# Patient Record
Sex: FEMALE | Race: WHITE | NOT HISPANIC OR LATINO | Employment: OTHER | URBAN - METROPOLITAN AREA
[De-identification: names, ages, dates, MRNs, and addresses within clinical notes are randomized per-mention and may not be internally consistent; named-entity substitution may affect disease eponyms.]

---

## 2017-07-06 DIAGNOSIS — I65.23 BILATERAL CAROTID ARTERY OCCLUSION: Primary | ICD-10-CM

## 2017-07-20 ENCOUNTER — OFFICE VISIT (OUTPATIENT)
Dept: VASCULAR SURGERY | Facility: CLINIC | Age: 72
End: 2017-07-20
Payer: MEDICARE

## 2017-07-20 ENCOUNTER — HOSPITAL ENCOUNTER (OUTPATIENT)
Dept: VASCULAR SURGERY | Facility: CLINIC | Age: 72
Discharge: HOME | End: 2017-07-20
Attending: SURGERY
Payer: MEDICARE

## 2017-07-20 VITALS
WEIGHT: 222 LBS | TEMPERATURE: 98 F | HEIGHT: 61 IN | DIASTOLIC BLOOD PRESSURE: 73 MMHG | BODY MASS INDEX: 41.91 KG/M2 | SYSTOLIC BLOOD PRESSURE: 184 MMHG | HEART RATE: 83 BPM

## 2017-07-20 DIAGNOSIS — I65.23 BILATERAL CAROTID ARTERY OCCLUSION: ICD-10-CM

## 2017-07-20 DIAGNOSIS — I65.22 ASYMPTOMATIC STENOSIS OF LEFT CAROTID ARTERY: Primary | ICD-10-CM

## 2017-07-20 PROCEDURE — 93880 EXTRACRANIAL BILAT STUDY: CPT | Mod: S$GLB,,, | Performed by: SURGERY

## 2017-07-20 PROCEDURE — 99999 PR PBB SHADOW E&M-EST. PATIENT-LVL III: CPT | Mod: PBBFAC,,, | Performed by: SURGERY

## 2017-07-20 PROCEDURE — 1125F AMNT PAIN NOTED PAIN PRSNT: CPT | Mod: S$GLB,,, | Performed by: SURGERY

## 2017-07-20 PROCEDURE — 1159F MED LIST DOCD IN RCRD: CPT | Mod: S$GLB,,, | Performed by: SURGERY

## 2017-07-20 PROCEDURE — 1157F ADVNC CARE PLAN IN RCRD: CPT | Mod: S$GLB,,, | Performed by: SURGERY

## 2017-07-20 PROCEDURE — 99214 OFFICE O/P EST MOD 30 MIN: CPT | Mod: S$GLB,,, | Performed by: SURGERY

## 2017-07-20 RX ORDER — PEN NEEDLE, DIABETIC 30 GX 1/3"
NEEDLE, DISPOSABLE MISCELLANEOUS
COMMUNITY
Start: 2017-07-19 | End: 2017-08-24 | Stop reason: SDUPTHER

## 2017-07-20 RX ORDER — TIZANIDINE HYDROCHLORIDE 4 MG/1
4 CAPSULE, GELATIN COATED ORAL DAILY
COMMUNITY
Start: 2017-06-02 | End: 2018-03-01 | Stop reason: SDUPTHER

## 2017-07-20 RX ORDER — LEVOTHYROXINE SODIUM 75 UG/1
TABLET ORAL
COMMUNITY
Start: 2017-07-16 | End: 2017-10-12 | Stop reason: SDUPTHER

## 2017-07-20 RX ORDER — DULAGLUTIDE 1.5 MG/.5ML
INJECTION, SOLUTION SUBCUTANEOUS
COMMUNITY
Start: 2017-07-16 | End: 2017-10-12 | Stop reason: SDUPTHER

## 2017-07-20 RX ORDER — CITALOPRAM 20 MG/1
20 TABLET, FILM COATED ORAL DAILY
COMMUNITY
Start: 2017-06-02 | End: 2018-04-10 | Stop reason: SDUPTHER

## 2017-07-20 NOTE — PROGRESS NOTES
Patient ID: Melinda Knight is a 71 y.o. female.    I. HISTORY     Chief Complaint: 1 year f/u for left CEA on 3/24/16    HPI  Melinda Knight is a 71 y.o. female with DM, HTN here for 1 year f/u s/p Left CEA on 3/24/16.  She reports continued numbness to area around the left ear which persists s/p Left CEA, but no pain. No TIA, no stroke, no sudden loss of vision.  She uses a wheelchair when traveling long distances but otherwise she is ambulatory. She is taking  mg and statin.    PMH: Insulin dependent diabetes, hyperlipidemia, HTN.  Denies MI  PSH: cholecystectomy, tubal ligation  SH: Denies tobacco use    Review of Systems   Constitution: Negative.   HENT: Negative.    Eyes: Negative.    Cardiovascular: Negative.    Respiratory: Negative.    Endocrine: Negative.    Hematologic/Lymphatic: Negative.    Skin: Negative.    Musculoskeletal: Negative for muscle weakness.   Gastrointestinal: Negative.    Genitourinary: Negative.    Neurological: Positive for dizziness and light-headedness (when she stands from sitting or laying). Negative for brief paralysis, disturbances in coordination, focal weakness, loss of balance, numbness, paresthesias, seizures, sensory change and vertigo.   Psychiatric/Behavioral: Negative for altered mental status.       II. PHYSICAL EXAM     Physical Exam   Constitutional: She is oriented to person, place, and time. She appears well-developed and well-nourished. No distress.   HENT:   Head: Normocephalic and atraumatic.   Eyes: Conjunctivae and EOM are normal. No scleral icterus.   Neck: Neck supple. No JVD present. Carotid bruit is not present.   Cardiovascular: Normal rate, regular rhythm, normal heart sounds and intact distal pulses.    No murmur heard.  Pulmonary/Chest: Effort normal and breath sounds normal. No respiratory distress. She has no wheezes.   Abdominal: Soft. She exhibits no distension.   Musculoskeletal: Normal range of motion. She exhibits no edema.   Neurological:  She is alert and oriented to person, place, and time.   Skin: Skin is warm and dry.   Psychiatric: She has a normal mood and affect.          III. ASSESSMENT & PLAN (MEDICAL DECISION MAKING)     1. Asymptomatic stenosis of left carotid artery        Imaging Results:  Carotid Duplex:   R L   1-39% Endarterectomized     Assessment/Diagnosis and Plan:  Melinda Knight is a 71 y.o. female here for 1 year f/u for left CEA. Denies any history of arm or leg numbness or weakness, facial droop, vision changes or slurred speech.Remote history of left hemispheric stroke in 2003 but no intervention was offered.    1. F/u in 1 year with  Carotid duplex or sooner if issues  2. Continue ASA and statin    JOSE Mendiola, APRN, FNP-BC  Nurse Practitioner  Vascular and Endovascular Surgery        Vascular Surgery Staff  I have seen and examined the patient and reviewed the NPs note. I agree with her assessment and plan.    Melinda Knight is a 71 y.o. female here for follow up of asymptomatic carotid stenosis.   Denies any recent history of arm or leg numbness or weakness, facial droop, vision changes or slurred speech.   No significant complaints today.    Continue yearly follow up      Sofia Way MD FACS [facility]  Vascular & Endovascular Surgery

## 2017-08-25 RX ORDER — PEN NEEDLE, DIABETIC 30 GX 1/3"
NEEDLE, DISPOSABLE MISCELLANEOUS
Qty: 200 EACH | Refills: 3 | Status: SHIPPED | OUTPATIENT
Start: 2017-08-25 | End: 2018-08-15 | Stop reason: SDUPTHER

## 2017-10-12 RX ORDER — INSULIN DETEMIR 100 [IU]/ML
INJECTION, SOLUTION SUBCUTANEOUS
Qty: 15 ML | Refills: 11 | Status: SHIPPED | OUTPATIENT
Start: 2017-10-12 | End: 2018-11-03 | Stop reason: SDUPTHER

## 2017-10-12 RX ORDER — LEVOTHYROXINE SODIUM 75 UG/1
TABLET ORAL
Qty: 90 TABLET | Refills: 1 | Status: SHIPPED | OUTPATIENT
Start: 2017-10-12 | End: 2018-04-10 | Stop reason: SDUPTHER

## 2017-10-12 RX ORDER — DULAGLUTIDE 1.5 MG/.5ML
INJECTION, SOLUTION SUBCUTANEOUS
Qty: 12 SYRINGE | Refills: 1 | Status: SHIPPED | OUTPATIENT
Start: 2017-10-12 | End: 2018-03-23 | Stop reason: SDUPTHER

## 2017-10-12 RX ORDER — LOSARTAN POTASSIUM AND HYDROCHLOROTHIAZIDE 25; 100 MG/1; MG/1
TABLET ORAL
Qty: 90 TABLET | Refills: 1 | Status: SHIPPED | OUTPATIENT
Start: 2017-10-12 | End: 2017-11-15 | Stop reason: SDUPTHER

## 2017-10-12 RX ORDER — DIPHENOXYLATE HYDROCHLORIDE AND ATROPINE SULFATE 2.5; .025 MG/1; MG/1
TABLET ORAL
Qty: 30 TABLET | Refills: 0 | Status: SHIPPED | OUTPATIENT
Start: 2017-10-12 | End: 2018-07-11 | Stop reason: SDUPTHER

## 2017-10-16 ENCOUNTER — TELEPHONE (OUTPATIENT)
Dept: PRIMARY CARE CLINIC | Facility: CLINIC | Age: 72
End: 2017-10-16

## 2017-10-16 NOTE — TELEPHONE ENCOUNTER
----- Message from Monse Sims sent at 10/16/2017 12:22 PM CDT -----  Contact: daughter- Selina  Patient needs refill on 3 meds, however Selina didn't know the name of them. Please call back at [phone] (home)     [facility]  [place] - [address]  [address] [place] [address]  Phone: [phone] Fax: [phone]

## 2017-10-18 ENCOUNTER — TELEPHONE (OUTPATIENT)
Dept: PRIMARY CARE CLINIC | Facility: CLINIC | Age: 72
End: 2017-10-18

## 2017-10-18 ENCOUNTER — PATIENT MESSAGE (OUTPATIENT)
Dept: PRIMARY CARE CLINIC | Facility: CLINIC | Age: 72
End: 2017-10-18

## 2017-10-18 NOTE — TELEPHONE ENCOUNTER
Patient has been called multiple in regards to medication refills and rx being at pharmacy with no success replied to patient via portal

## 2017-10-18 NOTE — TELEPHONE ENCOUNTER
Called [facility] twice and replied through myportal, called [facility] pharmacy states trulicty is ready for  and hyzar was just filled and both rx have refills

## 2017-10-31 ENCOUNTER — PATIENT MESSAGE (OUTPATIENT)
Dept: PRIMARY CARE CLINIC | Facility: CLINIC | Age: 72
End: 2017-10-31

## 2017-11-15 ENCOUNTER — OFFICE VISIT (OUTPATIENT)
Dept: PRIMARY CARE CLINIC | Facility: CLINIC | Age: 72
End: 2017-11-15
Payer: MEDICARE

## 2017-11-15 VITALS
OXYGEN SATURATION: 98 % | SYSTOLIC BLOOD PRESSURE: 157 MMHG | HEART RATE: 78 BPM | RESPIRATION RATE: 18 BRPM | TEMPERATURE: 98 F | HEIGHT: 61 IN | WEIGHT: 225 LBS | BODY MASS INDEX: 42.48 KG/M2 | DIASTOLIC BLOOD PRESSURE: 70 MMHG

## 2017-11-15 DIAGNOSIS — Z23 NEED FOR VACCINATION: ICD-10-CM

## 2017-11-15 DIAGNOSIS — N18.30 TYPE 2 DIABETES MELLITUS WITH STAGE 3 CHRONIC KIDNEY DISEASE, WITH LONG-TERM CURRENT USE OF INSULIN: Chronic | ICD-10-CM

## 2017-11-15 DIAGNOSIS — Z79.4 TYPE 2 DIABETES MELLITUS WITH STAGE 3 CHRONIC KIDNEY DISEASE, WITH LONG-TERM CURRENT USE OF INSULIN: Chronic | ICD-10-CM

## 2017-11-15 DIAGNOSIS — E78.5 HYPERLIPIDEMIA, UNSPECIFIED HYPERLIPIDEMIA TYPE: ICD-10-CM

## 2017-11-15 DIAGNOSIS — E11.22 TYPE 2 DIABETES MELLITUS WITH STAGE 3 CHRONIC KIDNEY DISEASE, WITH LONG-TERM CURRENT USE OF INSULIN: Chronic | ICD-10-CM

## 2017-11-15 DIAGNOSIS — M15.9 POLYARTICULAR OSTEOARTHRITIS: Primary | Chronic | ICD-10-CM

## 2017-11-15 DIAGNOSIS — N18.30 CKD (CHRONIC KIDNEY DISEASE) STAGE 3, GFR 30-59 ML/MIN: ICD-10-CM

## 2017-11-15 DIAGNOSIS — I10 ESSENTIAL HYPERTENSION: ICD-10-CM

## 2017-11-15 PROCEDURE — 99499 UNLISTED E&M SERVICE: CPT | Mod: S$GLB,,, | Performed by: FAMILY MEDICINE

## 2017-11-15 PROCEDURE — 99999 PR PBB SHADOW E&M-EST. PATIENT-LVL III: CPT | Mod: PBBFAC,,, | Performed by: FAMILY MEDICINE

## 2017-11-15 PROCEDURE — G0008 ADMIN INFLUENZA VIRUS VAC: HCPCS | Mod: S$GLB,,, | Performed by: FAMILY MEDICINE

## 2017-11-15 PROCEDURE — 99214 OFFICE O/P EST MOD 30 MIN: CPT | Mod: 25,S$GLB,, | Performed by: FAMILY MEDICINE

## 2017-11-15 PROCEDURE — 90662 IIV NO PRSV INCREASED AG IM: CPT | Mod: S$GLB,,, | Performed by: FAMILY MEDICINE

## 2017-11-15 RX ORDER — HYDROCODONE BITARTRATE AND ACETAMINOPHEN 7.5; 325 MG/1; MG/1
1 TABLET ORAL EVERY 8 HOURS PRN
Qty: 60 TABLET | Refills: 0 | Status: SHIPPED | OUTPATIENT
Start: 2017-11-15 | End: 2017-12-20 | Stop reason: SDUPTHER

## 2017-11-15 RX ORDER — HYDROCODONE BITARTRATE AND ACETAMINOPHEN 5; 325 MG/1; MG/1
1 TABLET ORAL DAILY PRN
Qty: 30 TABLET | Refills: 0 | Status: CANCELLED | OUTPATIENT
Start: 2017-11-15

## 2017-11-15 RX ORDER — DICYCLOMINE HYDROCHLORIDE 20 MG/1
20 TABLET ORAL DAILY PRN
Qty: 90 TABLET | Refills: 1 | Status: SHIPPED | OUTPATIENT
Start: 2017-11-15 | End: 2018-02-14 | Stop reason: SDUPTHER

## 2017-11-15 RX ORDER — LOSARTAN POTASSIUM AND HYDROCHLOROTHIAZIDE 25; 100 MG/1; MG/1
1 TABLET ORAL DAILY
Qty: 90 TABLET | Refills: 1 | Status: SHIPPED | OUTPATIENT
Start: 2017-11-15 | End: 2017-12-12 | Stop reason: SDUPTHER

## 2017-11-15 RX ORDER — DICYCLOMINE HYDROCHLORIDE 20 MG/1
20 TABLET ORAL EVERY 6 HOURS
COMMUNITY
End: 2017-11-15 | Stop reason: SDUPTHER

## 2017-11-15 NOTE — PROGRESS NOTES
"Subjective:       Patient ID: Melinda Knight is a 72 y.o. female.    Chief Complaint: Medication Refill and Flu Vaccine    Chronic polyarticular arthritis, primarily localizing to the knees.  No recent falls or injury.  Takes about 1 pain pill a day, feels like sometimes this does not last her all day.  Requesting higher dose if possible.  Diabetes-continues to maintain good glycemic control.  Fasting blood sugars are in the low 100s.  No significant hypoglycemia or other adverse events.  Hypertension-blood pressure stable on current regimen.  No adverse side effects.  Compliant with meds.      Review of Systems   Constitutional: Negative for fever and weight loss.   HENT: Negative for trouble swallowing.    Eyes: Negative for visual disturbance.   Cardiovascular: Negative for chest pain.   Gastrointestinal: Negative for abdominal pain and bowel incontinence.   Genitourinary: Negative for bladder incontinence, dysuria, hematuria and pelvic pain.   Musculoskeletal: Positive for arthralgias and back pain.   Skin: Negative for rash.   Neurological: Positive for weakness. Negative for tingling, numbness, headaches and paresthesias.   Psychiatric/Behavioral: Negative for agitation.       Objective:      Vitals:    11/15/17 0933   BP: (!) 157/70   BP Location: Left arm   Patient Position: Sitting   BP Method: Large (Automatic)   Pulse: 78   Resp: 18   Temp: 97.8 °F (36.6 °C)   TempSrc: Oral   SpO2: 98%   Weight: 102.1 kg (225 lb)   Height: 5' 1" (1.549 m)     Physical Exam   Constitutional: She is oriented to person, place, and time. She appears well-developed and well-nourished.   HENT:   Head: Normocephalic and atraumatic.   Neck: Neck supple. No JVD present.   Cardiovascular: Normal rate, regular rhythm and normal heart sounds.    Pulmonary/Chest: Effort normal and breath sounds normal.   Abdominal: She exhibits no distension.   Musculoskeletal: She exhibits no edema.        Right knee: She exhibits decreased range of " motion. She exhibits no deformity.        Left knee: She exhibits decreased range of motion. She exhibits no effusion.   Neurological: She is alert and oriented to person, place, and time.   Skin: Skin is warm and dry.   Psychiatric: She has a normal mood and affect.   Vitals reviewed.      Assessment:       1. Polyarticular osteoarthritis Sub-optimally controlled   2. Type 2 diabetes mellitus with stage 3 chronic kidney disease, with long-term current use of insulin Stable   3. CKD (chronic kidney disease) stage 3, GFR 30-59 ml/min    4. Hyperlipidemia, unspecified hyperlipidemia type    5. Essential hypertension Well controlled   6. Need for vaccination        Plan:       Polyarticular osteoarthritis  -     hydrocodone-acetaminophen 7.5-325mg (NORCO) 7.5-325 mg per tablet; Take 1 tablet by mouth every 8 (eight) hours as needed for Pain.  Dispense: 60 tablet; Refill: 0    Type 2 diabetes mellitus with stage 3 chronic kidney disease, with long-term current use of insulin  Comments:  Continue current meds, repeat labs in 3 months  Orders:  -     Hemoglobin A1c; Future; Expected date: 02/15/2018  -     Microalbumin/creatinine urine ratio; Future; Expected date: 02/15/2018    CKD (chronic kidney disease) stage 3, GFR 30-59 ml/min  -     CBC auto differential; Future; Expected date: 02/15/2018    Hyperlipidemia, unspecified hyperlipidemia type  -     Comprehensive metabolic panel; Future; Expected date: 02/15/2018  -     Lipid panel; Future; Expected date: 02/15/2018    Essential hypertension  Comments:  Continue current meds  Orders:  -     losartan-hydrochlorothiazide 100-25 mg (HYZAAR) 100-25 mg per tablet; Take 1 tablet by mouth once daily.  Dispense: 90 tablet; Refill: 1    Need for vaccination  -     Influenza - High Dose (65+) (PF) (IM)    Other orders  -     Cancel: hydrocodone-acetaminophen 5-325mg (NORCO) 5-325 mg per tablet; Take 1 tablet by mouth daily as needed for Pain.  Dispense: 30 tablet; Refill: 0  -    "  dicyclomine (BENTYL) 20 mg tablet; Take 1 tablet (20 mg total) by mouth daily as needed (GI cramps).  Dispense: 90 tablet; Refill: 1      Medication List with Changes/Refills   New Medications    HYDROCODONE-ACETAMINOPHEN 7.5-325MG (NORCO) 7.5-325 MG PER TABLET    Take 1 tablet by mouth every 8 (eight) hours as needed for Pain.   Current Medications    AMLODIPINE (NORVASC) 10 MG TABLET    10 mg once daily.    ASPIRIN 325 MG TABLET    Take 325 mg by mouth every evening.     ATORVASTATIN (LIPITOR) 40 MG TABLET    40 mg once daily.    CITALOPRAM (CELEXA) 20 MG TABLET    Take 20 mg by mouth once daily.     DIPHENOXYLATE-ATROPINE 2.5-0.025 MG (LOMOTIL) 2.5-0.025 MG PER TABLET    TAKE ONE TABLET BY MOUTH EVERY 6 HOURS AS NEEDED FOR DIARRHEA    INSULIN ASPART (NOVOLOG) 100 UNIT/ML INJECTION    Inject 8 Units into the skin 3 (three) times daily before meals.    LEVEMIR FLEXTOUCH 100 UNIT/ML (3 ML) INPN PEN    INJECT 30 UNITS SUBCUTANEOUSLY ONCE DAILY AT BEDTIME    LEVOTHYROXINE (SYNTHROID) 75 MCG TABLET    TAKE ONE TABLET BY MOUTH ONCE DAILY    MULTIVITAMIN ORAL    Take by mouth. [place] Diabetes Pack    NOVOTWIST 32 GAUGE X 1/5" NDLE    USE TWICE DAILY AS DIRECTED    OMEPRAZOLE (PRILOSEC) 20 MG CAPSULE    Take 20 mg by mouth once daily.     TIZANIDINE 4 MG CAP    Take 4 mg by mouth once daily.     TRULICITY 1.5 MG/0.5 ML PNIJ    INJECT ONE SYRINGE SUBCUTANEOUSLY ONCE A WEEK   Changed and/or Refilled Medications    Modified Medication Previous Medication    DICYCLOMINE (BENTYL) 20 MG TABLET dicyclomine (BENTYL) 20 mg tablet       Take 1 tablet (20 mg total) by mouth daily as needed (GI cramps).    Take 20 mg by mouth every 6 (six) hours.    LOSARTAN-HYDROCHLOROTHIAZIDE 100-25 MG (HYZAAR) 100-25 MG PER TABLET losartan-hydrochlorothiazide 100-25 mg (HYZAAR) 100-25 mg per tablet       Take 1 tablet by mouth once daily.    TAKE ONE TABLET BY MOUTH ONCE DAILY   Discontinued Medications    CITALOPRAM (CELEXA) 40 MG TABLET "    Take 40 mg by mouth once daily.    HYDROCODONE-ACETAMINOPHEN 5-325MG (NORCO) 5-325 MG PER TABLET    Take 1 tablet by mouth daily as needed for Pain.     LEVOTHYROXINE (SYNTHROID) 50 MCG TABLET    Take 0.05 mcg by mouth once daily.    NOVOLOG FLEXPEN 100 UNIT/ML INPN PEN        VICTOZA 3-MARIUM 0.6 MG/0.1 ML (18 MG/3 ML) PNIJ

## 2017-12-12 DIAGNOSIS — I10 ESSENTIAL HYPERTENSION: ICD-10-CM

## 2017-12-12 RX ORDER — LOSARTAN POTASSIUM AND HYDROCHLOROTHIAZIDE 25; 100 MG/1; MG/1
1 TABLET ORAL DAILY
Qty: 90 TABLET | Refills: 1 | Status: SHIPPED | OUTPATIENT
Start: 2017-12-12 | End: 2017-12-13 | Stop reason: SDUPTHER

## 2017-12-13 RX ORDER — LOSARTAN POTASSIUM AND HYDROCHLOROTHIAZIDE 25; 100 MG/1; MG/1
1 TABLET ORAL DAILY
Qty: 90 TABLET | Refills: 1 | Status: SHIPPED | OUTPATIENT
Start: 2017-12-13 | End: 2019-01-21 | Stop reason: SDUPTHER

## 2017-12-20 DIAGNOSIS — M15.9 POLYARTICULAR OSTEOARTHRITIS: Chronic | ICD-10-CM

## 2017-12-20 RX ORDER — HYDROCODONE BITARTRATE AND ACETAMINOPHEN 7.5; 325 MG/1; MG/1
1 TABLET ORAL EVERY 8 HOURS PRN
Qty: 60 TABLET | Refills: 0 | Status: SHIPPED | OUTPATIENT
Start: 2017-12-20 | End: 2018-02-08 | Stop reason: SDUPTHER

## 2018-02-08 DIAGNOSIS — M15.9 POLYARTICULAR OSTEOARTHRITIS: Chronic | ICD-10-CM

## 2018-02-08 RX ORDER — HYDROCODONE BITARTRATE AND ACETAMINOPHEN 7.5; 325 MG/1; MG/1
1 TABLET ORAL EVERY 8 HOURS PRN
Qty: 60 TABLET | Refills: 0 | Status: SHIPPED | OUTPATIENT
Start: 2018-02-08 | End: 2018-02-12 | Stop reason: SDUPTHER

## 2018-02-12 DIAGNOSIS — M15.9 POLYARTICULAR OSTEOARTHRITIS: Chronic | ICD-10-CM

## 2018-02-12 RX ORDER — HYDROCODONE BITARTRATE AND ACETAMINOPHEN 7.5; 325 MG/1; MG/1
1 TABLET ORAL EVERY 8 HOURS PRN
Qty: 60 TABLET | Refills: 0 | Status: SHIPPED | OUTPATIENT
Start: 2018-02-12 | End: 2018-03-29 | Stop reason: SDUPTHER

## 2018-02-14 RX ORDER — DICYCLOMINE HYDROCHLORIDE 20 MG/1
20 TABLET ORAL DAILY PRN
Qty: 90 TABLET | Refills: 1 | Status: SHIPPED | OUTPATIENT
Start: 2018-02-14 | End: 2018-11-05 | Stop reason: SDUPTHER

## 2018-03-01 RX ORDER — TIZANIDINE HYDROCHLORIDE 4 MG/1
CAPSULE, GELATIN COATED ORAL
Qty: 90 CAPSULE | Refills: 1 | Status: SHIPPED | OUTPATIENT
Start: 2018-03-01 | End: 2018-10-03

## 2018-03-12 ENCOUNTER — TELEPHONE (OUTPATIENT)
Dept: PRIMARY CARE CLINIC | Facility: CLINIC | Age: 73
End: 2018-03-12

## 2018-03-12 NOTE — TELEPHONE ENCOUNTER
----- Message from Shannon Richardson sent at 3/12/2018 10:55 AM CDT -----    Calling  To  Book    Blood  work    Orders // please call  For  Details /[phone]    Daughter ,fiorella

## 2018-03-16 ENCOUNTER — CLINICAL SUPPORT (OUTPATIENT)
Dept: PRIMARY CARE CLINIC | Facility: CLINIC | Age: 73
End: 2018-03-16
Payer: MEDICARE

## 2018-03-16 DIAGNOSIS — E78.5 HYPERLIPIDEMIA, UNSPECIFIED HYPERLIPIDEMIA TYPE: ICD-10-CM

## 2018-03-16 DIAGNOSIS — E11.22 TYPE 2 DIABETES MELLITUS WITH STAGE 3 CHRONIC KIDNEY DISEASE, WITH LONG-TERM CURRENT USE OF INSULIN: Chronic | ICD-10-CM

## 2018-03-16 DIAGNOSIS — Z79.4 TYPE 2 DIABETES MELLITUS WITH STAGE 3 CHRONIC KIDNEY DISEASE, WITH LONG-TERM CURRENT USE OF INSULIN: Chronic | ICD-10-CM

## 2018-03-16 DIAGNOSIS — N18.30 TYPE 2 DIABETES MELLITUS WITH STAGE 3 CHRONIC KIDNEY DISEASE, WITH LONG-TERM CURRENT USE OF INSULIN: Chronic | ICD-10-CM

## 2018-03-16 DIAGNOSIS — N18.30 CKD (CHRONIC KIDNEY DISEASE) STAGE 3, GFR 30-59 ML/MIN: ICD-10-CM

## 2018-03-16 LAB
ALBUMIN SERPL BCP-MCNC: 3.7 G/DL
ALP SERPL-CCNC: 111 U/L
ALT SERPL W/O P-5'-P-CCNC: 11 U/L
ANION GAP SERPL CALC-SCNC: 8 MMOL/L
AST SERPL-CCNC: 18 U/L
BASOPHILS # BLD AUTO: 0.05 K/UL
BASOPHILS NFR BLD: 0.7 %
BILIRUB SERPL-MCNC: 0.5 MG/DL
BUN SERPL-MCNC: 31 MG/DL
CALCIUM SERPL-MCNC: 10.8 MG/DL
CHLORIDE SERPL-SCNC: 103 MMOL/L
CHOLEST SERPL-MCNC: 168 MG/DL
CHOLEST/HDLC SERPL: 3.1 {RATIO}
CO2 SERPL-SCNC: 30 MMOL/L
CREAT SERPL-MCNC: 1.3 MG/DL
CREAT UR-MCNC: 89 MG/DL
DIFFERENTIAL METHOD: ABNORMAL
EOSINOPHIL # BLD AUTO: 0.3 K/UL
EOSINOPHIL NFR BLD: 3.9 %
ERYTHROCYTE [DISTWIDTH] IN BLOOD BY AUTOMATED COUNT: 12.8 %
EST. GFR  (AFRICAN AMERICAN): 48 ML/MIN/1.73 M^2
EST. GFR  (NON AFRICAN AMERICAN): 41.7 ML/MIN/1.73 M^2
ESTIMATED AVG GLUCOSE: 123 MG/DL
GLUCOSE SERPL-MCNC: 120 MG/DL
HBA1C MFR BLD HPLC: 5.9 %
HCT VFR BLD AUTO: 45 %
HDLC SERPL-MCNC: 54 MG/DL
HDLC SERPL: 32.1 %
HGB BLD-MCNC: 13.8 G/DL
IMM GRANULOCYTES # BLD AUTO: 0.02 K/UL
IMM GRANULOCYTES NFR BLD AUTO: 0.3 %
LDLC SERPL CALC-MCNC: 98.8 MG/DL
LYMPHOCYTES # BLD AUTO: 2.4 K/UL
LYMPHOCYTES NFR BLD: 33.3 %
MCH RBC QN AUTO: 30.8 PG
MCHC RBC AUTO-ENTMCNC: 30.7 G/DL
MCV RBC AUTO: 100 FL
MICROALBUMIN UR DL<=1MG/L-MCNC: 45 UG/ML
MICROALBUMIN/CREATININE RATIO: 50.6 UG/MG
MONOCYTES # BLD AUTO: 0.6 K/UL
MONOCYTES NFR BLD: 7.9 %
NEUTROPHILS # BLD AUTO: 3.8 K/UL
NEUTROPHILS NFR BLD: 53.9 %
NONHDLC SERPL-MCNC: 114 MG/DL
NRBC BLD-RTO: 0 /100 WBC
PLATELET # BLD AUTO: 265 K/UL
PMV BLD AUTO: 11.6 FL
POTASSIUM SERPL-SCNC: 4.7 MMOL/L
PROT SERPL-MCNC: 7.3 G/DL
RBC # BLD AUTO: 4.48 M/UL
SODIUM SERPL-SCNC: 141 MMOL/L
TRIGL SERPL-MCNC: 76 MG/DL
WBC # BLD AUTO: 7.11 K/UL

## 2018-03-16 PROCEDURE — 80053 COMPREHEN METABOLIC PANEL: CPT

## 2018-03-16 PROCEDURE — 83036 HEMOGLOBIN GLYCOSYLATED A1C: CPT

## 2018-03-16 PROCEDURE — 80061 LIPID PANEL: CPT

## 2018-03-16 PROCEDURE — 85025 COMPLETE CBC W/AUTO DIFF WBC: CPT

## 2018-03-16 PROCEDURE — 99999 PR PBB SHADOW E&M-EST. PATIENT-LVL II: CPT | Mod: PBBFAC,,,

## 2018-03-16 PROCEDURE — 82043 UR ALBUMIN QUANTITATIVE: CPT

## 2018-03-23 ENCOUNTER — OFFICE VISIT (OUTPATIENT)
Dept: PRIMARY CARE CLINIC | Facility: CLINIC | Age: 73
End: 2018-03-23
Payer: MEDICARE

## 2018-03-23 VITALS
WEIGHT: 221 LBS | SYSTOLIC BLOOD PRESSURE: 123 MMHG | DIASTOLIC BLOOD PRESSURE: 63 MMHG | HEIGHT: 61 IN | RESPIRATION RATE: 18 BRPM | HEART RATE: 92 BPM | OXYGEN SATURATION: 94 % | BODY MASS INDEX: 41.72 KG/M2 | TEMPERATURE: 98 F

## 2018-03-23 DIAGNOSIS — Z13.820 OSTEOPOROSIS SCREENING: ICD-10-CM

## 2018-03-23 DIAGNOSIS — Z12.31 ENCOUNTER FOR SCREENING MAMMOGRAM FOR BREAST CANCER: ICD-10-CM

## 2018-03-23 DIAGNOSIS — Z11.59 NEED FOR HEPATITIS C SCREENING TEST: ICD-10-CM

## 2018-03-23 DIAGNOSIS — E03.9 HYPOTHYROIDISM (ACQUIRED): Chronic | ICD-10-CM

## 2018-03-23 DIAGNOSIS — E11.42 TYPE 2 DIABETES MELLITUS WITH DIABETIC POLYNEUROPATHY, WITH LONG-TERM CURRENT USE OF INSULIN: Primary | Chronic | ICD-10-CM

## 2018-03-23 DIAGNOSIS — E66.01 MORBID OBESITY WITH BMI OF 40.0-44.9, ADULT: Chronic | ICD-10-CM

## 2018-03-23 DIAGNOSIS — N18.30 CKD (CHRONIC KIDNEY DISEASE) STAGE 3, GFR 30-59 ML/MIN: ICD-10-CM

## 2018-03-23 DIAGNOSIS — E78.5 HYPERLIPIDEMIA, UNSPECIFIED HYPERLIPIDEMIA TYPE: ICD-10-CM

## 2018-03-23 DIAGNOSIS — Z12.11 COLON CANCER SCREENING: ICD-10-CM

## 2018-03-23 DIAGNOSIS — Z79.4 TYPE 2 DIABETES MELLITUS WITH DIABETIC POLYNEUROPATHY, WITH LONG-TERM CURRENT USE OF INSULIN: Primary | Chronic | ICD-10-CM

## 2018-03-23 PROCEDURE — 3044F HG A1C LEVEL LT 7.0%: CPT | Mod: CPTII,S$GLB,, | Performed by: FAMILY MEDICINE

## 2018-03-23 PROCEDURE — 3078F DIAST BP <80 MM HG: CPT | Mod: CPTII,S$GLB,, | Performed by: FAMILY MEDICINE

## 2018-03-23 PROCEDURE — 99214 OFFICE O/P EST MOD 30 MIN: CPT | Mod: S$GLB,,, | Performed by: FAMILY MEDICINE

## 2018-03-23 PROCEDURE — 99499 UNLISTED E&M SERVICE: CPT | Mod: S$GLB,,, | Performed by: FAMILY MEDICINE

## 2018-03-23 PROCEDURE — 3074F SYST BP LT 130 MM HG: CPT | Mod: CPTII,S$GLB,, | Performed by: FAMILY MEDICINE

## 2018-03-23 PROCEDURE — 99999 PR PBB SHADOW E&M-EST. PATIENT-LVL IV: CPT | Mod: PBBFAC,,, | Performed by: FAMILY MEDICINE

## 2018-03-23 NOTE — PROGRESS NOTES
"Subjective:       Patient ID: Melinda Knight is a 72 y.o. female.    Chief Complaint: Results (Patient is here to dicuss her lab results )    Thinks her right leg gave out while walking outside last week, fell on her back. Has had increased lower back pain since then, no focal paresthesia. Neuropathy has been acting up more lately, gets relief from pain meds.  Recent labs look great, excellent glycemic control  Lipid profile excellent.  Renal function stable  Patient also complains of dizziness upon standing for the past few years.  Symptoms resolve when she sits or lies down.  Doesn't think she has fallen as a result of this, does make her uncomfortable at times.      Review of Systems   Constitutional: Negative for chills, fatigue and fever.   HENT: Negative for congestion.    Eyes: Negative for visual disturbance.   Respiratory: Negative for cough and shortness of breath.    Cardiovascular: Negative for chest pain.   Gastrointestinal: Negative for abdominal pain, nausea and vomiting.   Genitourinary: Negative for difficulty urinating.   Musculoskeletal: Positive for arthralgias, back pain and gait problem.   Skin: Negative for rash.   Neurological: Positive for dizziness. Negative for seizures and syncope.   Psychiatric/Behavioral: Negative for sleep disturbance.       Objective:      Vitals:    03/23/18 0843 03/23/18 0926 03/23/18 0927 03/23/18 0928   BP: 139/77 (!) 125/53 133/72 123/63   BP Location: Left arm Left arm Left arm Left arm   Patient Position: Sitting Lying Sitting Standing   BP Method: Large (Automatic) Large (Automatic) Large (Automatic) Large (Automatic)   Pulse: 83 86 96 92   Resp: 18      Temp: 98.4 °F (36.9 °C)      TempSrc: Oral      SpO2: (!) 94%      Weight: 100.2 kg (221 lb)      Height: 5' 1" (1.549 m)        Lab Results   Component Value Date    WBC 7.11 03/16/2018    HGB 13.8 03/16/2018    HCT 45.0 03/16/2018     03/16/2018    CHOL 168 03/16/2018    TRIG 76 03/16/2018    HDL 54 " 03/16/2018    ALT 11 03/16/2018    AST 18 03/16/2018     03/16/2018    K 4.7 03/16/2018     03/16/2018    CREATININE 1.3 03/16/2018    BUN 31 (H) 03/16/2018    CO2 30 (H) 03/16/2018    INR 1.0 03/18/2016    HGBA1C 5.9 (H) 03/16/2018     Physical Exam   Constitutional: She is oriented to person, place, and time. She appears well-developed and well-nourished.   HENT:   Head: Normocephalic and atraumatic.   Eyes: EOM are normal.   Neck: Neck supple. No JVD present. Carotid bruit is not present.   Cardiovascular: Normal rate, regular rhythm and normal heart sounds.    Pulmonary/Chest: Effort normal and breath sounds normal.   Abdominal: Soft. There is no tenderness.   Musculoskeletal: She exhibits no edema.   Neurological: She is alert and oriented to person, place, and time.   Skin: Skin is warm and dry.   Psychiatric: She has a normal mood and affect. Thought content normal.   Nursing note and vitals reviewed.      Assessment:       1. Type 2 diabetes mellitus with diabetic polyneuropathy, with long-term current use of insulin    2. CKD (chronic kidney disease) stage 3, GFR 30-59 ml/min    3. Hypothyroidism (acquired)    4. Morbid obesity with BMI of 40.0-44.9, adult    5. Need for hepatitis C screening test    6. Encounter for screening mammogram for breast cancer    7. Colon cancer screening    8. Osteoporosis screening    9. Hyperlipidemia, unspecified hyperlipidemia type        Plan:       Type 2 diabetes mellitus with diabetic polyneuropathy, with long-term current use of insulin  Comments:  Excellent glycemic control  Orders:  -     dulaglutide (TRULICITY) 1.5 mg/0.5 mL PnIj; Inject 1.5 mg into the skin every 7 days.  Dispense: 12 Syringe; Refill: 1  -     blood sugar diagnostic (BLOOD GLUCOSE TEST) Strp; 1 strip by Misc.(Non-Drug; Combo Route) route 2 (two) times daily. Prodigy  Dispense: 200 strip; Refill: 5  -     Hemoglobin A1c; Future; Expected date: 09/23/2018    CKD (chronic kidney disease)  stage 3, GFR 30-59 ml/min  Comments:  Stable renal function  Orders:  -     CBC auto differential; Future; Expected date: 09/23/2018  -     Comprehensive metabolic panel; Future; Expected date: 09/23/2018    Hypothyroidism (acquired)  -     TSH; Future; Expected date: 09/23/2018  -     T4, free; Future; Expected date: 09/23/2018  -     T3; Future; Expected date: 09/23/2018    Morbid obesity with BMI of 40.0-44.9, adult  Comments:  Encouraged patient to decrease carbohydrate intake    Need for hepatitis C screening test  -     Hepatitis C antibody; Future; Expected date: 03/23/2018    Encounter for screening mammogram for breast cancer  -     Mammo Digital Screening Bilat without CA; Future; Expected date: 04/06/2018    Colon cancer screening  Comments:  normal colonoscopy ~3 years ago    Osteoporosis screening  -     DXA Bone Density Spine And Hip; Future; Expected date: 03/23/2018    Hyperlipidemia, unspecified hyperlipidemia type  -     Lipid panel; Future; Expected date: 09/23/2018      Medication List with Changes/Refills   New Medications    BLOOD SUGAR DIAGNOSTIC (BLOOD GLUCOSE TEST) STRP    1 strip by Misc.(Non-Drug; Combo Route) route 2 (two) times daily. Prodigy   Current Medications    AMLODIPINE (NORVASC) 10 MG TABLET    10 mg once daily.    ASPIRIN 325 MG TABLET    Take 325 mg by mouth every evening.     ATORVASTATIN (LIPITOR) 40 MG TABLET    40 mg once daily.    BLOOD SUGAR DIAGNOSTIC (PRODIGY AUTOCODE TEST STRIPS MISC)    1 strip by Misc.(Non-Drug; Combo Route) route 2 (two) times daily.    CITALOPRAM (CELEXA) 20 MG TABLET    Take 20 mg by mouth once daily.     DICYCLOMINE (BENTYL) 20 MG TABLET    Take 1 tablet (20 mg total) by mouth daily as needed (GI cramps).    DIPHENOXYLATE-ATROPINE 2.5-0.025 MG (LOMOTIL) 2.5-0.025 MG PER TABLET    TAKE ONE TABLET BY MOUTH EVERY 6 HOURS AS NEEDED FOR DIARRHEA    HYDROCODONE-ACETAMINOPHEN 7.5-325MG (NORCO) 7.5-325 MG PER TABLET    Take 1 tablet by mouth every 8  "(eight) hours as needed for Pain.    INSULIN ASPART (NOVOLOG) 100 UNIT/ML INJECTION    Inject 8 Units into the skin 3 (three) times daily before meals.    LEVEMIR FLEXTOUCH 100 UNIT/ML (3 ML) INPN PEN    INJECT 30 UNITS SUBCUTANEOUSLY ONCE DAILY AT BEDTIME    LEVOTHYROXINE (SYNTHROID) 75 MCG TABLET    TAKE ONE TABLET BY MOUTH ONCE DAILY    LOSARTAN-HYDROCHLOROTHIAZIDE 100-25 MG (HYZAAR) 100-25 MG PER TABLET    Take 1 tablet by mouth once daily.    MULTIVITAMIN ORAL    Take by mouth. [place] Diabetes Pack    NOVOTWIST 32 GAUGE X 1/5" NDLE    USE TWICE DAILY AS DIRECTED    OMEPRAZOLE (PRILOSEC) 20 MG CAPSULE    Take 20 mg by mouth once daily.     TIZANIDINE 4 MG CAP    TAKE ONE CAPSULE BY MOUTH ONCE DAILY AS NEEDED FOR MUSCLE SPASM   Changed and/or Refilled Medications    Modified Medication Previous Medication    DULAGLUTIDE (TRULICITY) 1.5 MG/0.5 ML PNIJ TRULICITY 1.5 mg/0.5 mL PnIj       Inject 1.5 mg into the skin every 7 days.    INJECT ONE SYRINGE SUBCUTANEOUSLY ONCE A WEEK         "

## 2018-03-29 DIAGNOSIS — M15.9 POLYARTICULAR OSTEOARTHRITIS: Chronic | ICD-10-CM

## 2018-03-29 RX ORDER — HYDROCODONE BITARTRATE AND ACETAMINOPHEN 7.5; 325 MG/1; MG/1
1 TABLET ORAL EVERY 8 HOURS PRN
Qty: 60 TABLET | Refills: 0 | Status: SHIPPED | OUTPATIENT
Start: 2018-03-29 | End: 2018-04-27 | Stop reason: SDUPTHER

## 2018-04-05 DIAGNOSIS — M81.0 AGE-RELATED OSTEOPOROSIS WITHOUT CURRENT PATHOLOGICAL FRACTURE: ICD-10-CM

## 2018-04-05 RX ORDER — ALENDRONATE SODIUM 70 MG/1
70 TABLET ORAL
Qty: 12 TABLET | Refills: 3 | Status: SHIPPED | OUTPATIENT
Start: 2018-04-05 | End: 2018-08-27

## 2018-04-05 RX ORDER — TIZANIDINE 4 MG/1
4 TABLET ORAL EVERY 8 HOURS PRN
Qty: 60 TABLET | Refills: 3 | Status: ON HOLD | OUTPATIENT
Start: 2018-04-05 | End: 2018-12-26 | Stop reason: HOSPADM

## 2018-04-05 RX ORDER — TIZANIDINE HYDROCHLORIDE 4 MG/1
CAPSULE, GELATIN COATED ORAL
Qty: 90 CAPSULE | Refills: 1 | Status: CANCELLED | OUTPATIENT
Start: 2018-04-05

## 2018-04-05 NOTE — TELEPHONE ENCOUNTER
I spoke to the pharmacist and the capsules are not covered. Can you try sending over the tablets instead?

## 2018-04-10 RX ORDER — LEVOTHYROXINE SODIUM 75 UG/1
TABLET ORAL
Qty: 90 TABLET | Refills: 3 | Status: SHIPPED | OUTPATIENT
Start: 2018-04-10 | End: 2019-05-07 | Stop reason: SDUPTHER

## 2018-04-10 RX ORDER — CITALOPRAM 20 MG/1
TABLET, FILM COATED ORAL
Qty: 90 TABLET | Refills: 3 | Status: SHIPPED | OUTPATIENT
Start: 2018-04-10 | End: 2018-05-16 | Stop reason: SDUPTHER

## 2018-04-27 DIAGNOSIS — M15.9 POLYARTICULAR OSTEOARTHRITIS: Chronic | ICD-10-CM

## 2018-04-27 RX ORDER — HYDROCODONE BITARTRATE AND ACETAMINOPHEN 7.5; 325 MG/1; MG/1
1 TABLET ORAL EVERY 8 HOURS PRN
Qty: 60 TABLET | Refills: 0 | Status: SHIPPED | OUTPATIENT
Start: 2018-04-27 | End: 2018-05-28 | Stop reason: SDUPTHER

## 2018-05-16 RX ORDER — CITALOPRAM 20 MG/1
20 TABLET, FILM COATED ORAL DAILY
Qty: 90 TABLET | Refills: 3 | Status: SHIPPED | OUTPATIENT
Start: 2018-05-16 | End: 2019-06-12 | Stop reason: SDUPTHER

## 2018-05-28 DIAGNOSIS — M15.9 POLYARTICULAR OSTEOARTHRITIS: Chronic | ICD-10-CM

## 2018-05-29 RX ORDER — HYDROCODONE BITARTRATE AND ACETAMINOPHEN 7.5; 325 MG/1; MG/1
1 TABLET ORAL EVERY 8 HOURS PRN
Qty: 60 TABLET | Refills: 0 | Status: SHIPPED | OUTPATIENT
Start: 2018-05-29 | End: 2018-07-11 | Stop reason: SDUPTHER

## 2018-06-11 RX ORDER — AMLODIPINE BESYLATE 10 MG/1
TABLET ORAL
Qty: 90 TABLET | Refills: 3 | Status: SHIPPED | OUTPATIENT
Start: 2018-06-11 | End: 2018-06-15 | Stop reason: SDUPTHER

## 2018-06-15 RX ORDER — AMLODIPINE BESYLATE 10 MG/1
10 TABLET ORAL DAILY
Qty: 90 TABLET | Refills: 3 | Status: SHIPPED | OUTPATIENT
Start: 2018-06-15 | End: 2019-06-12 | Stop reason: SDUPTHER

## 2018-07-11 DIAGNOSIS — M15.9 POLYARTICULAR OSTEOARTHRITIS: Chronic | ICD-10-CM

## 2018-07-11 RX ORDER — DIPHENOXYLATE HYDROCHLORIDE AND ATROPINE SULFATE 2.5; .025 MG/1; MG/1
1 TABLET ORAL EVERY 6 HOURS PRN
Qty: 30 TABLET | Refills: 0 | Status: SHIPPED | OUTPATIENT
Start: 2018-07-11 | End: 2020-01-23 | Stop reason: SDUPTHER

## 2018-07-11 RX ORDER — ATORVASTATIN CALCIUM 40 MG/1
TABLET, FILM COATED ORAL
Qty: 90 TABLET | Refills: 3 | Status: SHIPPED | OUTPATIENT
Start: 2018-07-11 | End: 2019-07-31 | Stop reason: SDUPTHER

## 2018-07-11 RX ORDER — HYDROCODONE BITARTRATE AND ACETAMINOPHEN 7.5; 325 MG/1; MG/1
1 TABLET ORAL EVERY 8 HOURS PRN
Qty: 60 TABLET | Refills: 0 | Status: SHIPPED | OUTPATIENT
Start: 2018-07-11 | End: 2018-08-06 | Stop reason: SDUPTHER

## 2018-07-11 RX ORDER — ONDANSETRON 4 MG/1
TABLET, ORALLY DISINTEGRATING ORAL
Qty: 20 TABLET | Refills: 2 | Status: SHIPPED | OUTPATIENT
Start: 2018-07-11 | End: 2019-07-29 | Stop reason: SDUPTHER

## 2018-08-06 DIAGNOSIS — M15.9 POLYARTICULAR OSTEOARTHRITIS: Chronic | ICD-10-CM

## 2018-08-06 RX ORDER — HYDROCODONE BITARTRATE AND ACETAMINOPHEN 7.5; 325 MG/1; MG/1
1 TABLET ORAL EVERY 8 HOURS PRN
Qty: 60 TABLET | Refills: 0 | Status: SHIPPED | OUTPATIENT
Start: 2018-08-06 | End: 2018-09-05 | Stop reason: SDUPTHER

## 2018-08-27 ENCOUNTER — PATIENT MESSAGE (OUTPATIENT)
Dept: PRIMARY CARE CLINIC | Facility: CLINIC | Age: 73
End: 2018-08-27

## 2018-08-27 DIAGNOSIS — E11.42 TYPE 2 DIABETES MELLITUS WITH DIABETIC POLYNEUROPATHY, WITH LONG-TERM CURRENT USE OF INSULIN: Chronic | ICD-10-CM

## 2018-08-27 DIAGNOSIS — M15.9 POLYARTICULAR OSTEOARTHRITIS: Primary | Chronic | ICD-10-CM

## 2018-08-27 DIAGNOSIS — Z79.4 TYPE 2 DIABETES MELLITUS WITH DIABETIC POLYNEUROPATHY, WITH LONG-TERM CURRENT USE OF INSULIN: Chronic | ICD-10-CM

## 2018-08-27 RX ORDER — IBANDRONATE SODIUM 150 MG/1
150 TABLET, FILM COATED ORAL
Qty: 3 TABLET | Refills: 3 | Status: ON HOLD | OUTPATIENT
Start: 2018-08-27 | End: 2018-12-23

## 2018-08-28 ENCOUNTER — PATIENT MESSAGE (OUTPATIENT)
Dept: PRIMARY CARE CLINIC | Facility: CLINIC | Age: 73
End: 2018-08-28

## 2018-09-05 DIAGNOSIS — M15.9 POLYARTICULAR OSTEOARTHRITIS: Chronic | ICD-10-CM

## 2018-09-05 RX ORDER — HYDROCODONE BITARTRATE AND ACETAMINOPHEN 7.5; 325 MG/1; MG/1
1 TABLET ORAL EVERY 8 HOURS PRN
Qty: 60 TABLET | Refills: 0 | Status: SHIPPED | OUTPATIENT
Start: 2018-09-05 | End: 2018-10-03 | Stop reason: SDUPTHER

## 2018-09-09 ENCOUNTER — HOSPITAL ENCOUNTER (INPATIENT)
Facility: HOSPITAL | Age: 73
LOS: 3 days | Discharge: HOME HEALTH | DRG: 206 | End: 2018-09-12
Attending: EMERGENCY MEDICINE | Admitting: EMERGENCY MEDICINE
Payer: MEDICARE

## 2018-09-09 DIAGNOSIS — R06.00 DYSPNEA: ICD-10-CM

## 2018-09-09 DIAGNOSIS — R09.02 HYPOXIA: Primary | ICD-10-CM

## 2018-09-09 DIAGNOSIS — R10.9 ABDOMINAL PAIN: ICD-10-CM

## 2018-09-09 DIAGNOSIS — R07.9 CHEST PAIN: ICD-10-CM

## 2018-09-09 LAB
ALBUMIN SERPL BCP-MCNC: 3.5 G/DL
ALP SERPL-CCNC: 108 U/L
ALT SERPL W/O P-5'-P-CCNC: 14 U/L
ANION GAP SERPL CALC-SCNC: 12 MMOL/L
AST SERPL-CCNC: 19 U/L
BASOPHILS # BLD AUTO: 0.04 K/UL
BASOPHILS NFR BLD: 0.4 %
BILIRUB SERPL-MCNC: 0.3 MG/DL
BNP SERPL-MCNC: 28 PG/ML
BUN SERPL-MCNC: 37 MG/DL
CALCIUM SERPL-MCNC: 9.2 MG/DL
CHLORIDE SERPL-SCNC: 108 MMOL/L
CO2 SERPL-SCNC: 21 MMOL/L
CREAT SERPL-MCNC: 1.4 MG/DL
D DIMER PPP IA.FEU-MCNC: 0.86 MG/L FEU
DIFFERENTIAL METHOD: ABNORMAL
EOSINOPHIL # BLD AUTO: 0.1 K/UL
EOSINOPHIL NFR BLD: 1.2 %
ERYTHROCYTE [DISTWIDTH] IN BLOOD BY AUTOMATED COUNT: 13 %
EST. GFR  (AFRICAN AMERICAN): 44 ML/MIN/1.73 M^2
EST. GFR  (NON AFRICAN AMERICAN): 38 ML/MIN/1.73 M^2
GLUCOSE SERPL-MCNC: 190 MG/DL
HCT VFR BLD AUTO: 42.8 %
HGB BLD-MCNC: 13.4 G/DL
LACTATE SERPL-SCNC: 1 MMOL/L
LIPASE SERPL-CCNC: 66 U/L
LYMPHOCYTES # BLD AUTO: 1.1 K/UL
LYMPHOCYTES NFR BLD: 11.4 %
MCH RBC QN AUTO: 31.7 PG
MCHC RBC AUTO-ENTMCNC: 31.3 G/DL
MCV RBC AUTO: 101 FL
MONOCYTES # BLD AUTO: 0.7 K/UL
MONOCYTES NFR BLD: 6.9 %
NEUTROPHILS # BLD AUTO: 7.7 K/UL
NEUTROPHILS NFR BLD: 80.1 %
OB PNL STL: NEGATIVE
PLATELET # BLD AUTO: 262 K/UL
PMV BLD AUTO: 11 FL
POCT GLUCOSE: 194 MG/DL (ref 70–110)
POTASSIUM SERPL-SCNC: 3.9 MMOL/L
PROT SERPL-MCNC: 7.4 G/DL
RBC # BLD AUTO: 4.23 M/UL
SODIUM SERPL-SCNC: 141 MMOL/L
TROPONIN I SERPL DL<=0.01 NG/ML-MCNC: <0.006 NG/ML
TROPONIN I SERPL DL<=0.01 NG/ML-MCNC: <0.006 NG/ML
WBC # BLD AUTO: 9.61 K/UL

## 2018-09-09 PROCEDURE — 85025 COMPLETE CBC W/AUTO DIFF WBC: CPT

## 2018-09-09 PROCEDURE — 25000003 PHARM REV CODE 250: Performed by: EMERGENCY MEDICINE

## 2018-09-09 PROCEDURE — 82272 OCCULT BLD FECES 1-3 TESTS: CPT

## 2018-09-09 PROCEDURE — 84484 ASSAY OF TROPONIN QUANT: CPT | Mod: 91

## 2018-09-09 PROCEDURE — 83880 ASSAY OF NATRIURETIC PEPTIDE: CPT

## 2018-09-09 PROCEDURE — 12000002 HC ACUTE/MED SURGE SEMI-PRIVATE ROOM

## 2018-09-09 PROCEDURE — 93010 ELECTROCARDIOGRAM REPORT: CPT | Mod: ,,, | Performed by: INTERNAL MEDICINE

## 2018-09-09 PROCEDURE — 83690 ASSAY OF LIPASE: CPT

## 2018-09-09 PROCEDURE — 82962 GLUCOSE BLOOD TEST: CPT

## 2018-09-09 PROCEDURE — 99285 EMERGENCY DEPT VISIT HI MDM: CPT | Mod: 25

## 2018-09-09 PROCEDURE — 87449 NOS EACH ORGANISM AG IA: CPT

## 2018-09-09 PROCEDURE — 81000 URINALYSIS NONAUTO W/SCOPE: CPT

## 2018-09-09 PROCEDURE — 85379 FIBRIN DEGRADATION QUANT: CPT

## 2018-09-09 PROCEDURE — 80053 COMPREHEN METABOLIC PANEL: CPT

## 2018-09-09 PROCEDURE — 87427 SHIGA-LIKE TOXIN AG IA: CPT

## 2018-09-09 PROCEDURE — 83605 ASSAY OF LACTIC ACID: CPT

## 2018-09-09 PROCEDURE — 93005 ELECTROCARDIOGRAM TRACING: CPT

## 2018-09-09 PROCEDURE — 87045 FECES CULTURE AEROBIC BACT: CPT

## 2018-09-09 PROCEDURE — 87046 STOOL CULTR AEROBIC BACT EA: CPT | Mod: 59

## 2018-09-09 RX ORDER — LOPERAMIDE HYDROCHLORIDE 2 MG/1
4 CAPSULE ORAL
Status: COMPLETED | OUTPATIENT
Start: 2018-09-09 | End: 2018-09-09

## 2018-09-09 RX ADMIN — LOPERAMIDE HYDROCHLORIDE 4 MG: 2 CAPSULE ORAL at 08:09

## 2018-09-09 RX ADMIN — LIDOCAINE HYDROCHLORIDE: 20 SOLUTION ORAL; TOPICAL at 06:09

## 2018-09-09 NOTE — ED PROVIDER NOTES
"Encounter Date: 9/9/2018    SCRIBE #1 NOTE: I, Ting Dumont, am scribing for, and in the presence of,  Johny Chan MD. I have scribed the following portions of the note - Other sections scribed: HPI, ROS.       History     Chief Complaint   Patient presents with    Abdominal Pain     prior to arrival sudden RUQ pain today with nausea, none at the moment; reports loose stool today; hx of DM     CC: Abdominal Pain    72 y/o female with a PMHx of Carotid Artery Occlusion, CAD, HTN, Skin cancer, Thyroid disease, and DM presents to ED via EMS for emergent evaluation of sudden onset, intermittent RU abdominal pain pain with associated nausea, generalized "weakness," numbness to R arm and mouth, and SOB that began 2 hours ago (since resolved). Pt reports she was sitting an electronic shopping cart while shopping and her RU abdomen began to hurt.  She states the painful episode lasted 10-20 minutes, "felt like gas," and was relieved by breathing.   Pt denies hx of similar symptoms.  Pt notes a 10 year hx of lower abdominal pain which often results in her defecating on heself. Pt notes R leg swelling that began "a while back."  Pt also notes vascular surgery for a carotid blockage on 7/20/17, after which her SpO2 "was down."  Pt denies dysuria, constipation, palpitations, congestion, sore throat, and frequency. No other symptoms reported.        The history is provided by the patient and a relative. No  was used.     Review of patient's allergies indicates:   Allergen Reactions    Sulfa (sulfonamide antibiotics) Nausea And Vomiting    Menthol contain prod      Past Medical History:   Diagnosis Date    Carotid artery occlusion     Coronary artery disease     Hypertension     Skin cancer     cyst on the face , was removed 20years +    Thyroid disease     Type 2 diabetes mellitus      Past Surgical History:   Procedure Laterality Date    CAROTID ENDARTERECTOMY Left 03/24/2016    " CHOLECYSTECTOMY      ENDARTERECTOMY-CAROTID Left 3/24/2016    Performed by Sofia Way MD at [facility] OR [address] [place] [address]    GALLBLADDER SURGERY      1996    SKIN BIOPSY      TONSILLECTOMY      TUBAL LIGATION Bilateral 3/18/2016     History reviewed. No pertinent family history.  Social History     Tobacco Use    Smoking status: Never Smoker    Smokeless tobacco: Never Used   Substance Use Topics    Alcohol use: Yes     Alcohol/week: 0.0 oz     Comment: occasions    Drug use: No     Review of Systems   Constitutional: Negative for chills and fever.   HENT: Negative for ear pain and sore throat.    Eyes: Negative for pain.   Respiratory: Positive for shortness of breath (resolved). Negative for cough.    Cardiovascular: Negative for palpitations.   Gastrointestinal: Positive for abdominal pain (resolved) and nausea (resolved). Negative for vomiting.   Genitourinary: Negative for dysuria and urgency.   Musculoskeletal: Negative for neck pain.   Skin: Negative for rash and wound.   Neurological: Positive for numbness (R arm and mouth (resolved)). Negative for syncope and headaches.   All other systems reviewed and are negative.      Physical Exam     Initial Vitals [09/09/18 1701]   BP Pulse Resp Temp SpO2   (!) 152/70 88 18 98.2 °F (36.8 °C) 97 %      MAP       --         Physical Exam    Nursing note and vitals reviewed.  Constitutional: She appears well-developed and well-nourished. No distress.   HENT:   Mouth/Throat: Oropharynx is clear and moist.   Eyes: EOM are normal. Pupils are equal, round, and reactive to light.   Neck: Normal range of motion. Neck supple. No thyromegaly present. No JVD present.   Cardiovascular: Normal rate, regular rhythm, normal heart sounds and intact distal pulses. Exam reveals no gallop and no friction rub.    No murmur heard.  Pulmonary/Chest: Breath sounds normal. No respiratory distress.   Abdominal: Soft. Bowel sounds are normal. She exhibits no distension and no mass. There is no  tenderness. There is no rebound and no guarding.   Musculoskeletal: Normal range of motion. She exhibits no edema or tenderness.   Right calf mildly larger left calf.  No edema. No cord.  No Homans sign. No calf pain.  Normal pulses.   Neurological: She is alert and oriented to person, place, and time. She has normal strength.   Skin: Skin is warm and dry.         ED Course   Procedures  Labs Reviewed   CBC W/ AUTO DIFFERENTIAL - Abnormal; Notable for the following components:       Result Value     (*)     MCH 31.7 (*)     MCHC 31.3 (*)     Gran% 80.1 (*)     Lymph% 11.4 (*)     All other components within normal limits   COMPREHENSIVE METABOLIC PANEL - Abnormal; Notable for the following components:    CO2 21 (*)     Glucose 190 (*)     BUN, Bld 37 (*)     eGFR if  44 (*)     eGFR if non  38 (*)     All other components within normal limits   LIPASE - Abnormal; Notable for the following components:    Lipase 66 (*)     All other components within normal limits   D DIMER, QUANTITATIVE - Abnormal; Notable for the following components:    D-Dimer 0.86 (*)     All other components within normal limits   URINALYSIS MICROSCOPIC - Abnormal; Notable for the following components:    Bacteria, UA Many (*)     Non-Squam Epith 1 (*)     All other components within normal limits    Narrative:     Preferred Collection Type->Urine, Clean Catch   POCT GLUCOSE - Abnormal; Notable for the following components:    POCT Glucose 194 (*)     All other components within normal limits   CULTURE, STOOL   CLOSTRIDIUM DIFFICILE   ENTEROHEMORRHAGIC E.COLI   URINALYSIS, REFLEX TO URINE CULTURE    Narrative:     Preferred Collection Type->Urine, Clean Catch   TROPONIN I   B-TYPE NATRIURETIC PEPTIDE   OCCULT BLOOD X 1, STOOL   TROPONIN I   LACTIC ACID, PLASMA          Imaging Results          CT Abdomen Pelvis  Without Contrast (Final result)     Abnormal  Result time 09/09/18 20:27:21    Final result by  Minh Hernandez MD (09/09/18 20:27:21)                 Impression:      Significantly degraded examination secondary to motion limitations.    Abnormally thickened and distended long segment of the ileum with fecalization of the thickened segment.  Given the degree of significant atherosclerotic disease in the abdomen, some form of ischemic enteritis would be difficult to exclude.  Suggest correlation with lactate levels and also possible CTA, if clinically feasible.    Inflammatory changes involving the proximal aspect of the duodenum.  No evidence of bowel perforation.  Endoscopic correlation is recommended after the patient's acute symptoms resolve.    Status post cholecystectomy with marked enlargement of the CBD.  Nonemergent MRCP may be obtained for further evaluation.    Erosive endplate changes in the lumbar spine.  Nonemergent MRI of the lumbar spine may be attempted for further evaluation.    This report was flagged in Epic as abnormal.      Electronically signed by: Minh Hernandez MD  Date:    09/09/2018  Time:    20:27             Narrative:    EXAMINATION:  CT ABDOMEN PELVIS WITHOUT CONTRAST    CLINICAL HISTORY:  abdominal pain;    TECHNIQUE:  Axial CT scan of the abdomen and pelvis was obtained from the level of the hemidiaphragms to the pubic symphysis without intravenous contrast. Coronal and sagittal reformats were obtained. The study was performed using a renal stone protocol.    COMPARISON:  Abdomen x-ray dated 03/27/2016.    FINDINGS:  The examination is degraded secondary to motion and the patient's arm overlying the field of view.    There are no pleural effusions.  There is no evidence of a pneumothorax.  No airspace opacity is present.  There is subsegmental atelectasis in the bilateral lower lobes.    There are coronary artery calcifications present.  No pericardial effusions are present.  There are extensive calcifications involving the abdominal aorta and its branch vessels.  Overall  noncontrast assessment is significantly difficult.  There is no evidence of lymphadenopathy in the abdomen or pelvis.    The esophagus is unremarkable.  The stomach is distended.  There are minimal inflammatory changes involving the proximal aspect of the duodenum.  There is fluid distention of the large bowel.  There is an abnormally thickened loop of large bowel within the central abdomen.  The segment of thickened measures approximately 20 cm.  There is significant fecalization of the thickened segment of lobe.  There is an apparent change in caliber at the level of the distal ileum.  The appendix is not visualized.  There are no secondary findings of acute appendicitis.  The large bowel is unremarkable.    The liver is unremarkable.  The patient is status post cholecystectomy.  There is marked enlargement of the CBD measuring up to 1.2 cm.  No stones are identified along the course of the CBD.  There are calcifications involving the spleen.  The pancreas is unremarkable.    The adrenal glands are within normal limits.  There is a 1.4 cm simple cyst in the interpolar regions of the right kidney.  The left kidney is unremarkable.  No stones are identified along the course of the collecting system.  The urinary bladder is unremarkable.  The uterus is within normal limits.    There is no evidence of free fluid in the abdomen or pelvis.  There is no evidence of free air.  There is no evidence of pneumatosis.    The psoas margins are unremarkable.  The abdominal wall is within normal limits.  There are mild degenerative changes in the osseous structures.  There are erosive changes at the L3-L4 level.                               X-Ray Chest 1 View (Final result)  Result time 09/09/18 18:28:47    Final result by Chauncey Camargo MD (09/09/18 18:28:47)                 Impression:      1. No acute cardiopulmonary process.      Electronically signed by: Chauncey Camargo MD  Date:    09/09/2018  Time:    18:28              Narrative:    EXAMINATION:  XR CHEST 1 VIEW    CLINICAL HISTORY:  Chest pain, unspecified    TECHNIQUE:  Single frontal view of the chest was performed.    COMPARISON:  03/29/2016    FINDINGS:  The cardiomediastinal silhouette is not enlarged, noting calcification of the aortic arch..  There is no pleural effusion.  The trachea is midline.  The lungs are symmetrically expanded bilaterally with mildly coarse interstitial attenuation..  No large focal consolidation seen.  There is no pneumothorax.  The osseous structures are remarkable for degenerative changes..                                patient presents with acute onset of right upper quadrant/right lower chest pain.  Described as being sharp.  Associated with nausea and shortness of breath.  Lasted 20 min.  Now resolved.  Patient has already had her gallbladder removed.  Denies shortness of breath this time however patient's oxygen saturations as low as 86% on room air.  Patient does not have home oxygen.  States approximately 2 years ago she was on home oxygen after a carotid end arterectomy but went through a cardiac rehab program and was off home oxygen.  This is possibly a chronic state however needs to be addressed.  Patient states she has frequent dizziness and frequent abdominal pain and frequent episodes of shortness of breath. This may all be related to the chronic hypoxic state.  Patient denies any chest pain at this time.  Denies any pain with breathing.  .  Workup patient states she has had chronic diarrhea and chronic intermittent lower abdominal pain.  Lower abdominal pain occurs when usually she has have a bowel movement.  However states it hurts too much to sit on the toilet so she usually has a bowel movement on herself.  States the bowel was usually diarrhea.  Will send off stool studies for chronic diarrhea.  Due to complaints of both right upper quadrant and lower abdominal pain CT was performed.  CT suggests some inflammation to the  duodenum.  Possible peptic ulcer.  Radiologist states possible ischemic bowel.  Patient has no abdominal pain at this time.  As benign abdominal exam.  Has no elevated potassium or lactic acid.  I do not feel the patient is having ischemic bowel.  No peritoneal signs on repeat abdominal exam.  Patient has had troponin negative x2.  No acute EKG changes.  Do not feel this right upper quadrant pain/right lower chest pain is cardiac.  However unable to discharge the patient home due to oxygen requirement.  Will admit and consult social work for oxygen.  Will obtain a 2D echo and an ABG on room air after breathing treatment.  Discussed with Dr. Núñez.              Scribe Attestation:   Scribe #1: I performed the above scribed service and the documentation accurately describes the services I performed. I attest to the accuracy of the note.    Attending Attestation:           Physician Attestation for Scribe:  Physician Attestation Statement for Scribe #1: I, Johny Chan MD, reviewed documentation, as scribed by Ting Dumont in my presence, and it is both accurate and complete.                    Clinical Impression:   The primary encounter diagnosis was Hypoxia. Diagnoses of Abdominal pain, Chest pain, Dyspnea, and Dyspnea were also pertinent to this visit.                             Johny Chan MD  09/10/18 0149

## 2018-09-09 NOTE — ED TRIAGE NOTES
Pt arrives to er via ems on stretcher  with family. Pt states epigastric pain today 1645 in drivable cart in [facility]. Reports sob when move around toomuch, but none now. Denies nausea.

## 2018-09-10 PROBLEM — I10 HYPERTENSION: Status: ACTIVE | Noted: 2018-09-10

## 2018-09-10 PROBLEM — R53.1 WEAKNESS: Status: ACTIVE | Noted: 2018-09-10

## 2018-09-10 PROBLEM — R19.7 DIARRHEA: Status: ACTIVE | Noted: 2018-09-10

## 2018-09-10 PROBLEM — R07.9 CHEST PAIN: Status: ACTIVE | Noted: 2018-09-10

## 2018-09-10 PROBLEM — I10 HYPERTENSION: Status: RESOLVED | Noted: 2018-09-10 | Resolved: 2018-09-10

## 2018-09-10 PROBLEM — R09.02 HYPOXIA: Status: RESOLVED | Noted: 2018-09-09 | Resolved: 2018-09-10

## 2018-09-10 LAB
ALBUMIN SERPL BCP-MCNC: 3.2 G/DL
ALP SERPL-CCNC: 101 U/L
ALT SERPL W/O P-5'-P-CCNC: 15 U/L
ANION GAP SERPL CALC-SCNC: 8 MMOL/L
AORTIC VALVE STENOSIS: ABNORMAL
AST SERPL-CCNC: 17 U/L
BACTERIA #/AREA URNS HPF: ABNORMAL /HPF
BASOPHILS # BLD AUTO: 0.03 K/UL
BASOPHILS NFR BLD: 0.3 %
BILIRUB SERPL-MCNC: 0.4 MG/DL
BILIRUB UR QL STRIP: NEGATIVE
BUN SERPL-MCNC: 31 MG/DL
C DIFF GDH STL QL: NEGATIVE
C DIFF TOX A+B STL QL IA: NEGATIVE
CALCIUM SERPL-MCNC: 8.9 MG/DL
CHLORIDE SERPL-SCNC: 107 MMOL/L
CLARITY UR: CLEAR
CO2 SERPL-SCNC: 25 MMOL/L
COLOR UR: YELLOW
CREAT SERPL-MCNC: 1.2 MG/DL
DIFFERENTIAL METHOD: ABNORMAL
E COLI SXT1 STL QL IA: NEGATIVE
E COLI SXT2 STL QL IA: NEGATIVE
EOSINOPHIL # BLD AUTO: 0.2 K/UL
EOSINOPHIL NFR BLD: 1.7 %
ERYTHROCYTE [DISTWIDTH] IN BLOOD BY AUTOMATED COUNT: 12.8 %
EST. GFR  (AFRICAN AMERICAN): 53 ML/MIN/1.73 M^2
EST. GFR  (NON AFRICAN AMERICAN): 46 ML/MIN/1.73 M^2
ESTIMATED AVG GLUCOSE: 123 MG/DL
ESTIMATED PA SYSTOLIC PRESSURE: 23.81
GLOBAL PERICARDIAL EFFUSION: ABNORMAL
GLUCOSE SERPL-MCNC: 190 MG/DL
GLUCOSE UR QL STRIP: NEGATIVE
HBA1C MFR BLD HPLC: 5.9 %
HCT VFR BLD AUTO: 40.1 %
HGB BLD-MCNC: 12.9 G/DL
HGB UR QL STRIP: NEGATIVE
KETONES UR QL STRIP: NEGATIVE
LEUKOCYTE ESTERASE UR QL STRIP: NEGATIVE
LYMPHOCYTES # BLD AUTO: 1.5 K/UL
LYMPHOCYTES NFR BLD: 16.6 %
MCH RBC QN AUTO: 31.7 PG
MCHC RBC AUTO-ENTMCNC: 32.2 G/DL
MCV RBC AUTO: 99 FL
MICROSCOPIC COMMENT: ABNORMAL
MITRAL VALVE MOBILITY: NORMAL
MONOCYTES # BLD AUTO: 1 K/UL
MONOCYTES NFR BLD: 10.7 %
NEUTROPHILS # BLD AUTO: 6.5 K/UL
NEUTROPHILS NFR BLD: 70.6 %
NITRITE UR QL STRIP: NEGATIVE
NON-SQ EPI CELLS #/AREA URNS HPF: 1 /HPF
PH UR STRIP: 5 [PH] (ref 5–8)
PLATELET # BLD AUTO: 233 K/UL
PMV BLD AUTO: 10.9 FL
POCT GLUCOSE: 245 MG/DL (ref 70–110)
POTASSIUM SERPL-SCNC: 4.1 MMOL/L
PROT SERPL-MCNC: 6.8 G/DL
PROT UR QL STRIP: NEGATIVE
RBC # BLD AUTO: 4.07 M/UL
RBC #/AREA URNS HPF: 1 /HPF (ref 0–4)
RETIRED EF AND QEF - SEE NOTES: 55 (ref 55–65)
SODIUM SERPL-SCNC: 140 MMOL/L
SP GR UR STRIP: 1.02 (ref 1–1.03)
SQUAMOUS #/AREA URNS HPF: 2 /HPF
TROPONIN I SERPL DL<=0.01 NG/ML-MCNC: <0.006 NG/ML
URN SPEC COLLECT METH UR: NORMAL
UROBILINOGEN UR STRIP-ACNC: NEGATIVE EU/DL
WBC # BLD AUTO: 9.18 K/UL
WBC #/AREA URNS HPF: 2 /HPF (ref 0–5)

## 2018-09-10 PROCEDURE — 93306 TTE W/DOPPLER COMPLETE: CPT | Mod: 26,,, | Performed by: INTERNAL MEDICINE

## 2018-09-10 PROCEDURE — 25000003 PHARM REV CODE 250: Performed by: EMERGENCY MEDICINE

## 2018-09-10 PROCEDURE — 27000221 HC OXYGEN, UP TO 24 HOURS

## 2018-09-10 PROCEDURE — 85025 COMPLETE CBC W/AUTO DIFF WBC: CPT

## 2018-09-10 PROCEDURE — 25000003 PHARM REV CODE 250: Performed by: INTERNAL MEDICINE

## 2018-09-10 PROCEDURE — G8988 SELF CARE GOAL STATUS: HCPCS | Mod: CI

## 2018-09-10 PROCEDURE — G8989 SELF CARE D/C STATUS: HCPCS | Mod: CI

## 2018-09-10 PROCEDURE — 21400001 HC TELEMETRY ROOM

## 2018-09-10 PROCEDURE — 97161 PT EVAL LOW COMPLEX 20 MIN: CPT

## 2018-09-10 PROCEDURE — 63600175 PHARM REV CODE 636 W HCPCS: Performed by: EMERGENCY MEDICINE

## 2018-09-10 PROCEDURE — G8978 MOBILITY CURRENT STATUS: HCPCS | Mod: CK

## 2018-09-10 PROCEDURE — C9113 INJ PANTOPRAZOLE SODIUM, VIA: HCPCS | Performed by: EMERGENCY MEDICINE

## 2018-09-10 PROCEDURE — G8987 SELF CARE CURRENT STATUS: HCPCS | Mod: CI

## 2018-09-10 PROCEDURE — 36415 COLL VENOUS BLD VENIPUNCTURE: CPT

## 2018-09-10 PROCEDURE — 84484 ASSAY OF TROPONIN QUANT: CPT

## 2018-09-10 PROCEDURE — 97165 OT EVAL LOW COMPLEX 30 MIN: CPT

## 2018-09-10 PROCEDURE — G8980 MOBILITY D/C STATUS: HCPCS | Mod: CK

## 2018-09-10 PROCEDURE — 94761 N-INVAS EAR/PLS OXIMETRY MLT: CPT

## 2018-09-10 PROCEDURE — A4216 STERILE WATER/SALINE, 10 ML: HCPCS | Performed by: EMERGENCY MEDICINE

## 2018-09-10 PROCEDURE — 83036 HEMOGLOBIN GLYCOSYLATED A1C: CPT

## 2018-09-10 PROCEDURE — G8979 MOBILITY GOAL STATUS: HCPCS | Mod: CH

## 2018-09-10 PROCEDURE — 80053 COMPREHEN METABOLIC PANEL: CPT

## 2018-09-10 PROCEDURE — 93306 TTE W/DOPPLER COMPLETE: CPT

## 2018-09-10 PROCEDURE — 25500020 PHARM REV CODE 255: Performed by: INTERNAL MEDICINE

## 2018-09-10 PROCEDURE — 63600175 PHARM REV CODE 636 W HCPCS: Performed by: INTERNAL MEDICINE

## 2018-09-10 RX ORDER — LORAZEPAM 2 MG/ML
1 INJECTION INTRAMUSCULAR ONCE
Status: COMPLETED | OUTPATIENT
Start: 2018-09-10 | End: 2018-09-10

## 2018-09-10 RX ORDER — SODIUM CHLORIDE 0.9 % (FLUSH) 0.9 %
3 SYRINGE (ML) INJECTION EVERY 8 HOURS
Status: DISCONTINUED | OUTPATIENT
Start: 2018-09-10 | End: 2018-09-12 | Stop reason: HOSPADM

## 2018-09-10 RX ORDER — INSULIN ASPART 100 [IU]/ML
8 INJECTION, SOLUTION INTRAVENOUS; SUBCUTANEOUS
Status: DISCONTINUED | OUTPATIENT
Start: 2018-09-10 | End: 2018-09-12 | Stop reason: HOSPADM

## 2018-09-10 RX ORDER — PANTOPRAZOLE SODIUM 40 MG/10ML
40 INJECTION, POWDER, LYOPHILIZED, FOR SOLUTION INTRAVENOUS DAILY
Status: DISCONTINUED | OUTPATIENT
Start: 2018-09-10 | End: 2018-09-10

## 2018-09-10 RX ORDER — CITALOPRAM 20 MG/1
20 TABLET, FILM COATED ORAL DAILY
Status: DISCONTINUED | OUTPATIENT
Start: 2018-09-10 | End: 2018-09-12 | Stop reason: HOSPADM

## 2018-09-10 RX ORDER — ENOXAPARIN SODIUM 100 MG/ML
40 INJECTION SUBCUTANEOUS EVERY 24 HOURS
Status: DISCONTINUED | OUTPATIENT
Start: 2018-09-10 | End: 2018-09-12 | Stop reason: HOSPADM

## 2018-09-10 RX ORDER — ONDANSETRON 2 MG/ML
4 INJECTION INTRAMUSCULAR; INTRAVENOUS EVERY 8 HOURS PRN
Status: DISCONTINUED | OUTPATIENT
Start: 2018-09-10 | End: 2018-09-12 | Stop reason: HOSPADM

## 2018-09-10 RX ORDER — ATORVASTATIN CALCIUM 40 MG/1
40 TABLET, FILM COATED ORAL DAILY
Status: DISCONTINUED | OUTPATIENT
Start: 2018-09-10 | End: 2018-09-12 | Stop reason: HOSPADM

## 2018-09-10 RX ORDER — SODIUM CHLORIDE 9 MG/ML
INJECTION, SOLUTION INTRAVENOUS CONTINUOUS
Status: DISCONTINUED | OUTPATIENT
Start: 2018-09-10 | End: 2018-09-12 | Stop reason: HOSPADM

## 2018-09-10 RX ORDER — TIZANIDINE 4 MG/1
4 TABLET ORAL EVERY 8 HOURS
Status: DISCONTINUED | OUTPATIENT
Start: 2018-09-10 | End: 2018-09-12 | Stop reason: HOSPADM

## 2018-09-10 RX ORDER — ENOXAPARIN SODIUM 100 MG/ML
40 INJECTION SUBCUTANEOUS EVERY 24 HOURS
Status: DISCONTINUED | OUTPATIENT
Start: 2018-09-10 | End: 2018-09-10

## 2018-09-10 RX ORDER — IPRATROPIUM BROMIDE AND ALBUTEROL SULFATE 2.5; .5 MG/3ML; MG/3ML
3 SOLUTION RESPIRATORY (INHALATION)
Status: ACTIVE | OUTPATIENT
Start: 2018-09-10 | End: 2018-09-10

## 2018-09-10 RX ORDER — AMLODIPINE BESYLATE 5 MG/1
10 TABLET ORAL DAILY
Status: DISCONTINUED | OUTPATIENT
Start: 2018-09-10 | End: 2018-09-12 | Stop reason: HOSPADM

## 2018-09-10 RX ORDER — ASPIRIN 325 MG
325 TABLET ORAL NIGHTLY
Status: DISCONTINUED | OUTPATIENT
Start: 2018-09-10 | End: 2018-09-12 | Stop reason: HOSPADM

## 2018-09-10 RX ORDER — PANTOPRAZOLE SODIUM 40 MG/1
40 TABLET, DELAYED RELEASE ORAL DAILY
Status: DISCONTINUED | OUTPATIENT
Start: 2018-09-10 | End: 2018-09-12 | Stop reason: HOSPADM

## 2018-09-10 RX ADMIN — ATORVASTATIN CALCIUM 40 MG: 40 TABLET, FILM COATED ORAL at 09:09

## 2018-09-10 RX ADMIN — Medication 3 ML: at 06:09

## 2018-09-10 RX ADMIN — TIZANIDINE 4 MG: 4 TABLET ORAL at 10:09

## 2018-09-10 RX ADMIN — IOHEXOL 75 ML: 350 INJECTION, SOLUTION INTRAVENOUS at 05:09

## 2018-09-10 RX ADMIN — Medication 3 ML: at 11:09

## 2018-09-10 RX ADMIN — Medication 3 ML: at 02:09

## 2018-09-10 RX ADMIN — INSULIN ASPART 8 UNITS: 100 INJECTION, SOLUTION INTRAVENOUS; SUBCUTANEOUS at 04:09

## 2018-09-10 RX ADMIN — PANTOPRAZOLE SODIUM 40 MG: 40 INJECTION, POWDER, LYOPHILIZED, FOR SOLUTION INTRAVENOUS at 10:09

## 2018-09-10 RX ADMIN — INSULIN ASPART 8 UNITS: 100 INJECTION, SOLUTION INTRAVENOUS; SUBCUTANEOUS at 12:09

## 2018-09-10 RX ADMIN — TIZANIDINE 4 MG: 4 TABLET ORAL at 04:09

## 2018-09-10 RX ADMIN — ENOXAPARIN SODIUM 40 MG: 40 INJECTION SUBCUTANEOUS at 04:09

## 2018-09-10 RX ADMIN — LORAZEPAM 1 MG: 2 INJECTION INTRAMUSCULAR; INTRAVENOUS at 03:09

## 2018-09-10 RX ADMIN — ASPIRIN 325 MG ORAL TABLET 325 MG: 325 PILL ORAL at 10:09

## 2018-09-10 RX ADMIN — CITALOPRAM HYDROBROMIDE 20 MG: 20 TABLET ORAL at 12:09

## 2018-09-10 RX ADMIN — AMLODIPINE BESYLATE 10 MG: 5 TABLET ORAL at 09:09

## 2018-09-10 RX ADMIN — SODIUM CHLORIDE: 0.9 INJECTION, SOLUTION INTRAVENOUS at 06:09

## 2018-09-10 RX ADMIN — INSULIN DETEMIR 30 UNITS: 100 INJECTION, SOLUTION SUBCUTANEOUS at 10:09

## 2018-09-10 NOTE — NURSING
Ms. Knight arrived via stretcher from ED to telemetry at 0030. Patient settled in room, no distress, call bell in reach. Will continue to monitor.

## 2018-09-10 NOTE — HPI
Mrs. Knight is a 74 yo F who is a poor historian. She presents with multiple vague complaints- all of which she states are chronic problems- weakness, dizziness, chest pain/abdominal pain, diarrhea, not feeling good.  She could not elaborate on any. Evidently she was at [facility] when she had R upper abdomina/chest pain. Her daughter apparently called 911.  The patient presents to the ED and is found to have some mild hypoxemia. The patient states she does not wear 02 at home. D-dimer was elevated.  The patient is resting comfortably and has no current complaints. She is non- ill appearing.  The patient lives at home with family and ambulates with a walker.

## 2018-09-10 NOTE — SUBJECTIVE & OBJECTIVE
Past Medical History:   Diagnosis Date    Carotid artery occlusion     Coronary artery disease     Hypertension     Skin cancer     cyst on the face , was removed 20years +    Thyroid disease     Type 2 diabetes mellitus        Past Surgical History:   Procedure Laterality Date    CAROTID ENDARTERECTOMY Left 03/24/2016    CHOLECYSTECTOMY      ENDARTERECTOMY-CAROTID Left 3/24/2016    Performed by Sofia Way MD at [facility] OR [address] [place] [address]    GALLBLADDER SURGERY      1996    SKIN BIOPSY      TONSILLECTOMY      TUBAL LIGATION Bilateral 3/18/2016       Review of patient's allergies indicates:   Allergen Reactions    Sulfa (sulfonamide antibiotics) Nausea And Vomiting    Menthol contain prod        No current facility-administered medications on file prior to encounter.      Current Outpatient Medications on File Prior to Encounter   Medication Sig    amLODIPine (NORVASC) 10 MG tablet Take 1 tablet (10 mg total) by mouth once daily.    aspirin 325 MG tablet Take 325 mg by mouth every evening.     atorvastatin (LIPITOR) 40 MG tablet TAKE ONE TABLET BY MOUTH ONCE DAILY    citalopram (CELEXA) 20 MG tablet Take 1 tablet (20 mg total) by mouth once daily.    dicyclomine (BENTYL) 20 mg tablet Take 1 tablet (20 mg total) by mouth daily as needed (GI cramps).    dulaglutide (TRULICITY) 1.5 mg/0.5 mL PnIj Inject 1.5 mg into the skin every 7 days.    HYDROcodone-acetaminophen (NORCO) 7.5-325 mg per tablet Take 1 tablet by mouth every 8 (eight) hours as needed for Pain.    ibandronate (BONIVA) 150 mg tablet Take 1 tablet (150 mg total) by mouth every 30 days.    insulin aspart (NOVOLOG) 100 unit/mL injection Inject 8 Units into the skin 3 (three) times daily before meals. (Patient taking differently: Inject 8 Units into the skin 2 (two) times daily before meals. )    LEVEMIR FLEXTOUCH 100 unit/mL (3 mL) InPn pen INJECT 30 UNITS SUBCUTANEOUSLY ONCE DAILY AT BEDTIME    levothyroxine (SYNTHROID) 75 MCG tablet  "TAKE ONE TABLET BY MOUTH ONCE DAILY    losartan-hydrochlorothiazide 100-25 mg (HYZAAR) 100-25 mg per tablet Take 1 tablet by mouth once daily.    MULTIVITAMIN ORAL Take by mouth. [place] Diabetes Pack    omeprazole (PRILOSEC) 20 MG capsule Take 20 mg by mouth once daily.     ondansetron (ZOFRAN-ODT) 4 MG TbDL DISSOLVE ONE TABLET UNDER THE TONGUE EVERY 6 HOURS AS NEEDED FOR NAUSEA    tiZANidine (ZANAFLEX) 4 MG tablet Take 1 tablet (4 mg total) by mouth every 8 (eight) hours as needed.    tiZANidine 4 mg Cap TAKE ONE CAPSULE BY MOUTH ONCE DAILY AS NEEDED FOR MUSCLE SPASM    blood sugar diagnostic (BLOOD GLUCOSE TEST) Strp 1 strip by Misc.(Non-Drug; Combo Route) route 2 (two) times daily. Prodigy    BLOOD SUGAR DIAGNOSTIC (PRODIGY AUTOCODE TEST STRIPS MISC) 1 strip by Misc.(Non-Drug; Combo Route) route 2 (two) times daily.    diphenoxylate-atropine 2.5-0.025 mg (LOMOTIL) 2.5-0.025 mg per tablet Take 1 tablet by mouth every 6 (six) hours as needed.    pen needle, diabetic (NOVOTWIST) 32 gauge x 1/5" Ndle Use with insulin pen as directed     Family History     None        Tobacco Use    Smoking status: Never Smoker    Smokeless tobacco: Never Used   Substance and Sexual Activity    Alcohol use: Yes     Alcohol/week: 0.0 oz     Comment: occasions    Drug use: No    Sexual activity: Not on file     Review of Systems   Constitutional: Positive for activity change and fatigue. Negative for appetite change, chills, diaphoresis, fever and unexpected weight change.   HENT: Negative for congestion.    Eyes: Negative for discharge.   Respiratory: Negative for apnea, cough, choking, chest tightness, shortness of breath and stridor.    Cardiovascular: Positive for chest pain. Negative for leg swelling.   Gastrointestinal: Positive for abdominal pain and diarrhea. Negative for abdominal distention, anal bleeding, blood in stool, constipation, nausea and vomiting.   Genitourinary: Negative for difficulty " urinating.   Musculoskeletal: Negative for arthralgias.   Skin: Negative for color change.   Neurological: Positive for dizziness.   Psychiatric/Behavioral: Negative for agitation and behavioral problems.     Objective:     Vital Signs (Most Recent):  Temp: 98.2 °F (36.8 °C) (09/10/18 0725)  Pulse: 98 (09/10/18 0725)  Resp: 18 (09/10/18 0725)  BP: (!) 145/65 (09/10/18 0725)  SpO2: 95 % (09/10/18 0814) Vital Signs (24h Range):  Temp:  [98.2 °F (36.8 °C)-98.8 °F (37.1 °C)] 98.2 °F (36.8 °C)  Pulse:  [] 98  Resp:  [18-20] 18  SpO2:  [87 %-98 %] 95 %  BP: (125-177)/(60-72) 145/65     Weight: 97 kg (213 lb 13.5 oz)  Body mass index is 40.41 kg/m².    Physical Exam   Constitutional: She is oriented to person, place, and time. She appears well-developed and well-nourished.   HENT:   Head: Normocephalic and atraumatic.   Cardiovascular: Normal rate and regular rhythm. Exam reveals no gallop and no friction rub.   Pulmonary/Chest: Effort normal. No stridor. No respiratory distress. She has no wheezes. She has no rales.   Abdominal: Soft. Bowel sounds are normal. She exhibits no distension and no mass. There is no tenderness. There is no rebound and no guarding.   Neurological: She is alert and oriented to person, place, and time.   Skin: Skin is warm and dry.   Psychiatric: She has a normal mood and affect. Her behavior is normal.   Vitals reviewed.          Significant Labs:   BMP:   Recent Labs   Lab  09/10/18   0445   GLU  190*   NA  140   K  4.1   CL  107   CO2  25   BUN  31*   CREATININE  1.2   CALCIUM  8.9     CBC:   Recent Labs   Lab  09/09/18   1815  09/10/18   0445   WBC  9.61  9.18   HGB  13.4  12.9   HCT  42.8  40.1   PLT  262  233       Significant Imaging:  CT abdomen reviewed.   D-dimer .86  Trop negative.   CXR negative

## 2018-09-10 NOTE — H&P
[facility] Medicine  History & Physical    Patient Name: Melinda Knight  MRN: [id number]  Admission Date: 9/9/2018  Attending Physician: Ayan Hassan MD   Primary Care Provider: Duke Cole MD         Patient information was obtained from patient and ER records.     Subjective:     Principal Problem:Hypoxemia    Chief Complaint:   Chief Complaint   Patient presents with    Abdominal Pain     prior to arrival sudden RUQ pain today with nausea, none at the moment; reports loose stool today; hx of DM        HPI: Mrs. Knight is a 74 yo F who is a poor historian. She presents with multiple vague complaints- all of which she states are chronic problems- weakness, dizziness, chest pain/abdominal pain, diarrhea, not feeling good.  She could not elaborate on any. Evidently she was at [facility] when she had R upper abdomina/chest pain. Her daughter apparently called 911.  The patient presents to the ED and is found to have some mild hypoxemia. The patient states she does not wear 02 at home. D-dimer was elevated.  The patient is resting comfortably and has no current complaints. She is non- ill appearing.  The patient lives at home with family and ambulates with a walker.      Past Medical History:   Diagnosis Date    Carotid artery occlusion     Coronary artery disease     Hypertension     Skin cancer     cyst on the face , was removed 20years +    Thyroid disease     Type 2 diabetes mellitus        Past Surgical History:   Procedure Laterality Date    CAROTID ENDARTERECTOMY Left 03/24/2016    CHOLECYSTECTOMY      ENDARTERECTOMY-CAROTID Left 3/24/2016    Performed by Sofia Way MD at [facility] OR [address] [place] [address]    GALLBLADDER SURGERY      1996    SKIN BIOPSY      TONSILLECTOMY      TUBAL LIGATION Bilateral 3/18/2016       Review of patient's allergies indicates:   Allergen Reactions    Sulfa (sulfonamide antibiotics) Nausea And Vomiting    Menthol contain prod        No  current facility-administered medications on file prior to encounter.      Current Outpatient Medications on File Prior to Encounter   Medication Sig    amLODIPine (NORVASC) 10 MG tablet Take 1 tablet (10 mg total) by mouth once daily.    aspirin 325 MG tablet Take 325 mg by mouth every evening.     atorvastatin (LIPITOR) 40 MG tablet TAKE ONE TABLET BY MOUTH ONCE DAILY    citalopram (CELEXA) 20 MG tablet Take 1 tablet (20 mg total) by mouth once daily.    dicyclomine (BENTYL) 20 mg tablet Take 1 tablet (20 mg total) by mouth daily as needed (GI cramps).    dulaglutide (TRULICITY) 1.5 mg/0.5 mL PnIj Inject 1.5 mg into the skin every 7 days.    HYDROcodone-acetaminophen (NORCO) 7.5-325 mg per tablet Take 1 tablet by mouth every 8 (eight) hours as needed for Pain.    ibandronate (BONIVA) 150 mg tablet Take 1 tablet (150 mg total) by mouth every 30 days.    insulin aspart (NOVOLOG) 100 unit/mL injection Inject 8 Units into the skin 3 (three) times daily before meals. (Patient taking differently: Inject 8 Units into the skin 2 (two) times daily before meals. )    LEVEMIR FLEXTOUCH 100 unit/mL (3 mL) InPn pen INJECT 30 UNITS SUBCUTANEOUSLY ONCE DAILY AT BEDTIME    levothyroxine (SYNTHROID) 75 MCG tablet TAKE ONE TABLET BY MOUTH ONCE DAILY    losartan-hydrochlorothiazide 100-25 mg (HYZAAR) 100-25 mg per tablet Take 1 tablet by mouth once daily.    MULTIVITAMIN ORAL Take by mouth. [place] Diabetes Pack    omeprazole (PRILOSEC) 20 MG capsule Take 20 mg by mouth once daily.     ondansetron (ZOFRAN-ODT) 4 MG TbDL DISSOLVE ONE TABLET UNDER THE TONGUE EVERY 6 HOURS AS NEEDED FOR NAUSEA    tiZANidine (ZANAFLEX) 4 MG tablet Take 1 tablet (4 mg total) by mouth every 8 (eight) hours as needed.    tiZANidine 4 mg Cap TAKE ONE CAPSULE BY MOUTH ONCE DAILY AS NEEDED FOR MUSCLE SPASM    blood sugar diagnostic (BLOOD GLUCOSE TEST) Strp 1 strip by Misc.(Non-Drug; Combo Route) route 2 (two) times daily. Prodigy     "BLOOD SUGAR DIAGNOSTIC (PRODIGY AUTOCODE TEST STRIPS MISC) 1 strip by Misc.(Non-Drug; Combo Route) route 2 (two) times daily.    diphenoxylate-atropine 2.5-0.025 mg (LOMOTIL) 2.5-0.025 mg per tablet Take 1 tablet by mouth every 6 (six) hours as needed.    pen needle, diabetic (NOVOTWIST) 32 gauge x 1/5" Ndle Use with insulin pen as directed     Family History     None        Tobacco Use    Smoking status: Never Smoker    Smokeless tobacco: Never Used   Substance and Sexual Activity    Alcohol use: Yes     Alcohol/week: 0.0 oz     Comment: occasions    Drug use: No    Sexual activity: Not on file     Review of Systems   Constitutional: Positive for activity change and fatigue. Negative for appetite change, chills, diaphoresis, fever and unexpected weight change.   HENT: Negative for congestion.    Eyes: Negative for discharge.   Respiratory: Negative for apnea, cough, choking, chest tightness, shortness of breath and stridor.    Cardiovascular: Positive for chest pain. Negative for leg swelling.   Gastrointestinal: Positive for abdominal pain and diarrhea. Negative for abdominal distention, anal bleeding, blood in stool, constipation, nausea and vomiting.   Genitourinary: Negative for difficulty urinating.   Musculoskeletal: Negative for arthralgias.   Skin: Negative for color change.   Neurological: Positive for dizziness.   Psychiatric/Behavioral: Negative for agitation and behavioral problems.     Objective:     Vital Signs (Most Recent):  Temp: 98.2 °F (36.8 °C) (09/10/18 0725)  Pulse: 98 (09/10/18 0725)  Resp: 18 (09/10/18 0725)  BP: (!) 145/65 (09/10/18 0725)  SpO2: 95 % (09/10/18 0814) Vital Signs (24h Range):  Temp:  [98.2 °F (36.8 °C)-98.8 °F (37.1 °C)] 98.2 °F (36.8 °C)  Pulse:  [] 98  Resp:  [18-20] 18  SpO2:  [87 %-98 %] 95 %  BP: (125-177)/(60-72) 145/65     Weight: 97 kg (213 lb 13.5 oz)  Body mass index is 40.41 kg/m².    Physical Exam   Constitutional: She is oriented to person, place, " and time. She appears well-developed and well-nourished.   HENT:   Head: Normocephalic and atraumatic.   Cardiovascular: Normal rate and regular rhythm. Exam reveals no gallop and no friction rub.   Pulmonary/Chest: Effort normal. No stridor. No respiratory distress. She has no wheezes. She has no rales.   Abdominal: Soft. Bowel sounds are normal. She exhibits no distension and no mass. There is no tenderness. There is no rebound and no guarding.   Neurological: She is alert and oriented to person, place, and time.   Skin: Skin is warm and dry.   Psychiatric: She has a normal mood and affect. Her behavior is normal.   Vitals reviewed.          Significant Labs:   BMP:   Recent Labs   Lab  09/10/18   0445   GLU  190*   NA  140   K  4.1   CL  107   CO2  25   BUN  31*   CREATININE  1.2   CALCIUM  8.9     CBC:   Recent Labs   Lab  09/09/18   1815  09/10/18   0445   WBC  9.61  9.18   HGB  13.4  12.9   HCT  42.8  40.1   PLT  262  233       Significant Imaging:  CT abdomen reviewed.   D-dimer .86  Trop negative.   CXR negative     Assessment/Plan:     * Hypoxemia    This is the issue for admission per the ED.  Patient's D -dimer elevated with vague CP. Will check a CTA of the chest.            Weakness    Will order PT/OT eval. Likely benefit from H/H           Chest pain    Trop negative. More upper R abdominal pain. CTA chest pending.  Resolved.           Diarrhea    Chronic issue. Can follow up with GI.  C-diff negative. No concerning labs.           Asymptomatic stenosis of left carotid artery    No new issues           Morbid obesity    Body mass index is 40.41 kg/m².  Needs to loose weight.          Hypothyroidism (acquired)    Resume home meds.           Type 2 diabetes mellitus with diabetic chronic kidney disease    No other acute issues. A1c is 5.9          Diastolic dysfunction    Chronic. No acute issues.           GERD (gastroesophageal reflux disease)    Resume PPI          CKD (chronic kidney disease) stage  3, GFR 30-59 ml/min    At baseline.           Hyperlipemia    Resume statin           Essential hypertension    No acute issues. Resume home meds.             VTE Risk Mitigation (From admission, onward)        Ordered     enoxaparin injection 40 mg  Daily      09/10/18 0032     IP VTE HIGH RISK PATIENT  Once      09/10/18 0032            Ayan Rubin MD  Department of Hospital Medicine   [facility]

## 2018-09-10 NOTE — PT/OT/SLP EVAL
"Occupational Therapy   Evaluation and Discharge Note    Name: Melinda Knight  MRN: [id number]  Admitting Diagnosis:  Hypoxemia      Recommendations:     Discharge Recommendations: home  Discharge Equipment Recommendations:  none  Barriers to discharge:  None    History:     Occupational Profile:  Living Environment: lives with adult daughter  Previous level of function: modified independent with ADL's  Equipment Owned:  bedside commode, bath bench, glucometer, walker, rolling, rollator, shower chair  Assistance upon Discharge: from her daughter    Past Medical History:   Diagnosis Date    Carotid artery occlusion     Coronary artery disease     Hypertension     Skin cancer     cyst on the face , was removed 20years +    Thyroid disease     Type 2 diabetes mellitus          Past Surgical History:   Procedure Laterality Date    CAROTID ENDARTERECTOMY Left 03/24/2016    CHOLECYSTECTOMY      ENDARTERECTOMY-CAROTID Left 3/24/2016    Performed by Sofia Way [facility] at [facility] OR [address] [place] [address]    GALLBLADDER SURGERY      1996    SKIN BIOPSY      TONSILLECTOMY      TUBAL LIGATION Bilateral 3/18/2016       Subjective     Chief Complaint: "My hips hurt when I sit in one place for too long."  Patient/Family stated goals: to get better  Communicated with: nurse Gisele prior to session.  Pain/Comfort:  Pain Rating Post-Intervention 1: 6/10  Location - Side 2: Bilateral  Location 2: hip  Pain Addressed 2: Reposition, Cessation of Activity    Patients cultural, spiritual, Lutheran conflicts given the current situation: none    Objective:     Patient found with: peripheral IV, telemetry    General Precautions: Standard, fall   Orthopedic Precautions:N/A   Braces: N/A     Occupational Performance:    Bed Mobility:    · Patient completed Rolling/Turning to Right with modified independence  · Patient completed Scooting/Bridging with modified independence  · Patient completed Supine to Sit with modified independence    Functional " "Mobility/Transfers:  · Patient completed Sit <> Stand Transfer with stand by assistance  with rolling walker   · Patient completed Bed <> Chair Transfer using Step Transfer technique with contact guard assistance with rolling walker  · Functional Mobility: took 3-5 steps to the bedside chair but has already ambulated to the bathroom with the nursing staff    Activities of Daily Living:  · Feeding:  independence    · Upper Body Dressing: independence    · Lower Body Dressing: independence  to taye socks  · Toileting: independence      Cognitive/Visual Perceptual:  Cognitive/Psychosocial Skills:     -       Oriented to: Person, Place, Time and Situation   -       Follows Commands/attention:Follows two-step commands  -       Communication: clear/fluent  -       Memory: No Deficits noted  -       Safety awareness/insight to disability: intact   -       Mood/Affect/Coping skills/emotional control: Appropriate to situation    Physical Exam:  Balance:    -       sit balance good; standing balance good  Postural examination/scapula alignment:    -       Rounded shoulders  Skin integrity: Visible skin intact  Upper Extremity Range of Motion:     -       Right Upper Extremity: WFL    -       Left Upper Extremity: WFL      Upper Extremity Strength:  -       Right Upper Extremity: WFL    -       Left Upper Extremity: WFL      Patient left up in chair with all lines intact, call button in reach and daughter present    [facility] 6 Click:  [facility] Total Score: 23    Treatment & Education:  Evaluation    Education:    Assessment:     Melinda Knight is a 73 y.o. female with a medical diagnosis of Hypoxemia. At this time, patient is functioning at their prior level of function and does not require further acute OT services.     Clinical Decision Makin.  OT Low:  "Pt evaluation falls under low complexity for evaluation coding due to performance deficits noted in 1-3 areas as stated above and 0 co-morbities affecting current " "functional status. Data obtained from problem focused assessments. No modifications or assistance was required for completion of evaluation. Only brief occupational profile and history review completed."     Plan:     During this hospitalization, patient does not require further acute OT services.  Please re-consult if situation changes.    · Plan of Care Reviewed with: patient, daughter    This Plan of care has been discussed with the patient who was involved in its development and understands and is in agreement with the identified goals and treatment plan    GOALS:   Multidisciplinary Problems     Occupational Therapy Goals     Not on file          Multidisciplinary Problems (Resolved)        Problem: Occupational Therapy Goal    Goal Priority Disciplines Outcome Interventions   Occupational Therapy Goal   (Resolved)     OT, PT/OT Outcome(s) achieved                    Time Tracking:     OT Date of Treatment: 09/10/18  OT Start Time: 1424  OT Stop Time: 1434  OT Total Time (min): 10 min    Billable Minutes:Evaluation 10 minutes with PT    HUY Love, MS  9/10/2018    "

## 2018-09-10 NOTE — PLAN OF CARE
Problem: Diarrhea (Adult)  Intervention: Manage Oral/IV Intake   09/10/18 0516   Safety Interventions   Medication Review/Management medications reviewed   Nutrition Interventions   Fluid/Electrolyte Management fluids provided     Intervention: Promote Perineal Hygiene/Elimination Safety   09/10/18 0516   Hygiene Care   Perineal Care absorbent pad changed;diaper changed;perineum cleansed   Positioning   Body Position neutral body alignment;neutral head position;supine   Skin Interventions   Skin Protection Incontinence pads       Goal: Identify Related Risk Factors and Signs and Symptoms  Related risk factors and signs and symptoms are identified upon initiation of Human Response Clinical Practice Guideline (CPG)  Outcome: Ongoing (interventions implemented as appropriate)   09/10/18 0516   Diarrhea   Related Risk Factors (Diarrhea) anxiety/stress;contamination;dietary causes;infectious process;intestinal malabsorption;medication effects   Signs and Symptoms (Diarrhea) abdominal distension;abdominal pain/cramps;fecal incontinence;increased stool frequency, amount, and fluidity;loose, watery stool;malaise;urgency     Goal: Improved/Reduced Symptoms  Patient will demonstrate the desired outcomes by discharge/transition of care.  Outcome: Ongoing (interventions implemented as appropriate)   09/10/18 0516   Diarrhea (Adult)   Improved/Reduced Symptoms making progress toward outcome

## 2018-09-10 NOTE — HOSPITAL COURSE
Mrs. Knight is a 72 yo F who is a poor historian. She presents with multiple vague complaints- all of which she states are chronic problems- weakness, dizziness, chest pain/abdominal pain, diarrhea, not feeling good.  She could not elaborate on any. Evidently she was at [facility] when she had R upper abdomina/chest pain. Her daughter apparently called 911.  The patient presents to the ED and is found to have some mild hypoxemia. The patient states she does not wear 02 at home. D-dimer was elevated. The patient had a CT of abdomen and pelvis that was not very remarkable. Because of the elevated D-dimer and hypoxia- the patient was sent for a CTA of the chest on 9/10.  This showed no PE. GI was consulted as daughter noted that patient's RUQ abdominal pain worse than normal. She was taken to EGD on 9/11. This showed mild esophageal ring as well as gastritis. The patient had no further symptoms and was discharged to home with H/H on 9/12 with close GI follow up.  Activity as tolerated. Follow up with Dr. Mckee in one week. Will start PPI BiD. Diet- low NA, ADA 1800 luiz diet.

## 2018-09-10 NOTE — ASSESSMENT & PLAN NOTE
This is the issue for admission per the ED.  Patient's D -dimer elevated with vague CP. Will check a CTA of the chest.

## 2018-09-10 NOTE — PLAN OF CARE
Problem: Occupational Therapy Goal  Goal: Occupational Therapy Goal  Outcome: Outcome(s) achieved Date Met: 09/10/18  Patient tolerated evaluation well, patient with no need for skilled OT services at this time. HUY Love, MS

## 2018-09-10 NOTE — CONSULTS
Gastroenterology Consult    9/10/2018 3:21 PM    Consulting Physician:  Jaimee Vivar MD  Primary Care Provider: Duke Cole MD    Reason for consultation: abnormal CT    HPI:  Melinda Knight is a 73 y.o. female with a history of HTN, DM2, hypothyroidism, CAD and carotid artery occlusion s/p CEA who presents with multiple complaints including weakness/dizziness, chest and abdominal pain and diarrhea, which are chronic.  Her daughter was prompted to call 911 due to more severe RUQ pain than usual.  She reports the pain was sudden onset and located in the RUQ and chest without radiation.  It felt like gas pain.  She also reported worse diarrhea than usual throughout the day yesterday without any blood.  She does have a history of chronic diarrhea.  She also had some nausea, but no vomiting.  She says her pain is gone now (resolved on its own).  The patient's daughter says that her mother had had chronic GI issues for the last 20 years.  She did undergo EGD and colonoscopy at [facility] about 2-3 years ago.  She thinks that she had an unremarkable EGD and polyps on her colonoscopy.  She denies any melena, hematochezia or hematemesis.  She has lost about 5-10 lbs in the last few months.  No recent fevers or chills.  No jaundice or icterus.    Past Medical History:   Diagnosis Date    Carotid artery occlusion     Coronary artery disease     Hypertension     Skin cancer     cyst on the face , was removed 20years +    Thyroid disease     Type 2 diabetes mellitus        Past Surgical History:   Procedure Laterality Date    CAROTID ENDARTERECTOMY Left 03/24/2016    CHOLECYSTECTOMY      ENDARTERECTOMY-CAROTID Left 3/24/2016    Performed by Sofia Way MD at [facility] OR [address] [place] [address]    GALLBLADDER SURGERY      1996    SKIN BIOPSY      TONSILLECTOMY      TUBAL LIGATION Bilateral 3/18/2016       Social History     Socioeconomic History    Marital status:      Spouse name: None    Number of children: None     Years of education: None    Highest education level: None   Social Needs    Financial resource strain: None    Food insecurity - worry: None    Food insecurity - inability: None    Transportation needs - medical: None    Transportation needs - non-medical: None   Occupational History    None   Tobacco Use    Smoking status: Never Smoker    Smokeless tobacco: Never Used   Substance and Sexual Activity    Alcohol use: Yes     Alcohol/week: 0.0 oz     Comment: occasions    Drug use: No    Sexual activity: None   Other Topics Concern    None   Social History Narrative    None       History reviewed. No pertinent family history.      Review of patient's allergies indicates:   Allergen Reactions    Sulfa (sulfonamide antibiotics) Nausea And Vomiting    Menthol contain prod          Current Facility-Administered Medications:     amLODIPine tablet 10 mg, 10 mg, Oral, Daily, Johny Chan MD, 10 mg at 09/10/18 0933    aspirin tablet 325 mg, 325 mg, Oral, QHS, Johny Chan MD    atorvastatin tablet 40 mg, 40 mg, Oral, Daily, Johny Chan MD, 40 mg at 09/10/18 0933    citalopram tablet 20 mg, 20 mg, Oral, Daily, Ayan Hassan MD, 20 mg at 09/10/18 1213    enoxaparin injection 40 mg, 40 mg, Subcutaneous, Daily, Ayan Hassan MD    insulin aspart U-100 pen 8 Units, 8 Units, Subcutaneous, TID AC, Ayan Hassan MD, 8 Units at 09/10/18 1213    insulin detemir U-100 pen 30 Units, 30 Units, Subcutaneous, QHS, Ayan Hassan MD    ondansetron injection 4 mg, 4 mg, Intravenous, Q8H PRN, Johny Chan MD    pantoprazole EC tablet 40 mg, 40 mg, Oral, Daily, Ayan Hassan MD    sodium chloride 0.9% flush 3 mL, 3 mL, Intravenous, Q8H, Johny Chan MD, 3 mL at 09/10/18 0600    tiZANidine tablet 4 mg, 4 mg, Oral, Q8H, Ayan Hassan MD      Review of Systems   Constitutional: Positive for malaise/fatigue and weight loss. Negative for  "chills and fever.   HENT: Negative.    Eyes: Negative.    Respiratory: Negative.    Cardiovascular: Positive for chest pain.   Gastrointestinal: Positive for abdominal pain, diarrhea and nausea. Negative for blood in stool, constipation, heartburn, melena and vomiting.   Genitourinary: Negative.    Musculoskeletal: Negative.    Skin: Negative.    Neurological: Positive for weakness.   Endo/Heme/Allergies: Negative.          BP (!) 143/60   Pulse 92   Temp 98.3 °F (36.8 °C)   Resp 18   Ht 5' 1" (1.549 m)   Wt 97 kg (213 lb 13.5 oz)   SpO2 (!) 91%   Breastfeeding? No   BMI 40.41 kg/m²   Physical Exam   Constitutional: She is oriented to person, place, and time. She appears well-developed and well-nourished. No distress.   obese   HENT:   Head: Normocephalic and atraumatic.   Mouth/Throat: Oropharynx is clear and moist.   Eyes: EOM are normal. Pupils are equal, round, and reactive to light. No scleral icterus.   Cardiovascular: Normal rate and regular rhythm.   No murmur heard.  Pulmonary/Chest: Effort normal and breath sounds normal. She has no wheezes.   Abdominal: Soft. Bowel sounds are normal. She exhibits no distension. There is no tenderness. There is no guarding.   Musculoskeletal: Normal range of motion. She exhibits no edema.   Neurological: She is alert and oriented to person, place, and time. No sensory deficit.   Skin: Skin is warm and dry. No rash noted.   Vitals reviewed.      Labs:  Lab Results   Component Value Date/Time    WBC 9.18 09/10/2018 04:45 AM    HGB 12.9 09/10/2018 04:45 AM    HCT 40.1 09/10/2018 04:45 AM    HCT 31 (L) 03/24/2016 01:11 PM     09/10/2018 04:45 AM    MCV 99 (H) 09/10/2018 04:45 AM     09/10/2018 04:45 AM    K 4.1 09/10/2018 04:45 AM     09/10/2018 04:45 AM    CO2 25 09/10/2018 04:45 AM    BUN 31 (H) 09/10/2018 04:45 AM    CREATININE 1.2 09/10/2018 04:45 AM     (H) 09/10/2018 04:45 AM    CALCIUM 8.9 09/10/2018 04:45 AM    INR 1.0 03/18/2016 04:54 " PM   ]  Lab Results   Component Value Date/Time    PROT 6.8 09/10/2018 04:45 AM    ALBUMIN 3.2 (L) 09/10/2018 04:45 AM    BILITOT 0.4 09/10/2018 04:45 AM    AST 17 09/10/2018 04:45 AM    ALT 15 09/10/2018 04:45 AM    ALKPHOS 101 09/10/2018 04:45 AM   ]    Imaging and Procedures:  I personally reviewed the imaging/procedures below.  CT A/P 9/9/18:  Significantly degraded examination secondary to motion limitations.    Abnormally thickened and distended long segment of the ileum with fecalization of the thickened segment.  Given the degree of significant atherosclerotic disease in the abdomen, some form of ischemic enteritis would be difficult to exclude.  Suggest correlation with lactate levels and also possible CTA, if clinically feasible.    Inflammatory changes involving the proximal aspect of the duodenum.  No evidence of bowel perforation.  Endoscopic correlation is recommended after the patient's acute symptoms resolve.    Status post cholecystectomy with marked enlargement of the CBD.  Nonemergent MRCP may be obtained for further evaluation.    Erosive endplate changes in the lumbar spine.  Nonemergent MRI of the lumbar spine may be attempted for further evaluation.    Assessment:  Melinda Knight is a 73 y.o. female with a history of  HTN, DM2, hypothyroidism, CAD and carotid artery occlusion s/p CEA who presents with multiple complaints including weakness/dizziness, chest and abdominal pain and diarrhea, which are chronic. GI consulted for abnormal CT showing inflammatory changes in proximal duodenum, enlarged bile duct, and thickened and distended segment of ileum with fecalization.     Plan:  - can consider EGD to evaluate non-specific duodenal thickening and RUQ pain - will keep NPO after midnight - if pain recurs or she develops nausea and/or vomiting with dinner, will do EGD tomorrow  - bile duct dilation is likely related to post-cholecystectomy status; her LFTs are normal, so I do not suspect any  biliary obstruction  - unclear what the etiology of the small bowel thickening/fecalization is, but there is significant atherosclerosis/caclifications in her abdominal vasculature, so this may be chronic and related to ischemia.  She does report intermittent episodic periods of nausea/vomiting and decreased appetite, so she could develop some mild intermittent bowel obstructions from this, and it may also explain her diarrhea (vs. IBS-D); lactate is normal so this is probably chronic.  Could consider getting a CT or MR enterography in the outpatient setting to evaluate this further.    Jaimee Vivar MD

## 2018-09-10 NOTE — PT/OT/SLP EVAL
Physical Therapy Evaluation    Patient Name:  Melinda Knight   MRN:  [id number]    Recommendations:     Discharge Recommendations:  home health PT   Discharge Equipment Recommendations: none   Barriers to discharge: None    Assessment:     Melinda Knight is a 73 y.o. female admitted with a medical diagnosis of Hypoxemia.  She presents with the following impairments/functional limitations:  weakness, impaired functional mobilty, gait instability, impaired endurance, impaired balance, impaired self care skills, decreased lower extremity function, decreased safety awareness, pain .    Rehab Prognosis:  Good; patient would benefit from acute skilled PT services to address these deficits and reach maximum level of function.      Recent Surgery: * No surgery found *      Plan:     During this hospitalization, patient to be seen 3 x/week to address the above listed problems via gait training, therapeutic activities, therapeutic exercises  · Plan of Care Expires:  09/17/18   Plan of Care Reviewed with: patient    Subjective     Communicated with nsg prior to session.  Patient found HOB elevated upon PT entry to room, agreeable to evaluation.      Chief Complaint: pain  Patient comments/goals: to go home, pt declining HHPT  Pain/Comfort:  · Pain Rating 1: 6/10  · Location 1: (B Hips)  · Pain Addressed 1: Reposition, Distraction, Cessation of Activity, Nurse notified  · Pain Rating Post-Intervention 1: 6/10    Patients cultural, spiritual, Church conflicts given the current situation: none    Living Environment:  Pt lives with her Daughter in a [facility] with 3 NELDA front and back.  Front steps hs B HR's, back just one. HR  Prior to admission, patients level of function was Mod Indep.  Patient has the following equipment: bath bench, glucometer, walker, rolling, rollator.  DME owned (not currently used): none.  Upon discharge, patient will have assistance from Daughter.    Objective:     Patient found with: telemetry, peripheral  IV     General Precautions: Standard, fall   Orthopedic Precautions:N/A   Braces: N/A     Exams:  · Cognitive Exam:  Patient is oriented to Person, Place, Time and Situation  · Gross Motor Coordination:  mildly impaired 2/2 gen weakness  · Postural Exam:  Patient presented with the following abnormalities:    · -       Rounded shoulders  · -       Forward head  · Sensation:    · -       Intact  light/touch B LE's  · Skin Integrity/Edema:      · -       Skin integrity: Visible skin intact  · RLE ROM: WFL  · RLE Strength: WFL  · LLE ROM: WFL  · LLE Strength: WFL    Functional Mobility:  · Bed Mobility:     · Scooting: stand by assistance  · Supine to Sit: stand by assistance  · Transfers:     · Sit to Stand:  contact guard assistance with rolling walker  · Gait: 5 steps to BSC with CGA and RW  · Balance: FAir+ sit/stand    AM-PAC 6 CLICK MOBILITY  Total Score:19       Therapeutic Activities and Exercises:   Instructed pt to perform B AP's, FAQ's, marching, and Hip ABd/Add x 10 reps.   Pt verbalized/demonstrated understanding     Patient left up in chair with all lines intact, call button in reach, nsg notified and daughter present.    GOALS:   Multidisciplinary Problems     Physical Therapy Goals        Problem: Physical Therapy Goal    Goal Priority Disciplines Outcome Goal Variances Interventions   Physical Therapy Goal     PT, PT/OT Ongoing (interventions implemented as appropriate)     Description:  Goals to be met by: 2018     Patient will increase functional independence with mobility by performin. Supine to sit with Modified [place]  2. Sit to stand transfer with Modified [place]  3. Gait  x 50 feet with Modified [place] using Rolling Walker.   4. Lower extremity exercise program x10-15 reps per handout, with independence                      History:     Past Medical History:   Diagnosis Date    Carotid artery occlusion     Coronary artery disease     Hypertension     Skin cancer      cyst on the face , was removed 20years +    Thyroid disease     Type 2 diabetes mellitus        Past Surgical History:   Procedure Laterality Date    CAROTID ENDARTERECTOMY Left 03/24/2016    CHOLECYSTECTOMY      ENDARTERECTOMY-CAROTID Left 3/24/2016    Performed by [facility] at [facility] OR [address] [place] [address]    GALLBLADDER SURGERY      1996    SKIN BIOPSY      TONSILLECTOMY      TUBAL LIGATION Bilateral 3/18/2016       Clinical Decision Making:     History  Co-morbidities and personal factors that may impact the plan of care Examination  Body Structures and Functions, activity limitations and participation restrictions that may impact the plan of care Clinical Presentation   Decision Making/ Complexity Score   Co-morbidities:   [] Time since onset of injury / illness / exacerbation  [] Status of current condition  []Patient's cognitive status and safety concerns    [] Multiple Medical Problems (see med hx)  Personal Factors:   [] Patient's age  [] Prior Level of function   [] Patient's home situation (environment and family support)  [] Patient's level of motivation  [] Expected progression of patient      HISTORY:(criteria)    [] 05779 - no personal factors/history    [] 00631 - has 1-2 personal factor/comorbidity     [] 42406 - has >3 personal factor/comorbidity     Body Regions:  [] Objective examination findings  [] Head     []  Neck  [] Trunk   [] Upper Extremity  [] Lower Extremity    Body Systems:  [] For communication ability, affect, cognition, language, and learning style: the assessment of the ability to make needs known, consciousness, orientation (person, place, and time), expected emotional /behavioral responses, and learning preferences (eg, learning barriers, education  needs)  [] For the neuromuscular system: a general assessment of gross coordinated movement (eg, balance, gait, locomotion, transfers, and transitions) and motor function  (motor control and motor learning)  [] For the  musculoskeletal system: the assessment of gross symmetry, gross range of motion, gross strength, height, and weight  [] For the integumentary system: the assessment of pliability(texture), presence of scar formation, skin color, and skin integrity  [] For cardiovascular/pulmonary system: the assessment of heart rate, respiratory rate, blood pressure, and edema     Activity limitations:    [] Patient's cognitive status and saf ety concerns          [] Status of current condition      [] Weight bearing restriction  [] Cardiopulmunary Restriction    Participation Restrictions:   [] Goals and goal agreement with the patient     [] Rehab potential (prognosis) and probable outcome      Examination of Body System: (criteria)    [] 00915 - addressing 1-2 elements    [] 54216 - addressing a total of 3 or more elements     [] 20690 -  Addressing a total of 4 or more elements         Clinical Presentation: (criteria)  Choose one     On examination of body system using standardized tests and measures patient presents with (CHOOSE ONE) elements from any of the following: body structures and functions, activity limitations, and/or participation restrictions.  Leading to a clinical presentation that is considered (CHOOSE ONE)                              Clinical Decision Making  (Eval Complexity):  Choose One     Time Tracking:     PT Received On: 09/10/18  PT Start Time: 1425     PT Stop Time: 1435  PT Total Time (min): 10 min     Billable Minutes: Evaluation 10 co-treat with PÉREZ Munguia, PT  09/10/2018

## 2018-09-10 NOTE — PLAN OF CARE
09/10/18 1022   Discharge Assessment   Assessment Type Discharge Planning Assessment   Confirmed/corrected address and phone number on facesheet? Yes   Assessment information obtained from? Patient   Prior to hospitilization cognitive status: Alert/Oriented   Prior to hospitalization functional status: Independent;Assistive Equipment;Needs Assistance   Current cognitive status: Alert/Oriented   Current Functional Status: Independent;Assistive Equipment;Needs Assistance   Facility Arrived From: home   Lives With child(aster), adult   Able to Return to Prior Arrangements yes   Is patient able to care for self after discharge? (not without assistance)   Who are your caregiver(s) and their phone number(s)? [facility]- [phone]   Patient's perception of discharge disposition home or selfcare;home health   Readmission Within The Last 30 Days no previous admission in last 30 days   Patient currently being followed by outpatient case management? No   Patient currently receives any other outside agency services? No   Equipment Currently Used at Home bedside commode;bath bench;glucometer;walker, rolling;wheelchair;shower chair   Do you have any problems affording any of your prescribed medications? No   Is the patient taking medications as prescribed? yes   Does the patient have transportation home? Yes   Transportation Available car;family or friend will provide   Does the patient receive services at the [facility]? No   Discharge Plan A Home with family;Home Health   Discharge Plan B Home with family;Home Health  (with follow up appointments)   Patient/Family In Agreement With Plan yes   Readmission Questionnaire   Have you felt down, depressed, or hopeless? 1       [facility] - [place] - [address]  [address] [place] [address]  Phone: [phone] Fax: [phone]    [facility]/pharm[facility] #[id number] - [place] - [address]  [address]  [place] [address]  Phone:  [phone] Fax: [phone]

## 2018-09-10 NOTE — PLAN OF CARE
Problem: Diabetes, Type 2 (Adult)  Goal: Signs and Symptoms of Listed Potential Problems Will be Absent, Minimized or Managed (Diabetes, Type 2)  Signs and symptoms of listed potential problems will be absent, minimized or managed by discharge/transition of care (reference Diabetes, Type 2 (Adult) CPG).  Outcome: Ongoing (interventions implemented as appropriate)   09/10/18 1456   Diabetes, Type 2   Problems Assessed (Type 2 Diabetes) all   Problems Present (Type 2 Diabetes) none       Problem: Fall Risk (Adult)  Goal: Absence of Falls  Patient will demonstrate the desired outcomes by discharge/transition of care.  Outcome: Ongoing (interventions implemented as appropriate)   09/10/18 1456   Fall Risk (Adult)   Absence of Falls making progress toward outcome       Problem: Patient Care Overview  Goal: Plan of Care Review  Outcome: Ongoing (interventions implemented as appropriate)   09/10/18 1456   Coping/Psychosocial   Plan Of Care Reviewed With patient       Problem: Infection, Risk/Actual (Adult)  Goal: Identify Related Risk Factors and Signs and Symptoms  Related risk factors and signs and symptoms are identified upon initiation of Human Response Clinical Practice Guideline (CPG)  Outcome: Ongoing (interventions implemented as appropriate)   09/10/18 1456   Infection, Risk/Actual   Related Risk Factors (Infection, Risk/Actual) age extremes   Signs and Symptoms (Infection, Risk/Actual) weakness

## 2018-09-10 NOTE — PLAN OF CARE
Problem: Physical Therapy Goal  Goal: Physical Therapy Goal  Outcome: Ongoing (interventions implemented as appropriate)  Initial PT evaluation performed.  Pt could benefit from skilled PT services 3x/wk in order to maximize function prior to D/C.  HHPT recommended, but patient declined.

## 2018-09-11 ENCOUNTER — ANESTHESIA (OUTPATIENT)
Dept: ENDOSCOPY | Facility: HOSPITAL | Age: 73
DRG: 206 | End: 2018-09-11
Payer: MEDICARE

## 2018-09-11 ENCOUNTER — ANESTHESIA EVENT (OUTPATIENT)
Dept: ENDOSCOPY | Facility: HOSPITAL | Age: 73
DRG: 206 | End: 2018-09-11
Payer: MEDICARE

## 2018-09-11 PROBLEM — R10.9 ABDOMINAL PAIN: Status: ACTIVE | Noted: 2018-09-11

## 2018-09-11 LAB
ALBUMIN SERPL BCP-MCNC: 3.2 G/DL
ALP SERPL-CCNC: 112 U/L
ALT SERPL W/O P-5'-P-CCNC: 16 U/L
ANION GAP SERPL CALC-SCNC: 8 MMOL/L
AST SERPL-CCNC: 14 U/L
BASOPHILS # BLD AUTO: 0.04 K/UL
BASOPHILS NFR BLD: 0.6 %
BILIRUB SERPL-MCNC: 0.3 MG/DL
BUN SERPL-MCNC: 31 MG/DL
CALCIUM SERPL-MCNC: 9.1 MG/DL
CHLORIDE SERPL-SCNC: 108 MMOL/L
CO2 SERPL-SCNC: 25 MMOL/L
CREAT SERPL-MCNC: 1.3 MG/DL
DIFFERENTIAL METHOD: ABNORMAL
EOSINOPHIL # BLD AUTO: 0.3 K/UL
EOSINOPHIL NFR BLD: 4.2 %
ERYTHROCYTE [DISTWIDTH] IN BLOOD BY AUTOMATED COUNT: 12.7 %
EST. GFR  (AFRICAN AMERICAN): 48 ML/MIN/1.73 M^2
EST. GFR  (NON AFRICAN AMERICAN): 41 ML/MIN/1.73 M^2
GLUCOSE SERPL-MCNC: 238 MG/DL
HCT VFR BLD AUTO: 43.2 %
HGB BLD-MCNC: 13.1 G/DL
LYMPHOCYTES # BLD AUTO: 1.8 K/UL
LYMPHOCYTES NFR BLD: 27.2 %
MCH RBC QN AUTO: 30.5 PG
MCHC RBC AUTO-ENTMCNC: 30.3 G/DL
MCV RBC AUTO: 101 FL
MONOCYTES # BLD AUTO: 0.5 K/UL
MONOCYTES NFR BLD: 7.4 %
NEUTROPHILS # BLD AUTO: 3.9 K/UL
NEUTROPHILS NFR BLD: 60.3 %
PLATELET # BLD AUTO: 244 K/UL
PMV BLD AUTO: 11.1 FL
POCT GLUCOSE: 203 MG/DL (ref 70–110)
POCT GLUCOSE: 207 MG/DL (ref 70–110)
POCT GLUCOSE: 216 MG/DL (ref 70–110)
POCT GLUCOSE: 316 MG/DL (ref 70–110)
POTASSIUM SERPL-SCNC: 4.5 MMOL/L
PROT SERPL-MCNC: 7.3 G/DL
RBC # BLD AUTO: 4.29 M/UL
SODIUM SERPL-SCNC: 141 MMOL/L
WBC # BLD AUTO: 6.47 K/UL

## 2018-09-11 PROCEDURE — 85025 COMPLETE CBC W/AUTO DIFF WBC: CPT

## 2018-09-11 PROCEDURE — 88305 TISSUE EXAM BY PATHOLOGIST: CPT | Mod: 26,,, | Performed by: PATHOLOGY

## 2018-09-11 PROCEDURE — 27000221 HC OXYGEN, UP TO 24 HOURS

## 2018-09-11 PROCEDURE — 43239 EGD BIOPSY SINGLE/MULTIPLE: CPT | Performed by: INTERNAL MEDICINE

## 2018-09-11 PROCEDURE — 25000003 PHARM REV CODE 250: Performed by: EMERGENCY MEDICINE

## 2018-09-11 PROCEDURE — A4216 STERILE WATER/SALINE, 10 ML: HCPCS | Performed by: EMERGENCY MEDICINE

## 2018-09-11 PROCEDURE — D9220A PRA ANESTHESIA: Mod: CRNA,,, | Performed by: NURSE ANESTHETIST, CERTIFIED REGISTERED

## 2018-09-11 PROCEDURE — 63600175 PHARM REV CODE 636 W HCPCS: Performed by: NURSE ANESTHETIST, CERTIFIED REGISTERED

## 2018-09-11 PROCEDURE — 80053 COMPREHEN METABOLIC PANEL: CPT

## 2018-09-11 PROCEDURE — 63600175 PHARM REV CODE 636 W HCPCS: Performed by: INTERNAL MEDICINE

## 2018-09-11 PROCEDURE — 0DB78ZX EXCISION OF STOMACH, PYLORUS, VIA NATURAL OR ARTIFICIAL OPENING ENDOSCOPIC, DIAGNOSTIC: ICD-10-PCS | Performed by: INTERNAL MEDICINE

## 2018-09-11 PROCEDURE — 27201012 HC FORCEPS, HOT/COLD, DISP: Performed by: INTERNAL MEDICINE

## 2018-09-11 PROCEDURE — 37000008 HC ANESTHESIA 1ST 15 MINUTES: Performed by: INTERNAL MEDICINE

## 2018-09-11 PROCEDURE — 0DB98ZX EXCISION OF DUODENUM, VIA NATURAL OR ARTIFICIAL OPENING ENDOSCOPIC, DIAGNOSTIC: ICD-10-PCS | Performed by: INTERNAL MEDICINE

## 2018-09-11 PROCEDURE — 25000003 PHARM REV CODE 250: Performed by: INTERNAL MEDICINE

## 2018-09-11 PROCEDURE — 37000009 HC ANESTHESIA EA ADD 15 MINS: Performed by: INTERNAL MEDICINE

## 2018-09-11 PROCEDURE — D9220A PRA ANESTHESIA: Mod: ANES,,, | Performed by: ANESTHESIOLOGY

## 2018-09-11 PROCEDURE — 36415 COLL VENOUS BLD VENIPUNCTURE: CPT

## 2018-09-11 PROCEDURE — 21400001 HC TELEMETRY ROOM

## 2018-09-11 PROCEDURE — 88305 TISSUE EXAM BY PATHOLOGIST: CPT | Performed by: PATHOLOGY

## 2018-09-11 PROCEDURE — 94760 N-INVAS EAR/PLS OXIMETRY 1: CPT

## 2018-09-11 RX ORDER — PROPOFOL 10 MG/ML
VIAL (ML) INTRAVENOUS
Status: DISCONTINUED | OUTPATIENT
Start: 2018-09-11 | End: 2018-09-11

## 2018-09-11 RX ORDER — LIDOCAINE HCL/PF 100 MG/5ML
SYRINGE (ML) INTRAVENOUS
Status: DISPENSED
Start: 2018-09-11 | End: 2018-09-12

## 2018-09-11 RX ORDER — LABETALOL HYDROCHLORIDE 5 MG/ML
INJECTION, SOLUTION INTRAVENOUS
Status: DISPENSED
Start: 2018-09-11 | End: 2018-09-12

## 2018-09-11 RX ORDER — LABETALOL HYDROCHLORIDE 5 MG/ML
10 INJECTION, SOLUTION INTRAVENOUS ONCE
Status: COMPLETED | OUTPATIENT
Start: 2018-09-11 | End: 2018-09-11

## 2018-09-11 RX ORDER — PROPOFOL 10 MG/ML
VIAL (ML) INTRAVENOUS
Status: DISPENSED
Start: 2018-09-11 | End: 2018-09-12

## 2018-09-11 RX ORDER — SODIUM CHLORIDE 0.9 % (FLUSH) 0.9 %
3 SYRINGE (ML) INJECTION
Status: DISCONTINUED | OUTPATIENT
Start: 2018-09-11 | End: 2018-09-12 | Stop reason: HOSPADM

## 2018-09-11 RX ORDER — LIDOCAINE HCL/PF 100 MG/5ML
SYRINGE (ML) INTRAVENOUS
Status: DISCONTINUED | OUTPATIENT
Start: 2018-09-11 | End: 2018-09-11

## 2018-09-11 RX ADMIN — INSULIN DETEMIR 30 UNITS: 100 INJECTION, SOLUTION SUBCUTANEOUS at 09:09

## 2018-09-11 RX ADMIN — SODIUM CHLORIDE: 0.9 INJECTION, SOLUTION INTRAVENOUS at 03:09

## 2018-09-11 RX ADMIN — ENOXAPARIN SODIUM 40 MG: 40 INJECTION SUBCUTANEOUS at 04:09

## 2018-09-11 RX ADMIN — Medication 3 ML: at 05:09

## 2018-09-11 RX ADMIN — Medication 3 ML: at 09:09

## 2018-09-11 RX ADMIN — SODIUM CHLORIDE: 0.9 INJECTION, SOLUTION INTRAVENOUS at 11:09

## 2018-09-11 RX ADMIN — Medication 3 ML: at 02:09

## 2018-09-11 RX ADMIN — PROPOFOL 80 MG: 10 INJECTION, EMULSION INTRAVENOUS at 01:09

## 2018-09-11 RX ADMIN — TIZANIDINE 4 MG: 4 TABLET ORAL at 09:09

## 2018-09-11 RX ADMIN — INSULIN ASPART 8 UNITS: 100 INJECTION, SOLUTION INTRAVENOUS; SUBCUTANEOUS at 04:09

## 2018-09-11 RX ADMIN — TIZANIDINE 4 MG: 4 TABLET ORAL at 04:09

## 2018-09-11 RX ADMIN — LABETALOL HYDROCHLORIDE 10 MG: 5 INJECTION, SOLUTION INTRAVENOUS at 01:09

## 2018-09-11 RX ADMIN — ASPIRIN 325 MG ORAL TABLET 325 MG: 325 PILL ORAL at 09:09

## 2018-09-11 RX ADMIN — LIDOCAINE HYDROCHLORIDE 100 MG: 20 INJECTION, SOLUTION INTRAVENOUS at 01:09

## 2018-09-11 NOTE — PROGRESS NOTES
[facility] Hospital Medicine  Progress Note    Patient Name: Melinda Knight  MRN: [id number]  Patient Class: IP- Inpatient   Admission Date: 9/9/2018  Length of Stay: 2 days  Attending Physician: Ayan Hassan MD  Primary Care Provider: Duke Cole MD        Subjective:     Principal Problem:Hypoxemia    HPI:  Mrs. Knight is a 72 yo F who is a poor historian. She presents with multiple vague complaints- all of which she states are chronic problems- weakness, dizziness, chest pain/abdominal pain, diarrhea, not feeling good.  She could not elaborate on any. Evidently she was at [facility] when she had R upper abdomina/chest pain. Her daughter apparently called 911.  The patient presents to the ED and is found to have some mild hypoxemia. The patient states she does not wear 02 at home. D-dimer was elevated.  The patient is resting comfortably and has no current complaints. She is non- ill appearing.  The patient lives at home with family and ambulates with a walker.      Hospital Course:  Mrs. Knight is a 72 yo F who is a poor historian. She presents with multiple vague complaints- all of which she states are chronic problems- weakness, dizziness, chest pain/abdominal pain, diarrhea, not feeling good.  She could not elaborate on any. Evidently she was at [facility] when she had R upper abdomina/chest pain. Her daughter apparently called 911.  The patient presents to the ED and is found to have some mild hypoxemia. The patient states she does not wear 02 at home. D-dimer was elevated. The patient had a CT of abdomen and pelvis that was not very remarkable. Because of the elevated D-dimer and hypoxia- the patient was sent for a CTA of the chest on 9/10.  This showed no PE. GI was consulted as daughter noted that patient's RUQ abdominal pain worse than normal. She was taken to EGD on 9/11.     Interval History: No new issues.     Review of Systems   Constitutional: Negative for activity  change, appetite change, chills, diaphoresis, fatigue, fever and unexpected weight change.   HENT: Negative for congestion.    Eyes: Negative for discharge.   Respiratory: Negative for apnea, cough, choking, chest tightness, shortness of breath and stridor.    Cardiovascular: Negative for chest pain and leg swelling.   Gastrointestinal: Negative for abdominal distention, abdominal pain, anal bleeding, blood in stool, constipation, diarrhea, nausea and vomiting.   Genitourinary: Negative for difficulty urinating.   Musculoskeletal: Negative for arthralgias.   Skin: Negative for color change.   Neurological: Negative for dizziness.   Psychiatric/Behavioral: Negative for agitation and behavioral problems.     Objective:     Vital Signs (Most Recent):  Temp: 98.8 °F (37.1 °C) (09/11/18 0759)  Pulse: 94 (09/11/18 0759)  Resp: 18 (09/11/18 0759)  BP: 131/61 (09/11/18 0759)  SpO2: 96 % (09/11/18 0830) Vital Signs (24h Range):  Temp:  [98.2 °F (36.8 °C)-98.8 °F (37.1 °C)] 98.8 °F (37.1 °C)  Pulse:  [83-97] 94  Resp:  [18-20] 18  SpO2:  [84 %-97 %] 96 %  BP: (109-148)/(51-66) 131/61     Weight: 103.9 kg (229 lb 0.9 oz)  Body mass index is 43.28 kg/m².    Intake/Output Summary (Last 24 hours) at 9/11/2018 1143  Last data filed at 9/11/2018 0558  Gross per 24 hour   Intake 1837.91 ml   Output --   Net 1837.91 ml      Physical Exam   Constitutional: She is oriented to person, place, and time. She appears well-developed and well-nourished.   Abdominal: Soft. Bowel sounds are normal. She exhibits no distension. There is no tenderness.   Neurological: She is alert and oriented to person, place, and time.   Psychiatric: She has a normal mood and affect. Her behavior is normal.   Vitals reviewed.      Significant Labs:   BMP:   Recent Labs   Lab  09/11/18   0604   GLU  238*   NA  141   K  4.5   CL  108   CO2  25   BUN  31*   CREATININE  1.3   CALCIUM  9.1     CBC:   Recent Labs   Lab  09/09/18   1815  09/10/18   0445  09/11/18    0604   WBC  9.61  9.18  6.47   HGB  13.4  12.9  13.1   HCT  42.8  40.1  43.2   PLT  262  233  244       Significant Imaging:    Assessment/Plan:      * Hypoxemia    This is the issue for admission per the ED.  Patient's D -dimer elevated with vague CP. Will check a CTA of the chest. - negative. No acute issues.  May have Obesity/hypoventilation.           Abdominal pain    With abnormal CT of abdomen. GI consulted. EGD 9/11.           Weakness    Will order PT/OT eval. H/H. No equipment.           Chest pain    Trop negative. More upper R abdominal pain. CTA chest pending.  Resolved.           Diarrhea    Chronic issue. Can follow up with GI.  C-diff negative. No concerning labs.           Asymptomatic stenosis of left carotid artery    No new issues           Morbid obesity    Body mass index is 40.41 kg/m².  Needs to loose weight.          Hypothyroidism (acquired)    Resume home meds.           Type 2 diabetes mellitus with diabetic chronic kidney disease    No other acute issues. A1c is 5.9          Diastolic dysfunction    Chronic. No acute issues.           GERD (gastroesophageal reflux disease)    Resume PPI          CKD (chronic kidney disease) stage 3, GFR 30-59 ml/min    At baseline.           Hyperlipemia    Resume statin           Essential hypertension    No acute issues. Resume home meds.             VTE Risk Mitigation (From admission, onward)        Ordered     enoxaparin injection 40 mg  Daily      09/10/18 1052     IP VTE HIGH RISK PATIENT  Once      09/10/18 0032        EGD today. Home likely 9/12 with H/H.        Ayan Rubin MD  Department of Hospital Medicine   [facility]

## 2018-09-11 NOTE — SUBJECTIVE & OBJECTIVE
Interval History: No new issues.     Review of Systems   Constitutional: Negative for activity change, appetite change, chills, diaphoresis, fatigue, fever and unexpected weight change.   HENT: Negative for congestion.    Eyes: Negative for discharge.   Respiratory: Negative for apnea, cough, choking, chest tightness, shortness of breath and stridor.    Cardiovascular: Negative for chest pain and leg swelling.   Gastrointestinal: Negative for abdominal distention, abdominal pain, anal bleeding, blood in stool, constipation, diarrhea, nausea and vomiting.   Genitourinary: Negative for difficulty urinating.   Musculoskeletal: Negative for arthralgias.   Skin: Negative for color change.   Neurological: Negative for dizziness.   Psychiatric/Behavioral: Negative for agitation and behavioral problems.     Objective:     Vital Signs (Most Recent):  Temp: 98.8 °F (37.1 °C) (09/11/18 0759)  Pulse: 94 (09/11/18 0759)  Resp: 18 (09/11/18 0759)  BP: 131/61 (09/11/18 0759)  SpO2: 96 % (09/11/18 0830) Vital Signs (24h Range):  Temp:  [98.2 °F (36.8 °C)-98.8 °F (37.1 °C)] 98.8 °F (37.1 °C)  Pulse:  [83-97] 94  Resp:  [18-20] 18  SpO2:  [84 %-97 %] 96 %  BP: (109-148)/(51-66) 131/61     Weight: 103.9 kg (229 lb 0.9 oz)  Body mass index is 43.28 kg/m².    Intake/Output Summary (Last 24 hours) at 9/11/2018 1143  Last data filed at 9/11/2018 0558  Gross per 24 hour   Intake 1837.91 ml   Output --   Net 1837.91 ml      Physical Exam   Constitutional: She is oriented to person, place, and time. She appears well-developed and well-nourished.   Abdominal: Soft. Bowel sounds are normal. She exhibits no distension. There is no tenderness.   Neurological: She is alert and oriented to person, place, and time.   Psychiatric: She has a normal mood and affect. Her behavior is normal.   Vitals reviewed.      Significant Labs:   BMP:   Recent Labs   Lab  09/11/18   0604   GLU  238*   NA  141   K  4.5   CL  108   CO2  25   BUN  31*   CREATININE   1.3   CALCIUM  9.1     CBC:   Recent Labs   Lab  09/09/18   1815  09/10/18   0445  09/11/18   0604   WBC  9.61  9.18  6.47   HGB  13.4  12.9  13.1   HCT  42.8  40.1  43.2   PLT  262  233  244       Significant Imaging:

## 2018-09-11 NOTE — ANESTHESIA PREPROCEDURE EVALUATION
09/11/2018  Melinda Knight is a 73 y.o., female.    Anesthesia Evaluation    I have reviewed the Patient Summary Reports.     I have reviewed the Medications.     Review of Systems  Anesthesia Hx:  No problems with previous Anesthesia    Social:  Alcohol Use, Non-Smoker    Cardiovascular:   Hypertension CAD      Renal/:   Chronic Renal Disease, CRI    Hepatic/GI:   GERD    Musculoskeletal:   Arthritis     Endocrine:   Diabetes, type 2 Hypothyroidism        Physical Exam  General:  Well nourished    Airway/Jaw/Neck:  AIRWAY FINDINGS: Normal       Dental:  DENTAL FINDINGS: Normal   Chest/Lungs:  Chest/Lungs Clear    Heart/Vascular:  Heart Findings: Normal       Mental Status:  Mental Status Findings:  Cooperative, Alert and Oriented         Anesthesia Plan  Type of Anesthesia, risks & benefits discussed:  Anesthesia Type:  general  Patient's Preference:   Intra-op Monitoring Plan: standard ASA monitors  Intra-op Monitoring Plan Comments:   Post Op Pain Control Plan: multimodal analgesia, IV/PO Opioids PRN and per primary service following discharge from PACU  Post Op Pain Control Plan Comments:   Induction:   IV  Beta Blocker:  Patient is not currently on a Beta-Blocker (No further documentation required).       Informed Consent: Patient understands risks and agrees with Anesthesia plan.  Questions answered. Anesthesia consent signed with patient.  ASA Score: 3     Day of Surgery Review of History & Physical:    H&P update referred to the surgeon.         Ready For Surgery From Anesthesia Perspective.

## 2018-09-11 NOTE — PLAN OF CARE
Problem: Patient Care Overview  Goal: Plan of Care Review  Outcome: Ongoing (interventions implemented as appropriate)  Pt AAO; no diarrhea noted; afebrile; no falls or injuries; Blood glucose - 316; denies pain; IVFs infusing; no acute distress noted; Will continue to monitor.

## 2018-09-11 NOTE — TRANSFER OF CARE
"Anesthesia Transfer of Care Note    Patient: Melinda Knight    Procedure(s) Performed: Procedure(s) (LRB):  EGD (ESOPHAGOGASTRODUODENOSCOPY) (Left)    Patient location: GI    Anesthesia Type: general    Transport from OR: Transported from OR on 6-10 L/min O2 by face mask with adequate spontaneous ventilation    Post pain: adequate analgesia    Post assessment: no apparent anesthetic complications    Post vital signs: stable    Level of consciousness: awake, alert and oriented    Nausea/Vomiting: no nausea/vomiting    Complications: none    Transfer of care protocol was followed      Last vitals:   Visit Vitals  BP (!) 110/50 (BP Location: Right arm, Patient Position: Lying)   Pulse 62   Temp 36.7 °C (98.1 °F) (Oral)   Resp 12   Ht 5' 1" (1.549 m)   Wt 103.9 kg (229 lb 0.9 oz)   SpO2 99%   Breastfeeding? No   BMI 43.28 kg/m²     "

## 2018-09-11 NOTE — NURSING
Patient transported to [facility], No complaints, no acute distress noted. Will monitor upon return to unit.

## 2018-09-11 NOTE — PLAN OF CARE
Problem: Diabetes, Type 2 (Adult)  Intervention: Support/Optimize Psychosocial Response to Condition   09/11/18 1846   Coping/Psychosocial Interventions   Supportive Measures active listening utilized;decision-making supported;self-care encouraged;verbalization of feelings encouraged   Environmental Support calm environment promoted;environmental consistency promoted;personal routine supported       Goal: Signs and Symptoms of Listed Potential Problems Will be Absent, Minimized or Managed (Diabetes, Type 2)  Signs and symptoms of listed potential problems will be absent, minimized or managed by discharge/transition of care (reference Diabetes, Type 2 (Adult) CPG).  Outcome: Ongoing (interventions implemented as appropriate)   09/11/18 1846   Diabetes, Type 2   Problems Assessed (Type 2 Diabetes) all;hyperglycemia   Problems Present (Type 2 Diabetes) hyperglycemia

## 2018-09-11 NOTE — NURSING
Patient arrived to unit from Endo. No complaints, no acute distress noted. Will continue to monitor.

## 2018-09-11 NOTE — DISCHARGE SUMMARY
[facility]  Discharge Summary      Admit Date: 9/9/2018    Discharge Date and Time:  09/11/2018 2:04 PM    Attending Physician: Ayan Hassan MD     Reason for Admission: Abdominal Pain    Procedures Performed: Procedure(s) (LRB):  EGD (ESOPHAGOGASTRODUODENOSCOPY) (Left)    Hospital Course (synopsis of major diagnoses, care, treatment, and services provided during the course of the hospital stay): Ongoing     Consults: GI    Significant Diagnostic Studies: EGD    Final Diagnoses:    Principal Problem: Hypoxemia   Secondary Diagnoses: Abdominal Pain    Discharged Condition: good    Disposition: Admitted as an Inpatient    Follow Up/Patient Instructions: Follow-up with referring physician             Resume previous diet and activity.    Medications:  Transfer Medications (for Discharge Readmit only):   Current Facility-Administered Medications   Medication Dose Route Frequency Provider Last Rate Last Dose    0.9%  NaCl infusion   Intravenous Continuous Ayan Hassan  mL/hr at 09/11/18 1127      amLODIPine tablet 10 mg  10 mg Oral Daily Johny Chan MD   Stopped at 09/11/18 0900    aspirin tablet 325 mg  325 mg Oral QHS Johny Chan MD   325 mg at 09/10/18 2258    atorvastatin tablet 40 mg  40 mg Oral Daily Johny Chan MD   Stopped at 09/11/18 0900    citalopram tablet 20 mg  20 mg Oral Daily Ayan Hassan MD   20 mg at 09/10/18 1213    enoxaparin injection 40 mg  40 mg Subcutaneous Daily Ayan Hassan MD   40 mg at 09/10/18 1634    insulin aspart U-100 pen 8 Units  8 Units Subcutaneous TID AC Ayan Hassan MD   Stopped at 09/11/18 0645    insulin detemir U-100 pen 30 Units  30 Units Subcutaneous QHS Ayan Hassan MD   30 Units at 09/10/18 2259    labetalol (NORMODYNE,TRANDATE) 5 mg/mL injection             ondansetron injection 4 mg  4 mg Intravenous Q8H PRN Johny Chan MD        pantoprazole EC tablet 40  mg  40 mg Oral Daily Ayan Hassan MD   Stopped at 09/11/18 0900    sodium chloride 0.9% flush 3 mL  3 mL Intravenous Q8H Johny Chan MD   3 mL at 09/11/18 0558    sodium chloride 0.9% flush 3 mL  3 mL Intravenous PRN Stevenson Mckee MD        tiZANidine tablet 4 mg  4 mg Oral Q8H Ayan Hassan MD   4 mg at 09/10/18 2258     Facility-Administered Medications Ordered in Other Encounters   Medication Dose Route Frequency Provider Last Rate Last Dose    lidocaine (cardiac) injection    PRN Valeria Cervantes CRNA   100 mg at 09/11/18 1357    propofol (DIPRIVAN) 10 mg/mL infusion    PRN Valeria Cervantes CRNA   80 mg at 09/11/18 1357     No discharge procedures on file.

## 2018-09-11 NOTE — [employer] PATIENT INSTRUCTIONS
Discharge Summary/Instructions after an Endoscopic Procedure  Patient Name: Melinda Knight  Patient MRN: [id number]  Patient YOB: 1945 Tuesday, September 11, 2018  Stevenson Mckee MD  RESTRICTIONS:  During your procedure today, you received medications for sedation.  These   medications may affect your judgment, balance and coordination.  Therefore,   for 24 hours, you have the following restrictions:   - DO NOT drive a car, operate machinery, make legal/financial decisions,   sign important papers or drink alcohol.    ACTIVITY:  Today: no heavy lifting, straining or running due to procedural   sedation/anesthesia.  The following day: return to full activity including work.  DIET:  Eat and drink normally unless instructed otherwise.     TREATMENT FOR COMMON SIDE EFFECTS:  - Mild abdominal pain, nausea, belching, bloating or excessive gas:  rest,   eat lightly and use a heating pad.  - Sore Throat: treat with throat lozenges and/or gargle with warm salt   water.  - Because air was used during the procedure, expelling large amounts of air   from your rectum or belching is normal.  - If a bowel prep was taken, you may not have a bowel movement for 1-3 days.    This is normal.  SYMPTOMS TO WATCH FOR AND REPORT TO YOUR PHYSICIAN:  1. Abdominal pain or bloating, other than gas cramps.  2. Chest pain.  3. Back pain.  4. Signs of infection such as: chills or fever occurring within 24 hours   after the procedure.  5. Rectal bleeding, which would show as bright red, maroon, or black stools.   (A tablespoon of blood from the rectum is not serious, especially if   hemorrhoids are present.)  6. Vomiting.  7. Weakness or dizziness.  GO DIRECTLY TO THE NEAREST EMERGENCY ROOM IF YOU HAVE ANY OF THE FOLLOWING:      Difficulty breathing              Chills and/or fever over 101 F   Persistent vomiting and/or vomiting blood   Severe abdominal pain   Severe chest pain   Black, tarry stools   Bleeding- more than one  tablespoon   Any other symptom or condition that you feel may need urgent attention  Your doctor recommends these additional instructions:  If any biopsies were taken, your doctors clinic will contact you in 1 to 2   weeks with any results.  - Await pathology results.   - No ibuprofen, naproxen, or other non-steroidal anti-inflammatory drugs.   - Use Protonix (pantoprazole) 40 mg PO daily.   - The patient may have an enteritis.  A CT enterography can be considered if   she worsens.  Her symptoms are returning to baseline.  She also had a lot   of bilious fluid in the stomach so if she does not improve, a gastric   emptying study can also be considered.  If she does well and tolerates   food, she will likely be discharged tomorrow.  - Return patient to [facility] for ongoing care.  For questions, problems or results please call your physician - Stevenson Mckee MD at Work:  [phone].  [facility] can be reached at [phone]     IF A COMPLICATION OR EMERGENCY SITUATION ARISES AND YOU ARE UNABLE TO REACH   YOUR PHYSICIAN - GO DIRECTLY TO THE EMERGENCY ROOM.  Stevenson Mckee MD  9/11/2018 2:27:19 PM  This report has been verified and signed electronically.  [employer]

## 2018-09-11 NOTE — PT/OT/SLP PROGRESS
Physical Therapy      Patient Name:  Melinda Knight   MRN:  [id number]    Patient not seen today secondary to (Per pt's nurse, Raisa, pt off floor in endo.). Will follow-up as able.    Nelia Chung, PTA

## 2018-09-11 NOTE — ASSESSMENT & PLAN NOTE
This is the issue for admission per the ED.  Patient's D -dimer elevated with vague CP. Will check a CTA of the chest. - negative. No acute issues.  May have Obesity/hypoventilation.

## 2018-09-12 VITALS
BODY MASS INDEX: 41.91 KG/M2 | OXYGEN SATURATION: 90 % | SYSTOLIC BLOOD PRESSURE: 138 MMHG | TEMPERATURE: 98 F | HEART RATE: 95 BPM | DIASTOLIC BLOOD PRESSURE: 63 MMHG | HEIGHT: 61 IN | RESPIRATION RATE: 16 BRPM | WEIGHT: 222 LBS

## 2018-09-12 PROBLEM — R19.7 DIARRHEA: Status: RESOLVED | Noted: 2018-09-10 | Resolved: 2018-09-12

## 2018-09-12 PROBLEM — R07.9 CHEST PAIN: Status: RESOLVED | Noted: 2018-09-10 | Resolved: 2018-09-12

## 2018-09-12 PROBLEM — R10.9 ABDOMINAL PAIN: Status: RESOLVED | Noted: 2018-09-11 | Resolved: 2018-09-12

## 2018-09-12 LAB
ALBUMIN SERPL BCP-MCNC: 2.8 G/DL
ALP SERPL-CCNC: 87 U/L
ALT SERPL W/O P-5'-P-CCNC: 11 U/L
ANION GAP SERPL CALC-SCNC: 7 MMOL/L
AST SERPL-CCNC: 12 U/L
BACTERIA STL CULT: NORMAL
BASOPHILS # BLD AUTO: 0.03 K/UL
BASOPHILS NFR BLD: 0.4 %
BILIRUB SERPL-MCNC: 0.3 MG/DL
BUN SERPL-MCNC: 26 MG/DL
CALCIUM SERPL-MCNC: 9.1 MG/DL
CHLORIDE SERPL-SCNC: 112 MMOL/L
CO2 SERPL-SCNC: 26 MMOL/L
CREAT SERPL-MCNC: 1.2 MG/DL
DIFFERENTIAL METHOD: ABNORMAL
EOSINOPHIL # BLD AUTO: 0.3 K/UL
EOSINOPHIL NFR BLD: 4.2 %
ERYTHROCYTE [DISTWIDTH] IN BLOOD BY AUTOMATED COUNT: 13 %
EST. GFR  (AFRICAN AMERICAN): 53 ML/MIN/1.73 M^2
EST. GFR  (NON AFRICAN AMERICAN): 46 ML/MIN/1.73 M^2
GLUCOSE SERPL-MCNC: 155 MG/DL
HCT VFR BLD AUTO: 39.1 %
HGB BLD-MCNC: 12.3 G/DL
LYMPHOCYTES # BLD AUTO: 1.4 K/UL
LYMPHOCYTES NFR BLD: 19.5 %
MCH RBC QN AUTO: 31.4 PG
MCHC RBC AUTO-ENTMCNC: 31.5 G/DL
MCV RBC AUTO: 100 FL
MONOCYTES # BLD AUTO: 0.4 K/UL
MONOCYTES NFR BLD: 5.9 %
NEUTROPHILS # BLD AUTO: 5 K/UL
NEUTROPHILS NFR BLD: 70 %
PLATELET # BLD AUTO: 257 K/UL
PMV BLD AUTO: 11.5 FL
POTASSIUM SERPL-SCNC: 4.4 MMOL/L
PROT SERPL-MCNC: 6.1 G/DL
RBC # BLD AUTO: 3.92 M/UL
SODIUM SERPL-SCNC: 145 MMOL/L
WBC # BLD AUTO: 7.14 K/UL

## 2018-09-12 PROCEDURE — A4216 STERILE WATER/SALINE, 10 ML: HCPCS | Performed by: EMERGENCY MEDICINE

## 2018-09-12 PROCEDURE — 36415 COLL VENOUS BLD VENIPUNCTURE: CPT

## 2018-09-12 PROCEDURE — 85025 COMPLETE CBC W/AUTO DIFF WBC: CPT

## 2018-09-12 PROCEDURE — 25000003 PHARM REV CODE 250: Performed by: INTERNAL MEDICINE

## 2018-09-12 PROCEDURE — 25000003 PHARM REV CODE 250: Performed by: EMERGENCY MEDICINE

## 2018-09-12 PROCEDURE — 94761 N-INVAS EAR/PLS OXIMETRY MLT: CPT

## 2018-09-12 PROCEDURE — 80053 COMPREHEN METABOLIC PANEL: CPT

## 2018-09-12 PROCEDURE — 27000221 HC OXYGEN, UP TO 24 HOURS

## 2018-09-12 RX ORDER — PANTOPRAZOLE SODIUM 40 MG/1
40 TABLET, DELAYED RELEASE ORAL 2 TIMES DAILY
Qty: 60 TABLET | Refills: 5 | Status: SHIPPED | OUTPATIENT
Start: 2018-09-12 | End: 2018-10-03 | Stop reason: SDUPTHER

## 2018-09-12 RX ADMIN — Medication 3 ML: at 06:09

## 2018-09-12 RX ADMIN — SODIUM CHLORIDE: 0.9 INJECTION, SOLUTION INTRAVENOUS at 10:09

## 2018-09-12 RX ADMIN — PANTOPRAZOLE SODIUM 40 MG: 40 TABLET, DELAYED RELEASE ORAL at 10:09

## 2018-09-12 RX ADMIN — SODIUM CHLORIDE: 0.9 INJECTION, SOLUTION INTRAVENOUS at 01:09

## 2018-09-12 RX ADMIN — ATORVASTATIN CALCIUM 40 MG: 40 TABLET, FILM COATED ORAL at 10:09

## 2018-09-12 RX ADMIN — AMLODIPINE BESYLATE 10 MG: 5 TABLET ORAL at 10:09

## 2018-09-12 RX ADMIN — INSULIN ASPART 8 UNITS: 100 INJECTION, SOLUTION INTRAVENOUS; SUBCUTANEOUS at 10:09

## 2018-09-12 RX ADMIN — CITALOPRAM HYDROBROMIDE 20 MG: 20 TABLET ORAL at 10:09

## 2018-09-12 RX ADMIN — TIZANIDINE 4 MG: 4 TABLET ORAL at 06:09

## 2018-09-12 NOTE — SUBJECTIVE & OBJECTIVE
Interval History: No complaints. Would like to go home.       Review of Systems   Constitutional: Negative for activity change.   Respiratory: Negative for chest tightness.    Cardiovascular: Negative for chest pain.   Gastrointestinal: Negative for abdominal pain, nausea and vomiting.   Genitourinary: Negative for difficulty urinating.     Objective:     Vital Signs (Most Recent):  Temp: 98.3 °F (36.8 °C) (09/12/18 0358)  Pulse: 98 (09/12/18 0358)  Resp: 19 (09/12/18 0358)  BP: (!) 149/68 (09/12/18 0358)  SpO2: 98 % (09/12/18 0358) Vital Signs (24h Range):  Temp:  [97.8 °F (36.6 °C)-98.8 °F (37.1 °C)] 98.3 °F (36.8 °C)  Pulse:  [62-98] 98  Resp:  [12-20] 19  SpO2:  [93 %-99 %] 98 %  BP: ()/() 149/68     Weight: 100.7 kg (222 lb 0.1 oz)  Body mass index is 41.95 kg/m².  No intake or output data in the 24 hours ending 09/12/18 0721   Physical Exam   Constitutional: She is oriented to person, place, and time. She appears well-developed and well-nourished.   HENT:   Head: Normocephalic and atraumatic.   Abdominal: Bowel sounds are normal. There is no tenderness.   Neurological: She is alert and oriented to person, place, and time.   Psychiatric: She has a normal mood and affect.   Vitals reviewed.      Significant Labs:   BMP:   Recent Labs   Lab  09/12/18 0456   GLU  155*   NA  145   K  4.4   CL  112*   CO2  26   BUN  26*   CREATININE  1.2   CALCIUM  9.1     CBC:   Recent Labs   Lab  09/11/18   0604  09/12/18 0456   WBC  6.47  7.14   HGB  13.1  12.3   HCT  43.2  39.1   PLT  244  257       Significant Imaging

## 2018-09-12 NOTE — ASSESSMENT & PLAN NOTE
With abnormal CT of abdomen. GI consulted. EGD 9/11- some gastritis. PPI bid  Follow up GI in one week.

## 2018-09-12 NOTE — PROGRESS NOTES
[facility] Hospital Medicine  Progress Note    Patient Name: Melinda Knight  MRN: [id number]  Patient Class: IP- Inpatient   Admission Date: 9/9/2018  Length of Stay: 3 days  Attending Physician: Ayan Hassan MD  Primary Care Provider: Duke Cole MD        Subjective:     Principal Problem:Hypoxemia    HPI:  Mrs. Knight is a 74 yo F who is a poor historian. She presents with multiple vague complaints- all of which she states are chronic problems- weakness, dizziness, chest pain/abdominal pain, diarrhea, not feeling good.  She could not elaborate on any. Evidently she was at [facility] when she had R upper abdomina/chest pain. Her daughter apparently called 911.  The patient presents to the ED and is found to have some mild hypoxemia. The patient states she does not wear 02 at home. D-dimer was elevated.  The patient is resting comfortably and has no current complaints. She is non- ill appearing.  The patient lives at home with family and ambulates with a walker.      Hospital Course:  Mrs. Knight is a 74 yo F who is a poor historian. She presents with multiple vague complaints- all of which she states are chronic problems- weakness, dizziness, chest pain/abdominal pain, diarrhea, not feeling good.  She could not elaborate on any. Evidently she was at [facility] when she had R upper abdomina/chest pain. Her daughter apparently called 911.  The patient presents to the ED and is found to have some mild hypoxemia. The patient states she does not wear 02 at home. D-dimer was elevated. The patient had a CT of abdomen and pelvis that was not very remarkable. Because of the elevated D-dimer and hypoxia- the patient was sent for a CTA of the chest on 9/10.  This showed no PE. GI was consulted as daughter noted that patient's RUQ abdominal pain worse than normal. She was taken to EGD on 9/11. This showed mild esophageal ring as well as gastritis. The patient had no further symptoms and was  discharged to home with H/H on 9/12 with close GI follow up.  Activity as tolerated. Follow up with Dr. Mckee in one week. Will start PPI BiD.    Interval History: No complaints. Would like to go home.       Review of Systems   Constitutional: Negative for activity change.   Respiratory: Negative for chest tightness.    Cardiovascular: Negative for chest pain.   Gastrointestinal: Negative for abdominal pain, nausea and vomiting.   Genitourinary: Negative for difficulty urinating.     Objective:     Vital Signs (Most Recent):  Temp: 98.3 °F (36.8 °C) (09/12/18 0358)  Pulse: 98 (09/12/18 0358)  Resp: 19 (09/12/18 0358)  BP: (!) 149/68 (09/12/18 0358)  SpO2: 98 % (09/12/18 0358) Vital Signs (24h Range):  Temp:  [97.8 °F (36.6 °C)-98.8 °F (37.1 °C)] 98.3 °F (36.8 °C)  Pulse:  [62-98] 98  Resp:  [12-20] 19  SpO2:  [93 %-99 %] 98 %  BP: ()/() 149/68     Weight: 100.7 kg (222 lb 0.1 oz)  Body mass index is 41.95 kg/m².  No intake or output data in the 24 hours ending 09/12/18 0721   Physical Exam   Constitutional: She is oriented to person, place, and time. She appears well-developed and well-nourished.   HENT:   Head: Normocephalic and atraumatic.   Abdominal: Bowel sounds are normal. There is no tenderness.   Neurological: She is alert and oriented to person, place, and time.   Psychiatric: She has a normal mood and affect.   Vitals reviewed.      Significant Labs:   BMP:   Recent Labs   Lab  09/12/18 0456   GLU  155*   NA  145   K  4.4   CL  112*   CO2  26   BUN  26*   CREATININE  1.2   CALCIUM  9.1     CBC:   Recent Labs   Lab  09/11/18   0604  09/12/18   0456   WBC  6.47  7.14   HGB  13.1  12.3   HCT  43.2  39.1   PLT  244  257       Significant Imaging    Assessment/Plan:      * Hypoxemia    This is the issue for admission per the ED.  Patient's D -dimer elevated with vague CP. Will check a CTA of the chest. - negative. No acute issues.  May have Obesity/hypoventilation. Will wean 02 today and check  RA sats.           Abdominal pain    With abnormal CT of abdomen. GI consulted. EGD 9/11- some gastritis. PPI bid  Follow up GI in one week.           Weakness    Will order PT/OT eval. H/H. No equipment.           Chest pain    Trop negative. More upper R abdominal pain. CTA chest pending.  Resolved.           Diarrhea    Chronic issue. Can follow up with GI.  C-diff negative. No concerning labs.           Asymptomatic stenosis of left carotid artery    No new issues           Morbid obesity    Body mass index is 40.41 kg/m².  Needs to loose weight.          Hypothyroidism (acquired)    Resume home meds.           Type 2 diabetes mellitus with diabetic chronic kidney disease    No other acute issues. A1c is 5.9          Diastolic dysfunction    Chronic. No acute issues.           GERD (gastroesophageal reflux disease)    Resume PPI          CKD (chronic kidney disease) stage 3, GFR 30-59 ml/min    At baseline.           Hyperlipemia    Resume statin           Essential hypertension    No acute issues. Resume home meds.             VTE Risk Mitigation (From admission, onward)        Ordered     enoxaparin injection 40 mg  Daily      09/10/18 1052     IP VTE HIGH RISK PATIENT  Once      09/10/18 0032          Will d/c to home with H/H.       Ayan Rubin MD  Department of Hospital Medicine   [facility]

## 2018-09-12 NOTE — NURSING
Report received from YVONNE Kline. The pt is lying in bed resting. She is not in any pain or discomfort. Tele monitor in progress with alarms set. Safety precautions maintained and bed locked in lowest position. Side rails up X2. Call bell and personal items within reach. Will continue to monitor pt for any changes.

## 2018-09-12 NOTE — DISCHARGE SUMMARY
[facility] Hospital Medicine  Discharge Summary      Patient Name: Melinda Knight  MRN: [id number]  Admission Date: 9/9/2018  Hospital Length of Stay: 3 days  Discharge Date and Time:  09/12/2018 7:27 AM  Attending Physician: Ayan Hassan MD   Discharging Provider: Ayan Hassan MD  Primary Care Provider: Duke Cole MD      HPI:   Mrs. Knight is a 72 yo F who is a poor historian. She presents with multiple vague complaints- all of which she states are chronic problems- weakness, dizziness, chest pain/abdominal pain, diarrhea, not feeling good.  She could not elaborate on any. Evidently she was at [facility] when she had R upper abdomina/chest pain. Her daughter apparently called 911.  The patient presents to the ED and is found to have some mild hypoxemia. The patient states she does not wear 02 at home. D-dimer was elevated.  The patient is resting comfortably and has no current complaints. She is non- ill appearing.  The patient lives at home with family and ambulates with a walker.      Procedure(s) (LRB):  EGD (ESOPHAGOGASTRODUODENOSCOPY) (Left)      Hospital Course:   Mrs. Knight is a 72 yo F who is a poor historian. She presents with multiple vague complaints- all of which she states are chronic problems- weakness, dizziness, chest pain/abdominal pain, diarrhea, not feeling good.  She could not elaborate on any. Evidently she was at [facility] when she had R upper abdomina/chest pain. Her daughter apparently called 911.  The patient presents to the ED and is found to have some mild hypoxemia. The patient states she does not wear 02 at home. D-dimer was elevated. The patient had a CT of abdomen and pelvis that was not very remarkable. Because of the elevated D-dimer and hypoxia- the patient was sent for a CTA of the chest on 9/10.  This showed no PE. GI was consulted as daughter noted that patient's RUQ abdominal pain worse than normal. She was taken to EGD on 9/11. This  showed mild esophageal ring as well as gastritis. The patient had no further symptoms and was discharged to home with H/H on 9/12 with close GI follow up.  Activity as tolerated. Follow up with Dr. Mckee in one week. Will start PPI BiD. Diet- low NA, ADA 1800 luiz diet.      Consults:   Consults (From admission, onward)        Status Ordering Provider     Inpatient consult to Gastroenterology  Once     Provider:  (Not yet assigned)    Completed JOSE KUMAR     Inpatient consult to Social Work  Once     Provider:  (Not yet assigned)    Acknowledged DIANA LUNA          No new Assessment & Plan notes have been filed under this hospital service since the last note was generated.  Service: Hospital Medicine    Final Active Diagnoses:    Diagnosis Date Noted POA    Weakness [R53.1] 09/10/2018 Yes    Asymptomatic stenosis of left carotid artery [I65.22] 07/20/2017 Yes    Morbid obesity [E66.01] 03/29/2016 Yes    Type 2 diabetes mellitus with diabetic chronic kidney disease [E11.22] 03/29/2016 Yes     Chronic    Hypothyroidism (acquired) [E03.9] 03/29/2016 Yes     Chronic    CKD (chronic kidney disease) stage 3, GFR 30-59 ml/min [N18.3] 03/21/2016 Yes    Diastolic dysfunction [I51.9] 03/21/2016 Yes    GERD (gastroesophageal reflux disease) [K21.9] 03/21/2016 Yes    Essential hypertension [I10] 02/24/2016 Yes    Hyperlipemia [E78.5] 02/24/2016 Yes      Problems Resolved During this Admission:    Diagnosis Date Noted Date Resolved POA    PRINCIPAL PROBLEM:  Hypoxemia [R09.02] 03/28/2016 09/12/2018 Yes    Abdominal pain [R10.9] 09/11/2018 09/12/2018 Yes    Hypertension [I10] 09/10/2018 09/10/2018 Yes    Diarrhea [R19.7] 09/10/2018 09/12/2018 Yes    Chest pain [R07.9] 09/10/2018 09/12/2018 Yes    Hypoxia [R09.02] 09/09/2018 09/10/2018 Yes       Discharged Condition: good    Disposition:     Follow Up:  Follow-up Information     Duke Cole MD In 1 week.    Specialty:  Family  Medicine  Contact information:  [address]  [address]  [place] [address]  [phone]             Stevenson Mckee MD In 1 week.    Specialty:  Gastroenterology  Contact information:  [address] [place] [address]  [address]  [facility]  [place] [address] [phone]                 Patient Instructions:   No discharge procedures on file.    Significant Diagnostic Studies:    Pending Diagnostic Studies:     None         Medications:  Reconciled Home Medications:      Medication List      START taking these medications    pantoprazole 40 MG tablet  Commonly known as:  PROTONIX  Take 1 tablet (40 mg total) by mouth 2 (two) times daily.  Replaces:  omeprazole 20 MG capsule        CHANGE how you take these medications    insulin aspart U-100 100 unit/mL injection  Commonly known as:  NOVOLOG  Inject 8 Units into the skin 3 (three) times daily before meals.  What changed:  when to take this        CONTINUE taking these medications    amLODIPine 10 MG tablet  Commonly known as:  NORVASC  Take 1 tablet (10 mg total) by mouth once daily.     aspirin 325 MG tablet  Take 325 mg by mouth every evening.     atorvastatin 40 MG tablet  Commonly known as:  LIPITOR  TAKE ONE TABLET BY MOUTH ONCE DAILY     citalopram 20 MG tablet  Commonly known as:  CELEXA  Take 1 tablet (20 mg total) by mouth once daily.     dicyclomine 20 mg tablet  Commonly known as:  BENTYL  Take 1 tablet (20 mg total) by mouth daily as needed (GI cramps).     diphenoxylate-atropine 2.5-0.025 mg 2.5-0.025 mg per tablet  Commonly known as:  LOMOTIL  Take 1 tablet by mouth every 6 (six) hours as needed.     dulaglutide 1.5 mg/0.5 mL Pnij  Commonly known as:  TRULICITY  Inject 1.5 mg into the skin every 7 days.     HYDROcodone-acetaminophen 7.5-325 mg per tablet  Commonly known as:  NORCO  Take 1 tablet by mouth every 8 (eight) hours as needed for Pain.     ibandronate 150 mg tablet  Commonly known as:  BONIVA  Take 1  "tablet (150 mg total) by mouth every 30 days.     LEVEMIR FLEXTOUCH U-100 INSULN 100 unit/mL (3 mL) Inpn pen  Generic drug:  insulin detemir U-100  INJECT 30 UNITS SUBCUTANEOUSLY ONCE DAILY AT BEDTIME     levothyroxine 75 MCG tablet  Commonly known as:  SYNTHROID  TAKE ONE TABLET BY MOUTH ONCE DAILY     losartan-hydrochlorothiazide 100-25 mg 100-25 mg per tablet  Commonly known as:  HYZAAR  Take 1 tablet by mouth once daily.     MULTIVITAMIN ORAL  Take by mouth. [place] Diabetes Pack     ondansetron 4 MG Tbdl  Commonly known as:  ZOFRAN-ODT  DISSOLVE ONE TABLET UNDER THE TONGUE EVERY 6 HOURS AS NEEDED FOR NAUSEA     pen needle, diabetic 32 gauge x 1/5" Ndle  Commonly known as:  NOVOTWIST  Use with insulin pen as directed     * PRODIGY AUTOCODE TEST STRIPS MISC  1 strip by Misc.(Non-Drug; Combo Route) route 2 (two) times daily.     * blood sugar diagnostic Strp  Commonly known as:  BLOOD GLUCOSE TEST  1 strip by Misc.(Non-Drug; Combo Route) route 2 (two) times daily. Prodigy     * tiZANidine 4 mg Cap  TAKE ONE CAPSULE BY MOUTH ONCE DAILY AS NEEDED FOR MUSCLE SPASM     * tiZANidine 4 MG tablet  Commonly known as:  ZANAFLEX  Take 1 tablet (4 mg total) by mouth every 8 (eight) hours as needed.         * This list has 4 medication(s) that are the same as other medications prescribed for you. Read the directions carefully, and ask your doctor or other care provider to review them with you.            STOP taking these medications    omeprazole 20 MG capsule  Commonly known as:  PRILOSEC  Replaced by:  pantoprazole 40 MG tablet            Indwelling Lines/Drains at time of discharge:   Lines/Drains/Airways          None          Time spent on the discharge of patient:  > 30 minutes  Patient was seen and examined on the date of discharge and determined to be suitable for discharge.         Ayan Rubin MD  Department of Hospital Medicine  [facility]  "

## 2018-09-12 NOTE — PLAN OF CARE
[facility]    HOME HEALTH ORDERS  FACE TO FACE ENCOUNTER    Patient Name: Melinda Knight  YOB: 1945    PCP: Duke Cole MD   PCP Address: [address] / [place] [address]  PCP Phone Number: [phone]  PCP Fax: [phone]    Encounter Date: 09/12/2018    Admit to Home Health    Diagnoses:  Active Hospital Problems    Diagnosis  POA    Weakness [R53.1]  Yes    Asymptomatic stenosis of left carotid artery [I65.22]  Yes    Morbid obesity [E66.01]  Yes    Type 2 diabetes mellitus with diabetic chronic kidney disease [E11.22]  Yes     Chronic    Hypothyroidism (acquired) [E03.9]  Yes     Chronic    CKD (chronic kidney disease) stage 3, GFR 30-59 ml/min [N18.3]  Yes    Diastolic dysfunction [I51.9]  Yes    GERD (gastroesophageal reflux disease) [K21.9]  Yes    Essential hypertension [I10]  Yes    Hyperlipemia [E78.5]  Yes      Resolved Hospital Problems    Diagnosis Date Resolved POA    *Hypoxemia [R09.02] 09/12/2018 Yes     Priority: 1 - High    Abdominal pain [R10.9] 09/12/2018 Yes     Priority: 2     Hypertension [I10] 09/10/2018 Yes    Diarrhea [R19.7] 09/12/2018 Yes    Chest pain [R07.9] 09/12/2018 Yes    Hypoxia [R09.02] 09/10/2018 Yes       No future appointments.  Follow-up Information     Duke Cole MD In 1 week.    Specialty:  Family Medicine  Contact information:  [address]  [address]  [place] [address]  [phone]             Stevenson Mckee MD In 1 week.    Specialty:  Gastroenterology  Contact information:  [address] [place] [address]  [address]  [facility]  [place] [address]  [phone]                     I have seen and examined this patient face to face today. My clinical findings that support the need for the home health skilled services and home bound status are the following:  Weakness/numbness causing balance and gait disturbance due to Weakness/Debility making it  taxing to leave home.    Allergies:  Review of patient's allergies indicates:   Allergen Reactions    Sulfa (sulfonamide antibiotics) Nausea And Vomiting    Menthol contain prod        Diet: diabetic diet: 2000 calorie and 2 gram sodium diet    Activities: activity as tolerated    Nursing:   SN to complete comprehensive assessment including routine vital signs. Instruct on disease process and s/s of complications to report to MD. Review/verify medication list sent home with the patient at time of discharge  and instruct patient/caregiver as needed. Frequency may be adjusted depending on start of care date.    Notify MD if SBP > 160 or < 90; DBP > 90 or < 50; HR > 120 or < 50; Temp > 101; Other:         CONSULTS:    Physical Therapy to evaluate and treat. Evaluate for home safety and equipment needs; Establish/upgrade home exercise program. Perform / instruct on therapeutic exercises, gait training, transfer training, and Range of Motion.  Occupational Therapy to evaluate and treat. Evaluate home environment for safety and equipment needs. Perform/Instruct on transfers, ADL training, ROM, and therapeutic exercises.  Aide to provide assistance with personal care, ADLs, and vital signs.        Medications: Review discharge medications with patient and family and provide education.      Current Discharge Medication List      START taking these medications    Details   pantoprazole (PROTONIX) 40 MG tablet Take 1 tablet (40 mg total) by mouth 2 (two) times daily.  Qty: 60 tablet, Refills: 5         CONTINUE these medications which have NOT CHANGED    Details   amLODIPine (NORVASC) 10 MG tablet Take 1 tablet (10 mg total) by mouth once daily.  Qty: 90 tablet, Refills: 3      aspirin 325 MG tablet Take 325 mg by mouth every evening.       atorvastatin (LIPITOR) 40 MG tablet TAKE ONE TABLET BY MOUTH ONCE DAILY  Qty: 90 tablet, Refills: 3      citalopram (CELEXA) 20 MG tablet Take 1 tablet (20 mg total) by mouth once  daily.  Qty: 90 tablet, Refills: 3      dicyclomine (BENTYL) 20 mg tablet Take 1 tablet (20 mg total) by mouth daily as needed (GI cramps).  Qty: 90 tablet, Refills: 1      dulaglutide (TRULICITY) 1.5 mg/0.5 mL PnIj Inject 1.5 mg into the skin every 7 days.  Qty: 12 Syringe, Refills: 1    Comments: Please consider 90 day supplies to promote better adherence  Associated Diagnoses: Type 2 diabetes mellitus with diabetic polyneuropathy, with long-term current use of insulin      HYDROcodone-acetaminophen (NORCO) 7.5-325 mg per tablet Take 1 tablet by mouth every 8 (eight) hours as needed for Pain.  Qty: 60 tablet, Refills: 0    Associated Diagnoses: Polyarticular osteoarthritis      ibandronate (BONIVA) 150 mg tablet Take 1 tablet (150 mg total) by mouth every 30 days.  Qty: 3 tablet, Refills: 3      insulin aspart (NOVOLOG) 100 unit/mL injection Inject 8 Units into the skin 3 (three) times daily before meals.  Qty: 15 mL, Refills: 0      LEVEMIR FLEXTOUCH 100 unit/mL (3 mL) InPn pen INJECT 30 UNITS SUBCUTANEOUSLY ONCE DAILY AT BEDTIME  Qty: 15 mL, Refills: 11    Comments: Please consider 90 day supplies to promote better adherence      levothyroxine (SYNTHROID) 75 MCG tablet TAKE ONE TABLET BY MOUTH ONCE DAILY  Qty: 90 tablet, Refills: 3      losartan-hydrochlorothiazide 100-25 mg (HYZAAR) 100-25 mg per tablet Take 1 tablet by mouth once daily.  Qty: 90 tablet, Refills: 1    Associated Diagnoses: Essential hypertension      MULTIVITAMIN ORAL Take by mouth. [place] Diabetes Pack      ondansetron (ZOFRAN-ODT) 4 MG TbDL DISSOLVE ONE TABLET UNDER THE TONGUE EVERY 6 HOURS AS NEEDED FOR NAUSEA  Qty: 20 tablet, Refills: 2    Comments: Please consider 90 day supplies to promote better adherence      tiZANidine (ZANAFLEX) 4 MG tablet Take 1 tablet (4 mg total) by mouth every 8 (eight) hours as needed.  Qty: 60 tablet, Refills: 3      tiZANidine 4 mg Cap TAKE ONE CAPSULE BY MOUTH ONCE DAILY AS NEEDED FOR MUSCLE  "SPASM  Qty: 90 capsule, Refills: 1      !! blood sugar diagnostic (BLOOD GLUCOSE TEST) Strp 1 strip by Misc.(Non-Drug; Combo Route) route 2 (two) times daily. Prodigy  Qty: 200 strip, Refills: 5    Associated Diagnoses: Type 2 diabetes mellitus with diabetic polyneuropathy, with long-term current use of insulin      !! BLOOD SUGAR DIAGNOSTIC (PRODIGY AUTOCODE TEST STRIPS MISC) 1 strip by Misc.(Non-Drug; Combo Route) route 2 (two) times daily.      diphenoxylate-atropine 2.5-0.025 mg (LOMOTIL) 2.5-0.025 mg per tablet Take 1 tablet by mouth every 6 (six) hours as needed.  Qty: 30 tablet, Refills: 0      pen needle, diabetic (NOVOTWIST) 32 gauge x 1/5" Ndle Use with insulin pen as directed  Qty: 200 each, Refills: 3       !! - Potential duplicate medications found. Please discuss with provider.      STOP taking these medications       omeprazole (PRILOSEC) 20 MG capsule Comments:   Reason for Stopping:               I certify that this patient is confined to her home and needs intermittent skilled nursing care, physical therapy and occupational therapy.      "

## 2018-09-12 NOTE — PLAN OF CARE
"   09/12/18 1130   Final Note   Assessment Type Final Discharge Note   Discharge Disposition Home-Health   Hospital Follow Up  Appt(s) scheduled? Yes   Discharge plans and expectations educations in teach back method with documentation complete? Yes   [facility] Referral Info   Post Acute Recommendation Home-care   EDUCATION:   provided with educational information on GI disorders.  Information reviewed and placed in :My Healthcare Packet" to be brought home  to use as resource after discharge.  Information included:  signs and symptoms to look for and call the doctor if experiencing, and symptoms that may indicate a medical emergency: CALL 911.      All questions answered.  Teach back method used.    Patient stated, "will take medications as prescribed and go to appointments as scheduled and make sure to tell family if I think something is wrong  ".        "

## 2018-09-12 NOTE — ASSESSMENT & PLAN NOTE
This is the issue for admission per the ED.  Patient's D -dimer elevated with vague CP. Will check a CTA of the chest. - negative. No acute issues.  May have Obesity/hypoventilation. Will wean 02 today and check RA sats.

## 2018-09-12 NOTE — PROGRESS NOTES
Face sheet, PT/OT notes, and orders faxed to N.  TN indicated that the plan is for the pt to discharge to home on today and would like to be arranged with Klaudia SPARKS.  TN provided name and contact information and requested to be contacted when arranged.  TN to continue to follow    1020- referral for home health sent via [facility] to [facility] and [facility].  TN indicated that the pt is to discharge to home on today and that referral has been sent to both [facility] and [facility].  TN to follow in [facility] for response.    1035- call to [facility] to schedule post hospital discharge follow up appointment.  Appointment scheduled and entered into follow up tab to print on AVS at time of discharge.    1115- pt accepted in [facility] by Klaudia SPARKS     1127- [facility] accepted [facility]  as discharge destination to complete booking.

## 2018-09-12 NOTE — PROGRESS NOTES
WRITTEN HEALTHCARE DISCHARGE INFORMATION      Things that YOU are responsible for to Manage Your Care At Home:  1. Getting your prescriptions filled.  2. Taking you medications as directed. DO NOT MISS ANY DOSES!  3. Going to your follow-up doctor appointments. This is important because it allows the doctor to monitor your progress and to determine if any changes need to be made to your treatment plan.     If you are unable to make your follow up appointments, please call the number listed and reschedule this appointment.      After discharge, if you need assistance, you can call [facility] On [facility] Line for 24/7 assistance at [phone]     If you are experience any signs or symptoms, Call your doctor or Call 911 and come to your nearest Emergency Room.     Thank you for choosing [facility] and allowing us to care for you.        You should receive a call from [facility] Discharge Department within 48-72 hours to help manage your care after discharge. Please try to make sure that you answer your phone for this important phone call.     Follow-up Information     Duke Cole MD In 1 week.    Specialty:  Family Medicine  Why:  call to schedule appointment as needed to see PCP  Contact information:  [address]  [address]  [facility] [address]  [phone]             Stevenson Mckee MD On 9/21/2018.    Specialty:  Gastroenterology  Why:  Appointment scheduled for Friday September 21st 3pm   Contact information:  [address] [place] [address]  [address]  [facility]  [place] [address]  [phone]             [facility].    Specialty:  Home Health Services  Why:  Home Health- call number provided for questions or concerns regarding home health   Contact information:  [address]  [facility] [id number] [phone]

## 2018-09-12 NOTE — PLAN OF CARE
Problem: Diabetes, Type 2 (Adult)  Goal: Signs and Symptoms of Listed Potential Problems Will be Absent, Minimized or Managed (Diabetes, Type 2)  Signs and symptoms of listed potential problems will be absent, minimized or managed by discharge/transition of care (reference Diabetes, Type 2 (Adult) CPG).   09/11/18 1846   Diabetes, Type 2   Problems Assessed (Type 2 Diabetes) all;hyperglycemia   Problems Present (Type 2 Diabetes) hyperglycemia       Problem: Infection, Risk/Actual (Adult)  Goal: Identify Related Risk Factors and Signs and Symptoms  Related risk factors and signs and symptoms are identified upon initiation of Human Response Clinical Practice Guideline (CPG)  Outcome: Ongoing (interventions implemented as appropriate)   09/10/18 1456   Infection, Risk/Actual   Related Risk Factors (Infection, Risk/Actual) age extremes   Signs and Symptoms (Infection, Risk/Actual) weakness     Goal: Infection Prevention/Resolution  Patient will demonstrate the desired outcomes by discharge/transition of care.  Outcome: Ongoing (interventions implemented as appropriate)   09/12/18 0505   Infection, Risk/Actual (Adult)   Infection Prevention/Resolution making progress toward outcome

## 2018-09-12 NOTE — PLAN OF CARE
Problem: Diabetes, Type 2 (Adult)  Goal: Signs and Symptoms of Listed Potential Problems Will be Absent, Minimized or Managed (Diabetes, Type 2)  Signs and symptoms of listed potential problems will be absent, minimized or managed by discharge/transition of care (reference Diabetes, Type 2 (Adult) CPG).  Outcome: Ongoing (interventions implemented as appropriate)   09/12/18 1336   Diabetes, Type 2   Problems Assessed (Type 2 Diabetes) all   Problems Present (Type 2 Diabetes) none       Problem: Fall Risk (Adult)  Goal: Absence of Falls  Patient will demonstrate the desired outcomes by discharge/transition of care.  Outcome: Ongoing (interventions implemented as appropriate)   09/12/18 1336   Fall Risk (Adult)   Absence of Falls making progress toward outcome       Problem: Patient Care Overview  Goal: Plan of Care Review  Outcome: Ongoing (interventions implemented as appropriate)   09/12/18 1336   Coping/Psychosocial   Plan Of Care Reviewed With patient       Problem: Infection, Risk/Actual (Adult)  Goal: Infection Prevention/Resolution  Patient will demonstrate the desired outcomes by discharge/transition of care.  Outcome: Ongoing (interventions implemented as appropriate)   09/12/18 1336   Infection, Risk/Actual (Adult)   Infection Prevention/Resolution making progress toward outcome       Problem: Diarrhea (Adult)  Goal: Improved/Reduced Symptoms  Patient will demonstrate the desired outcomes by discharge/transition of care.  Outcome: Ongoing (interventions implemented as appropriate)   09/12/18 1336   Diarrhea (Adult)   Improved/Reduced Symptoms making progress toward outcome       Problem: Pressure Ulcer Risk (Yovani Scale) (Adult,Obstetrics,Pediatric)  Goal: Skin Integrity  Patient will demonstrate the desired outcomes by discharge/transition of care.  Outcome: Ongoing (interventions implemented as appropriate)   09/12/18 1336   Pressure Ulcer Risk (Yovani Scale) (Adult,Obstetrics,Pediatric)   Skin Integrity  making progress toward outcome

## 2018-09-12 NOTE — ANESTHESIA POSTPROCEDURE EVALUATION
"Anesthesia Post Evaluation    Patient: Melinda Knight    Procedure(s) Performed: Procedure(s) (LRB):  EGD (ESOPHAGOGASTRODUODENOSCOPY) (Left)    Final Anesthesia Type: MAC  Patient location during evaluation: GI PACU  Patient participation: Yes- Able to Participate  Level of consciousness: awake and alert and oriented  Post-procedure vital signs: reviewed and stable  Pain management: adequate  Airway patency: patent  PONV status at discharge: No PONV  Anesthetic complications: no      Cardiovascular status: blood pressure returned to baseline and hemodynamically stable  Respiratory status: unassisted, spontaneous ventilation and room air  Hydration status: euvolemic  Follow-up not needed.        Visit Vitals  /63 (BP Location: Right arm, Patient Position: Lying)   Pulse 95   Temp 36.9 °C (98.4 °F) (Oral)   Resp 16   Ht 5' 1" (1.549 m)   Wt 100.7 kg (222 lb 0.1 oz)   SpO2 (!) 90%   Breastfeeding? No   BMI 41.95 kg/m²       Pain/Noelle Score: Pain Assessment Performed: Yes (9/12/2018  1:05 PM)  Presence of Pain: denies (9/12/2018  1:05 PM)  Pain Rating Post Med Admin: 0 (9/11/2018 10:54 PM)  Noelle Score: 10 (9/11/2018  2:30 PM)        "

## 2018-09-13 ENCOUNTER — TELEPHONE (OUTPATIENT)
Dept: PRIMARY CARE CLINIC | Facility: CLINIC | Age: 73
End: 2018-09-13

## 2018-09-13 NOTE — PT/OT/SLP DISCHARGE
Physical Therapy Discharge Summary    Name: Melinda Knight  MRN: [id number]   Principal Problem: Hypoxemia     Patient Discharged from acute Physical Therapy on 2018.  Please refer to prior PT noted date on 09/10/2018 for functional status.     Assessment:     Patient was discharged unexpectedly.  Information required to complete an accurate discharge summary is unknown.  Refer to therapy initial evaluation and last progress note for initial and most recent functional status and goal achievement.  Recommendations made may be found in medical record.    Objective:     GOALS:   Multidisciplinary Problems     Physical Therapy Goals        Problem: Physical Therapy Goal    Goal Priority Disciplines Outcome Goal Variances Interventions   Physical Therapy Goal     PT, PT/OT Ongoing (interventions implemented as appropriate)     Description:  Goals to be met by: 2018     Patient will increase functional independence with mobility by performin. Supine to sit with Modified [place]  2. Sit to stand transfer with Modified [place]  3. Gait  x 50 feet with Modified Shoshone using Rolling Walker.   4. Lower extremity exercise program x10-15 reps per handout, with independence                      Reasons for Discontinuation of Therapy Services  Transfer to alternate level of care.      Plan:     Patient Discharged to: Home with Home Health Service.    Aury Munguia, PT  2018

## 2018-09-13 NOTE — TELEPHONE ENCOUNTER
----- Message from Dorene Landrum sent at 9/13/2018 10:41 AM CDT -----  Contact: Francoise with [facility] phone [phone]  Francoise with [employer] phone [phone], Calling to inform you patient is refusing home health . Please advise. Thanks.

## 2018-09-14 ENCOUNTER — PATIENT OUTREACH (OUTPATIENT)
Dept: ADMINISTRATIVE | Facility: CLINIC | Age: 73
End: 2018-09-14

## 2018-09-14 NOTE — PROGRESS NOTES
C3 nurse attempted to contact patient. No answer. The following message was left for the patient to return the call:  Good morning I am a nurse calling on behalf of [facility] from the Care Coordination Center.  This is a Transitional Care Call for Melinda Knight . When you have a moment please contact us at [phone] or [phone] Monday through Friday, between the hours of 8 am to 4 pm. We look forward to speaking with you. On behalf of [facility] have a nice day.    The patient does not have a scheduled HOSFU appointment within 7-14 days post hospital discharge date 9/12. Message sent to Physician staff to assist with HOSFU appointment scheduling. NON [facility]

## 2018-09-17 ENCOUNTER — TELEPHONE (OUTPATIENT)
Dept: PRIMARY CARE CLINIC | Facility: CLINIC | Age: 73
End: 2018-09-17

## 2018-09-17 NOTE — TELEPHONE ENCOUNTER
Spoke to Diandra and notified her that if the patient is refusing home health, she has the right to do so. She states understanding and will cancel the order.

## 2018-09-17 NOTE — TELEPHONE ENCOUNTER
----- Message from Alyx Ramirez sent at 9/17/2018 12:49 PM CDT -----  Contact: Diandra with [facility]   Diandra with [facility] need to speak with a nurse regarding they need to cancel patient home health due to patient refusing. Please call back at [phone]

## 2018-09-19 ENCOUNTER — PATIENT MESSAGE (OUTPATIENT)
Dept: PRIMARY CARE CLINIC | Facility: CLINIC | Age: 73
End: 2018-09-19

## 2018-09-19 DIAGNOSIS — I65.23 BILATERAL CAROTID ARTERY STENOSIS: Primary | ICD-10-CM

## 2018-10-03 ENCOUNTER — OFFICE VISIT (OUTPATIENT)
Dept: PRIMARY CARE CLINIC | Facility: CLINIC | Age: 73
End: 2018-10-03
Payer: MEDICARE

## 2018-10-03 VITALS
HEART RATE: 77 BPM | DIASTOLIC BLOOD PRESSURE: 61 MMHG | SYSTOLIC BLOOD PRESSURE: 124 MMHG | WEIGHT: 219 LBS | BODY MASS INDEX: 41.35 KG/M2 | OXYGEN SATURATION: 96 % | HEIGHT: 61 IN | TEMPERATURE: 99 F | RESPIRATION RATE: 16 BRPM

## 2018-10-03 DIAGNOSIS — E03.9 HYPOTHYROIDISM (ACQUIRED): Chronic | ICD-10-CM

## 2018-10-03 DIAGNOSIS — E78.5 HYPERLIPIDEMIA, UNSPECIFIED HYPERLIPIDEMIA TYPE: ICD-10-CM

## 2018-10-03 DIAGNOSIS — Z11.59 NEED FOR HEPATITIS C SCREENING TEST: ICD-10-CM

## 2018-10-03 DIAGNOSIS — K29.70 GASTRITIS WITHOUT BLEEDING, UNSPECIFIED CHRONICITY, UNSPECIFIED GASTRITIS TYPE: Primary | ICD-10-CM

## 2018-10-03 DIAGNOSIS — E11.42 TYPE 2 DIABETES MELLITUS WITH DIABETIC POLYNEUROPATHY, WITH LONG-TERM CURRENT USE OF INSULIN: Chronic | ICD-10-CM

## 2018-10-03 DIAGNOSIS — Z79.4 TYPE 2 DIABETES MELLITUS WITH DIABETIC POLYNEUROPATHY, WITH LONG-TERM CURRENT USE OF INSULIN: Chronic | ICD-10-CM

## 2018-10-03 DIAGNOSIS — I70.0 ATHEROSCLEROSIS OF ABDOMINAL AORTA: ICD-10-CM

## 2018-10-03 DIAGNOSIS — Z23 NEED FOR PROPHYLACTIC VACCINATION AND INOCULATION AGAINST INFLUENZA: ICD-10-CM

## 2018-10-03 DIAGNOSIS — M15.9 POLYARTICULAR OSTEOARTHRITIS: Chronic | ICD-10-CM

## 2018-10-03 DIAGNOSIS — Z23 NEED FOR VACCINATION: ICD-10-CM

## 2018-10-03 PROCEDURE — 3074F SYST BP LT 130 MM HG: CPT | Mod: CPTII,,, | Performed by: FAMILY MEDICINE

## 2018-10-03 PROCEDURE — 1101F PT FALLS ASSESS-DOCD LE1/YR: CPT | Mod: CPTII,,, | Performed by: FAMILY MEDICINE

## 2018-10-03 PROCEDURE — 99213 OFFICE O/P EST LOW 20 MIN: CPT | Mod: PBBFAC,PN | Performed by: FAMILY MEDICINE

## 2018-10-03 PROCEDURE — 90662 IIV NO PRSV INCREASED AG IM: CPT | Mod: PBBFAC,PN

## 2018-10-03 PROCEDURE — 3044F HG A1C LEVEL LT 7.0%: CPT | Mod: CPTII,,, | Performed by: FAMILY MEDICINE

## 2018-10-03 PROCEDURE — 99999 PR PBB SHADOW E&M-EST. PATIENT-LVL III: CPT | Mod: PBBFAC,,, | Performed by: FAMILY MEDICINE

## 2018-10-03 PROCEDURE — 3078F DIAST BP <80 MM HG: CPT | Mod: CPTII,,, | Performed by: FAMILY MEDICINE

## 2018-10-03 PROCEDURE — 99214 OFFICE O/P EST MOD 30 MIN: CPT | Mod: S$PBB,,, | Performed by: FAMILY MEDICINE

## 2018-10-03 PROCEDURE — 99499 UNLISTED E&M SERVICE: CPT | Mod: S$GLB,,, | Performed by: FAMILY MEDICINE

## 2018-10-03 RX ORDER — HYDROCODONE BITARTRATE AND ACETAMINOPHEN 7.5; 325 MG/1; MG/1
1 TABLET ORAL EVERY 8 HOURS PRN
Qty: 60 TABLET | Refills: 0 | Status: SHIPPED | OUTPATIENT
Start: 2018-10-03 | End: 2018-11-05 | Stop reason: SDUPTHER

## 2018-10-03 RX ORDER — PANTOPRAZOLE SODIUM 40 MG/1
40 TABLET, DELAYED RELEASE ORAL DAILY
Qty: 90 TABLET | Refills: 0 | Status: SHIPPED | OUTPATIENT
Start: 2018-10-03 | End: 2019-01-08 | Stop reason: SDUPTHER

## 2018-10-03 NOTE — PROGRESS NOTES
Patient ID by name and . Allergies verified. Influenza High Dose vaccine given IM in right deltoid using aseptic technique. Aspirated with no blood noted. Patient tolerated well. Given per physicians order. No adverse reaction noted.

## 2018-10-03 NOTE — PROGRESS NOTES
"Subjective:       Patient ID: Melinda Knight is a 73 y.o. female.    Chief Complaint: Hospital Follow Up (patient says she is here for a follow up after being admitted for abdominal pain )    Admitted to the hospital with abdominal pain and hypoxia in early September.  Workup essentially unremarkable except gastritis noted on EGD.  Biopsies negative, H pylori negative.  Discharged home on b.i.d. Protonix, feeling much better.  Eating and drinking well.  Diabetes-excellent glycemic control, compliant with medications, no adverse side effects      Review of Systems   Constitutional: Negative for fever.   HENT: Negative for trouble swallowing.    Eyes: Negative for visual disturbance.   Respiratory: Negative for shortness of breath.    Cardiovascular: Negative for chest pain.   Gastrointestinal: Positive for abdominal pain. Negative for blood in stool.   Genitourinary: Negative for difficulty urinating.   Musculoskeletal: Positive for arthralgias and back pain.   Skin: Negative for rash and wound.   Neurological: Negative for syncope.   Hematological: Bruises/bleeds easily.   Psychiatric/Behavioral: Negative for agitation and confusion.       Objective:      Vitals:    10/03/18 1607   BP: 124/61   BP Location: Right arm   Patient Position: Sitting   BP Method: Large (Automatic)   Pulse: 77   Resp: 16   Temp: 98.5 °F (36.9 °C)   TempSrc: Oral   SpO2: 96%   Weight: 99.3 kg (219 lb)   Height: 5' 1" (1.549 m)     Lab Results   Component Value Date    WBC 7.10 09/26/2018    HGB 13.6 09/26/2018    HCT 41.8 09/26/2018     09/26/2018    CHOL 168 09/26/2018    TRIG 111 09/26/2018    HDL 47 09/26/2018    ALT 11 (L) 09/26/2018    AST 16 09/26/2018     09/26/2018    K 4.5 09/26/2018     09/26/2018    CREATININE 1.4 09/26/2018    BUN 36 (H) 09/26/2018    CO2 31 (H) 09/26/2018    TSH 3.17 09/26/2018    INR 1.0 03/18/2016    HGBA1C 5.9 (H) 09/26/2018     Physical Exam   Constitutional: She is oriented to person, " place, and time. She appears well-developed and well-nourished.   HENT:   Head: Normocephalic and atraumatic.   Cardiovascular: Normal rate, regular rhythm and normal heart sounds.   Pulses:       Dorsalis pedis pulses are 2+ on the right side, and 2+ on the left side.   Pulmonary/Chest: Effort normal and breath sounds normal.   Abdominal: Soft. She exhibits no distension. There is no tenderness.   Musculoskeletal: She exhibits no edema.   Feet:   Right Foot:   Protective Sensation: 9 sites tested. 9 sites sensed.   Skin Integrity: Negative for ulcer, blister or skin breakdown.   Left Foot:   Protective Sensation: 9 sites tested. 9 sites sensed.   Skin Integrity: Positive for callus. Negative for ulcer, blister or skin breakdown.   Neurological: She is alert and oriented to person, place, and time.   Skin: Skin is warm and dry.   Psychiatric: She has a normal mood and affect. Her behavior is normal.   Nursing note and vitals reviewed.      Assessment:       1. Gastritis without bleeding, unspecified chronicity, unspecified gastritis type    2. Type 2 diabetes mellitus with diabetic polyneuropathy, with long-term current use of insulin    3. Hyperlipidemia, unspecified hyperlipidemia type    4. Hypothyroidism (acquired)    5. Need for vaccination    6. Need for hepatitis C screening test    7. Atherosclerosis of abdominal aorta    8. Polyarticular osteoarthritis Sub-optimally controlled   9. Need for prophylactic vaccination and inoculation against influenza        Plan:       Gastritis without bleeding, unspecified chronicity, unspecified gastritis type  Comments:  Continue PPI for an additional 90 days  Orders:  -     pantoprazole (PROTONIX) 40 MG tablet; Take 1 tablet (40 mg total) by mouth once daily.  Dispense: 90 tablet; Refill: 0  -     CBC auto differential; Future; Expected date: 04/03/2019    Type 2 diabetes mellitus with diabetic polyneuropathy, with long-term current use of insulin  Comments:  Excellent  glycemic control, continue current Rx. Advised to see opthalmologist for retinopathy screening  Orders:  -     dulaglutide (TRULICITY) 1.5 mg/0.5 mL PnIj; Inject 1.5 mg into the skin every 7 days.  Dispense: 12 Syringe; Refill: 3  -     Hemoglobin A1c; Future; Expected date: 04/03/2019  -     Microalbumin/creatinine urine ratio; Future; Expected date: 04/03/2019  -     HM DIABETES FOOT EXAM    Hyperlipidemia, unspecified hyperlipidemia type  -     Comprehensive metabolic panel; Future; Expected date: 04/03/2019  -     Lipid panel; Future; Expected date: 04/03/2019    Hypothyroidism (acquired)  -     TSH; Future; Expected date: 04/03/2019  -     T4, free; Future; Expected date: 04/03/2019  -     T3; Future; Expected date: 04/03/2019    Need for vaccination  Comments:  flu shot today    Need for hepatitis C screening test  -     Hepatitis C antibody; Future; Expected date: 04/03/2019    Atherosclerosis of abdominal aorta  Comments:  Need to maintain good control of blood pressure and cholesterol, continue aspirin    Polyarticular osteoarthritis  -     HYDROcodone-acetaminophen (NORCO) 7.5-325 mg per tablet; Take 1 tablet by mouth every 8 (eight) hours as needed for Pain.  Dispense: 60 tablet; Refill: 0    Need for prophylactic vaccination and inoculation against influenza  -     Flu Vaccine - High Dose (PF) (65+)         Medication List           Accurate as of 10/3/18  6:02 PM. If you have any questions, ask your nurse or doctor.               CHANGE how you take these medications    insulin aspart U-100 100 unit/mL injection  Commonly known as:  NOVOLOG  Inject 8 Units into the skin 3 (three) times daily before meals.  What changed:  when to take this     pantoprazole 40 MG tablet  Commonly known as:  PROTONIX  Take 1 tablet (40 mg total) by mouth once daily.  What changed:  when to take this  Changed by:  Duke Cole MD     tiZANidine 4 MG tablet  Commonly known as:  ZANAFLEX  Take 1 tablet (4 mg total) by  "mouth every 8 (eight) hours as needed.  What changed:  Another medication with the same name was removed. Continue taking this medication, and follow the directions you see here.  Changed by:  Duke Cole MD        CONTINUE taking these medications    amLODIPine 10 MG tablet  Commonly known as:  NORVASC  Take 1 tablet (10 mg total) by mouth once daily.     aspirin 325 MG tablet     atorvastatin 40 MG tablet  Commonly known as:  LIPITOR  TAKE ONE TABLET BY MOUTH ONCE DAILY     citalopram 20 MG tablet  Commonly known as:  CELEXA  Take 1 tablet (20 mg total) by mouth once daily.     dicyclomine 20 mg tablet  Commonly known as:  BENTYL  Take 1 tablet (20 mg total) by mouth daily as needed (GI cramps).     diphenoxylate-atropine 2.5-0.025 mg 2.5-0.025 mg per tablet  Commonly known as:  LOMOTIL  Take 1 tablet by mouth every 6 (six) hours as needed.     dulaglutide 1.5 mg/0.5 mL Pnij  Commonly known as:  TRULICITY  Inject 1.5 mg into the skin every 7 days.     HYDROcodone-acetaminophen 7.5-325 mg per tablet  Commonly known as:  NORCO  Take 1 tablet by mouth every 8 (eight) hours as needed for Pain.     ibandronate 150 mg tablet  Commonly known as:  BONIVA  Take 1 tablet (150 mg total) by mouth every 30 days.     LEVEMIR FLEXTOUCH U-100 INSULN 100 unit/mL (3 mL) Inpn pen  Generic drug:  insulin detemir U-100  INJECT 30 UNITS SUBCUTANEOUSLY ONCE DAILY AT BEDTIME     levothyroxine 75 MCG tablet  Commonly known as:  SYNTHROID  TAKE ONE TABLET BY MOUTH ONCE DAILY     losartan-hydrochlorothiazide 100-25 mg 100-25 mg per tablet  Commonly known as:  HYZAAR  Take 1 tablet by mouth once daily.     MULTIVITAMIN ORAL     ondansetron 4 MG Tbdl  Commonly known as:  ZOFRAN-ODT  DISSOLVE ONE TABLET UNDER THE TONGUE EVERY 6 HOURS AS NEEDED FOR NAUSEA     pen needle, diabetic 32 gauge x 1/5" Ndle  Commonly known as:  NOVOTWIST  Use with insulin pen as directed     * PRODIGY AUTOCODE TEST STRIPS MISC     * blood sugar diagnostic " Strp  Commonly known as:  BLOOD GLUCOSE TEST  1 strip by Misc.(Non-Drug; Combo Route) route 2 (two) times daily. Prodigy         * This list has 2 medication(s) that are the same as other medications prescribed for you. Read the directions carefully, and ask your doctor or other care provider to review them with you.               Where to Get Your Medications      These medications were sent to [facility] [place] - [address] [place] [address]    Phone:  [phone]   · dulaglutide 1.5 mg/0.5 mL Pnij  · HYDROcodone-acetaminophen 7.5-325 mg per tablet  · pantoprazole 40 MG tablet

## 2018-10-15 RX ORDER — INSULIN ASPART 100 [IU]/ML
INJECTION, SOLUTION INTRAVENOUS; SUBCUTANEOUS
Qty: 15 ML | Refills: 11 | Status: SHIPPED | OUTPATIENT
Start: 2018-10-15 | End: 2019-02-27 | Stop reason: SDUPTHER

## 2018-10-29 ENCOUNTER — OFFICE VISIT (OUTPATIENT)
Dept: VASCULAR SURGERY | Facility: CLINIC | Age: 73
End: 2018-10-29
Payer: MEDICARE

## 2018-10-29 ENCOUNTER — HOSPITAL ENCOUNTER (OUTPATIENT)
Dept: VASCULAR SURGERY | Facility: CLINIC | Age: 73
Discharge: HOME | End: 2018-10-29
Attending: SURGERY
Payer: MEDICARE

## 2018-10-29 VITALS
DIASTOLIC BLOOD PRESSURE: 66 MMHG | WEIGHT: 215.81 LBS | HEIGHT: 61 IN | HEART RATE: 71 BPM | SYSTOLIC BLOOD PRESSURE: 149 MMHG | BODY MASS INDEX: 40.75 KG/M2 | TEMPERATURE: 99 F

## 2018-10-29 DIAGNOSIS — I65.22 ASYMPTOMATIC STENOSIS OF LEFT CAROTID ARTERY: Primary | ICD-10-CM

## 2018-10-29 DIAGNOSIS — I65.23 BILATERAL CAROTID ARTERY STENOSIS: ICD-10-CM

## 2018-10-29 DIAGNOSIS — N18.30 CKD (CHRONIC KIDNEY DISEASE) STAGE 3, GFR 30-59 ML/MIN: ICD-10-CM

## 2018-10-29 PROCEDURE — 99213 OFFICE O/P EST LOW 20 MIN: CPT | Mod: PBBFAC,25 | Performed by: SURGERY

## 2018-10-29 PROCEDURE — 1101F PT FALLS ASSESS-DOCD LE1/YR: CPT | Mod: CPTII,,, | Performed by: SURGERY

## 2018-10-29 PROCEDURE — 3078F DIAST BP <80 MM HG: CPT | Mod: CPTII,,, | Performed by: SURGERY

## 2018-10-29 PROCEDURE — 93880 EXTRACRANIAL BILAT STUDY: CPT | Mod: 26,S$PBB,, | Performed by: SURGERY

## 2018-10-29 PROCEDURE — 93880 EXTRACRANIAL BILAT STUDY: CPT | Mod: PBBFAC | Performed by: SURGERY

## 2018-10-29 PROCEDURE — 99999 PR PBB SHADOW E&M-EST. PATIENT-LVL III: CPT | Mod: PBBFAC,,, | Performed by: SURGERY

## 2018-10-29 PROCEDURE — 99213 OFFICE O/P EST LOW 20 MIN: CPT | Mod: S$PBB,,, | Performed by: SURGERY

## 2018-10-29 PROCEDURE — 3077F SYST BP >= 140 MM HG: CPT | Mod: CPTII,,, | Performed by: SURGERY

## 2018-10-29 NOTE — PROGRESS NOTES
Patient ID: Melinda Knight is a 73 y.o. female.    I. HISTORY     Chief Complaint: 1 year f/u for left CEA on 3/24/16    HPI  Melinda Knight is a 73 y.o. female with DM, HTN here for  f/u s/p Left CEA on 2016. No TIA, no stroke, no sudden loss of vision.  She uses a wheelchair when traveling long distances but otherwise she is ambulatory. She is taking  mg and statin.    PMH: Insulin dependent diabetes, hyperlipidemia, HTN.  Denies MI  PSH: cholecystectomy, tubal ligation  SH: Denies tobacco use    Review of Systems   Constitution: Negative.   HENT: Negative.    Eyes: Negative.    Cardiovascular: Negative.    Respiratory: Negative.    Endocrine: Negative.    Hematologic/Lymphatic: Negative.    Skin: Negative.    Musculoskeletal: Negative for muscle weakness.   Gastrointestinal: Negative.    Genitourinary: Negative.    Neurological: Positive for dizziness and light-headedness (when she stands from sitting or laying). Negative for brief paralysis, disturbances in coordination, focal weakness, loss of balance, numbness, paresthesias, seizures, sensory change and vertigo.   Psychiatric/Behavioral: Negative for altered mental status.       II. PHYSICAL EXAM   Right Arm BP - Sittin/66 (10/29/18 1344)  Left Arm BP - Sittin/67 (10/29/18 1344)  Pulse: 71  Temp: 98.5 °F (36.9 °C)  Body mass index is 40.78 kg/m².      Physical Exam   Constitutional: She is oriented to person, place, and time. She appears well-developed and well-nourished. No distress.   HENT:   Head: Normocephalic and atraumatic.   Eyes: Conjunctivae and EOM are normal. No scleral icterus.   Neck: Neck supple. No JVD present. Carotid bruit is not present.   Cardiovascular: Normal rate, regular rhythm, normal heart sounds and intact distal pulses.   No murmur heard.  Pulmonary/Chest: Effort normal and breath sounds normal. No respiratory distress. She has no wheezes.   Abdominal: Soft. She exhibits no distension.   Musculoskeletal:  Normal range of motion. She exhibits no edema.   Neurological: She is alert and oriented to person, place, and time.   Skin: Skin is warm and dry.   Psychiatric: She has a normal mood and affect.          III. ASSESSMENT & PLAN (MEDICAL DECISION MAKING)     1. Asymptomatic stenosis of left carotid artery    2. CKD (chronic kidney disease) stage 3, GFR 30-59 ml/min        Imaging Results:  Carotid Duplex:   R L   1-39% Endarterectomized     Assessment/Diagnosis and Plan:  Melinda Knight is a 73 y.o. female here for follow up of asymptomatic carotid stenosis.   Denies any recent history of arm or leg numbness or weakness, facial droop, vision changes or slurred speech.   No significant complaints today.    Continue yearly follow up      Sofia Way MD FACS [facility]  Vascular & Endovascular Surgery

## 2018-11-05 DIAGNOSIS — M15.9 POLYARTICULAR OSTEOARTHRITIS: Chronic | ICD-10-CM

## 2018-11-05 RX ORDER — HYDROCODONE BITARTRATE AND ACETAMINOPHEN 7.5; 325 MG/1; MG/1
1 TABLET ORAL EVERY 8 HOURS PRN
Qty: 60 TABLET | Refills: 0 | Status: SHIPPED | OUTPATIENT
Start: 2018-11-05 | End: 2018-12-03 | Stop reason: SDUPTHER

## 2018-11-05 RX ORDER — DICYCLOMINE HYDROCHLORIDE 20 MG/1
20 TABLET ORAL DAILY PRN
Qty: 90 TABLET | Refills: 1 | Status: SHIPPED | OUTPATIENT
Start: 2018-11-05 | End: 2019-05-07 | Stop reason: SDUPTHER

## 2018-11-05 RX ORDER — INSULIN DETEMIR 100 [IU]/ML
INJECTION, SOLUTION SUBCUTANEOUS
Qty: 15 ML | Refills: 11 | Status: SHIPPED | OUTPATIENT
Start: 2018-11-05 | End: 2020-01-23

## 2018-12-03 ENCOUNTER — PATIENT MESSAGE (OUTPATIENT)
Dept: PRIMARY CARE CLINIC | Facility: CLINIC | Age: 73
End: 2018-12-03

## 2018-12-03 DIAGNOSIS — M15.9 POLYARTICULAR OSTEOARTHRITIS: Chronic | ICD-10-CM

## 2018-12-03 RX ORDER — HYDROCODONE BITARTRATE AND ACETAMINOPHEN 7.5; 325 MG/1; MG/1
1 TABLET ORAL EVERY 8 HOURS PRN
Qty: 60 TABLET | Refills: 0 | Status: SHIPPED | OUTPATIENT
Start: 2018-12-03 | End: 2019-01-02 | Stop reason: SDUPTHER

## 2018-12-22 ENCOUNTER — HOSPITAL ENCOUNTER (INPATIENT)
Facility: HOSPITAL | Age: 73
LOS: 4 days | Discharge: HOME HEALTH | DRG: 189 | End: 2018-12-26
Attending: EMERGENCY MEDICINE | Admitting: HOSPITALIST
Payer: MEDICARE

## 2018-12-22 DIAGNOSIS — E66.01 MORBID OBESITY WITH BMI OF 40.0-44.9, ADULT: Chronic | ICD-10-CM

## 2018-12-22 DIAGNOSIS — R53.83 FATIGUE: ICD-10-CM

## 2018-12-22 DIAGNOSIS — R93.89 ABNORMAL VENTRICULAR WALL MOTION: ICD-10-CM

## 2018-12-22 DIAGNOSIS — E11.42 TYPE 2 DIABETES MELLITUS WITH DIABETIC POLYNEUROPATHY, WITH LONG-TERM CURRENT USE OF INSULIN: Chronic | ICD-10-CM

## 2018-12-22 DIAGNOSIS — R06.02 SOB (SHORTNESS OF BREATH) ON EXERTION: ICD-10-CM

## 2018-12-22 DIAGNOSIS — R09.02 HYPOXIA: ICD-10-CM

## 2018-12-22 DIAGNOSIS — Z79.4 TYPE 2 DIABETES MELLITUS WITH DIABETIC POLYNEUROPATHY, WITH LONG-TERM CURRENT USE OF INSULIN: Chronic | ICD-10-CM

## 2018-12-22 DIAGNOSIS — R79.89 ELEVATED TROPONIN: ICD-10-CM

## 2018-12-22 DIAGNOSIS — N17.9 AKI (ACUTE KIDNEY INJURY): Primary | ICD-10-CM

## 2018-12-22 DIAGNOSIS — R53.1 WEAKNESS: ICD-10-CM

## 2018-12-22 DIAGNOSIS — J96.01 ACUTE RESPIRATORY FAILURE WITH HYPOXIA: ICD-10-CM

## 2018-12-22 DIAGNOSIS — N39.0 URINARY TRACT INFECTION WITHOUT HEMATURIA, SITE UNSPECIFIED: ICD-10-CM

## 2018-12-22 LAB
ALBUMIN SERPL BCP-MCNC: 3.3 G/DL
ALP SERPL-CCNC: 97 U/L
ALT SERPL W/O P-5'-P-CCNC: 54 U/L
ANION GAP SERPL CALC-SCNC: 11 MMOL/L
AST SERPL-CCNC: 47 U/L
BACTERIA #/AREA URNS AUTO: ABNORMAL /HPF
BASOPHILS # BLD AUTO: 0.07 K/UL
BASOPHILS NFR BLD: 0.7 %
BILIRUB SERPL-MCNC: 0.5 MG/DL
BILIRUB UR QL STRIP: NEGATIVE
BNP SERPL-MCNC: 970 PG/ML
BUN SERPL-MCNC: 48 MG/DL
CALCIUM SERPL-MCNC: 8.8 MG/DL
CHLORIDE SERPL-SCNC: 100 MMOL/L
CLARITY UR REFRACT.AUTO: ABNORMAL
CO2 SERPL-SCNC: 24 MMOL/L
COLOR UR AUTO: YELLOW
CREAT SERPL-MCNC: 2.3 MG/DL
CTP QC/QA: YES
D DIMER PPP IA.FEU-MCNC: 0.63 MG/L FEU
DIFFERENTIAL METHOD: ABNORMAL
EOSINOPHIL # BLD AUTO: 0 K/UL
EOSINOPHIL NFR BLD: 0.2 %
ERYTHROCYTE [DISTWIDTH] IN BLOOD BY AUTOMATED COUNT: 12.2 %
EST. GFR  (AFRICAN AMERICAN): 23.9 ML/MIN/1.73 M^2
EST. GFR  (NON AFRICAN AMERICAN): 20.8 ML/MIN/1.73 M^2
GLUCOSE SERPL-MCNC: 251 MG/DL
GLUCOSE UR QL STRIP: NEGATIVE
HCT VFR BLD AUTO: 40.1 %
HGB BLD-MCNC: 12.5 G/DL
HGB UR QL STRIP: NEGATIVE
HYALINE CASTS UR QL AUTO: 10 /LPF
IMM GRANULOCYTES # BLD AUTO: 0.05 K/UL
IMM GRANULOCYTES NFR BLD AUTO: 0.5 %
KETONES UR QL STRIP: NEGATIVE
LACTATE SERPL-SCNC: 1.3 MMOL/L
LEUKOCYTE ESTERASE UR QL STRIP: ABNORMAL
LYMPHOCYTES # BLD AUTO: 1.6 K/UL
LYMPHOCYTES NFR BLD: 14.7 %
MAGNESIUM SERPL-MCNC: 2.5 MG/DL
MCH RBC QN AUTO: 31.6 PG
MCHC RBC AUTO-ENTMCNC: 31.2 G/DL
MCV RBC AUTO: 102 FL
MICROSCOPIC COMMENT: ABNORMAL
MONOCYTES # BLD AUTO: 1.1 K/UL
MONOCYTES NFR BLD: 10.1 %
NEUTROPHILS # BLD AUTO: 7.9 K/UL
NEUTROPHILS NFR BLD: 73.8 %
NITRITE UR QL STRIP: NEGATIVE
NON-SQ EPI CELLS #/AREA URNS AUTO: 1 /HPF
NRBC BLD-RTO: 0 /100 WBC
PH UR STRIP: 5 [PH] (ref 5–8)
PLATELET # BLD AUTO: 222 K/UL
PMV BLD AUTO: 11.4 FL
POC MOLECULAR INFLUENZA A AGN: NEGATIVE
POC MOLECULAR INFLUENZA B AGN: NEGATIVE
POTASSIUM SERPL-SCNC: 4.4 MMOL/L
POTASSIUM SERPL-SCNC: 5.6 MMOL/L
PROT SERPL-MCNC: 7.4 G/DL
PROT UR QL STRIP: ABNORMAL
RBC # BLD AUTO: 3.95 M/UL
RBC #/AREA URNS AUTO: 2 /HPF (ref 0–4)
SODIUM SERPL-SCNC: 135 MMOL/L
SP GR UR STRIP: 1.02 (ref 1–1.03)
SQUAMOUS #/AREA URNS AUTO: 58 /HPF
T4 FREE SERPL-MCNC: 1.04 NG/DL
TROPONIN I SERPL DL<=0.01 NG/ML-MCNC: 0.62 NG/ML
TSH SERPL DL<=0.005 MIU/L-ACNC: 0.38 UIU/ML
URN SPEC COLLECT METH UR: ABNORMAL
WBC # BLD AUTO: 10.7 K/UL
WBC #/AREA URNS AUTO: >100 /HPF (ref 0–5)

## 2018-12-22 PROCEDURE — 83036 HEMOGLOBIN GLYCOSYLATED A1C: CPT

## 2018-12-22 PROCEDURE — 83605 ASSAY OF LACTIC ACID: CPT

## 2018-12-22 PROCEDURE — 87088 URINE BACTERIA CULTURE: CPT

## 2018-12-22 PROCEDURE — 93005 ELECTROCARDIOGRAM TRACING: CPT

## 2018-12-22 PROCEDURE — 83880 ASSAY OF NATRIURETIC PEPTIDE: CPT

## 2018-12-22 PROCEDURE — 85025 COMPLETE CBC W/AUTO DIFF WBC: CPT

## 2018-12-22 PROCEDURE — 80053 COMPREHEN METABOLIC PANEL: CPT

## 2018-12-22 PROCEDURE — 96374 THER/PROPH/DIAG INJ IV PUSH: CPT

## 2018-12-22 PROCEDURE — 99285 EMERGENCY DEPT VISIT HI MDM: CPT

## 2018-12-22 PROCEDURE — 87077 CULTURE AEROBIC IDENTIFY: CPT

## 2018-12-22 PROCEDURE — 85379 FIBRIN DEGRADATION QUANT: CPT

## 2018-12-22 PROCEDURE — 84484 ASSAY OF TROPONIN QUANT: CPT

## 2018-12-22 PROCEDURE — 96361 HYDRATE IV INFUSION ADD-ON: CPT

## 2018-12-22 PROCEDURE — 84132 ASSAY OF SERUM POTASSIUM: CPT

## 2018-12-22 PROCEDURE — 25000003 PHARM REV CODE 250: Performed by: PHYSICIAN ASSISTANT

## 2018-12-22 PROCEDURE — 96375 TX/PRO/DX INJ NEW DRUG ADDON: CPT

## 2018-12-22 PROCEDURE — 83735 ASSAY OF MAGNESIUM: CPT

## 2018-12-22 PROCEDURE — 87086 URINE CULTURE/COLONY COUNT: CPT

## 2018-12-22 PROCEDURE — 87186 SC STD MICRODIL/AGAR DIL: CPT

## 2018-12-22 PROCEDURE — 12000002 HC ACUTE/MED SURGE SEMI-PRIVATE ROOM

## 2018-12-22 PROCEDURE — 63600175 PHARM REV CODE 636 W HCPCS: Performed by: PHYSICIAN ASSISTANT

## 2018-12-22 PROCEDURE — 81001 URINALYSIS AUTO W/SCOPE: CPT

## 2018-12-22 PROCEDURE — 84443 ASSAY THYROID STIM HORMONE: CPT

## 2018-12-22 PROCEDURE — 99285 EMERGENCY DEPT VISIT HI MDM: CPT | Mod: ,,, | Performed by: EMERGENCY MEDICINE

## 2018-12-22 PROCEDURE — 93010 ELECTROCARDIOGRAM REPORT: CPT | Mod: ,,, | Performed by: INTERNAL MEDICINE

## 2018-12-22 PROCEDURE — 84439 ASSAY OF FREE THYROXINE: CPT

## 2018-12-22 RX ORDER — CEFTRIAXONE 1 G/1
1 INJECTION, POWDER, FOR SOLUTION INTRAMUSCULAR; INTRAVENOUS
Status: COMPLETED | OUTPATIENT
Start: 2018-12-22 | End: 2018-12-22

## 2018-12-22 RX ORDER — ONDANSETRON 2 MG/ML
4 INJECTION INTRAMUSCULAR; INTRAVENOUS
Status: COMPLETED | OUTPATIENT
Start: 2018-12-22 | End: 2018-12-22

## 2018-12-22 RX ORDER — ACETAMINOPHEN 500 MG
1000 TABLET ORAL
Status: COMPLETED | OUTPATIENT
Start: 2018-12-22 | End: 2018-12-22

## 2018-12-22 RX ADMIN — CEFTRIAXONE SODIUM 1 G: 1 INJECTION, POWDER, FOR SOLUTION INTRAMUSCULAR; INTRAVENOUS at 10:12

## 2018-12-22 RX ADMIN — ACETAMINOPHEN 1000 MG: 500 TABLET ORAL at 09:12

## 2018-12-22 RX ADMIN — SODIUM CHLORIDE 500 ML: 0.9 INJECTION, SOLUTION INTRAVENOUS at 09:12

## 2018-12-22 RX ADMIN — SODIUM CHLORIDE 500 ML: 0.9 INJECTION, SOLUTION INTRAVENOUS at 10:12

## 2018-12-22 RX ADMIN — ONDANSETRON HYDROCHLORIDE 4 MG: 2 INJECTION, SOLUTION INTRAMUSCULAR; INTRAVENOUS at 09:12

## 2018-12-23 PROBLEM — R74.8 CARDIAC ENZYMES ELEVATED: Status: ACTIVE | Noted: 2018-12-23

## 2018-12-23 PROBLEM — R06.02 SOB (SHORTNESS OF BREATH) ON EXERTION: Status: ACTIVE | Noted: 2018-12-23

## 2018-12-23 PROBLEM — F32.A DEPRESSION: Status: ACTIVE | Noted: 2018-12-23

## 2018-12-23 LAB
ALBUMIN SERPL BCP-MCNC: 2.9 G/DL
ALLENS TEST: ABNORMAL
ALP SERPL-CCNC: 86 U/L
ALT SERPL W/O P-5'-P-CCNC: 42 U/L
ANION GAP SERPL CALC-SCNC: 8 MMOL/L
AST SERPL-CCNC: 27 U/L
BACTERIA #/AREA URNS AUTO: ABNORMAL /HPF
BASOPHILS # BLD AUTO: 0.06 K/UL
BASOPHILS NFR BLD: 0.6 %
BILIRUB SERPL-MCNC: 0.4 MG/DL
BILIRUB UR QL STRIP: NEGATIVE
BUN SERPL-MCNC: 43 MG/DL
CALCIUM SERPL-MCNC: 8.2 MG/DL
CHLORIDE SERPL-SCNC: 104 MMOL/L
CLARITY UR REFRACT.AUTO: CLEAR
CO2 SERPL-SCNC: 25 MMOL/L
COLOR UR AUTO: YELLOW
CREAT SERPL-MCNC: 1.9 MG/DL
CREAT UR-MCNC: 65 MG/DL
DELSYS: ABNORMAL
DIFFERENTIAL METHOD: ABNORMAL
EOSINOPHIL # BLD AUTO: 0.1 K/UL
EOSINOPHIL NFR BLD: 0.8 %
ERYTHROCYTE [DISTWIDTH] IN BLOOD BY AUTOMATED COUNT: 12.1 %
EST. GFR  (AFRICAN AMERICAN): 30.1 ML/MIN/1.73 M^2
EST. GFR  (NON AFRICAN AMERICAN): 26.1 ML/MIN/1.73 M^2
ESTIMATED AVG GLUCOSE: 134 MG/DL
FIO2: 37
FLOW: 4
GLUCOSE SERPL-MCNC: 230 MG/DL
GLUCOSE UR QL STRIP: NEGATIVE
HBA1C MFR BLD HPLC: 6.3 %
HCO3 UR-SCNC: 26.7 MMOL/L (ref 24–28)
HCT VFR BLD AUTO: 37.7 %
HGB BLD-MCNC: 11.8 G/DL
HGB UR QL STRIP: NEGATIVE
HYALINE CASTS UR QL AUTO: 1 /LPF
IMM GRANULOCYTES # BLD AUTO: 0.08 K/UL
IMM GRANULOCYTES NFR BLD AUTO: 0.8 %
KETONES UR QL STRIP: NEGATIVE
LEUKOCYTE ESTERASE UR QL STRIP: ABNORMAL
LYMPHOCYTES # BLD AUTO: 1.4 K/UL
LYMPHOCYTES NFR BLD: 13.7 %
MAGNESIUM SERPL-MCNC: 1.8 MG/DL
MCH RBC QN AUTO: 31.8 PG
MCHC RBC AUTO-ENTMCNC: 31.3 G/DL
MCV RBC AUTO: 102 FL
MICROSCOPIC COMMENT: ABNORMAL
MODE: ABNORMAL
MONOCYTES # BLD AUTO: 1 K/UL
MONOCYTES NFR BLD: 9.8 %
NEUTROPHILS # BLD AUTO: 7.5 K/UL
NEUTROPHILS NFR BLD: 74.3 %
NITRITE UR QL STRIP: NEGATIVE
NRBC BLD-RTO: 0 /100 WBC
PCO2 BLDA: 49.7 MMHG (ref 35–45)
PH SMN: 7.34 [PH] (ref 7.35–7.45)
PH UR STRIP: 5 [PH] (ref 5–8)
PHOSPHATE SERPL-MCNC: 2.3 MG/DL
PLATELET # BLD AUTO: 202 K/UL
PMV BLD AUTO: 11.7 FL
PO2 BLDA: 58 MMHG (ref 80–100)
POC BE: 1 MMOL/L
POC SATURATED O2: 88 % (ref 95–100)
POC TCO2: 28 MMOL/L (ref 23–27)
POCT GLUCOSE: 146 MG/DL (ref 70–110)
POCT GLUCOSE: 150 MG/DL (ref 70–110)
POCT GLUCOSE: 219 MG/DL (ref 70–110)
POTASSIUM SERPL-SCNC: 3.9 MMOL/L
PROCALCITONIN SERPL IA-MCNC: 0.05 NG/ML
PROT SERPL-MCNC: 6.3 G/DL
PROT UR QL STRIP: NEGATIVE
RBC # BLD AUTO: 3.71 M/UL
RBC #/AREA URNS AUTO: 0 /HPF (ref 0–4)
SAMPLE: ABNORMAL
SITE: ABNORMAL
SODIUM SERPL-SCNC: 137 MMOL/L
SODIUM UR-SCNC: 65 MMOL/L
SP GR UR STRIP: 1.01 (ref 1–1.03)
SP02: 90
SQUAMOUS #/AREA URNS AUTO: 1 /HPF
TROPONIN I SERPL DL<=0.01 NG/ML-MCNC: 0.56 NG/ML
TROPONIN I SERPL DL<=0.01 NG/ML-MCNC: 0.66 NG/ML
URN SPEC COLLECT METH UR: ABNORMAL
UUN UR-MCNC: 601 MG/DL
WBC # BLD AUTO: 10.14 K/UL
WBC #/AREA URNS AUTO: 8 /HPF (ref 0–5)

## 2018-12-23 PROCEDURE — 11000001 HC ACUTE MED/SURG PRIVATE ROOM

## 2018-12-23 PROCEDURE — 25000003 PHARM REV CODE 250: Performed by: STUDENT IN AN ORGANIZED HEALTH CARE EDUCATION/TRAINING PROGRAM

## 2018-12-23 PROCEDURE — 83735 ASSAY OF MAGNESIUM: CPT

## 2018-12-23 PROCEDURE — 84484 ASSAY OF TROPONIN QUANT: CPT | Mod: 91

## 2018-12-23 PROCEDURE — S5571 INSULIN DISPOS PEN 3 ML: HCPCS | Performed by: STUDENT IN AN ORGANIZED HEALTH CARE EDUCATION/TRAINING PROGRAM

## 2018-12-23 PROCEDURE — 63600175 PHARM REV CODE 636 W HCPCS: Performed by: STUDENT IN AN ORGANIZED HEALTH CARE EDUCATION/TRAINING PROGRAM

## 2018-12-23 PROCEDURE — 84145 PROCALCITONIN (PCT): CPT

## 2018-12-23 PROCEDURE — 36600 WITHDRAWAL OF ARTERIAL BLOOD: CPT

## 2018-12-23 PROCEDURE — 84300 ASSAY OF URINE SODIUM: CPT

## 2018-12-23 PROCEDURE — 82803 BLOOD GASES ANY COMBINATION: CPT

## 2018-12-23 PROCEDURE — 84100 ASSAY OF PHOSPHORUS: CPT

## 2018-12-23 PROCEDURE — 85025 COMPLETE CBC W/AUTO DIFF WBC: CPT

## 2018-12-23 PROCEDURE — 81001 URINALYSIS AUTO W/SCOPE: CPT

## 2018-12-23 PROCEDURE — 99223 1ST HOSP IP/OBS HIGH 75: CPT | Mod: AI,GC,, | Performed by: HOSPITALIST

## 2018-12-23 PROCEDURE — 80053 COMPREHEN METABOLIC PANEL: CPT

## 2018-12-23 PROCEDURE — 84540 ASSAY OF URINE/UREA-N: CPT

## 2018-12-23 PROCEDURE — 36415 COLL VENOUS BLD VENIPUNCTURE: CPT

## 2018-12-23 PROCEDURE — 84484 ASSAY OF TROPONIN QUANT: CPT

## 2018-12-23 PROCEDURE — 82570 ASSAY OF URINE CREATININE: CPT

## 2018-12-23 RX ORDER — LOSARTAN POTASSIUM AND HYDROCHLOROTHIAZIDE 25; 100 MG/1; MG/1
1 TABLET ORAL DAILY
Status: DISCONTINUED | OUTPATIENT
Start: 2018-12-23 | End: 2018-12-25

## 2018-12-23 RX ORDER — LEVOTHYROXINE SODIUM 75 UG/1
75 TABLET ORAL
Status: DISCONTINUED | OUTPATIENT
Start: 2018-12-23 | End: 2018-12-26 | Stop reason: HOSPADM

## 2018-12-23 RX ORDER — GLUCAGON 1 MG
1 KIT INJECTION
Status: DISCONTINUED | OUTPATIENT
Start: 2018-12-23 | End: 2018-12-26 | Stop reason: HOSPADM

## 2018-12-23 RX ORDER — INSULIN ASPART 100 [IU]/ML
6 INJECTION, SOLUTION INTRAVENOUS; SUBCUTANEOUS
Status: DISCONTINUED | OUTPATIENT
Start: 2018-12-23 | End: 2018-12-26 | Stop reason: HOSPADM

## 2018-12-23 RX ORDER — TIZANIDINE 4 MG/1
4 TABLET ORAL EVERY 8 HOURS PRN
Status: DISCONTINUED | OUTPATIENT
Start: 2018-12-23 | End: 2018-12-26 | Stop reason: HOSPADM

## 2018-12-23 RX ORDER — IBUPROFEN 200 MG
16 TABLET ORAL
Status: DISCONTINUED | OUTPATIENT
Start: 2018-12-23 | End: 2018-12-26 | Stop reason: HOSPADM

## 2018-12-23 RX ORDER — ACETAMINOPHEN 325 MG/1
650 TABLET ORAL EVERY 8 HOURS PRN
Status: DISCONTINUED | OUTPATIENT
Start: 2018-12-23 | End: 2018-12-26 | Stop reason: HOSPADM

## 2018-12-23 RX ORDER — DICYCLOMINE HYDROCHLORIDE 20 MG/1
20 TABLET ORAL DAILY PRN
Status: DISCONTINUED | OUTPATIENT
Start: 2018-12-23 | End: 2018-12-26 | Stop reason: HOSPADM

## 2018-12-23 RX ORDER — IBUPROFEN 200 MG
24 TABLET ORAL
Status: DISCONTINUED | OUTPATIENT
Start: 2018-12-23 | End: 2018-12-26 | Stop reason: HOSPADM

## 2018-12-23 RX ORDER — ONDANSETRON 2 MG/ML
4 INJECTION INTRAMUSCULAR; INTRAVENOUS EVERY 6 HOURS PRN
Status: DISCONTINUED | OUTPATIENT
Start: 2018-12-23 | End: 2018-12-26 | Stop reason: HOSPADM

## 2018-12-23 RX ORDER — SODIUM,POTASSIUM PHOSPHATES 280-250MG
1 POWDER IN PACKET (EA) ORAL
Status: COMPLETED | OUTPATIENT
Start: 2018-12-23 | End: 2018-12-24

## 2018-12-23 RX ORDER — SODIUM CHLORIDE 0.9 % (FLUSH) 0.9 %
5 SYRINGE (ML) INJECTION
Status: DISCONTINUED | OUTPATIENT
Start: 2018-12-23 | End: 2018-12-26 | Stop reason: HOSPADM

## 2018-12-23 RX ORDER — CITALOPRAM 20 MG/1
20 TABLET, FILM COATED ORAL DAILY
Status: DISCONTINUED | OUTPATIENT
Start: 2018-12-23 | End: 2018-12-26 | Stop reason: HOSPADM

## 2018-12-23 RX ORDER — CEFTRIAXONE 1 G/1
1 INJECTION, POWDER, FOR SOLUTION INTRAMUSCULAR; INTRAVENOUS
Status: DISCONTINUED | OUTPATIENT
Start: 2018-12-23 | End: 2018-12-26

## 2018-12-23 RX ORDER — ATORVASTATIN CALCIUM 20 MG/1
40 TABLET, FILM COATED ORAL DAILY
Status: DISCONTINUED | OUTPATIENT
Start: 2018-12-23 | End: 2018-12-26 | Stop reason: HOSPADM

## 2018-12-23 RX ORDER — ASPIRIN 325 MG
325 TABLET ORAL NIGHTLY
Status: DISCONTINUED | OUTPATIENT
Start: 2018-12-23 | End: 2018-12-26 | Stop reason: HOSPADM

## 2018-12-23 RX ORDER — HEPARIN SODIUM 5000 [USP'U]/ML
5000 INJECTION, SOLUTION INTRAVENOUS; SUBCUTANEOUS EVERY 8 HOURS
Status: DISCONTINUED | OUTPATIENT
Start: 2018-12-23 | End: 2018-12-26

## 2018-12-23 RX ORDER — AMLODIPINE BESYLATE 10 MG/1
10 TABLET ORAL DAILY
Status: DISCONTINUED | OUTPATIENT
Start: 2018-12-23 | End: 2018-12-26 | Stop reason: HOSPADM

## 2018-12-23 RX ORDER — MAGNESIUM SULFATE HEPTAHYDRATE 40 MG/ML
2 INJECTION, SOLUTION INTRAVENOUS ONCE
Status: COMPLETED | OUTPATIENT
Start: 2018-12-23 | End: 2018-12-23

## 2018-12-23 RX ORDER — PANTOPRAZOLE SODIUM 40 MG/1
40 TABLET, DELAYED RELEASE ORAL DAILY
Status: DISCONTINUED | OUTPATIENT
Start: 2018-12-23 | End: 2018-12-26 | Stop reason: HOSPADM

## 2018-12-23 RX ADMIN — HEPARIN SODIUM 5000 UNITS: 5000 INJECTION, SOLUTION INTRAVENOUS; SUBCUTANEOUS at 03:12

## 2018-12-23 RX ADMIN — CEFTRIAXONE 1 G: 1 INJECTION, POWDER, FOR SOLUTION INTRAMUSCULAR; INTRAVENOUS at 03:12

## 2018-12-23 RX ADMIN — HEPARIN SODIUM 5000 UNITS: 5000 INJECTION, SOLUTION INTRAVENOUS; SUBCUTANEOUS at 06:12

## 2018-12-23 RX ADMIN — CITALOPRAM HYDROBROMIDE 20 MG: 20 TABLET ORAL at 09:12

## 2018-12-23 RX ADMIN — INSULIN DETEMIR 18 UNITS: 100 INJECTION, SOLUTION SUBCUTANEOUS at 12:12

## 2018-12-23 RX ADMIN — HEPARIN SODIUM 5000 UNITS: 5000 INJECTION, SOLUTION INTRAVENOUS; SUBCUTANEOUS at 10:12

## 2018-12-23 RX ADMIN — AMLODIPINE BESYLATE 10 MG: 10 TABLET ORAL at 09:12

## 2018-12-23 RX ADMIN — ATORVASTATIN CALCIUM 40 MG: 20 TABLET, FILM COATED ORAL at 09:12

## 2018-12-23 RX ADMIN — ASPIRIN 325 MG ORAL TABLET 325 MG: 325 PILL ORAL at 01:12

## 2018-12-23 RX ADMIN — DICYCLOMINE HYDROCHLORIDE 20 MG: 20 TABLET ORAL at 02:12

## 2018-12-23 RX ADMIN — ACETAMINOPHEN 650 MG: 325 TABLET ORAL at 01:12

## 2018-12-23 RX ADMIN — PANTOPRAZOLE SODIUM 40 MG: 40 TABLET, DELAYED RELEASE ORAL at 09:12

## 2018-12-23 RX ADMIN — LEVOTHYROXINE SODIUM 75 MCG: 75 TABLET ORAL at 06:12

## 2018-12-23 RX ADMIN — MAGNESIUM SULFATE IN WATER 2 G: 40 INJECTION, SOLUTION INTRAVENOUS at 10:12

## 2018-12-23 RX ADMIN — ASPIRIN 325 MG ORAL TABLET 325 MG: 325 PILL ORAL at 09:12

## 2018-12-23 RX ADMIN — LOSARTAN POTASSIUM AND HYDROCHLOROTHIAZIDE 1 TABLET: 25; 100 TABLET ORAL at 09:12

## 2018-12-23 RX ADMIN — POTASSIUM & SODIUM PHOSPHATES POWDER PACK 280-160-250 MG 1 PACKET: 280-160-250 PACK at 09:12

## 2018-12-23 RX ADMIN — POTASSIUM & SODIUM PHOSPHATES POWDER PACK 280-160-250 MG 1 PACKET: 280-160-250 PACK at 12:12

## 2018-12-23 RX ADMIN — POTASSIUM & SODIUM PHOSPHATES POWDER PACK 280-160-250 MG 1 PACKET: 280-160-250 PACK at 06:12

## 2018-12-23 RX ADMIN — INSULIN ASPART 6 UNITS: 100 INJECTION, SOLUTION INTRAVENOUS; SUBCUTANEOUS at 06:12

## 2018-12-23 RX ADMIN — INSULIN ASPART 6 UNITS: 100 INJECTION, SOLUTION INTRAVENOUS; SUBCUTANEOUS at 10:12

## 2018-12-23 NOTE — ASSESSMENT & PLAN NOTE
- Troponin 0.621  -  (last BNP 9/18 was 28)  - Last recorded Echo in 9/18 showed normal EF with mild aortic stenosis, no wall abnormalities and concentric remodeling   - Pt denies CP, endorses orthopnea, SOB, nausea - will continue to trend troponin   - Repeat Echo

## 2018-12-23 NOTE — ED NOTES
Pt resting comfortably; pt confirms no current complaints; pt offered bathroom assistance; inquiry made regarding the need for PO hydration; pt instructed to call w/ any needs; pt agreed

## 2018-12-23 NOTE — H&P
"[facility] Medicine  History & Physical    Patient Name: Melinda Knight  MRN: [id number]  Admission Date: 12/22/2018  Attending Physician: Debbie Abad MD   Primary Care Provider: Duke Cole MD    [facility] Medicine Team: [facility] 1 Joel Welch MD     Patient information was obtained from patient, relative(s), past medical records and ER records.     Subjective:     Principal Problem:SOB (shortness of breath) on exertion    Chief Complaint:   Chief Complaint   Patient presents with    Fatigue     Presents to ED via EMS c/o general malaise, decreased appetite for last few days.        HPI: Melinda Knight is a 73 year old female with a medical history significant for hypothyroidism, CAD, HTN, HLD and DM2 who presents to ED for evaluation of 1 week of generalized malaise and loss of appetite. Pt states that since 12/17 she has noted increasing malaise and "feeling hot". Pt states that she did no take her temperature at the time. Pt denies chills. Pt also complains of decreased appetite. Pt state that she still feels hungry and wants to eat but does not eat due to associated postprandial nausea and discomfort. Pt also endorses SOB with exertion. Pt states that she has noted increasing SOB over the past 2 years. Pt states that she is unable to walk up stairs or walk more then 5 feet without feeling SOB. Pt states that she uses a walker at home. Pt states denies home O2 but daughter states that cardiologists at an outside hospital had discussed it (SpO2 on admit mid 80s, 95% on 3L O2 via NC in ED). Pt states that she sleeps with her head elevated due to feeling SOB when lying flat.     Pt endorses SOB, orthopnea, fever, nausea/vomiting and decreased appetitie. Pt denies CP, chills, ABD pain/distension, swelling in legs, dysuria or changes in bowel or bladder habits.     In ED, Pt was noted to be febrile to 100.9 which resolved with tylenol. During initial work up, BNP was " elevated to 970, troponin was 0.621. EKG did not show ST changes. Last echo was 9/18, EF was normal and pt was noted to have mild aortic stenosis. UA in ED showed 3+ leukocytes and many bacteria, she received 1 dose of Ceftriaxone. A CXR showed subsegmental atelectasis in the right lung base but no signs of effusion or pneumonia.    Past Medical History:   Diagnosis Date    Carotid artery occlusion     Coronary artery disease     Hypertension     Skin cancer     cyst on the face , was removed 20years +    Thyroid disease     Type 2 diabetes mellitus        Past Surgical History:   Procedure Laterality Date    CAROTID ENDARTERECTOMY Left 03/24/2016    CHOLECYSTECTOMY      EGD (ESOPHAGOGASTRODUODENOSCOPY) Left 9/11/2018    Performed by Stevenson Mckee [facility] at [facility]    ENDARTERECTOMY-CAROTID Left 3/24/2016    Performed by Sofia Way MD at [facility] OR [address] [place] [address]    GALLBLADDER SURGERY      1996    SKIN BIOPSY      TONSILLECTOMY      TUBAL LIGATION Bilateral 3/18/2016       Review of patient's allergies indicates:   Allergen Reactions    Sulfa (sulfonamide antibiotics) Nausea And Vomiting    Menthol contain prod        No current facility-administered medications on file prior to encounter.      Current Outpatient Medications on File Prior to Encounter   Medication Sig    amLODIPine (NORVASC) 10 MG tablet Take 1 tablet (10 mg total) by mouth once daily.    aspirin 325 MG tablet Take 325 mg by mouth every evening.     atorvastatin (LIPITOR) 40 MG tablet TAKE ONE TABLET BY MOUTH ONCE DAILY    citalopram (CELEXA) 20 MG tablet Take 1 tablet (20 mg total) by mouth once daily.    dicyclomine (BENTYL) 20 mg tablet Take 1 tablet (20 mg total) by mouth daily as needed (GI cramps).    diphenoxylate-atropine 2.5-0.025 mg (LOMOTIL) 2.5-0.025 mg per tablet Take 1 tablet by mouth every 6 (six) hours as needed.    dulaglutide (TRULICITY) 1.5 mg/0.5 mL PnIj Inject 1.5 mg into the skin every 7 days.     "HYDROcodone-acetaminophen (NORCO) 7.5-325 mg per tablet Take 1 tablet by mouth every 8 (eight) hours as needed for Pain.    ibandronate (BONIVA) 150 mg tablet Take 1 tablet (150 mg total) by mouth every 30 days.    insulin aspart (NOVOLOG) 100 unit/mL injection Inject 8 Units into the skin 3 (three) times daily before meals. (Patient taking differently: Inject 8 Units into the skin 2 (two) times daily before meals. )    LEVEMIR FLEXTOUCH U-100 INSULN 100 unit/mL (3 mL) InPn pen INJECT 30 UNITS SUBCUTANEOUSLY ONCE DAILY AT BEDTIME    levothyroxine (SYNTHROID) 75 MCG tablet TAKE ONE TABLET BY MOUTH ONCE DAILY    losartan-hydrochlorothiazide 100-25 mg (HYZAAR) 100-25 mg per tablet Take 1 tablet by mouth once daily.    ondansetron (ZOFRAN-ODT) 4 MG TbDL DISSOLVE ONE TABLET UNDER THE TONGUE EVERY 6 HOURS AS NEEDED FOR NAUSEA    blood sugar diagnostic (BLOOD GLUCOSE TEST) Strp 1 strip by Misc.(Non-Drug; Combo Route) route 2 (two) times daily. Pro[employer]    BLOOD SUGAR DIAGNOSTIC (PRODIGY AUTOCODE TEST STRIPS MISC) 1 strip by Misc.(Non-Drug; Combo Route) route 2 (two) times daily.    MULTIVITAMIN ORAL Take by mouth. [place] Diabetes Pack    NOVOLOG FLEXPEN U-100 INSULIN 100 unit/mL InPn pen INJECT 8 UNITS SUBCUTANEOUSLY TWICE DAILY WITH MEALS    pantoprazole (PROTONIX) 40 MG tablet Take 1 tablet (40 mg total) by mouth once daily.    pen needle, diabetic (NOVOTWIST) 32 gauge x 1/5" Ndle Use with insulin pen as directed    tiZANidine (ZANAFLEX) 4 MG tablet Take 1 tablet (4 mg total) by mouth every 8 (eight) hours as needed.     Family History     None        Tobacco Use    Smoking status: Never Smoker    Smokeless tobacco: Never Used   Substance and Sexual Activity    Alcohol use: Yes     Alcohol/week: 0.0 oz     Frequency: Monthly or less     Comment: occasions    Drug use: No    Sexual activity: Not on file     Review of Systems   Constitutional: Positive for activity change, appetite change and " fatigue. Negative for chills and fever.   HENT: Negative for rhinorrhea, sinus pressure and sore throat.    Eyes: Negative for photophobia and visual disturbance.   Respiratory: Positive for cough and shortness of breath.    Cardiovascular: Negative for chest pain, palpitations and leg swelling.   Gastrointestinal: Positive for nausea and vomiting. Negative for abdominal distention, abdominal pain, constipation and diarrhea.   Endocrine: Negative for polydipsia and polyuria.   Genitourinary: Negative for dysuria, frequency and urgency.   Musculoskeletal: Positive for back pain. Negative for neck pain and neck stiffness.   Neurological: Positive for dizziness. Negative for headaches.   Psychiatric/Behavioral: Negative for agitation and confusion.     Objective:     Vital Signs (Most Recent):  Temp: 98.9 °F (37.2 °C) (12/22/18 2114)  Pulse: 90 (12/22/18 2245)  Resp: 17 (12/22/18 2245)  BP: (!) 131/96 (12/22/18 2230)  SpO2: (!) 94 % (12/22/18 2245) Vital Signs (24h Range):  Temp:  [98.9 °F (37.2 °C)-100.9 °F (38.3 °C)] 98.9 °F (37.2 °C)  Pulse:  [84-92] 90  Resp:  [14-20] 17  SpO2:  [91 %-96 %] 94 %  BP: (131-154)/(60-96) 131/96        There is no height or weight on file to calculate BMI.    Physical Exam   Constitutional: She is oriented to person, place, and time. She appears well-developed and well-nourished.   HENT:   Head: Normocephalic and atraumatic.   Eyes: EOM are normal. Pupils are equal, round, and reactive to light.   Neck: Normal range of motion. Neck supple.   Cardiovascular: Normal rate and regular rhythm.   Pulmonary/Chest: No respiratory distress.   Decreased breath sounds bilaterally with decreased effort   Abdominal: Soft. Bowel sounds are normal. There is no tenderness.   Musculoskeletal: Normal range of motion.   Neurological: She is alert and oriented to person, place, and time.   Skin: Skin is warm and dry.   Nursing note and vitals reviewed.        CRANIAL NERVES     CN III, IV, VI   Pupils are  equal, round, and reactive to light.  Extraocular motions are normal.        Significant Labs:   ABGs: No results for input(s): PH, PCO2, HCO3, POCSATURATED, BE, TOTALHB, COHB, METHB, O2HB, POCFIO2 in the last 48 hours.  CBC:   Recent Labs   Lab 12/22/18 2051   WBC 10.70   HGB 12.5   HCT 40.1        CMP:   Recent Labs   Lab 12/22/18 2051 12/22/18 2220   *  --    K 5.6* 4.4     --    CO2 24  --    *  --    BUN 48*  --    CREATININE 2.3*  --    CALCIUM 8.8  --    PROT 7.4  --    ALBUMIN 3.3*  --    BILITOT 0.5  --    ALKPHOS 97  --    AST 47*  --    ALT 54*  --    ANIONGAP 11  --    EGFRNONAA 20.8*  --      Cardiac Markers:   Recent Labs   Lab 12/22/18 2051   *     Lactic Acid:   Recent Labs   Lab 12/22/18 2051   LACTATE 1.3     Troponin:   Recent Labs   Lab 12/22/18 2051   TROPONINI 0.621*     TSH:   Recent Labs   Lab 12/22/18 2051   TSH 0.378*     Urine Culture: No results for input(s): LABURIN in the last 48 hours.  Urine Studies:   Recent Labs   Lab 12/22/18 2122   COLORU Yellow   APPEARANCEUA Cloudy*   PHUR 5.0   SPECGRAV 1.020   PROTEINUA 1+*   GLUCUA Negative   KETONESU Negative   BILIRUBINUA Negative   OCCULTUA Negative   NITRITE Negative   LEUKOCYTESUR 3+*   RBCUA 2   WBCUA >100*   BACTERIA Many*   SQUAMEPITHEL 58   HYALINECASTS 10*     All pertinent labs within the past 24 hours have been reviewed.    Significant Imaging: I have reviewed all pertinent imaging results/findings within the past 24 hours.     CXR 12/22/18 2205  Subsegmental atelectasis right base.    Assessment/Plan:     * SOB (shortness of breath) on exertion    - Pt endorses SOB on exertion for ~2 years  - Pt SpO2 in mid 80s on admit, 95% on 3l O2 via NC, drops to 89% while speaking  - Previous CT chest 9/18 negative for PE  - Based on history and previous echo, consider pulmonary HTN vs heart failure   - Will repeat 2D echo with bubble study for further assessment   - Consult pulmonology as needed       Cardiac enzymes elevated    - Troponin 0.621  -  (last BNP 9/18 was 28)  - Last recorded Echo in 9/18 showed normal EF with mild aortic stenosis, no wall abnormalities and concentric remodeling   - Pt denies CP, endorses orthopnea, SOB, nausea - will continue to trend troponin   - Repeat Echo        Depression    - Continue home Celexa       GERMÁN (acute kidney injury)    - BUN/Cr 48/2.3  - Baseline Cr 1.2-1.4  - Received 1L NS in ED  - UTI on UA, 3+ leukocytes, no urinary symptoms, received 1 dose ceftriaxone in ED, no suprapubic or flank tenderness  - Likely related to dehydration due to decreased PO intake, no signs of urinary obstruction/pyelonephritis. Will continue to monitor        Hypothyroidism (acquired)    - Continue home levothyroxine        Type 2 diabetes mellitus with diabetic chronic kidney disease    -  on admit, pt states that this is elevated due to increased soft drink consumption during last week   - Home insulin regimen is 30 Levemir qhs, 8 units Novolog TID with meals, Trulicity weekly  - Will start 19 Levemir, 6 Novolog TID with meals while inpatient      GERD (gastroesophageal reflux disease)    - Continue home PPI       UTI (urinary tract infection)    - Pt denies urinary symptoms   - UA done on routine work up of fever shows bacturia and 3+ leukocytes  - Received 1 dose ceftriaxone in ED  - Continue Ceftriaxone while inpatient, 1g q24       Hyperlipemia    - Continue home statin       Essential hypertension    - Continue home Losartan/HCTZ and Norvasc       VTE Risk Mitigation (From admission, onward)        Ordered     heparin (porcine) injection 5,000 Units  Every 8 hours      12/23/18 0018     IP VTE HIGH RISK PATIENT  Once      12/23/18 0018             Joel Welch MD  Department of Hospital Medicine   [facility]

## 2018-12-23 NOTE — HPI
"Melinda Knight is a 73 year old female with a medical history significant for hypothyroidism, CAD, HTN, HLD and DM2 who presents to ED for evaluation of 1 week of generalized malaise and loss of appetite. Pt states that since 12/17 she has noted increasing malaise and "feeling hot". Pt states that she did no take her temperature at the time. Pt denies chills. Pt also complains of decreased appetite. Pt state that she still feels hungry and wants to eat but does not eat due to associated postprandial nausea and discomfort. Pt also endorses SOB with exertion. Pt states that she has noted increasing SOB over the past 2 years. Pt states that she is unable to walk up stairs or walk more then 5 feet without feeling SOB. Pt states that she uses a walker at home. Pt states denies home O2 but daughter states that cardiologists at an outside hospital had discussed it (SpO2 on admit mid 80s, 95% on 3L O2 via NC in ED). Pt states that she sleeps with her head elevated due to feeling SOB when lying flat.     Pt endorses SOB, orthopnea, fever, nausea/vomiting and decreased appetitie. Pt denies CP, chills, ABD pain/distension, swelling in legs, dysuria or changes in bowel or bladder habits.     In ED, Pt was noted to be febrile to 100.9 which resolved with tylenol. During initial work up, BNP was elevated to 970, troponin was 0.621. EKG did not show ST changes. Last echo was 9/18, EF was normal and pt was noted to have mild aortic stenosis. UA in ED showed 3+ leukocytes and many bacteria, she received 1 dose of Ceftriaxone. A CXR showed subsegmental atelectasis in the right lung base but no signs of effusion or pneumonia.  "

## 2018-12-23 NOTE — PLAN OF CARE
Problem: Adult Inpatient Plan of Care  Goal: Plan of Care Review  Outcome: Ongoing (interventions implemented as appropriate)  POC reviewed with patient; patient verbalizes understanding. Reinforcement needed. Abdominal pain maintained with medication. Patient expresses relief. Safety precautions in place; call light within reach, bed in low position, wheels locked, side rails up x2. Will continue to monitor.

## 2018-12-23 NOTE — PHARMACY MED REC
"Admission Medication Reconciliation - Pharmacy Consult Note    The home medication history was taken by NAME, TITLE.  Based on information gathered and subsequent review by the clinical pharmacist, the items below may need attention.    You may go to "Admission" then "Reconcile Home Medications" tabs to review and/or act upon these items.        No issues noted with the medication reconciliation.        Please address this information as you see fit.  Feel free to contact us if you have any questions or require assistance.    David Marie  [phone]                .    .          "

## 2018-12-23 NOTE — ASSESSMENT & PLAN NOTE
- Pt denies urinary symptoms   - UA done on routine work up of fever shows bacturia and 3+ leukocytes  - Received 1 dose ceftriaxone in ED

## 2018-12-23 NOTE — ASSESSMENT & PLAN NOTE
-  on admit, pt states that this is elevated due to increased soft drink consumption during last week   - Home insulin regimen is 30 Levemir qhs, 8 units Novolog TID with meals, Trulicity weekly  - Will start 19 Levemir, 6 Novolog TID with meals while inpatient

## 2018-12-23 NOTE — ASSESSMENT & PLAN NOTE
- Pt endorses SOB on exertion for ~2 years  - Pt SpO2 in mid 80s on admit, 95% on 3l O2 via NC, drops to 89% while speaking  - Previous CT chest 9/18 negative for PE  - Based on history and previous echo, consider pulmonary HTN vs heart failure   - Will repeat 2D echo with bubble study for further assessment   - Consult pulmonology as needed

## 2018-12-23 NOTE — ED PROVIDER NOTES
Encounter Date: 12/22/2018    SCRIBE #1 NOTE: I, Son Deanne, am scribing for, and in the presence of,  Dr. Grant . I have scribed the following portions of the note - the APC attestation.       History     Chief Complaint   Patient presents with    Fatigue     Presents to ED via EMS c/o general malaise, decreased appetite for last few days.     Patient is a 73-year-old female with history of thyroid disease, CAD, hypertension is presenting to the ER for evaluation of fatigue.  Patient states that since the 17th of December she has been feeling fatigue but worsening today.  She has had a decrease in appetite for the last few weeks as well. Patient states that she did feel slightly nauseated today, no vomiting.  Denies any abdominal pain, vomiting or diarrhea.  Last bowel movement was earlier today, unremarkable.  No blood in stool or black tarry stool.  Patient states that she feels very weak in general.  She was told that she has a fever in the ambulance.  She did not record her temperature at home.  Denies any chills.  Patient denies any cough or congestion at this time.Patient denies chest pain palpitations.    She states she does feel short of breath on ambulation which is not new for her.  This has been ongoing for the last 2 years.  Daughter states that there was talks about placing patient on supplemental O2 to but it has never been done.  No prior cardiac stent placement.  Daughter states that when she checked patient's oxygen rate was in the 50s.        The history is provided by the patient and a relative.     Review of patient's allergies indicates:   Allergen Reactions    Sulfa (sulfonamide antibiotics) Nausea And Vomiting    Menthol contain prod      Past Medical History:   Diagnosis Date    Carotid artery occlusion     Coronary artery disease     Hypertension     Skin cancer     cyst on the face , was removed 20years +    Thyroid disease     Type 2 diabetes mellitus      Past Surgical History:    Procedure Laterality Date    CAROTID ENDARTERECTOMY Left 03/24/2016    CHOLECYSTECTOMY      EGD (ESOPHAGOGASTRODUODENOSCOPY) Left 9/11/2018    Performed by Stevenson [facility] at [facility]    ENDARTERECTOMY-CAROTID Left 3/24/2016    Performed by Sofia Way MD at [facility] OR [address] [place] [address]    GALLBLADDER SURGERY      1996    SKIN BIOPSY      TONSILLECTOMY      TUBAL LIGATION Bilateral 3/18/2016     No family history on file.  Social History     Tobacco Use    Smoking status: Never Smoker    Smokeless tobacco: Never Used   Substance Use Topics    Alcohol use: Yes     Alcohol/week: 0.0 oz     Frequency: Monthly or less     Comment: occasions    Drug use: No     Review of Systems   Constitutional: Positive for appetite change, fatigue and fever. Negative for chills.   HENT: Negative for congestion, postnasal drip and rhinorrhea.    Eyes: Negative for visual disturbance.   Respiratory: Negative for cough, chest tightness and shortness of breath.    Cardiovascular: Negative for chest pain, palpitations and leg swelling.   Gastrointestinal: Positive for nausea. Negative for abdominal pain, diarrhea and vomiting.   Genitourinary: Negative for dysuria, flank pain and hematuria.   Musculoskeletal: Negative for myalgias.   Skin: Negative for rash.   Allergic/Immunologic: Negative for immunocompromised state.   Neurological: Positive for weakness.   Hematological: Does not bruise/bleed easily.   Psychiatric/Behavioral: Negative for confusion.       Physical Exam     Initial Vitals [12/22/18 1944]   BP Pulse Resp Temp SpO2   (!) 140/60 90 20 (!) 100.9 °F (38.3 °C) 95 %      MAP       --         Physical Exam    Vitals reviewed.  Constitutional: She appears well-developed and well-nourished. She is not diaphoretic. No distress.   HENT:   Head: Normocephalic and atraumatic.   Mouth/Throat: Oropharynx is clear and moist. Mucous membranes are dry.   Eyes: Conjunctivae and EOM are normal. Pupils are equal, round, and  reactive to light.   Neck: Neck supple.   Cardiovascular: Normal rate, regular rhythm, normal heart sounds and intact distal pulses.   Pulmonary/Chest: Effort normal. She has decreased breath sounds. She has no wheezes. She has no rhonchi. She has no rales.   Abdominal: Soft. Normal appearance. She exhibits no distension. There is no tenderness.   Large obese abdomen   Neurological: She is alert and oriented to person, place, and time.   Skin: Skin is warm and dry.         ED Course   Procedures  Labs Reviewed   CBC W/ AUTO DIFFERENTIAL - Abnormal; Notable for the following components:       Result Value    RBC 3.95 (*)      (*)     MCH 31.6 (*)     MCHC 31.2 (*)     Gran # (ANC) 7.9 (*)     Immature Grans (Abs) 0.05 (*)     Mono # 1.1 (*)     Gran% 73.8 (*)     Lymph% 14.7 (*)     All other components within normal limits   COMPREHENSIVE METABOLIC PANEL - Abnormal; Notable for the following components:    Sodium 135 (*)     Potassium 5.6 (*)     Glucose 251 (*)     BUN, Bld 48 (*)     Creatinine 2.3 (*)     Albumin 3.3 (*)     AST 47 (*)     ALT 54 (*)     eGFR if  23.9 (*)     eGFR if non  20.8 (*)     All other components within normal limits   TROPONIN I - Abnormal; Notable for the following components:    Troponin I 0.621 (*)     All other components within normal limits   TSH - Abnormal; Notable for the following components:    TSH 0.378 (*)     All other components within normal limits   D DIMER, QUANTITATIVE - Abnormal; Notable for the following components:    D-Dimer 0.63 (*)     All other components within normal limits   URINALYSIS, REFLEX TO URINE CULTURE - Abnormal; Notable for the following components:    Appearance, UA Cloudy (*)     Protein, UA 1+ (*)     Leukocytes, UA 3+ (*)     All other components within normal limits    Narrative:     Preferred Collection Type->Urine, Catheterized   URINALYSIS MICROSCOPIC - Abnormal; Notable for the following components:     WBC, UA >100 (*)     Bacteria, UA Many (*)     Non-Squam Epith 1 (*)     Hyaline Casts, UA 10 (*)     All other components within normal limits    Narrative:     Preferred Collection Type->Urine, Catheterized   B-TYPE NATRIURETIC PEPTIDE - Abnormal; Notable for the following components:     (*)     All other components within normal limits    Narrative:     add on test bnp per dr manju martin order #[id number]  12/22/2018    22:51    CULTURE, URINE   MAGNESIUM   LACTIC ACID, PLASMA   POTASSIUM   T4, FREE   B-TYPE NATRIURETIC PEPTIDE   POCT INFLUENZA A/B MOLECULAR     EKG Readings: (Independently Interpreted)   21:29   Rhythm: Normal Sinus rhythm, Heart Rate: 90 bpm, 1st degree AV block, , Left ventricular block, similar morphology to her previous EKG in Sept 9, 2018       Imaging Results          X-Ray Chest PA And Lateral (Final result)  Result time 12/22/18 22:43:41    Final result by Adeel Higgins MD (12/22/18 22:43:41)                 Impression:      Subsegmental atelectasis right base.      Electronically signed by: Adeel Higgins MD  Date:    12/22/2018  Time:    22:43             Narrative:    EXAMINATION:  XR CHEST PA AND LATERAL    CLINICAL HISTORY:  Other fatigue    TECHNIQUE:  PA and lateral views of the chest were performed.    COMPARISON:  None.    FINDINGS:  Subsegmental atelectasis right base.    There is no consolidation, effusion, or pneumothorax.    Cardiomediastinal silhouette is unremarkable.    Regional osseous structures are unchanged.                                       APC / Resident Notes:   Patient seen in the ER promptly upon arrival.  She is febrile 100.9, no acute distress.  Physical examination was fairly unremarkable.  Abdomen soft, nondistended.  Lung auscultation reveals diminished breath sounds bilaterally. Patient was placed on supplemental 2 L O2 in ED with O2 saturations in the mid to upper 90s.  She is able to speak in complete sentences.  Patient was  given Tylenol for fever which did improve in the ED.    IV access established, labs ordered fluids given.  Laboratory studies show WBC of 10.7.  Hemoglobin is stable. Chemistries reveal GERMÁN with creatinine of 2.3 and BUN of 48.  Mild transaminitis also noted. Given patient's hypoxia dimer was ordered and found to be positive. Mild elevation of 0.63.  Lactic acid 1.3.  Elevated troponin of 0.621.  Patient has no active chest pain at this time.  EKG shows normal sinus rhythm at a rate of 90 beats per minute.    X-ray of chest reveals subsegmental atelectasis this of the right lung base.  Discuss with [facility] Dr. Bellamy regarding possible V/Q scan and given patient Lovenox.  [place] that this is likely a chronic finding, recommended to hold off on imaging at this time in the ED.  Upon reassessment patient is resting comfortably.  Does not require BiPAP at this time.  When patient is taken off of the supplemental O2 her O2 saturation decreases to 88% on room air.  She will be admitted to Hospital Medicine stable condition. The care of this patient was overseen by attending physician who agrees with treatment, plan, and disposition.         Scribe Attestation:   Scribe #1: I performed the above scribed service and the documentation accurately describes the services I performed. I attest to the accuracy of the note.    Attending Attestation:     Physician Attestation Statement for NP/PA:   I have conducted a face to face encounter with this patient in addition to the NP/PA, due to Medical Complexity    Other NP/PA Attestation Additions:    History of Present Illness: Patient with few days history of general weakness, occasional cough, congestion and diarrhea. No recent antibiotics. No long travel. SOB on exertion.                      Clinical Impression:   The primary encounter diagnosis was GERMÁN (acute kidney injury). Diagnoses of Fatigue, Hypoxia, Elevated troponin, and Urinary tract infection without  hematuria, site unspecified were also pertinent to this visit.      Disposition:   Disposition: Admitted  Condition: Sarah Veloz PA-C  12/22/18 0179

## 2018-12-23 NOTE — PROGRESS NOTES
ABG results were reported to Dr. Moreno at 656 on 12/23/18.    Results for12/23/2018 06:50   Ref. Range 12/23/2018 06:21   POC PH Latest Ref Range: 7.35 - 7.45  7.424   POC PCO2 Latest Ref Range: 35 - 45 mmHg 37.8   POC PO2 Latest Ref Range: 80 - 100 mmHg 88   POC BE Latest Ref Range: -2 to 2 mmol/L 0   POC HCO3 Latest Ref Range: 24 - 28 mmol/L 24.8   POC SATURATED O2 Latest Ref Range: 95 - 100 % 97   POC TCO2 Latest Ref Range: 23 - 27 mmol/L 26   Sample Unknown ARTERIAL   Allens Test Unknown Pass   Site Unknown RR

## 2018-12-23 NOTE — ED NOTES
Patient identifiers verified and correct for Melinda Knight.    LOC: The patient is awake, alert and aware of environment with an appropriate affect, the patient is oriented x 3 and speaking appropriately.  APPEARANCE: Patient appears fatigued and obese, patient is clean and well groomed, adult brief in place for urgency incontinence  SKIN: The skin is warm and dry, pallor noted, patient has normal skin turgor and dry mucus membranes, skin intact, no breakdown or bruising noted.  MUSCULOSKELETAL: Patient moving all extremities spontaneously, no obvious swelling or deformities noted, generalized weakness  RESPIRATORY: Airway is open and patent, respirations are spontaneous, patient has a normal effort and rate, no accessory muscle use noted, bilateral breath sounds clear  CARDIAC: Patient has a normal rate, no peripheral edema noted, capillary refill < 3 seconds.  ABDOMEN: Soft and non tender to palpation, no distention noted, active bowel sounds present in all four quadrants.  NEUROLOGIC: PERRL, 3mm bilaterally, eyes open spontaneously, behavior appropriate to situation, follows commands, facial expression symmetrical, bilateral hand grasp equal and even, purposeful motor response noted, normal sensation in all extremities when touched with a finger.

## 2018-12-23 NOTE — PLAN OF CARE
Problem: Adult Inpatient Plan of Care  Goal: Plan of Care Review  Outcome: Ongoing (interventions implemented as appropriate)  Pt alert and oriented, able to make her needs known. Calm and cooperative to care. Admitted from ED. Oriented to the room and unit. NPO was ordered and maintained. All due medication given. Assisted to go to bathroom x 1 assist. Voided well. Kept comfortable. All needs attended. Call light within easy reach. Fall precaution maintained.

## 2018-12-23 NOTE — ASSESSMENT & PLAN NOTE
- BUN/Cr 48/2.3  - Baseline Cr 1.2-1.4  - Received 1L NS in ED  - UTI on UA, 3+ leukocytes, no urinary symptoms, received 1 dose ceftriaxone in ED, no suprapubic or flank tenderness  - Likely related to dehydration due to decreased PO intake, no signs of urinary obstruction/pyelonephritis. Will continue to monitor

## 2018-12-23 NOTE — ASSESSMENT & PLAN NOTE
- Pt denies urinary symptoms   - UA done on routine work up of fever shows bacturia and 3+ leukocytes  - Continue Ceftriaxone while inpatient, 1g q24  - UCx (12/24) -

## 2018-12-23 NOTE — ED NOTES
Patient reports decreased appetite and vomiting since December 17th.  Patient's dtr reports starting new medication ibandronate on 15th.  Patient reports loose stool on the 17th, Patient reports feeling feverish. Denies abd pain currently. Reports feeling fatigued.

## 2018-12-23 NOTE — SUBJECTIVE & OBJECTIVE
Past Medical History:   Diagnosis Date    Carotid artery occlusion     Coronary artery disease     Hypertension     Skin cancer     cyst on the face , was removed 20years +    Thyroid disease     Type 2 diabetes mellitus        Past Surgical History:   Procedure Laterality Date    CAROTID ENDARTERECTOMY Left 03/24/2016    CHOLECYSTECTOMY      EGD (ESOPHAGOGASTRODUODENOSCOPY) Left 9/11/2018    Performed by Stevenson [facility] at [facility] ENDO    ENDARTERECTOMY-CAROTID Left 3/24/2016    Per[facility] by Sofia Way [facility] at [facility] [address] [place] [address]    GALLBLADDER SURGERY      1996    SKIN BIOPSY      TONSILLECTOMY      TUBAL LIGATION Bilateral 3/18/2016       Review of patient's allergies indicates:   Allergen Reactions    Sulfa (sulfonamide antibiotics) Nausea And Vomiting    Menthol contain prod        No current facility-administered medications on file prior to encounter.      Current Outpatient Medications on File Prior to Encounter   Medication Sig    amLODIPine (NORVASC) 10 MG tablet Take 1 tablet (10 mg total) by mouth once daily.    aspirin 325 MG tablet Take 325 mg by mouth every evening.     atorvastatin (LIPITOR) 40 MG tablet TAKE ONE TABLET BY MOUTH ONCE DAILY    citalopram (CELEXA) 20 MG tablet Take 1 tablet (20 mg total) by mouth once daily.    dicyclomine (BENTYL) 20 mg tablet Take 1 tablet (20 mg total) by mouth daily as needed (GI cramps).    diphenoxylate-atropine 2.5-0.025 mg (LOMOTIL) 2.5-0.025 mg per tablet Take 1 tablet by mouth every 6 (six) hours as needed.    dulaglutide (TRULICITY) 1.5 mg/0.5 mL PnIj Inject 1.5 mg into the skin every 7 days.    HYDROcodone-acetaminophen (NORCO) 7.5-325 mg per tablet Take 1 tablet by mouth every 8 (eight) hours as needed for Pain.    ibandronate (BONIVA) 150 mg tablet Take 1 tablet (150 mg total) by mouth every 30 days.    insulin aspart (NOVOLOG) 100 unit/mL injection Inject 8 Units into the skin 3 (three) times daily before meals. (Patient taking  "differently: Inject 8 Units into the skin 2 (two) times daily before meals. )    LEVEMIR FLEXTOUCH U-100 INSULN 100 unit/mL (3 mL) InPn pen INJECT 30 UNITS SUBCUTANEOUSLY ONCE DAILY AT BEDTIME    levothyroxine (SYNTHROID) 75 MCG tablet TAKE ONE TABLET BY MOUTH ONCE DAILY    losartan-hydrochlorothiazide 100-25 mg (HYZAAR) 100-25 mg per tablet Take 1 tablet by mouth once daily.    ondansetron (ZOFRAN-ODT) 4 MG TbDL DISSOLVE ONE TABLET UNDER THE TONGUE EVERY 6 HOURS AS NEEDED FOR NAUSEA    blood sugar diagnostic (BLOOD GLUCOSE TEST) Strp 1 strip by Misc.(Non-Drug; Combo Route) route 2 (two) times daily. Prodig[employer]    BLOOD SUGAR DIAGNOSTIC (PRODIGY AUTOCODE TEST STRIPS MISC) 1 strip by Misc.(Non-Drug; Combo Route) route 2 (two) times daily.    MULTIVITAMIN ORAL Take by mouth. [place] Diabetes Pack    NOVOLOG FLEXPEN U-100 INSULIN 100 unit/mL InPn pen INJECT 8 UNITS SUBCUTANEOUSLY TWICE DAILY WITH MEALS    pantoprazole (PROTONIX) 40 MG tablet Take 1 tablet (40 mg total) by mouth once daily.    pen needle, diabetic (NOVOTWIST) 32 gauge x 1/5" Ndle Use with insulin pen as directed    tiZANidine (ZANAFLEX) 4 MG tablet Take 1 tablet (4 mg total) by mouth every 8 (eight) hours as needed.     Family History     None        Tobacco Use    Smoking status: Never Smoker    Smokeless tobacco: Never Used   Substance and Sexual Activity    Alcohol use: Yes     Alcohol/week: 0.0 oz     Frequency: Monthly or less     Comment: occasions    Drug use: No    Sexual activity: Not on file     Review of Systems   Constitutional: Positive for activity change, appetite change and fatigue. Negative for chills and fever.   HENT: Negative for rhinorrhea, sinus pressure and sore throat.    Eyes: Negative for photophobia and visual disturbance.   Respiratory: Positive for cough and shortness of breath.    Cardiovascular: Negative for chest pain, palpitations and leg swelling.   Gastrointestinal: Positive for nausea and vomiting. " Negative for abdominal distention, abdominal pain, constipation and diarrhea.   Endocrine: Negative for polydipsia and polyuria.   Genitourinary: Negative for dysuria, frequency and urgency.   Musculoskeletal: Positive for back pain. Negative for neck pain and neck stiffness.   Neurological: Positive for dizziness. Negative for headaches.   Psychiatric/Behavioral: Negative for agitation and confusion.     Objective:     Vital Signs (Most Recent):  Temp: 98.9 °F (37.2 °C) (12/22/18 2114)  Pulse: 90 (12/22/18 2245)  Resp: 17 (12/22/18 2245)  BP: (!) 131/96 (12/22/18 2230)  SpO2: (!) 94 % (12/22/18 2245) Vital Signs (24h Range):  Temp:  [98.9 °F (37.2 °C)-100.9 °F (38.3 °C)] 98.9 °F (37.2 °C)  Pulse:  [84-92] 90  Resp:  [14-20] 17  SpO2:  [91 %-96 %] 94 %  BP: (131-154)/(60-96) 131/96        There is no height or weight on file to calculate BMI.    Physical Exam   Constitutional: She is oriented to person, place, and time. She appears well-developed and well-nourished.   HENT:   Head: Normocephalic and atraumatic.   Eyes: EOM are normal. Pupils are equal, round, and reactive to light.   Neck: Normal range of motion. Neck supple.   Cardiovascular: Normal rate and regular rhythm.   Pulmonary/Chest: No respiratory distress.   Decreased breath sounds bilaterally with decreased effort   Abdominal: Soft. Bowel sounds are normal. There is no tenderness.   Musculoskeletal: Normal range of motion.   Neurological: She is alert and oriented to person, place, and time.   Skin: Skin is warm and dry.   Nursing note and vitals reviewed.        CRANIAL NERVES     CN III, IV, VI   Pupils are equal, round, and reactive to light.  Extraocular motions are normal.        Significant Labs:   ABGs: No results for input(s): PH, PCO2, HCO3, POCSATURATED, BE, TOTALHB, COHB, METHB, O2HB, POCFIO2 in the last 48 hours.  CBC:   Recent Labs   Lab 12/22/18 2051   WBC 10.70   HGB 12.5   HCT 40.1        CMP:   Recent Labs   Lab 12/22/18  6077  12/22/18 2220   *  --    K 5.6* 4.4     --    CO2 24  --    *  --    BUN 48*  --    CREATININE 2.3*  --    CALCIUM 8.8  --    PROT 7.4  --    ALBUMIN 3.3*  --    BILITOT 0.5  --    ALKPHOS 97  --    AST 47*  --    ALT 54*  --    ANIONGAP 11  --    EGFRNONAA 20.8*  --      Cardiac Markers:   Recent Labs   Lab 12/22/18 2051   *     Lactic Acid:   Recent Labs   Lab 12/22/18 2051   LACTATE 1.3     Troponin:   Recent Labs   Lab 12/22/18 2051   TROPONINI 0.621*     TSH:   Recent Labs   Lab 12/22/18 2051   TSH 0.378*     Urine Culture: No results for input(s): LABURIN in the last 48 hours.  Urine Studies:   Recent Labs   Lab 12/22/18 2122   COLORU Yellow   APPEARANCEUA Cloudy*   PHUR 5.0   SPECGRAV 1.020   PROTEINUA 1+*   GLUCUA Negative   KETONESU Negative   BILIRUBINUA Negative   OCCULTUA Negative   NITRITE Negative   LEUKOCYTESUR 3+*   RBCUA 2   WBCUA >100*   BACTERIA Many*   SQUAMEPITHEL 58   HYALINECASTS 10*     All pertinent labs within the past 24 hours have been reviewed.    Significant Imaging: I have reviewed all pertinent imaging results/findings within the past 24 hours.     CXR 12/22/18 2205  Subsegmental atelectasis right base.

## 2018-12-24 PROBLEM — J96.91 RESPIRATORY FAILURE WITH HYPOXIA: Status: ACTIVE | Noted: 2018-12-23

## 2018-12-24 PROBLEM — R93.89 ABNORMAL VENTRICULAR WALL MOTION: Status: ACTIVE | Noted: 2018-12-24

## 2018-12-24 LAB
ALBUMIN SERPL BCP-MCNC: 3 G/DL
ALP SERPL-CCNC: 100 U/L
ALT SERPL W/O P-5'-P-CCNC: 33 U/L
ANION GAP SERPL CALC-SCNC: 10 MMOL/L
ASCENDING AORTA: 2.38 CM
AST SERPL-CCNC: 18 U/L
AV INDEX (PROSTH): 0.57
AV MEAN GRADIENT: 8.89 MMHG
AV PEAK GRADIENT: 15.84 MMHG
AV VALVE AREA: 1.79 CM2
BILIRUB SERPL-MCNC: 0.4 MG/DL
BSA FOR ECHO PROCEDURE: 2.11 M2
BUN SERPL-MCNC: 27 MG/DL
CALCIUM SERPL-MCNC: 8.9 MG/DL
CHLORIDE SERPL-SCNC: 102 MMOL/L
CO2 SERPL-SCNC: 28 MMOL/L
CREAT SERPL-MCNC: 1.3 MG/DL
CV ECHO LV RWT: 0.48 CM
D DIMER PPP IA.FEU-MCNC: 1.08 MG/L FEU
DOP CALC AO PEAK VEL: 1.99 M/S
DOP CALC AO VTI: 32.48 CM
DOP CALC LVOT AREA: 3.14 CM2
DOP CALC LVOT DIAMETER: 2 CM
DOP CALC LVOT STROKE VOLUME: 58 CM3
DOP CALCLVOT PEAK VEL VTI: 18.47 CM
E WAVE DECELERATION TIME: 160.1 MSEC
E/A RATIO: 0.75
E/E' RATIO: 11.16
ECHO LV POSTERIOR WALL: 0.8 CM (ref 0.6–1.1)
ERYTHROCYTE [DISTWIDTH] IN BLOOD BY AUTOMATED COUNT: 12.3 %
EST. GFR  (AFRICAN AMERICAN): 47.7 ML/MIN/1.73 M^2
EST. GFR  (NON AFRICAN AMERICAN): 41.4 ML/MIN/1.73 M^2
FRACTIONAL SHORTENING: 26 % (ref 28–44)
GLUCOSE SERPL-MCNC: 186 MG/DL
HCT VFR BLD AUTO: 42.7 %
HGB BLD-MCNC: 13.2 G/DL
INTERVENTRICULAR SEPTUM: 1 CM (ref 0.6–1.1)
IVRT: 0.08 MSEC
LA MAJOR: 4.96 CM
LA MINOR: 5.05 CM
LA WIDTH: 4.09 CM
LEFT ATRIUM SIZE: 3.48 CM
LEFT ATRIUM VOLUME INDEX: 30.3 ML/M2
LEFT ATRIUM VOLUME: 60.55 CM3
LEFT INTERNAL DIMENSION IN SYSTOLE: 2.48 CM (ref 2.1–4)
LEFT VENTRICLE DIASTOLIC VOLUME INDEX: 23.12 ML/M2
LEFT VENTRICLE DIASTOLIC VOLUME: 46.26 ML
LEFT VENTRICLE MASS INDEX: 41.7 G/M2
LEFT VENTRICLE SYSTOLIC VOLUME INDEX: 10.9 ML/M2
LEFT VENTRICLE SYSTOLIC VOLUME: 21.8 ML
LEFT VENTRICULAR INTERNAL DIMENSION IN DIASTOLE: 3.36 CM (ref 3.5–6)
LEFT VENTRICULAR MASS: 83.35 G
LV LATERAL E/E' RATIO: 9.64
LV SEPTAL E/E' RATIO: 13.25
MAGNESIUM SERPL-MCNC: 2.3 MG/DL
MCH RBC QN AUTO: 31.7 PG
MCHC RBC AUTO-ENTMCNC: 30.9 G/DL
MCV RBC AUTO: 102 FL
MV PEAK A VEL: 1.42 M/S
MV PEAK E VEL: 1.06 M/S
PHOSPHATE SERPL-MCNC: 2 MG/DL
PLATELET # BLD AUTO: 238 K/UL
PMV BLD AUTO: 12 FL
POCT GLUCOSE: 198 MG/DL (ref 70–110)
POCT GLUCOSE: 204 MG/DL (ref 70–110)
POCT GLUCOSE: 219 MG/DL (ref 70–110)
POCT GLUCOSE: 270 MG/DL (ref 70–110)
POTASSIUM SERPL-SCNC: 3.9 MMOL/L
PROT SERPL-MCNC: 6.9 G/DL
RA MAJOR: 4.44 CM
RA PRESSURE: 3 MMHG
RA WIDTH: 3.69 CM
RBC # BLD AUTO: 4.17 M/UL
RIGHT VENTRICULAR END-DIASTOLIC DIMENSION: 3.28 CM
RV TISSUE DOPPLER FREE WALL SYSTOLIC VELOCITY 1 (APICAL 4 CHAMBER VIEW): 9.76 M/S
SINUS: 2.85 CM
SODIUM SERPL-SCNC: 140 MMOL/L
STJ: 2.5 CM
TDI LATERAL: 0.11
TDI SEPTAL: 0.08
TDI: 0.1
TRICUSPID ANNULAR PLANE SYSTOLIC EXCURSION: 1.25 CM
WBC # BLD AUTO: 8.7 K/UL

## 2018-12-24 PROCEDURE — 25000003 PHARM REV CODE 250: Performed by: STUDENT IN AN ORGANIZED HEALTH CARE EDUCATION/TRAINING PROGRAM

## 2018-12-24 PROCEDURE — G8987 SELF CARE CURRENT STATUS: HCPCS | Mod: CK

## 2018-12-24 PROCEDURE — 97165 OT EVAL LOW COMPLEX 30 MIN: CPT

## 2018-12-24 PROCEDURE — 36415 COLL VENOUS BLD VENIPUNCTURE: CPT

## 2018-12-24 PROCEDURE — 97161 PT EVAL LOW COMPLEX 20 MIN: CPT

## 2018-12-24 PROCEDURE — G8988 SELF CARE GOAL STATUS: HCPCS | Mod: CI

## 2018-12-24 PROCEDURE — 25000003 PHARM REV CODE 250: Performed by: HOSPITALIST

## 2018-12-24 PROCEDURE — 11000001 HC ACUTE MED/SURG PRIVATE ROOM

## 2018-12-24 PROCEDURE — 83735 ASSAY OF MAGNESIUM: CPT

## 2018-12-24 PROCEDURE — 25500020 PHARM REV CODE 255: Performed by: HOSPITALIST

## 2018-12-24 PROCEDURE — 85379 FIBRIN DEGRADATION QUANT: CPT

## 2018-12-24 PROCEDURE — 63600175 PHARM REV CODE 636 W HCPCS: Performed by: STUDENT IN AN ORGANIZED HEALTH CARE EDUCATION/TRAINING PROGRAM

## 2018-12-24 PROCEDURE — 80053 COMPREHEN METABOLIC PANEL: CPT

## 2018-12-24 PROCEDURE — S5571 INSULIN DISPOS PEN 3 ML: HCPCS | Performed by: STUDENT IN AN ORGANIZED HEALTH CARE EDUCATION/TRAINING PROGRAM

## 2018-12-24 PROCEDURE — 99233 SBSQ HOSP IP/OBS HIGH 50: CPT | Mod: GC,,, | Performed by: HOSPITALIST

## 2018-12-24 PROCEDURE — 84100 ASSAY OF PHOSPHORUS: CPT

## 2018-12-24 PROCEDURE — 85027 COMPLETE CBC AUTOMATED: CPT

## 2018-12-24 RX ORDER — SODIUM,POTASSIUM PHOSPHATES 280-250MG
2 POWDER IN PACKET (EA) ORAL EVERY 4 HOURS
Status: COMPLETED | OUTPATIENT
Start: 2018-12-24 | End: 2018-12-24

## 2018-12-24 RX ORDER — INSULIN ASPART 100 [IU]/ML
0-5 INJECTION, SOLUTION INTRAVENOUS; SUBCUTANEOUS
Status: DISCONTINUED | OUTPATIENT
Start: 2018-12-24 | End: 2018-12-26 | Stop reason: HOSPADM

## 2018-12-24 RX ADMIN — LOSARTAN POTASSIUM AND HYDROCHLOROTHIAZIDE 1 TABLET: 25; 100 TABLET ORAL at 09:12

## 2018-12-24 RX ADMIN — CITALOPRAM HYDROBROMIDE 20 MG: 20 TABLET ORAL at 09:12

## 2018-12-24 RX ADMIN — PANTOPRAZOLE SODIUM 40 MG: 40 TABLET, DELAYED RELEASE ORAL at 09:12

## 2018-12-24 RX ADMIN — LEVOTHYROXINE SODIUM 75 MCG: 75 TABLET ORAL at 06:12

## 2018-12-24 RX ADMIN — ASPIRIN 325 MG ORAL TABLET 325 MG: 325 PILL ORAL at 09:12

## 2018-12-24 RX ADMIN — HEPARIN SODIUM 5000 UNITS: 5000 INJECTION, SOLUTION INTRAVENOUS; SUBCUTANEOUS at 03:12

## 2018-12-24 RX ADMIN — POTASSIUM & SODIUM PHOSPHATES POWDER PACK 280-160-250 MG 1 PACKET: 280-160-250 PACK at 06:12

## 2018-12-24 RX ADMIN — INSULIN ASPART 6 UNITS: 100 INJECTION, SOLUTION INTRAVENOUS; SUBCUTANEOUS at 09:12

## 2018-12-24 RX ADMIN — POTASSIUM & SODIUM PHOSPHATES POWDER PACK 280-160-250 MG 2 PACKET: 280-160-250 PACK at 09:12

## 2018-12-24 RX ADMIN — ACETAMINOPHEN 650 MG: 325 TABLET ORAL at 09:12

## 2018-12-24 RX ADMIN — INSULIN DETEMIR 18 UNITS: 100 INJECTION, SOLUTION SUBCUTANEOUS at 09:12

## 2018-12-24 RX ADMIN — HEPARIN SODIUM 5000 UNITS: 5000 INJECTION, SOLUTION INTRAVENOUS; SUBCUTANEOUS at 06:12

## 2018-12-24 RX ADMIN — HEPARIN SODIUM 5000 UNITS: 5000 INJECTION, SOLUTION INTRAVENOUS; SUBCUTANEOUS at 09:12

## 2018-12-24 RX ADMIN — ATORVASTATIN CALCIUM 40 MG: 20 TABLET, FILM COATED ORAL at 09:12

## 2018-12-24 RX ADMIN — POTASSIUM & SODIUM PHOSPHATES POWDER PACK 280-160-250 MG 2 PACKET: 280-160-250 PACK at 03:12

## 2018-12-24 RX ADMIN — IOHEXOL 100 ML: 350 INJECTION, SOLUTION INTRAVENOUS at 07:12

## 2018-12-24 RX ADMIN — AMLODIPINE BESYLATE 10 MG: 10 TABLET ORAL at 09:12

## 2018-12-24 RX ADMIN — CEFTRIAXONE 1 G: 1 INJECTION, POWDER, FOR SOLUTION INTRAMUSCULAR; INTRAVENOUS at 03:12

## 2018-12-24 RX ADMIN — INSULIN ASPART 6 UNITS: 100 INJECTION, SOLUTION INTRAVENOUS; SUBCUTANEOUS at 12:12

## 2018-12-24 RX ADMIN — POTASSIUM & SODIUM PHOSPHATES POWDER PACK 280-160-250 MG 2 PACKET: 280-160-250 PACK at 12:12

## 2018-12-24 NOTE — PLAN OF CARE
Therapy cleared pt  No home oxygen  [facility]     12/24/18 5542   Discharge Assessment   Assessment Type Discharge Planning Assessment   Confirmed/corrected address and phone number on facesheet? Yes   Assessment information obtained from? Patient   Communicated expected length of stay with patient/caregiver yes   Prior to hospitilization cognitive status: Alert/Oriented   Prior to hospitalization functional status: Independent;Assistive Equipment   Current cognitive status: Alert/Oriented   Current Functional Status: Independent;Assistive Equipment   Lives With child(aster), adult   Able to Return to Prior Arrangements yes   Is patient able to care for self after discharge? Yes   Who are your caregiver(s) and their phone number(s)? daughter will give her ride home      Patient's perception of discharge disposition home or selfcare   Readmission Within the Last 30 Days no previous admission in last 30 days   Patient currently being followed by outpatient case management? No   Patient currently receives any other outside agency services? No   Equipment Currently Used at Home walker, rolling;rollator;bedside commode;shower chair   Do you have any problems affording any of your prescribed medications? No   Is the patient taking medications as prescribed? yes   Does the patient have transportation home? Yes   Does the patient receive services at the [facility]? No   Discharge Plan A Home with family   Discharge Plan B Home with family   Patient/Family in Agreement with Plan yes

## 2018-12-24 NOTE — ASSESSMENT & PLAN NOTE
-  on admit, pt states that this is elevated due to increased soft drink consumption during last week   - Home insulin regimen is 30 Levemir qhs, 8 units Novolog TID with meals, Trulicity weekly  - 18 Levemir, 6 Novolog TID with meals while inpatient, adjust as needed

## 2018-12-24 NOTE — PROGRESS NOTES
"[facility] Hospital Medicine  Progress Note    Patient Name: Melinda Knight  MRN: [id number]  Patient Class: IP- Inpatient   Admission Date: 12/22/2018  Length of Stay: 2 days  Attending Physician: Debbie Abad MD  Primary Care Provider: Duke Cole MD    [facility] Medicine Team: [facility] HOSP MED [address] MD    Subjective:     Principal Problem:Respiratory failure with hypoxia    HPI:  Melinda Knight is a 73 year old female with a medical history significant for hypothyroidism, CAD, HTN, HLD and DM2 who presents to ED for evaluation of 1 week of generalized malaise and loss of appetite. Pt states that since 12/17 she has noted increasing malaise and "feeling hot". Pt states that she did no take her temperature at the time. Pt denies chills. Pt also complains of decreased appetite. Pt state that she still feels hungry and wants to eat but does not eat due to associated postprandial nausea and discomfort. Pt also endorses SOB with exertion. Pt states that she has noted increasing SOB over the past 2 years. Pt states that she is unable to walk up stairs or walk more then 5 feet without feeling SOB. Pt states that she uses a walker at home. Pt states denies home O2 but daughter states that cardiologists at an outside hospital had discussed it (SpO2 on admit mid 80s, 95% on 3L O2 via NC in ED). Pt states that she sleeps with her head elevated due to feeling SOB when lying flat.     Pt endorses SOB, orthopnea, fever, nausea/vomiting and decreased appetitie. Pt denies CP, chills, ABD pain/distension, swelling in legs, dysuria or changes in bowel or bladder habits.     In ED, Pt was noted to be febrile to 100.9 which resolved with tylenol. During initial work up, BNP was elevated to 970, troponin was 0.621. EKG did not show ST changes. Last echo was 9/18, EF was normal and pt was noted to have mild aortic stenosis. UA in ED showed 3+ leukocytes and many bacteria, she received 1 dose of " Ceftriaxone. A CXR showed subsegmental atelectasis in the right lung base but no signs of effusion or pneumonia.    Hospital Course:  Ms Knight was admitted for hypoxic respiratory failure requiring supplemental oxygen via nasal cannula (whereas she does not use oxygen at baseline at home, does not have h/o COPD, used to smoke about a cigarette a day for 5 years decades ago).  Echo did not reveal underlying heart failure, and O2 requirement was increasing.  D-dimer was elevated, so CTA chest was ordered to look for PE.      Interval History    Increased nasal cannula requirement, positive d dimer, CTA chest today.     ROS     Respiratory: + cough, + shortness of breath  Cardiovascular: no chest pain, no palpitations  Gastrointestinal: no nausea, no vomiting, no diarrhea, no constipation,  no abdominal pain    PEx  Temp:  [97.1 °F (36.2 °C)-98.8 °F (37.1 °C)]   Pulse:  []   Resp:  [18-20]   BP: (137-172)/(63-89)   SpO2:  [90 %-97 %]       Intake/Output Summary (Last 24 hours) at 12/24/2018 1238  Last data filed at 12/24/2018 0700  Gross per 24 hour   Intake 440 ml   Output 800 ml   Net -360 ml         General Appearance: no acute distress  Heart: regular rate and rhythm, no lower ext edema  Respiratory: Normal respiratory effort but coughing, on 5 L NC, no crackles   Abdomen: Soft, non-tender; bowel sounds active  Neurologic:  No focal numbness or weakness  Mental status: Alert, oriented x 4, affect appropriate             Assessment/Plan:      * Respiratory failure with hypoxia    Acute hypoxic respiratory failure.  Does not use oxygen at baseline.  No known history of COPD. Remote smoking history.    - Pt endorses SOB on exertion for ~2 years  - Pt SpO2 in mid 80s on admit, 95% on 3l O2 via NC, drops to 89% while speaking, oxygen requirement continues to increae  - Previous CT chest 9/18 negative for PE  - Echo was unrevealing for heart failure  - D dimer elevated (12/24)  - CTA chest (12/24) -      GERMÁN  (acute kidney injury)    - On admission, BUN/Cr 48/2.3  - Baseline Cr 1.2-1.4  - Received 1L NS in ED  - UTI on UA, 3+ leukocytes, no urinary symptoms, received 1 dose ceftriaxone in ED, no suprapubic or flank tenderness  - Likely related to dehydration due to decreased PO intake, no signs of urinary obstruction/pyelonephritis.  Improving.       Acute cystitis without hematuria    - Pt denies urinary symptoms   - UA done on routine work up of fever shows bacturia and 3+ leukocytes  - Continue Ceftriaxone while inpatient, 1g q24  - UCx (12/24) -        Essential hypertension    - Continue home Losartan/HCTZ and Norvasc     Type 2 diabetes mellitus with chronic kidney disease, with long-term current use of insulin    -  on admit, pt states that this is elevated due to increased soft drink consumption during last week   - Home insulin regimen is 30 Levemir qhs, 8 units Novolog TID with meals, Trulicity weekly  - 18 Levemir, 6 Novolog TID with meals while inpatient, adjust as needed     Hypothyroidism (acquired)    - Continue home levothyroxine        Hyperlipemia    - Continue home statin       GERD (gastroesophageal reflux disease)    - Continue home PPI       Depression    - Continue home Celexa       Cardiac enzymes elevated    - Troponin downtrended, EKG without ST elevations  - likely NSTEMI 2/2 hypoxic respiratory failure  - new segmental wall motion abnormalities on 12/23 echo which were not present on 9/10 echo, outpatient referral for outpatient ischemic work up ordered.         VTE Risk Mitigation (From admission, onward)        Ordered     heparin (porcine) injection 5,000 Units  Every 8 hours      12/23/18 0018     IP VTE HIGH RISK PATIENT  Once      12/23/18 0018              Ghislaine Way MD  Department of Hospital Medicine   [facility]

## 2018-12-24 NOTE — ASSESSMENT & PLAN NOTE
Acute hypoxic respiratory failure.  Does not use oxygen at baseline.  No known history of COPD. Remote smoking history.    - Pt endorses SOB on exertion for ~2 years  - Pt SpO2 in mid 80s on admit, 95% on 3l O2 via NC, drops to 89% while speaking, oxygen requirement continues to increae  - Previous CT chest 9/18 negative for PE  - Echo was unrevealing for heart failure  - D dimer elevated (12/24)  - CTA chest (12/24) -

## 2018-12-24 NOTE — PLAN OF CARE
Problem: Physical Therapy Goal  Goal: Physical Therapy Goal  Goals to be met by: 2018     Patient will increase functional independence with mobility by performin. Supine to sit with Modified [place]  2. Sit to supine with Modified [place]  3. Sit to stand transfer with Supervision  4. Gait  x 100 feet with Stand-by Assistance using Rolling Walker.   5. Lower extremity exercise program x15 reps per handout, with supervision    Outcome: Ongoing (interventions implemented as appropriate)  Pt evaluation complete. Pt goals set.    MJ KOHLI, PT  2018

## 2018-12-24 NOTE — ASSESSMENT & PLAN NOTE
- On admission, BUN/Cr 48/2.3  - Baseline Cr 1.2-1.4  - Received 1L NS in ED  - UTI on UA, 3+ leukocytes, no urinary symptoms, received 1 dose ceftriaxone in ED, no suprapubic or flank tenderness  - Likely related to dehydration due to decreased PO intake, no signs of urinary obstruction/pyelonephritis.  Improving.

## 2018-12-24 NOTE — ASSESSMENT & PLAN NOTE
- Troponin downtrended, EKG without ST elevations  - likely NSTEMI 2/2 hypoxic respiratory failure  - new segmental wall motion abnormalities on 12/23 echo which were not present on 9/10 echo, outpatient referral for outpatient ischemic work up ordered.

## 2018-12-24 NOTE — PT/OT/SLP EVAL
Physical Therapy Evaluation    Patient Name:  Melinda Knight   MRN:  [id number]    Recommendations:     Discharge Recommendations:  (HH)   Discharge Equipment Recommendations: none   Barriers to discharge: None    Assessment:     Melinda Knight is a 73 y.o. female admitted with a medical diagnosis of Respiratory failure with hypoxia.  She presents with the following impairments/functional limitations:  weakness, gait instability, impaired endurance, impaired balance, impaired cardiopulmonary response to activity, impaired functional mobilty, decreased safety awareness. Pt performed bed mobility and transfers CGA. Pt amb ~40ft CGA with RW, vc's for AD management; no LOB and mild SOB with O2 intact. Pt amb limited by fatigue. Pt will benefit from skilled PT to improve deficits and increase overall functional mobility.     Rehab Prognosis: Good; patient would benefit from acute skilled PT services to address these deficits and reach maximum level of function.    Recent Surgery: * No surgery found *      Plan:     During this hospitalization, patient to be seen 3 x/week to address the identified rehab impairments via gait training, therapeutic activities, therapeutic exercises and progress toward the following goals:    · Plan of Care Expires:  01/24/19    Subjective     Chief Complaint: fatigue  Patient/Family Comments/goals: return home  Pain/Comfort:  · Pain Rating 1: 5/10  · Location - Side 1: Left  · Location - Orientation 1: generalized  · Location 1: knee  · Pain Addressed 1: Reposition, Distraction  · Pain Rating Post-Intervention 1: 5/10    Patients cultural, spiritual, Holiness conflicts given the current situation:      Living Environment:  Pt lives with daughter in 1-story house with 3 NELDA with B handrails and tub/shower. Pt reports rollator for household amb and w/c for community mobility. Pt reports mod (I) with ADLs except assist with bathing. Pt reports when daughter is not home she is in the bed  majority of the time.   Prior to admission, patients level of function was mod (I).  Equipment used at home: walker, rolling, rollator, wheelchair, bedside commode, shower chair.  DME owned (not currently used): none.  Upon discharge, patient will have assistance from family.    Objective:     Communicated with RN prior to session.  Patient found supine in bed and sister and daughter present telemetry  upon PT entry to room.    General Precautions: Standard, fall   Orthopedic Precautions:N/A   Braces: N/A     Exams:  · Cognitive Exam:  Patient is oriented to Person, Place, Time and Situation  · Gross Motor Coordination:  WFL  · Postural Exam:  Patient presented with the following abnormalities:    · -       Rounded shoulders  · Sensation:    · -       Intact  light/touch B LE  · Skin Integrity/Edema:      · -       Skin integrity: Visible skin intact  · RLE ROM: WFL  · RLE Strength: WFL  · LLE ROM: WFL  · LLE Strength: WFL    Functional Mobility:  Bed Mobility:     · Supine to Sit: contact guard assistance    Transfers:     · Sit to Stand:  contact guard assistance with rolling walker    Gait: ~40ft CGA with RW, vc's for AD management; no LOB and mild SOB with O2 intact. Pt amb limited by fatigue.      Therapeutic Activities and Exercises:  Pt sat EOB with S.  Pt and family educated on:  -role of PT/POC  -safety with mobility  -importance of OOB activity  -up in chair for majority of the day  Pt safe to amb with RW with RN staff.     AM-PAC 6 CLICK MOBILITY  Total Score:18     Patient left up in chair with all lines intact, call button in reach and RN notified and family present.    GOALS:   Multidisciplinary Problems     Physical Therapy Goals        Problem: Physical Therapy Goal    Goal Priority Disciplines Outcome Goal Variances Interventions   Physical Therapy Goal     PT, PT/OT Ongoing (interventions implemented as appropriate)     Description:  Goals to be met by: 01/03/2018     Patient will increase  functional independence with mobility by performin. Supine to sit with Modified [place]  2. Sit to supine with Modified [place]  3. Sit to stand transfer with Supervision  4. Gait  x 100 feet with Stand-by Assistance using Rolling Walker.   5. Lower extremity exercise program x15 reps per handout, with supervision                      History:     Past Medical History:   Diagnosis Date    Carotid artery occlusion     Coronary artery disease     Hypertension     Skin cancer     cyst on the face , was removed 20years +    Thyroid disease     Type 2 diabetes mellitus        Past Surgical History:   Procedure Laterality Date    CAROTID ENDARTERECTOMY Left 2016    CHOLECYSTECTOMY      EGD (ESOPHAGOGASTRODUODENOSCOPY) Left 2018    [facility]    ENDARTERECTOMY-CAROTID Left 3/24/2016    [facility] by Sofia Way [facility] at [facility] [address] [place] [address]    GALLBLADDER SURGERY      1996    SKIN BIOPSY      TONSILLECTOMY      TUBAL LIGATION Bilateral 3/18/2016       Clinical Decision Making:     Decision Making/ Complexity Score   On examination of body system using standardized tests and measures patient presents with 1-2 elements from any of the following: body structures and functions, activity limitations, and/or participation restrictions.  Leading to a clinical presentation that is considered stable and/or uncomplicated                              Clinical Decision Making  (Eval Complexity):  Low- 40710     Time Tracking:     PT Received On: 18  PT Start Time: 1058     PT Stop Time: 1118  PT Total Time (min): 20 min     Billable Minutes: Evaluation 20      MJ KOHLI, PT  2018

## 2018-12-24 NOTE — PLAN OF CARE
Problem: Adult Inpatient Plan of Care  Goal: Plan of Care Review  Outcome: Ongoing (interventions implemented as appropriate)  Pt alert and oriented, calm and cooperative to care. Rest comfort in bed. Multiple episodes of BM on this shift. Kept dry and clean. O2 via n/c at 4L in progress. Titrated as needed. No SOB noted. BG management in progress with ACHS BG monitoring and Novolog and Levemir insulin pens. No episodes of hyperglycemia and hypoglycemia noted. Slept well. All needs attended. Call light within easy reach. No c/o voiced.

## 2018-12-24 NOTE — PT/OT/SLP EVAL
Occupational Therapy   Evaluation    Name: Melinda Knight  MRN: [id number]  Admitting Diagnosis:  Respiratory failure with hypoxia      Recommendations:     Discharge Recommendations: ( OT )  Discharge Equipment Recommendations:  none  Barriers to discharge:  None    History:     Occupational Profile:  Living Environment: Lives w/ daughter in [facility] w/ 3 NELDA, w/ L/R handrails and a tub/shower combo.    Previous level of function: Independent w/ ADLs/IADLs  Roles and Routines: Mother, Casino enthusiast  Equipment Used at Home:  shower chair, walker, rolling, rollator, bedside commode  Assistance upon Discharge: Yes from family    Past Medical History:   Diagnosis Date    Carotid artery occlusion     Coronary artery disease     Hypertension     Skin cancer     cyst on the face , was removed 20years +    Thyroid disease     Type 2 diabetes mellitus        Past Surgical History:   Procedure Laterality Date    CAROTID ENDARTERECTOMY Left 03/24/2016    CHOLECYSTECTOMY      EGD (ESOPHAGOGASTRODUODENOSCOPY) Left 9/11/2018    [facility] ENDO    ENDARTERECTOMY-CAROTID Left 3/24/2016    Performed by [facility] W[facility] at [facility] OR [address] [place] [address]    GALLBLADDER SURGERY      1996    SKIN BIOPSY      TONSILLECTOMY      TUBAL LIGATION Bilateral 3/18/2016       Subjective     Chief Complaint: Fatigue  Patient/Family Comments/goals: return home to [facility]    Pain/Comfort:  · Pain Rating 1: 0/10  · Pain Rating Post-Intervention 1: 0/10    Patients cultural, spiritual, Rastafarian conflicts given the current situation: no    Objective:     Communicated with: RN prior to session.  Patient found with: all lines intact, call button in reach and daughter present and telemetry upon OT entry to room.    General Precautions: Standard, fall   Orthopedic Precautions:N/A   Braces: N/A     Occupational Performance:    Bed Mobility:    · N/A See PT for levels    Functional Mobility/Transfers:  · Patient completed Sit  <> Stand Transfer with contact guard assistance  with  hand-held assist   · Functional Mobility: Able to tolerate short household distances CGA w/ RW and vc for AD management.     Activities of Daily Living:  · Bathing: stand by assistance Seated in Room chair washing face w/ washcloth  · Lower Body Dressing: minimum assistance For AT, vc, and safety.     Cognitive/Visual Perceptual:  Cognitive/Psychosocial Skills:     -       Oriented to: Person, Place, Time and Situation   -       Follows Commands/attention:Follows multistep  commands  -       Communication: clear/fluent  -       Memory: No Deficits noted  -       Safety awareness/insight to disability: intact   -       Mood/Affect/Coping skills/emotional control: Appropriate to situation  Visual/Perceptual:      -Intact no glasses worn during eval    Physical Exam:  Balance:    -       Grossly fair  Dominant hand:    -       RH  Upper Extremity Range of Motion:     -       Right Upper Extremity: WFL  -       Left Upper Extremity: WFL  Upper Extremity Strength:    -       Right Upper Extremity: WFL  -       Left Upper Extremity: WFL   Strength:    -       Right Upper Extremity: WFL  -       Left Upper Extremity: WFL  Fine Motor Coordination:    -       Intact    AMPAC 6 Click ADL:  AMPAC Total Score: 19    Treatment & Education:  -Pt edu on OT role/POC  -Importance of OOB activity with staff assistance  -Safety during functional t/f and mobility  - White board updated  - Multiple self care tasks/functional mobility completed-- assistance level noted above  - All questions/concerns answered within OT scope of practice    Education:    Patient left up in chair with all lines intact and call button in reach    Assessment:     Melinda Knight is a 73 y.o. female with a medical diagnosis of Respiratory failure with hypoxia.  She presents with the following performance deficits affecting function: weakness, impaired endurance, impaired self care skills, impaired  "functional mobilty, decreased upper extremity function, decreased lower extremity function, impaired cardiopulmonary response to activity.      Rehab Prognosis: Good; patient would benefit from acute skilled OT services to address these deficits and reach maximum level of function.         Clinical Decision Makin.  OT Low:  "Pt evaluation falls under low complexity for evaluation coding due to performance deficits noted in 1-3 areas as stated above and 0 co-morbities affecting current functional status. Data obtained from problem focused assessments. No modifications or assistance was required for completion of evaluation. Only brief occupational profile and history review completed."     Plan:     Patient to be seen 3 x/week to address the above listed problems via self-care/home management, therapeutic activities, therapeutic exercises  · Plan of Care Expires: 19  · Plan of Care Reviewed with: patient, daughter    This Plan of care has been discussed with the patient who was involved in its development and understands and is in agreement with the identified goals and treatment plan    GOALS:   Multidisciplinary Problems     Occupational Therapy Goals        Problem: Occupational Therapy Goal    Goal Priority Disciplines Outcome Interventions   Occupational Therapy Goal     OT, PT/OT     Description:  Goals to be met by: 2019    Patient will increase functional independence with ADLs by performing:    UE Dressing with Set-up Assistance.  LE Dressing with Set-up Assistance.  Grooming while standing at sink with Set-up Assistance.  Toileting from toilet with Supervision for hygiene and clothing management.   Bathing from  sitting at sink with Set-up Assistance.  Toilet transfer to toilet with Contact Guard Assistance.                      Time Tracking:     OT Date of Treatment: 18  OT Start Time: 1120  OT Stop Time: 1135  OT Total Time (min): 15 min    Billable Minutes:Evaluation 15 " zoë APPLE [place], OT  12/24/2018

## 2018-12-24 NOTE — SUBJECTIVE & OBJECTIVE
Interval History    Increased nasal cannula requirement, positive d dimer, CTA chest today.     ROS     Respiratory: + cough, + shortness of breath  Cardiovascular: no chest pain, no palpitations  Gastrointestinal: no nausea, no vomiting, no diarrhea, no constipation,  no abdominal pain    PEx  Temp:  [97.1 °F (36.2 °C)-98.8 °F (37.1 °C)]   Pulse:  []   Resp:  [18-20]   BP: (137-172)/(63-89)   SpO2:  [90 %-97 %]       Intake/Output Summary (Last 24 hours) at 12/24/2018 1238  Last data filed at 12/24/2018 0700  Gross per 24 hour   Intake 440 ml   Output 800 ml   Net -360 ml         General Appearance: no acute distress  Heart: regular rate and rhythm, no lower ext edema  Respiratory: Normal respiratory effort but coughing, on 5 L NC, no crackles   Abdomen: Soft, non-tender; bowel sounds active  Neurologic:  No focal numbness or weakness  Mental status: Alert, oriented x 4, affect appropriate

## 2018-12-25 LAB
ALLENS TEST: ABNORMAL
ANION GAP SERPL CALC-SCNC: 11 MMOL/L
BUN SERPL-MCNC: 22 MG/DL
CALCIUM SERPL-MCNC: 8.8 MG/DL
CHLORIDE SERPL-SCNC: 99 MMOL/L
CO2 SERPL-SCNC: 29 MMOL/L
CREAT SERPL-MCNC: 1.3 MG/DL
DELSYS: ABNORMAL
ERYTHROCYTE [DISTWIDTH] IN BLOOD BY AUTOMATED COUNT: 12.5 %
EST. GFR  (AFRICAN AMERICAN): 47.7 ML/MIN/1.73 M^2
EST. GFR  (NON AFRICAN AMERICAN): 41.4 ML/MIN/1.73 M^2
GLUCOSE SERPL-MCNC: 220 MG/DL
HCO3 UR-SCNC: 28.2 MMOL/L (ref 24–28)
HCT VFR BLD AUTO: 44.3 %
HGB BLD-MCNC: 13.7 G/DL
MCH RBC QN AUTO: 30.8 PG
MCHC RBC AUTO-ENTMCNC: 30.9 G/DL
MCV RBC AUTO: 100 FL
PCO2 BLDA: 43.4 MMHG (ref 35–45)
PH SMN: 7.42 [PH] (ref 7.35–7.45)
PHOSPHATE SERPL-MCNC: 4.1 MG/DL
PLATELET # BLD AUTO: 257 K/UL
PMV BLD AUTO: 11.9 FL
PO2 BLDA: 88 MMHG (ref 80–100)
POC BE: 4 MMOL/L
POC SATURATED O2: 97 % (ref 95–100)
POC TCO2: 29 MMOL/L (ref 23–27)
POCT GLUCOSE: 177 MG/DL (ref 70–110)
POCT GLUCOSE: 187 MG/DL (ref 70–110)
POCT GLUCOSE: 189 MG/DL (ref 70–110)
POCT GLUCOSE: 204 MG/DL (ref 70–110)
POCT GLUCOSE: 216 MG/DL (ref 70–110)
POTASSIUM SERPL-SCNC: 4.5 MMOL/L
RBC # BLD AUTO: 4.45 M/UL
SAMPLE: ABNORMAL
SITE: ABNORMAL
SODIUM SERPL-SCNC: 139 MMOL/L
WBC # BLD AUTO: 8.68 K/UL

## 2018-12-25 PROCEDURE — 99900035 HC TECH TIME PER 15 MIN (STAT)

## 2018-12-25 PROCEDURE — 11000001 HC ACUTE MED/SURG PRIVATE ROOM

## 2018-12-25 PROCEDURE — 94761 N-INVAS EAR/PLS OXIMETRY MLT: CPT

## 2018-12-25 PROCEDURE — 94640 AIRWAY INHALATION TREATMENT: CPT

## 2018-12-25 PROCEDURE — 27000221 HC OXYGEN, UP TO 24 HOURS

## 2018-12-25 PROCEDURE — 80048 BASIC METABOLIC PNL TOTAL CA: CPT

## 2018-12-25 PROCEDURE — 85027 COMPLETE CBC AUTOMATED: CPT

## 2018-12-25 PROCEDURE — 36415 COLL VENOUS BLD VENIPUNCTURE: CPT

## 2018-12-25 PROCEDURE — 99233 SBSQ HOSP IP/OBS HIGH 50: CPT | Mod: GC,,, | Performed by: HOSPITALIST

## 2018-12-25 PROCEDURE — 25000003 PHARM REV CODE 250: Performed by: STUDENT IN AN ORGANIZED HEALTH CARE EDUCATION/TRAINING PROGRAM

## 2018-12-25 PROCEDURE — 63600175 PHARM REV CODE 636 W HCPCS: Performed by: HOSPITALIST

## 2018-12-25 PROCEDURE — 25000242 PHARM REV CODE 250 ALT 637 W/ HCPCS: Performed by: STUDENT IN AN ORGANIZED HEALTH CARE EDUCATION/TRAINING PROGRAM

## 2018-12-25 PROCEDURE — 84100 ASSAY OF PHOSPHORUS: CPT

## 2018-12-25 PROCEDURE — 63600175 PHARM REV CODE 636 W HCPCS: Performed by: STUDENT IN AN ORGANIZED HEALTH CARE EDUCATION/TRAINING PROGRAM

## 2018-12-25 RX ORDER — HYDROCHLOROTHIAZIDE 25 MG/1
25 TABLET ORAL DAILY
Status: DISCONTINUED | OUTPATIENT
Start: 2018-12-25 | End: 2018-12-25

## 2018-12-25 RX ORDER — LOSARTAN POTASSIUM 50 MG/1
100 TABLET ORAL DAILY
Status: DISCONTINUED | OUTPATIENT
Start: 2018-12-26 | End: 2018-12-26

## 2018-12-25 RX ORDER — HYDROCHLOROTHIAZIDE 25 MG/1
25 TABLET ORAL DAILY
Status: COMPLETED | OUTPATIENT
Start: 2018-12-25 | End: 2018-12-25

## 2018-12-25 RX ORDER — IPRATROPIUM BROMIDE AND ALBUTEROL SULFATE 2.5; .5 MG/3ML; MG/3ML
3 SOLUTION RESPIRATORY (INHALATION)
Status: DISCONTINUED | OUTPATIENT
Start: 2018-12-25 | End: 2018-12-26 | Stop reason: HOSPADM

## 2018-12-25 RX ORDER — HYDROCHLOROTHIAZIDE 25 MG/1
50 TABLET ORAL DAILY
Status: DISCONTINUED | OUTPATIENT
Start: 2018-12-25 | End: 2018-12-25

## 2018-12-25 RX ORDER — HYDROCHLOROTHIAZIDE 25 MG/1
50 TABLET ORAL DAILY
Status: DISCONTINUED | OUTPATIENT
Start: 2018-12-26 | End: 2018-12-26

## 2018-12-25 RX ADMIN — IPRATROPIUM BROMIDE AND ALBUTEROL SULFATE 3 ML: .5; 3 SOLUTION RESPIRATORY (INHALATION) at 11:12

## 2018-12-25 RX ADMIN — CITALOPRAM HYDROBROMIDE 20 MG: 20 TABLET ORAL at 09:12

## 2018-12-25 RX ADMIN — LOSARTAN POTASSIUM AND HYDROCHLOROTHIAZIDE 1 TABLET: 25; 100 TABLET ORAL at 08:12

## 2018-12-25 RX ADMIN — INSULIN ASPART 6 UNITS: 100 INJECTION, SOLUTION INTRAVENOUS; SUBCUTANEOUS at 12:12

## 2018-12-25 RX ADMIN — AMLODIPINE BESYLATE 10 MG: 10 TABLET ORAL at 08:12

## 2018-12-25 RX ADMIN — HEPARIN SODIUM 5000 UNITS: 5000 INJECTION, SOLUTION INTRAVENOUS; SUBCUTANEOUS at 05:12

## 2018-12-25 RX ADMIN — IPRATROPIUM BROMIDE AND ALBUTEROL SULFATE 3 ML: .5; 3 SOLUTION RESPIRATORY (INHALATION) at 09:12

## 2018-12-25 RX ADMIN — PANTOPRAZOLE SODIUM 40 MG: 40 TABLET, DELAYED RELEASE ORAL at 08:12

## 2018-12-25 RX ADMIN — INSULIN ASPART 2 UNITS: 100 INJECTION, SOLUTION INTRAVENOUS; SUBCUTANEOUS at 12:12

## 2018-12-25 RX ADMIN — HEPARIN SODIUM 5000 UNITS: 5000 INJECTION, SOLUTION INTRAVENOUS; SUBCUTANEOUS at 01:12

## 2018-12-25 RX ADMIN — INSULIN ASPART 6 UNITS: 100 INJECTION, SOLUTION INTRAVENOUS; SUBCUTANEOUS at 05:12

## 2018-12-25 RX ADMIN — ASPIRIN 325 MG ORAL TABLET 325 MG: 325 PILL ORAL at 08:12

## 2018-12-25 RX ADMIN — HEPARIN SODIUM 5000 UNITS: 5000 INJECTION, SOLUTION INTRAVENOUS; SUBCUTANEOUS at 10:12

## 2018-12-25 RX ADMIN — ATORVASTATIN CALCIUM 40 MG: 20 TABLET, FILM COATED ORAL at 08:12

## 2018-12-25 RX ADMIN — LEVOTHYROXINE SODIUM 75 MCG: 75 TABLET ORAL at 05:12

## 2018-12-25 RX ADMIN — INSULIN ASPART 6 UNITS: 100 INJECTION, SOLUTION INTRAVENOUS; SUBCUTANEOUS at 08:12

## 2018-12-25 RX ADMIN — IPRATROPIUM BROMIDE AND ALBUTEROL SULFATE 3 ML: .5; 3 SOLUTION RESPIRATORY (INHALATION) at 03:12

## 2018-12-25 RX ADMIN — INSULIN DETEMIR 18 UNITS: 100 INJECTION, SOLUTION SUBCUTANEOUS at 08:12

## 2018-12-25 RX ADMIN — CEFTRIAXONE 1 G: 1 INJECTION, POWDER, FOR SOLUTION INTRAMUSCULAR; INTRAVENOUS at 05:12

## 2018-12-25 RX ADMIN — HYDROCHLOROTHIAZIDE 25 MG: 25 TABLET ORAL at 01:12

## 2018-12-25 NOTE — PROGRESS NOTES
"[facility] Hospital Medicine  Progress Note    Patient Name: Melinda Knight  MRN: [id number]  Patient Class: IP- Inpatient   Admission Date: 12/22/2018  Length of Stay: 3 days  Attending Physician: Debbie Abad MD  Primary Care Provider: Duke Cole MD    [facility] Medicine Team: [facility]OSP MED 1 Elia Moreno MD    Subjective:     Principal Problem:Respiratory failure with hypoxia    HPI:  Melinda Knight is a 73 year old female with a medical history significant for hypothyroidism, CAD, HTN, HLD and DM2 who presents to ED for evaluation of 1 week of generalized malaise and loss of appetite. Pt states that since 12/17 she has noted increasing malaise and "feeling hot". Pt states that she did no take her temperature at the time. Pt denies chills. Pt also complains of decreased appetite. Pt state that she still feels hungry and wants to eat but does not eat due to associated postprandial nausea and discomfort. Pt also endorses SOB with exertion. Pt states that she has noted increasing SOB over the past 2 years. Pt states that she is unable to walk up stairs or walk more then 5 feet without feeling SOB. Pt states that she uses a walker at home. Pt states denies home O2 but daughter states that cardiologists at an outside hospital had discussed it (SpO2 on admit mid 80s, 95% on 3L O2 via NC in ED). Pt states that she sleeps with her head elevated due to feeling SOB when lying flat.     Pt endorses SOB, orthopnea, fever, nausea/vomiting and decreased appetitie. Pt denies CP, chills, ABD pain/distension, swelling in legs, dysuria or changes in bowel or bladder habits.     In ED, Pt was noted to be febrile to 100.9 which resolved with tylenol. During initial work up, BNP was elevated to 970, troponin was 0.621. EKG did not show ST changes. Last echo was 9/18, EF was normal and pt was noted to have mild aortic stenosis. UA in ED showed 3+ leukocytes and many bacteria, she received 1 dose of " Ceftriaxone. A CXR showed subsegmental atelectasis in the right lung base but no signs of effusion or pneumonia.    Hospital Course:  Melinda Knight was admitted for hypoxic respiratory failure requiring supplemental oxygen via nasal cannula (whereas she does not use oxygen at baseline at home, does not have h/o COPD, used to smoke about a cigarette a day for 5 years decades ago).  Echo did not reveal underlying heart failure, and O2 requirement was increasing.  D-dimer was elevated, so CTA chest was ordered to look for PE.  As there was no evidence of PE on CTA, and pt was still requiring supplementary oxygen, pulmonology was consulted for advice.     Interval History: No acute events overnight, however pt with increasing oxygen requirements yesterday.  She still reports fatigue.    Review of Systems   Constitutional: Positive for activity change, appetite change and fatigue. Negative for chills and fever.   HENT: Negative for rhinorrhea, sinus pressure and sore throat.    Eyes: Negative for photophobia and visual disturbance.   Respiratory: Positive for shortness of breath. Negative for cough.    Cardiovascular: Negative for chest pain, palpitations and leg swelling.   Gastrointestinal: Negative for abdominal distention, abdominal pain, constipation, diarrhea, nausea and vomiting.   Endocrine: Negative for polydipsia and polyuria.   Genitourinary: Negative for dysuria, frequency and urgency.   Musculoskeletal: Positive for back pain. Negative for neck pain and neck stiffness.   Neurological: Negative for dizziness and headaches.   Psychiatric/Behavioral: Negative for agitation and confusion.     Objective:     Vital Signs (Most Recent):  Temp: 98.6 °F (37 °C) (12/25/18 1149)  Pulse: 104 (12/25/18 1149)  Resp: 20 (12/25/18 1149)  BP: 137/61 (12/25/18 1149)  SpO2: (!) 93 %(2L) (12/25/18 1152) Vital Signs (24h Range):  Temp:  [97.9 °F (36.6 °C)-99.4 °F (37.4 °C)] 98.6 °F (37 °C)  Pulse:  [] 104  Resp:  [14-20]  20  SpO2:  [92 %-96 %] 93 %  BP: (130-158)/(61-83) 137/61     Weight: 103.9 kg (229 lb)  Body mass index is 43.27 kg/m².    Intake/Output Summary (Last 24 hours) at 12/25/2018 1354  Last data filed at 12/25/2018 1331  Gross per 24 hour   Intake 360 ml   Output 1900 ml   Net -1540 ml      Physical Exam   Constitutional: She is oriented to person, place, and time. She appears well-developed and well-nourished.   HENT:   Head: Normocephalic and atraumatic.   Eyes: EOM are normal. Pupils are equal, round, and reactive to light.   Neck: Normal range of motion. Neck supple.   Cardiovascular: Normal rate and regular rhythm.   Pulmonary/Chest: Effort normal. No respiratory distress. She has decreased breath sounds in the right lower field and the left lower field. She has no wheezes.   Abdominal: Soft. Bowel sounds are normal. There is no tenderness.   Musculoskeletal: Normal range of motion.   Neurological: She is alert and oriented to person, place, and time.   Skin: Skin is warm and dry.   Vitals reviewed.      Significant Labs:   ABGs:   Recent Labs   Lab 12/25/18  1054   PH 7.420   PCO2 43.4   HCO3 28.2*   POCSATURATED 97   BE 4     CBC:   Recent Labs   Lab 12/24/18  0553 12/25/18  0428   WBC 8.70 8.68   HGB 13.2 13.7   HCT 42.7 44.3    257     CMP:   Recent Labs   Lab 12/24/18  0553 12/25/18  0428    139   K 3.9 4.5    99   CO2 28 29   * 220*   BUN 27* 22   CREATININE 1.3 1.3   CALCIUM 8.9 8.8   PROT 6.9  --    ALBUMIN 3.0*  --    BILITOT 0.4  --    ALKPHOS 100  --    AST 18  --    ALT 33  --    ANIONGAP 10 11   EGFRNONAA 41.4* 41.4*     Magnesium:   Recent Labs   Lab 12/24/18  0553   MG 2.3     All pertinent labs within the past 24 hours have been reviewed.    Significant Imaging: I have reviewed all pertinent imaging results/findings within the past 24 hours.    Assessment/Plan:      * Respiratory failure with hypoxia    Acute hypoxic respiratory failure.  Does not use oxygen at baseline.   No known history of COPD. Remote smoking history.      - Pt endorses SOB on exertion for ~2 years  - Pt SpO2 in mid 80s on admit, 95% on 3l O2 via NC, drops to 89% while speaking, oxygen requirement continues to increae  - Previous CT chest 9/18 negative for PE  - Echo was unrevealing for heart failure  - D dimer elevated (12/24)  - CTA chest (12/24) negative for PE  - Wean supplementary oxygen for SpO2 >92%  - Continue duonebs q4h scheduled while awake  - Pulmonology consulted for recommendations     Cardiac enzymes elevated    - Troponin downtrended, EKG without ST elevations  - likely cardiac strain 2/2 hypoxic respiratory failure  - new segmental wall motion abnormalities on 12/23 echo which were not present on 9/10 echo, outpatient referral for outpatient ischemic work up ordered.       Depression    - Continue home Celexa       GERMÁN (acute kidney injury)    - On admission, BUN/Cr 48/2.3  - Baseline Cr 1.2-1.4  - Received 1L NS in ED  - UTI on UA, 3+ leukocytes, no urinary symptoms, received 1 dose ceftriaxone in ED, no suprapubic or flank tenderness  - Likely related to dehydration due to decreased PO intake, no signs of urinary obstruction/pyelonephritis.  Improving.       Hypothyroidism (acquired)    - Continue home levothyroxine 75mcg po qAM       Type 2 diabetes mellitus with chronic kidney disease, with long-term current use of insulin    -  on admit, pt states that this is elevated due to increased soft drink consumption during last week   - Home insulin regimen is 30 Levemir qhs, 8 units Novolog TID with meals, Trulicity weekly  - Increase insulin detemir from 18u to 20u daily  - Continue insulin aspart 6u qAC  - Continue LDSSI and adjust scheduled PRN     GERD (gastroesophageal reflux disease)    - Continue home pantoprazole 40mg po daily       Acute cystitis without hematuria    - Pt denies urinary symptoms   - UA done on routine work up of fever shows bactiuria and 3+ leukocytes  - Continue  Ceftriaxone while inpatient, 1g IV q24  - UCx (12/24) with GNR, pending speciation       Hyperlipemia    - Continue home atorvastatin 40mg po daily       Essential hypertension    - Discontinue Losartan/HCTZ combination pill on 12/25 to titrate up dose  - Continue home amlodipine 10mg po daily  - Continue Losartan 100mg po daily  - Increase HCTZ from 25mg to 50mg po daily       VTE Risk Mitigation (From admission, onward)        Ordered     heparin (porcine) injection 5,000 Units  Every 8 hours      12/23/18 0018     IP VTE HIGH RISK PATIENT  Once      12/23/18 0018              Elia Moreno MD  Department of Hospital Medicine   [facility]

## 2018-12-25 NOTE — PLAN OF CARE
Problem: Adult Inpatient Plan of Care  Goal: Plan of Care Review  Slept most of this shift. Sat greater thatn 92 % other VSS. No complain of pain voiced. No falls.

## 2018-12-25 NOTE — ASSESSMENT & PLAN NOTE
- Pt denies urinary symptoms   - UA done on routine work up of fever shows bactiuria and 3+ leukocytes  - Continue Ceftriaxone while inpatient, 1g IV q24  - UCx (12/24) with GNR, pending speciation

## 2018-12-25 NOTE — ASSESSMENT & PLAN NOTE
Acute hypoxic respiratory failure.  Does not use oxygen at baseline.  No known history of COPD. Remote smoking history.      - Pt endorses SOB on exertion for ~2 years  - Pt SpO2 in mid 80s on admit, 95% on 3l O2 via NC, drops to 89% while speaking, oxygen requirement continues to increae  - Previous CT chest 9/18 negative for PE  - Echo was unrevealing for heart failure  - D dimer elevated (12/24)  - CTA chest (12/24) negative for PE  - Wean supplementary oxygen for SpO2 >92%  - Continue duonebs q4h scheduled while awake  - Pulmonology consulted for recommendations

## 2018-12-25 NOTE — PLAN OF CARE
Problem: Adult Inpatient Plan of Care  Goal: Plan of Care Review  Outcome: Ongoing (interventions implemented as appropriate)  POC reviewed with patient; patient verbalizes understanding. Reinforcement may be needed. Compliant with medication administration regimen. Patient autonomously uses incentive spirometer. Elevated blood pressure maintained with blood pressure medication. O2 weaning successful @ this time. No s/sx of pain/distress noted. Denies pain. Safety precautions in place; call light within reach, bed in low position, wheels locked, side rails up x2. Will continue to monitor.

## 2018-12-25 NOTE — SUBJECTIVE & OBJECTIVE
Interval History: No acute events overnight, however pt with increasing oxygen requirements yesterday.  She still reports fatigue.    Review of Systems   Constitutional: Positive for activity change, appetite change and fatigue. Negative for chills and fever.   HENT: Negative for rhinorrhea, sinus pressure and sore throat.    Eyes: Negative for photophobia and visual disturbance.   Respiratory: Positive for shortness of breath. Negative for cough.    Cardiovascular: Negative for chest pain, palpitations and leg swelling.   Gastrointestinal: Negative for abdominal distention, abdominal pain, constipation, diarrhea, nausea and vomiting.   Endocrine: Negative for polydipsia and polyuria.   Genitourinary: Negative for dysuria, frequency and urgency.   Musculoskeletal: Positive for back pain. Negative for neck pain and neck stiffness.   Neurological: Negative for dizziness and headaches.   Psychiatric/Behavioral: Negative for agitation and confusion.     Objective:     Vital Signs (Most Recent):  Temp: 98.6 °F (37 °C) (12/25/18 1149)  Pulse: 104 (12/25/18 1149)  Resp: 20 (12/25/18 1149)  BP: 137/61 (12/25/18 1149)  SpO2: (!) 93 %(2L) (12/25/18 1152) Vital Signs (24h Range):  Temp:  [97.9 °F (36.6 °C)-99.4 °F (37.4 °C)] 98.6 °F (37 °C)  Pulse:  [] 104  Resp:  [14-20] 20  SpO2:  [92 %-96 %] 93 %  BP: (130-158)/(61-83) 137/61     Weight: 103.9 kg (229 lb)  Body mass index is 43.27 kg/m².    Intake/Output Summary (Last 24 hours) at 12/25/2018 1354  Last data filed at 12/25/2018 1331  Gross per 24 hour   Intake 360 ml   Output 1900 ml   Net -1540 ml      Physical Exam   Constitutional: She is oriented to person, place, and time. She appears well-developed and well-nourished.   HENT:   Head: Normocephalic and atraumatic.   Eyes: EOM are normal. Pupils are equal, round, and reactive to light.   Neck: Normal range of motion. Neck supple.   Cardiovascular: Normal rate and regular rhythm.   Pulmonary/Chest: Effort normal. No  respiratory distress. She has decreased breath sounds in the right lower field and the left lower field. She has no wheezes.   Abdominal: Soft. Bowel sounds are normal. There is no tenderness.   Musculoskeletal: Normal range of motion.   Neurological: She is alert and oriented to person, place, and time.   Skin: Skin is warm and dry.   Vitals reviewed.      Significant Labs:   ABGs:   Recent Labs   Lab 12/25/18  1054   PH 7.420   PCO2 43.4   HCO3 28.2*   POCSATURATED 97   BE 4     CBC:   Recent Labs   Lab 12/24/18  0553 12/25/18  0428   WBC 8.70 8.68   HGB 13.2 13.7   HCT 42.7 44.3    257     CMP:   Recent Labs   Lab 12/24/18  0553 12/25/18  0428    139   K 3.9 4.5    99   CO2 28 29   * 220*   BUN 27* 22   CREATININE 1.3 1.3   CALCIUM 8.9 8.8   PROT 6.9  --    ALBUMIN 3.0*  --    BILITOT 0.4  --    ALKPHOS 100  --    AST 18  --    ALT 33  --    ANIONGAP 10 11   EGFRNONAA 41.4* 41.4*     Magnesium:   Recent Labs   Lab 12/24/18  0553   MG 2.3     All pertinent labs within the past 24 hours have been reviewed.    Significant Imaging: I have reviewed all pertinent imaging results/findings within the past 24 hours.

## 2018-12-25 NOTE — ASSESSMENT & PLAN NOTE
-  on admit, pt states that this is elevated due to increased soft drink consumption during last week   - Home insulin regimen is 30 Levemir qhs, 8 units Novolog TID with meals, Trulicity weekly  - Increase insulin detemir from 18u to 20u daily  - Continue insulin aspart 6u qAC  - Continue LDSSI and adjust scheduled PRN

## 2018-12-25 NOTE — ASSESSMENT & PLAN NOTE
- Discontinue Losartan/HCTZ combination pill on 12/25 to titrate up dose  - Continue home amlodipine 10mg po daily  - Continue Losartan 100mg po daily  - Increase HCTZ from 25mg to 50mg po daily

## 2018-12-25 NOTE — ASSESSMENT & PLAN NOTE
- Troponin downtrended, EKG without ST elevations  - likely cardiac strain 2/2 hypoxic respiratory failure  - new segmental wall motion abnormalities on 12/23 echo which were not present on 9/10 echo, outpatient referral for outpatient ischemic work up ordered.

## 2018-12-26 VITALS
TEMPERATURE: 99 F | SYSTOLIC BLOOD PRESSURE: 174 MMHG | HEIGHT: 61 IN | HEART RATE: 114 BPM | RESPIRATION RATE: 17 BRPM | OXYGEN SATURATION: 92 % | DIASTOLIC BLOOD PRESSURE: 73 MMHG | BODY MASS INDEX: 43.23 KG/M2 | WEIGHT: 229 LBS

## 2018-12-26 PROBLEM — N17.9 AKI (ACUTE KIDNEY INJURY): Status: RESOLVED | Noted: 2018-12-22 | Resolved: 2018-12-26

## 2018-12-26 LAB
ANION GAP SERPL CALC-SCNC: 9 MMOL/L
BACTERIA UR CULT: NORMAL
BUN SERPL-MCNC: 26 MG/DL
CALCIUM SERPL-MCNC: 9.6 MG/DL
CHLORIDE SERPL-SCNC: 101 MMOL/L
CO2 SERPL-SCNC: 30 MMOL/L
CREAT SERPL-MCNC: 1.4 MG/DL
ERYTHROCYTE [DISTWIDTH] IN BLOOD BY AUTOMATED COUNT: 12.9 %
EST. GFR  (AFRICAN AMERICAN): 43.6 ML/MIN/1.73 M^2
EST. GFR  (NON AFRICAN AMERICAN): 37.8 ML/MIN/1.73 M^2
GLUCOSE SERPL-MCNC: 238 MG/DL
HCT VFR BLD AUTO: 44.8 %
HGB BLD-MCNC: 13.3 G/DL
MCH RBC QN AUTO: 30.3 PG
MCHC RBC AUTO-ENTMCNC: 29.7 G/DL
MCV RBC AUTO: 102 FL
PHOSPHATE SERPL-MCNC: 3.6 MG/DL
PLATELET # BLD AUTO: 221 K/UL
PMV BLD AUTO: 11.8 FL
POCT GLUCOSE: 217 MG/DL (ref 70–110)
POCT GLUCOSE: 236 MG/DL (ref 70–110)
POTASSIUM SERPL-SCNC: 4.6 MMOL/L
RBC # BLD AUTO: 4.39 M/UL
SODIUM SERPL-SCNC: 140 MMOL/L
WBC # BLD AUTO: 11.22 K/UL

## 2018-12-26 PROCEDURE — 25000003 PHARM REV CODE 250: Performed by: STUDENT IN AN ORGANIZED HEALTH CARE EDUCATION/TRAINING PROGRAM

## 2018-12-26 PROCEDURE — 94640 AIRWAY INHALATION TREATMENT: CPT

## 2018-12-26 PROCEDURE — S5571 INSULIN DISPOS PEN 3 ML: HCPCS | Performed by: STUDENT IN AN ORGANIZED HEALTH CARE EDUCATION/TRAINING PROGRAM

## 2018-12-26 PROCEDURE — 63600175 PHARM REV CODE 636 W HCPCS: Performed by: STUDENT IN AN ORGANIZED HEALTH CARE EDUCATION/TRAINING PROGRAM

## 2018-12-26 PROCEDURE — 85027 COMPLETE CBC AUTOMATED: CPT

## 2018-12-26 PROCEDURE — 84100 ASSAY OF PHOSPHORUS: CPT

## 2018-12-26 PROCEDURE — 99238 HOSP IP/OBS DSCHRG MGMT 30/<: CPT | Mod: ,,, | Performed by: HOSPITALIST

## 2018-12-26 PROCEDURE — 25000242 PHARM REV CODE 250 ALT 637 W/ HCPCS: Performed by: STUDENT IN AN ORGANIZED HEALTH CARE EDUCATION/TRAINING PROGRAM

## 2018-12-26 PROCEDURE — 36415 COLL VENOUS BLD VENIPUNCTURE: CPT

## 2018-12-26 PROCEDURE — 80048 BASIC METABOLIC PNL TOTAL CA: CPT

## 2018-12-26 RX ORDER — CARVEDILOL 3.12 MG/1
3.12 TABLET ORAL 2 TIMES DAILY
Status: DISCONTINUED | OUTPATIENT
Start: 2018-12-26 | End: 2018-12-26 | Stop reason: HOSPADM

## 2018-12-26 RX ORDER — CARVEDILOL 3.12 MG/1
3.12 TABLET ORAL 2 TIMES DAILY
Qty: 60 TABLET | Refills: 3 | Status: SHIPPED | OUTPATIENT
Start: 2018-12-26 | End: 2019-01-08 | Stop reason: SDUPTHER

## 2018-12-26 RX ORDER — HEPARIN SODIUM 5000 [USP'U]/ML
7500 INJECTION, SOLUTION INTRAVENOUS; SUBCUTANEOUS EVERY 8 HOURS
Status: DISCONTINUED | OUTPATIENT
Start: 2018-12-26 | End: 2018-12-26 | Stop reason: HOSPADM

## 2018-12-26 RX ORDER — LOSARTAN POTASSIUM AND HYDROCHLOROTHIAZIDE 25; 100 MG/1; MG/1
1 TABLET ORAL DAILY
Status: DISCONTINUED | OUTPATIENT
Start: 2018-12-27 | End: 2018-12-26 | Stop reason: HOSPADM

## 2018-12-26 RX ORDER — ALBUTEROL SULFATE 0.63 MG/3ML
0.63 SOLUTION RESPIRATORY (INHALATION) 3 TIMES DAILY PRN
Qty: 75 ML | Refills: 0 | Status: SHIPPED | OUTPATIENT
Start: 2018-12-26 | End: 2020-03-12 | Stop reason: SDUPTHER

## 2018-12-26 RX ADMIN — LOSARTAN POTASSIUM 100 MG: 50 TABLET ORAL at 08:12

## 2018-12-26 RX ADMIN — CARVEDILOL 3.12 MG: 3.12 TABLET, FILM COATED ORAL at 11:12

## 2018-12-26 RX ADMIN — LEVOTHYROXINE SODIUM 75 MCG: 75 TABLET ORAL at 05:12

## 2018-12-26 RX ADMIN — INSULIN DETEMIR 20 UNITS: 100 INJECTION, SOLUTION SUBCUTANEOUS at 08:12

## 2018-12-26 RX ADMIN — IPRATROPIUM BROMIDE AND ALBUTEROL SULFATE 3 ML: .5; 3 SOLUTION RESPIRATORY (INHALATION) at 09:12

## 2018-12-26 RX ADMIN — INSULIN ASPART 2 UNITS: 100 INJECTION, SOLUTION INTRAVENOUS; SUBCUTANEOUS at 08:12

## 2018-12-26 RX ADMIN — INSULIN ASPART 6 UNITS: 100 INJECTION, SOLUTION INTRAVENOUS; SUBCUTANEOUS at 12:12

## 2018-12-26 RX ADMIN — INSULIN ASPART 6 UNITS: 100 INJECTION, SOLUTION INTRAVENOUS; SUBCUTANEOUS at 08:12

## 2018-12-26 RX ADMIN — CITALOPRAM HYDROBROMIDE 20 MG: 20 TABLET ORAL at 08:12

## 2018-12-26 RX ADMIN — PANTOPRAZOLE SODIUM 40 MG: 40 TABLET, DELAYED RELEASE ORAL at 08:12

## 2018-12-26 RX ADMIN — HYDROCHLOROTHIAZIDE 50 MG: 25 TABLET ORAL at 08:12

## 2018-12-26 RX ADMIN — AMLODIPINE BESYLATE 10 MG: 10 TABLET ORAL at 08:12

## 2018-12-26 RX ADMIN — CEFTRIAXONE 1 G: 1 INJECTION, POWDER, FOR SOLUTION INTRAMUSCULAR; INTRAVENOUS at 04:12

## 2018-12-26 RX ADMIN — ATORVASTATIN CALCIUM 40 MG: 20 TABLET, FILM COATED ORAL at 08:12

## 2018-12-26 RX ADMIN — HEPARIN SODIUM 5000 UNITS: 5000 INJECTION, SOLUTION INTRAVENOUS; SUBCUTANEOUS at 05:12

## 2018-12-26 RX ADMIN — IPRATROPIUM BROMIDE AND ALBUTEROL SULFATE 3 ML: .5; 3 SOLUTION RESPIRATORY (INHALATION) at 11:12

## 2018-12-26 NOTE — ASSESSMENT & PLAN NOTE
Acute hypoxic respiratory failure.  Does not use oxygen at baseline.  No known history of COPD.  Remote smoking history.      - Pt endorses dyspnea on exertion for ~2 years  - Pt SpO2 in mid 80s on admit, 95% on 3l O2 via NC, drops to 89% while speaking, oxygen requirement continues to increae  - Previous CT chest 9/18 negative for PE  - Echo was unrevealing for heart failure  - D dimer elevated (12/24)  - CTA chest (12/24) negative for PE  - Weaned supplementary oxygen for SpO2 >92%  - Continue duonebs q4h scheduled while inpatient  - Discharge home with albuterol inhaler TID PRN  - Follow up with PCP and have PFTs done as outpatient

## 2018-12-26 NOTE — NURSING
DC information given to patient and patient's daughter; both verbalize understanding. IVs and Telemetry DCed; patient tolerated well. Awaiting Transport via WC.

## 2018-12-26 NOTE — PLAN OF CARE
[facility]    HOME HEALTH ORDERS  FACE TO FACE ENCOUNTER    Patient Name: Melinda Knight  YOB: 1945    PCP: Duke Cole MD   PCP Address: [address] / [place] [address]  PCP Phone Number: [phone]  PCP Fax: [phone]    Encounter Date: 12/26/2018    Admit to Home Health    Diagnoses:  Active Hospital Problems    Diagnosis  POA    *Respiratory failure with hypoxia [J96.91]  Yes    Abnormal ventricular wall motion [R93.89]  Unknown    Cardiac enzymes elevated [R74.8]  Yes    GERMÁN (acute kidney injury) [N17.9]  Yes    Hypothyroidism (acquired) [E03.9]  Yes     Chronic    Type 2 diabetes mellitus with chronic kidney disease, with long-term current use of insulin [E11.22, Z79.4]  Not Applicable    GERD (gastroesophageal reflux disease) [K21.9]  Yes    Acute cystitis without hematuria [N30.00]  Yes    CKD (chronic kidney disease) stage 3, GFR 30-59 ml/min [N18.3]  Yes    Essential hypertension [I10]  Yes    Hyperlipemia [E78.5]  Yes    Morbid obesity with BMI of 40.0-44.9, adult [E66.01, Z68.41]  Not Applicable     Chronic      Resolved Hospital Problems   No resolved problems to display.       No future appointments.  Follow-up Information     Schedule an appointment as soon as possible for a visit with Duke Cole MD.    Specialty:  Family Medicine  Contact information:  [address]  [address]  [place] [address]  [phone]                     I have seen and examined this patient face to face today. My clinical findings that support the need for the home health skilled services and home bound status are the following:  Weakness/numbness causing balance and gait disturbance due to Infection and Weakness/Debility making it taxing to leave home.  Requiring assistive device to leave home due to unsteady gait caused by  Infection and Weakness/Debility.  Patient with medication mismanagement issues requiring home bound status  as evidenced by  Unstable vital signs (blood pressure, heart rate), Poor understanding of medication regimen/dosage, Poor adherence to medication regimen/dosage and Uncontrolled hyperglycemic/hypoglycemic events.  Medical restrictions requiring assistance of another human to leave home due to  Dyspnea on exertion (SOB) and Unstable ambulation.    Allergies:  Review of patient's allergies indicates:   Allergen Reactions    Sulfa (sulfonamide antibiotics) Nausea And Vomiting    Menthol contain prod        Diet: diabetic diet: 2000 calorie    Activities: activity as tolerated and ambulate in house with assistance    Nursing:   SN to complete comprehensive assessment including routine vital signs. Instruct on disease process and s/s of complications to report to MD. Review/verify medication list sent home with the patient at time of discharge  and instruct patient/caregiver as needed. Frequency may be adjusted depending on start of care date.    Notify MD if SBP > 160 or < 90; DBP > 90 or < 50; HR > 120 or < 50; Temp > 101      CONSULTS:    Physical Therapy to evaluate and treat. Evaluate for home safety and equipment needs; Establish/upgrade home exercise program. Perform / instruct on therapeutic exercises, gait training, transfer training, and Range of Motion.  Occupational Therapy to evaluate and treat. Evaluate home environment for safety and equipment needs. Perform/Instruct on transfers, ADL training, ROM, and therapeutic exercises.   to evaluate for community resources/long-range planning.  Aide to provide assistance with personal care, ADLs, and vital signs.    MISCELLANEOUS CARE:  Diabetic Care:   SN to perform and educate Diabetic management with blood glucose monitoring:, Fingerstick blood sugar AC and HS and Report CBG < 60 or > 350 to physician.    WOUND CARE ORDERS  n/a      Medications: Review discharge medications with patient and family and provide education.      Current Discharge  Medication List      START taking these medications    Details   albuterol (ACCUNEB) 0.63 mg/3 mL Nebu Take 3 mLs (0.63 mg total) by nebulization 3 (three) times daily as needed. Rescue  Qty: 1 Box, Refills: 0      carvedilol (COREG) 3.125 MG tablet Take 1 tablet (3.125 mg total) by mouth 2 (two) times daily.  Qty: 60 tablet, Refills: 3         CONTINUE these medications which have CHANGED    Details   dulaglutide (TRULICITY) 1.5 mg/0.5 mL PnIj Inject 1.5 mg into the skin every 7 days. (Mondays)    Comments: Please consider 90 day supplies to promote better adherence  Associated Diagnoses: Type 2 diabetes mellitus with diabetic polyneuropathy, with long-term current use of insulin         CONTINUE these medications which have NOT CHANGED    Details   amLODIPine (NORVASC) 10 MG tablet Take 1 tablet (10 mg total) by mouth once daily.  Qty: 90 tablet, Refills: 3      aspirin 325 MG tablet Take 325 mg by mouth every evening.       atorvastatin (LIPITOR) 40 MG tablet TAKE ONE TABLET BY MOUTH ONCE DAILY  Qty: 90 tablet, Refills: 3      blood sugar diagnostic (BLOOD GLUCOSE TEST) Strp 1 strip by Misc.(Non-Drug; Combo Route) route 2 (two) times daily. Prodigy  Qty: 200 strip, Refills: 5    Associated Diagnoses: Type 2 diabetes mellitus with diabetic polyneuropathy, with long-term current use of insulin      citalopram (CELEXA) 20 MG tablet Take 1 tablet (20 mg total) by mouth once daily.  Qty: 90 tablet, Refills: 3      dicyclomine (BENTYL) 20 mg tablet Take 1 tablet (20 mg total) by mouth daily as needed (GI cramps).  Qty: 90 tablet, Refills: 1      diphenoxylate-atropine 2.5-0.025 mg (LOMOTIL) 2.5-0.025 mg per tablet Take 1 tablet by mouth every 6 (six) hours as needed.  Qty: 30 tablet, Refills: 0      GREEN TEA EXTRACT ORAL Take 1 tablet by mouth once daily.      HYDROcodone-acetaminophen (NORCO) 7.5-325 mg per tablet Take 1 tablet by mouth every 8 (eight) hours as needed for Pain.  Qty: 60 tablet, Refills: 0     "Associated Diagnoses: Polyarticular osteoarthritis      LEVEMIR FLEXTOUCH U-100 INSULN 100 unit/mL (3 mL) InPn pen INJECT 30 UNITS SUBCUTANEOUSLY ONCE DAILY AT BEDTIME  Qty: 15 mL, Refills: 11    Comments: Please consider 90 day supplies to promote better adherence      levothyroxine (SYNTHROID) 75 MCG tablet TAKE ONE TABLET BY MOUTH ONCE DAILY  Qty: 90 tablet, Refills: 3      losartan-hydrochlorothiazide 100-25 mg (HYZAAR) 100-25 mg per tablet Take 1 tablet by mouth once daily.  Qty: 90 tablet, Refills: 1    Associated Diagnoses: Essential hypertension      MULTIVITAMIN ORAL Take 1 tablet by mouth once daily. [place] Diabetes Pack       NOVOLOG FLEXPEN U-100 INSULIN 100 unit/mL InPn pen INJECT 8 UNITS SUBCUTANEOUSLY TWICE DAILY WITH MEALS  Qty: 15 mL, Refills: 11      ondansetron (ZOFRAN-ODT) 4 MG TbDL DISSOLVE ONE TABLET UNDER THE TONGUE EVERY 6 HOURS AS NEEDED FOR NAUSEA  Qty: 20 tablet, Refills: 2    Comments: Please consider 90 day supplies to promote better adherence      pantoprazole (PROTONIX) 40 MG tablet Take 1 tablet (40 mg total) by mouth once daily.  Qty: 90 tablet, Refills: 0    Associated Diagnoses: Gastritis without bleeding, unspecified chronicity, unspecified gastritis type      pen needle, diabetic (NOVOTWIST) 32 gauge x 1/5" Ndle Use with insulin pen as directed  Qty: 200 each, Refills: 3         STOP taking these medications       cinnamon bark (CINNAMON ORAL) Comments:   Reason for Stopping:         diphenhydramine HCl (ALLERGY MEDICINE ORAL) Comments:   Reason for Stopping:         tiZANidine (ZANAFLEX) 4 MG tablet Comments:   Reason for Stopping:               I certify that this patient is confined to her home and needs intermittent skilled nursing care, physical therapy and occupational therapy.      "

## 2018-12-26 NOTE — ASSESSMENT & PLAN NOTE
- Continue home amlodipine 10mg po daily  - Continue home Losartan-HCTZ 100-25mg po daily  - Start carvedilol 3.125mg po BID

## 2018-12-26 NOTE — ASSESSMENT & PLAN NOTE
- Pt denies urinary symptoms   - UA done on routine work up of fever shows bactiuria and 3+ leukocytes  - UCx (12/24) with GNR, pan-sensitive Morganella morganii  - Pt s/p Rocephin 1g IV q24h x5 days (12/22-12/26)

## 2018-12-26 NOTE — PLAN OF CARE
HH orders sent to [facility] ([phone]) via [facility] with request to call  with name of company for our documentation.      Krysta Hodges LMSW

## 2018-12-26 NOTE — PLAN OF CARE
Problem: Adult Inpatient Plan of Care  Goal: Plan of Care Review  Outcome: Outcome(s) achieved Date Met: 12/26/18  Wean off oxygen O2 Sat 96% on room air. Encourage patient to Turn cough and take deep breathes every 2 hours when awake. To help increase lung movement . VSS. denies any complaint this shift. NO falls. VOiding without any problems

## 2018-12-26 NOTE — DISCHARGE SUMMARY
"[facility] Hospital Medicine  Discharge Summary      Patient Name: Melinda Knight  MRN: [id number]  Admission Date: 12/22/2018  Hospital Length of Stay: 4 days  Discharge Date and Time:  12/26/2018 1:09 PM  Attending Physician: Debbie Abad MD   Discharging Provider: Elia Moreno MD  Primary Care Provider: Duke Cole MD  [facility] Medicine Team: [facility] HOSP MED 1 Elia Moreno MD    HPI:   Melinda Knight is a 73 year old female with a medical history significant for hypothyroidism, CAD, HTN, HLD and DM2 who presents to ED for evaluation of 1 week of generalized malaise and loss of appetite. Pt states that since 12/17 she has noted increasing malaise and "feeling hot". Pt states that she did no take her temperature at the time. Pt denies chills. Pt also complains of decreased appetite. Pt state that she still feels hungry and wants to eat but does not eat due to associated postprandial nausea and discomfort. Pt also endorses SOB with exertion. Pt states that she has noted increasing SOB over the past 2 years. Pt states that she is unable to walk up stairs or walk more then 5 feet without feeling SOB. Pt states that she uses a walker at home. Pt states denies home O2 but daughter states that cardiologists at an outside hospital had discussed it (SpO2 on admit mid 80s, 95% on 3L O2 via NC in ED). Pt states that she sleeps with her head elevated due to feeling SOB when lying flat.     Pt endorses SOB, orthopnea, fever, nausea/vomiting and decreased appetitie. Pt denies CP, chills, ABD pain/distension, swelling in legs, dysuria or changes in bowel or bladder habits.     In ED, Pt was noted to be febrile to 100.9 which resolved with tylenol. During initial work up, BNP was elevated to 970, troponin was 0.621. EKG did not show ST changes. Last echo was 9/18, EF was normal and pt was noted to have mild aortic stenosis. UA in ED showed 3+ leukocytes and many bacteria, she received 1 dose of " Ceftriaxone. A CXR showed subsegmental atelectasis in the right lung base but no signs of effusion or pneumonia.    * No surgery found *      Hospital Course:   Melinda Knight was admitted for acute hypoxic respiratory failure requiring supplemental oxygen via nasal cannula, whereas she does not use oxygen at baseline at home, does not have a known history of COPD, and reports that she used to smoke about a cigarette a day for 5 years decades ago).  As pt with vague constitutional symptoms of fatigue and UA on admission with 3+ leukocytes and many bacteria, pt was started on IV ceftriaxone for presumed UTI.  TTE was performed and did not reveal underlying heart failure or valvular disease, however pt's supplementary oxygen requirement was increasing. A D-dimer was obtained and elevated, so CTA chest was ordered to look for PE.  As there was no evidence of PE on CTA, and pt was still requiring supplementary oxygen, scheduled duonebs were started.  Pt was also instructed to utilize incentive spirometer at bedside.  Pt's respiratory status improved and she was able to be weaned off of supplementary oxygen.  On 12/26, she was deemed stable for discharge home with Home Health PT/OT, as well as instructions to follow up with her PCP and undergo pulmonary function testing.     Consults:   Consults (From admission, onward)        Status Ordering Provider     IP consult to case management  Once     Provider:  (Not yet assigned)    Acknowledged AL BRUNO        Physical Exam   Constitutional: She is oriented to person, place, and time. No distress.   HENT:   Head: Normocephalic and atraumatic.   Eyes: EOM are normal. Pupils are equal, round, and reactive to light.   Neck: Normal range of motion. Neck supple.   Cardiovascular: Normal rate, regular rhythm and normal heart sounds.   Pulmonary/Chest: Effort normal and breath sounds normal. No respiratory distress.   On room air   Abdominal: Soft. Bowel sounds are normal.  There is no tenderness.   Musculoskeletal: She exhibits no edema or tenderness.   Neurological: She is alert and oriented to person, place, and time.   Skin: Skin is warm and dry. She is not diaphoretic.       * Respiratory failure with hypoxia    Acute hypoxic respiratory failure.  Does not use oxygen at baseline.  No known history of COPD.  Remote smoking history.      - Pt endorses dyspnea on exertion for ~2 years  - Pt SpO2 in mid 80s on admit, 95% on 3l O2 via NC, drops to 89% while speaking, oxygen requirement continues to increae  - Previous CT chest 9/18 negative for PE  - Echo was unrevealing for heart failure  - D dimer elevated (12/24)  - CTA chest (12/24) negative for PE  - Weaned supplementary oxygen for SpO2 >92%  - Continue duonebs q4h scheduled while inpatient  - Discharge home with albuterol inhaler TID PRN  - Follow up with PCP and have PFTs done as outpatient     Hypothyroidism (acquired)    - TSH 0.378  /  fT4 1.04  on 12/22  - Continue home levothyroxine 75mcg po qAM       Type 2 diabetes mellitus with chronic kidney disease, with long-term current use of insulin    -  on admit, pt states that this is elevated due to increased soft drink consumption during last week   - Home insulin regimen is 30 Levemir qhs, 8 units Novolog TID with meals, Trulicity weekly  - Continue insulin detemir 20u daily  - Continue insulin aspart 6u qAC  - Continue LDSSI and adjust scheduled PRN  - Resume home regimen upon discharge     GERD (gastroesophageal reflux disease)    - Continue home pantoprazole 40mg po daily       Acute cystitis without hematuria    - Pt denies urinary symptoms   - UA done on routine work up of fever shows bactiuria and 3+ leukocytes  - UCx (12/24) with GNR, pan-sensitive Morganella morganii  - Pt s/p Rocephin 1g IV q24h x5 days (12/22-12/26)     Hyperlipemia    - Continue home atorvastatin 40mg po daily       Essential hypertension    - Continue home amlodipine 10mg po daily  -  "Continue home Losartan-HCTZ 100-25mg po daily  - Start carvedilol 3.125mg po BID     GERMÁN (acute kidney injury)-resolved as of 12/26/2018    - On admission, BUN/Cr 48/2.3  - Baseline Cr 1.2-1.4  - Received 1L NS in ED  - UTI on UA, 3+ leukocytes, no urinary symptoms, received 1 dose ceftriaxone in ED, no suprapubic or flank tenderness  - Likely related to dehydration due to decreased PO intake, no signs of urinary obstruction/pyelonephritis.  Improving.         Final Active Diagnoses:    Diagnosis Date Noted POA    PRINCIPAL PROBLEM:  Respiratory failure with hypoxia [J96.91] 12/23/2018 Yes    Abnormal ventricular wall motion [R93.89] 12/24/2018 Unknown    Cardiac enzymes elevated [R74.8] 12/23/2018 Yes    Hypothyroidism (acquired) [E03.9] 03/29/2016 Yes     Chronic    Type 2 diabetes mellitus with chronic kidney disease, with long-term current use of insulin [E11.22, Z79.4] 03/29/2016 Not Applicable    GERD (gastroesophageal reflux disease) [K21.9] 03/21/2016 Yes    Acute cystitis without hematuria [N30.00] 03/21/2016 Yes    CKD (chronic kidney disease) stage 3, GFR 30-59 ml/min [N18.3] 03/21/2016 Yes    Essential hypertension [I10] 02/24/2016 Yes    Hyperlipemia [E78.5] 02/24/2016 Yes    Morbid obesity with BMI of 40.0-44.9, adult [E66.01, Z68.41] 02/24/2016 Not Applicable     Chronic      Problems Resolved During this Admission:    Diagnosis Date Noted Date Resolved POA    GERMÁN (acute kidney injury) [N17.9] 12/22/2018 12/26/2018 Yes       Discharged Condition: good    Disposition: Home-Health Care [facility]    Follow Up:  Follow-up Information     Schedule an appointment as soon as possible for a visit with Duke Cole MD.    Specialty:  Family Medicine  Contact information:  [address]  [address]  [facility] [address] [phone]                 Patient Instructions:      NEBULIZER FOR HOME USE     Order Specific Question Answer Comments   Height: 5' 1" (1.549 m)    Weight: 103.9 kg (229 lb) "    Does patient have medical equipment at home? shower chair    Does patient have medical equipment at home? walker, rolling    Does patient have medical equipment at home? rollator    Does patient have medical equipment at home? bedside commode    Length of need (1-99 months): 99    Vendor: [facility]    Expected Date of Delivery: 12/26/2018      Hemoglobin   Standing Status: Future Standing Exp. Date: 02/24/20     Ambulatory Referral to Cardiology   Referral Priority: Routine Referral Type: Consultation   Referral Reason: Specialty Services Required   Requested Specialty: Cardiology   Number of Visits Requested: 1     Complete PFT with bronchodilator   Standing Status: Future Standing Exp. Date: 12/26/19       Significant Diagnostic Studies:  Labs:   CMP   Recent Labs   Lab 12/25/18  0428 12/26/18  0925    140   K 4.5 4.6   CL 99 101   CO2 29 30*   * 238*   BUN 22 26*   CREATININE 1.3 1.4   CALCIUM 8.8 9.6   ANIONGAP 11 9   ESTGFRAFRICA 47.7* 43.6*   EGFRNONAA 41.4* 37.8*   , CBC   Recent Labs   Lab 12/25/18  0428 12/26/18  0722   WBC 8.68 11.22   HGB 13.7 13.3   HCT 44.3 44.8    221   , INR   Lab Results   Component Value Date    INR 1.0 03/18/2016   , Lipid Panel   Lab Results   Component Value Date    CHOL 168 09/26/2018    HDL 47 09/26/2018    LDLCALC 99 09/26/2018    TRIG 111 09/26/2018    CHOLHDL 28.0 09/26/2018   , Troponin   Recent Labs   Lab 12/23/18  0821   TROPONINI 0.557*   , A1C:   Recent Labs   Lab 09/10/18  0445 09/26/18  0928 12/22/18  2051   HGBA1C 5.9* 5.9* 6.3*    and All labs within the past 24 hours have been reviewed    Microbiology:   Urine Culture    Lab Results   Component Value Date    LABURIN  12/22/2018     MORGANELLA MORGANII  > 100,000 cfu/ml  No other significant isolate       Radiology: X-Ray: CXR: X-Ray Chest PA and Lateral (CXR):   Results for orders placed or performed during the hospital encounter of 12/22/18   X-Ray Chest PA And Lateral    Narrative     EXAMINATION:  XR CHEST PA AND LATERAL    CLINICAL HISTORY:  Other fatigue    TECHNIQUE:  PA and lateral views of the chest were performed.    COMPARISON:  None.    FINDINGS:  Subsegmental atelectasis right base.    There is no consolidation, effusion, or pneumothorax.    Cardiomediastinal silhouette is unremarkable.    Regional osseous structures are unchanged.      Impression    Subsegmental atelectasis right base.      Electronically signed by: Adeel Higgins MD  Date:    12/22/2018  Time:    22:43     CT scan:   Imaging Results          CTA CHEST NON CORONARY (Final result)  Result time 12/22/18 22:43:41    Final result by Margaux Zimmer MD (12/24/18 23:37)                 Impression:      1. No evidence of pulmonary thromboembolism through the proximal segmental levels noting evaluation for more distal pulmonary embolus, most notably lung bases is limited due to respiratory motion artifact. Correlation with d-dimer and lower extremity venous Doppler ultrasound as clinically warranted.  2. Bibasilar airspace opacities possibly relating to underlying atelectatic versus consolidative change.  Correlation for ongoing infectious process advised.  3. Coronary artery and aortic atherosclerosis.    Electronically signed by: Margaux Zimmer MD  Date:    12/24/2018  Time:    23:37             Narrative:    EXAMINATION:  CTA CHEST NON CORONARY    CLINICAL HISTORY:  Respiratory failure, not elsewhere classified    TECHNIQUE:  Low dose axial images, sagittal and coronal reformations were obtained from the thoracic inlet to the lung bases following the IV administration of 75 mL of Omnipaque 350.  Contrast timing was optimized to evaluate the pulmonary arteries.  MIP images were performed.    COMPARISON:  CTA chest 09/10/2018    FINDINGS:  The visualized soft tissue and vascular structures at the base of the neck are within normal limits.    The thoracic aorta maintains normal caliber, contour, and course with moderate  atherosclerotic calcification within its course.  There is no evidence of aneurysmal dilation or dissection. The heart is not enlarged and there is no evidence of pericardial effusion.  There is calcific atherosclerosis of the coronary vessels.  The esophagus maintains a normal course and caliber.There is no axillary, mediastinal, or hilar lymph node enlargement.    The trachea is midline and proximal airways are patent.  Please note definitive evaluation of pulmonary parenchyma is limited due to respiratory motion artifact most notable at the lung bases.  There is bibasilar atelectatic/consolidative change, the degree of which is more pronounced when compared to prior examination of 09/10/2018.  Correlation for ongoing infectious process advised.    Examination is limited due to significant respiratory motion artifact which limits evaluation for distal pulmonary thromboembolism, most notably at the lung bases.  Allowing for this as well as artifact from dense contrast bolus in the SVC, there is no evidence to suggest pulmonary thromboembolism through the proximal segmental levels.    Visualized structures of the upper abdomen demonstrate postoperative change of prior cholecystectomy.  The visualized osseous structures demonstrate no acute abnormalities.                                Cardiac Graphics: Echocardiogram:   Transthoracic echo (TTE) complete (Cupid Only):   Results for orders placed or performed during the hospital encounter of 12/22/18   Transthoracic echo (TTE) complete (Cupid Only)   Result Value Ref Range    Ascending aorta 2.38 cm    STJ 2.50 cm    AV mean gradient 8.89 mmHg    Ao peak hank 1.99 m/s    Ao VTI 32.48 cm    IVRT 0.08 msec    IVS 1.00 0.6 - 1.1 cm    LA size 3.48 cm    Left Atrium Major Axis 4.96 cm    Left Atrium Minor Axis 5.05 cm    LVIDD 3.36 (A) 3.5 - 6.0 cm    LVIDS 2.48 2.1 - 4.0 cm    LVOT diameter 2.00 cm    LVOT peak VTI 18.47 cm    PW 0.80 0.6 - 1.1 cm    MV Peak A Hank 1.42  m/s    E wave decelartion time 160.10 msec    MV Peak E Hank 1.06 m/s    RA Major Axis 4.44 cm    RA Width 3.69 cm    RVDD 3.28 cm    Sinus 2.85 cm    TAPSE 1.25 cm    TDI LATERAL 0.11     TDI SEPTAL 0.08     LA WIDTH 4.09 cm    LV Diastolic Volume 46.26 mL    LV Systolic Volume 21.80 mL    RV S' 9.76 m/s    LV LATERAL E/E' RATIO 9.64     LV SEPTAL E/E' RATIO 13.25     FS 26 %    LA volume 60.55 cm3    LV mass 83.35 g    Left Ventricle Relative Wall Thickness 0.48 cm    AV valve area 1.79 cm2    AV index (prosthetic) 0.57     E/A ratio 0.75     Mean e' 0.10     LVOT area 3.14 cm2    LVOT stroke volume 58.00 cm3    AV peak gradient 15.84 mmHg    E/E' ratio 11.16     LV Systolic Volume Index 10.9 mL/m2    LV Diastolic Volume Index 23.12 mL/m2    LA Volume Index 30.3 mL/m2    LV Mass Index 41.7 g/m2    BSA 2.11 m2    Right Atrial Pressure (from IVC) 3 mmHg       Pending Diagnostic Studies:     None         Medications:  Reconciled Home Medications:      Medication List      START taking these medications    albuterol 0.63 mg/3 mL Nebu  Commonly known as:  ACCUNEB  Take 3 mLs (0.63 mg total) by nebulization 3 (three) times daily as needed. Rescue     carvedilol 3.125 MG tablet  Commonly known as:  COREG  Take 1 tablet (3.125 mg total) by mouth 2 (two) times daily.        CHANGE how you take these medications    dulaglutide 1.5 mg/0.5 mL Pnij  Commonly known as:  TRULICITY  Inject 1.5 mg into the skin every 7 days. (Mondays)  What changed:  additional instructions        CONTINUE taking these medications    amLODIPine 10 MG tablet  Commonly known as:  NORVASC  Take 1 tablet (10 mg total) by mouth once daily.     aspirin 325 MG tablet  Take 325 mg by mouth every evening.     atorvastatin 40 MG tablet  Commonly known as:  LIPITOR  TAKE ONE TABLET BY MOUTH ONCE DAILY     blood sugar diagnostic Strp  Commonly known as:  BLOOD GLUCOSE TEST  1 strip by Misc.(Non-Drug; Combo Route) route 2 (two) times daily. Prodigy    "  citalopram 20 MG tablet  Commonly known as:  CELEXA  Take 1 tablet (20 mg total) by mouth once daily.     dicyclomine 20 mg tablet  Commonly known as:  BENTYL  Take 1 tablet (20 mg total) by mouth daily as needed (GI cramps).     diphenoxylate-atropine 2.5-0.025 mg 2.5-0.025 mg per tablet  Commonly known as:  LOMOTIL  Take 1 tablet by mouth every 6 (six) hours as needed.     GREEN TEA EXTRACT ORAL  Take 1 tablet by mouth once daily.     HYDROcodone-acetaminophen 7.5-325 mg per tablet  Commonly known as:  NORCO  Take 1 tablet by mouth every 8 (eight) hours as needed for Pain.     LEVEMIR FLEXTOUCH U-100 INSULN 100 unit/mL (3 mL) Inpn pen  Generic drug:  insulin detemir U-100  INJECT 30 UNITS SUBCUTANEOUSLY ONCE DAILY AT BEDTIME     levothyroxine 75 MCG tablet  Commonly known as:  SYNTHROID  TAKE ONE TABLET BY MOUTH ONCE DAILY     losartan-hydrochlorothiazide 100-25 mg 100-25 mg per tablet  Commonly known as:  HYZAAR  Take 1 tablet by mouth once daily.     MULTIVITAMIN ORAL  Take 1 tablet by mouth once daily. [place] Diabetes Pack     NovoLOG Flexpen U-100 Insulin 100 unit/mL Inpn pen  Generic drug:  insulin aspart U-100  INJECT 8 UNITS SUBCUTANEOUSLY TWICE DAILY WITH MEALS     ondansetron 4 MG Tbdl  Commonly known as:  ZOFRAN-ODT  DISSOLVE ONE TABLET UNDER THE TONGUE EVERY 6 HOURS AS NEEDED FOR NAUSEA     pantoprazole 40 MG tablet  Commonly known as:  PROTONIX  Take 1 tablet (40 mg total) by mouth once daily.     pen needle, diabetic 32 gauge x 1/5" Ndle  Commonly known as:  NOVOTWIST  Use with insulin pen as directed        STOP taking these medications    ALLERGY MEDICINE ORAL     CINNAMON ORAL     tiZANidine 4 MG tablet  Commonly known as:  ZANAFLEX            Indwelling Lines/Drains at time of discharge:   Lines/Drains/Airways     Drain            Female External Urinary Catheter 12/23/18 1047 3 days                Time spent on the discharge of patient: 35 minutes  Patient was seen and examined on the " date of discharge and determined to be suitable for discharge.         Elia Moreno MD  Department of Hospital Medicine  [facility]

## 2018-12-26 NOTE — PLAN OF CARE
No future appointments.    Future Appointments   Date Time Provider Department Center   1/8/2019  4:00 PM [facility]

## 2018-12-26 NOTE — DISCHARGE INSTRUCTIONS
Please follow up with your primary care doctor (Dr. Cole) within 1 week.    Please ask your doctor about your diabetes regimen.  Please ask your doctor about pulmonary function testing (PFTs) for your lungs.    Please take your blood pressure medications as prescribed:  - Norvasc (amlodipine) 10 MG DAILY  - Hyzaar (losartan-HCTZ) 100-25 MG DAILY  - Coreg (carvedilol) 3.125 MG TWICE A DAY

## 2018-12-26 NOTE — ASSESSMENT & PLAN NOTE
-  on admit, pt states that this is elevated due to increased soft drink consumption during last week   - Home insulin regimen is 30 Levemir qhs, 8 units Novolog TID with meals, Trulicity weekly  - Continue insulin detemir 20u daily  - Continue insulin aspart 6u qAC  - Continue LDSSI and adjust scheduled PRN  - Resume home regimen upon discharge

## 2018-12-26 NOTE — PLAN OF CARE
hh per msw; pt has PHN  Future Appointments   Date Time Provider Department Center   1/8/2019  4:00 PM [facility]     12/26/18 0953   Final Note   Assessment Type Final Discharge Note   Anticipated Discharge Disposition Home-Health   Hospital Follow Up  Appt(s) scheduled? Yes   Discharge plans and expectations educations in teach back method with documentation complete? Yes   Right Care Referral Info   Post Acute Recommendation Home-care

## 2018-12-28 ENCOUNTER — PATIENT OUTREACH (OUTPATIENT)
Dept: ADMINISTRATIVE | Facility: CLINIC | Age: 73
End: 2018-12-28

## 2018-12-28 NOTE — TELEPHONE ENCOUNTER
Phoned pt states she feels the medicine she takes once a month for her bones is making her sick, 150mg ibandronate, gives her nausea and weakness, last dose taken on Dec 15.,States feels ok now but wants MD to know.

## 2019-01-02 DIAGNOSIS — M15.9 POLYARTICULAR OSTEOARTHRITIS: Chronic | ICD-10-CM

## 2019-01-02 RX ORDER — HYDROCODONE BITARTRATE AND ACETAMINOPHEN 7.5; 325 MG/1; MG/1
1 TABLET ORAL EVERY 8 HOURS PRN
Qty: 60 TABLET | Refills: 0 | Status: SHIPPED | OUTPATIENT
Start: 2019-01-02 | End: 2019-01-28 | Stop reason: SDUPTHER

## 2019-01-08 ENCOUNTER — OFFICE VISIT (OUTPATIENT)
Dept: PRIMARY CARE CLINIC | Facility: CLINIC | Age: 74
End: 2019-01-08
Payer: MEDICARE

## 2019-01-08 VITALS
BODY MASS INDEX: 43.43 KG/M2 | TEMPERATURE: 98 F | DIASTOLIC BLOOD PRESSURE: 68 MMHG | HEART RATE: 70 BPM | HEIGHT: 61 IN | OXYGEN SATURATION: 98 % | SYSTOLIC BLOOD PRESSURE: 123 MMHG | RESPIRATION RATE: 18 BRPM | WEIGHT: 230 LBS

## 2019-01-08 DIAGNOSIS — K21.9 GASTROESOPHAGEAL REFLUX DISEASE, ESOPHAGITIS PRESENCE NOT SPECIFIED: ICD-10-CM

## 2019-01-08 DIAGNOSIS — N18.30 CKD (CHRONIC KIDNEY DISEASE) STAGE 3, GFR 30-59 ML/MIN: ICD-10-CM

## 2019-01-08 DIAGNOSIS — M81.0 AGE-RELATED OSTEOPOROSIS WITHOUT CURRENT PATHOLOGICAL FRACTURE: ICD-10-CM

## 2019-01-08 DIAGNOSIS — J96.01 ACUTE RESPIRATORY FAILURE WITH HYPOXIA: Primary | ICD-10-CM

## 2019-01-08 DIAGNOSIS — I10 ESSENTIAL HYPERTENSION: ICD-10-CM

## 2019-01-08 PROCEDURE — 99499 UNLISTED E&M SERVICE: CPT | Mod: S$GLB,,, | Performed by: FAMILY MEDICINE

## 2019-01-08 PROCEDURE — 99499 RISK ADDL DX/OHS AUDIT: ICD-10-PCS | Mod: S$GLB,,, | Performed by: FAMILY MEDICINE

## 2019-01-08 PROCEDURE — 99214 OFFICE O/P EST MOD 30 MIN: CPT | Mod: S$GLB,,, | Performed by: FAMILY MEDICINE

## 2019-01-08 PROCEDURE — 99214 PR OFFICE/OUTPT VISIT, EST, LEVL IV, 30-39 MIN: ICD-10-PCS | Mod: S$GLB,,, | Performed by: FAMILY MEDICINE

## 2019-01-08 PROCEDURE — 99999 PR PBB SHADOW E&M-EST. PATIENT-LVL IV: ICD-10-PCS | Mod: PBBFAC,,, | Performed by: FAMILY MEDICINE

## 2019-01-08 PROCEDURE — 99999 PR PBB SHADOW E&M-EST. PATIENT-LVL IV: CPT | Mod: PBBFAC,,, | Performed by: FAMILY MEDICINE

## 2019-01-08 RX ORDER — IBANDRONATE SODIUM 150 MG/1
150 TABLET, FILM COATED ORAL
Qty: 3 TABLET | Refills: 3 | Status: SHIPPED | OUTPATIENT
Start: 2019-01-08 | End: 2019-10-11 | Stop reason: SDUPTHER

## 2019-01-08 RX ORDER — CARVEDILOL 3.12 MG/1
3.12 TABLET ORAL 2 TIMES DAILY
Qty: 180 TABLET | Refills: 1 | Status: SHIPPED | OUTPATIENT
Start: 2019-01-08 | End: 2019-07-29 | Stop reason: SDUPTHER

## 2019-01-08 RX ORDER — PANTOPRAZOLE SODIUM 40 MG/1
40 TABLET, DELAYED RELEASE ORAL DAILY
Qty: 90 TABLET | Refills: 1 | Status: SHIPPED | OUTPATIENT
Start: 2019-01-08 | End: 2019-07-29 | Stop reason: SDUPTHER

## 2019-01-08 NOTE — PROGRESS NOTES
"Subjective:       Patient ID: Melinda Knight is a 73 y.o. female.    Chief Complaint: Hospital Follow Up; Medication Problem; and Cough (congestion)    Admitted to the hospital in late December with acute respiratory failure with hypoxia, requiring supplemental oxygen and aggressive bronchodilator therapy.  Cardiac workup fairly unrevealing.  CTA of the chest negative for pulmonary embolism.  Had possible airspace disease versus atelectasis, as well as concomitant urinary tract infection, responded appropriately to antibiotics.  Weaned off oxygen and discharged home with albuterol, though she has not had to use any since discharge. Says she is overall feeling much better.  Has some nasal congestion, but denies shortness of breath, chest pain, palpitations, wheezing or hemoptysis.  No fevers or chills.  Remote history of occasional cigarette smoking, but has a history of heavy secondhand smoke exposure.  Has never had pulmonary function testing, but was advised to get outpatient PFTs in a few weeks.      Review of Systems   Constitutional: Negative for fever and unexpected weight change.   HENT: Positive for congestion. Negative for trouble swallowing.    Eyes: Negative for visual disturbance.   Respiratory: Negative for cough, shortness of breath and wheezing.    Cardiovascular: Negative for chest pain.   Gastrointestinal: Negative for nausea and vomiting.   Genitourinary: Negative for difficulty urinating.   Musculoskeletal: Positive for arthralgias and back pain.   Skin: Negative for rash and wound.   Neurological: Negative for seizures and syncope.   Hematological: Bruises/bleeds easily.   Psychiatric/Behavioral: Negative for agitation and confusion.       Objective:      Vitals:    01/08/19 1601   BP: 123/68   BP Location: Right arm   Patient Position: Sitting   BP Method: Medium (Automatic)   Pulse: 70   Resp: 18   Temp: 98 °F (36.7 °C)   TempSrc: Oral   SpO2: 98%   Weight: 104.3 kg (230 lb)   Height: 5' 1" " (1.549 m)     Physical Exam   Constitutional: She is oriented to person, place, and time. She appears well-developed and well-nourished.   HENT:   Head: Normocephalic and atraumatic.   Cardiovascular: Normal rate, regular rhythm and normal heart sounds.   Pulmonary/Chest: Effort normal and breath sounds normal.   Musculoskeletal: She exhibits no edema.   Neurological: She is alert and oriented to person, place, and time.   Skin: Skin is warm and dry.   Psychiatric: She has a normal mood and affect. Her behavior is normal.   Nursing note and vitals reviewed.      Assessment:       1. Acute respiratory failure with hypoxia    2. Age-related osteoporosis without current pathological fracture    3. CKD (chronic kidney disease) stage 3, GFR 30-59 ml/min    4. Gastroesophageal reflux disease, esophagitis presence not specified    5. Essential hypertension        Plan:       Acute respiratory failure with hypoxia  -     Complete PFT w/ bronchodilator; Future; Expected date: 01/22/2019  -     X-Ray Chest PA And Lateral; Future; Expected date: 01/22/2019  Overall much improved, hypoxia resolved.  Needs baseline PFTs  Age-related osteoporosis without current pathological fracture  -     ibandronate (BONIVA) 150 mg tablet; Take 1 tablet (150 mg total) by mouth every 30 days.  Dispense: 3 tablet; Refill: 3    CKD (chronic kidney disease) stage 3, GFR 30-59 ml/min  Renal function stable  Gastroesophageal reflux disease, esophagitis presence not specified  -     pantoprazole (PROTONIX) 40 MG tablet; Take 1 tablet (40 mg total) by mouth once daily.  Dispense: 90 tablet; Refill: 1    Essential hypertension  -     carvedilol (COREG) 3.125 MG tablet; Take 1 tablet (3.125 mg total) by mouth 2 (two) times daily.  Dispense: 180 tablet; Refill: 1         Medication List           Accurate as of 1/8/19  5:36 PM. If you have any questions, ask your nurse or doctor.               START taking these medications    ibandronate 150 mg  tablet  Commonly known as:  BONIVA  Take 1 tablet (150 mg total) by mouth every 30 days.  Started by:  Duke Cole MD        CONTINUE taking these medications    albuterol 0.63 mg/3 mL Nebu  Commonly known as:  ACCUNEB  Take 3 mLs (0.63 mg total) by nebulization 3 (three) times daily as needed. Rescue     amLODIPine 10 MG tablet  Commonly known as:  NORVASC  Take 1 tablet (10 mg total) by mouth once daily.     aspirin 325 MG tablet     atorvastatin 40 MG tablet  Commonly known as:  LIPITOR  TAKE ONE TABLET BY MOUTH ONCE DAILY     blood sugar diagnostic Strp  Commonly known as:  BLOOD GLUCOSE TEST  1 strip by Misc.(Non-Drug; Combo Route) route 2 (two) times daily. Prodigy     carvedilol 3.125 MG tablet  Commonly known as:  COREG  Take 1 tablet (3.125 mg total) by mouth 2 (two) times daily.     citalopram 20 MG tablet  Commonly known as:  CELEXA  Take 1 tablet (20 mg total) by mouth once daily.     dicyclomine 20 mg tablet  Commonly known as:  BENTYL  Take 1 tablet (20 mg total) by mouth daily as needed (GI cramps).     diphenoxylate-atropine 2.5-0.025 mg 2.5-0.025 mg per tablet  Commonly known as:  LOMOTIL  Take 1 tablet by mouth every 6 (six) hours as needed.     dulaglutide 1.5 mg/0.5 mL Pnij  Commonly known as:  TRULICITY  Inject 1.5 mg into the skin every 7 days. (Mondays)     GREEN TEA EXTRACT ORAL     HYDROcodone-acetaminophen 7.5-325 mg per tablet  Commonly known as:  NORCO  Take 1 tablet by mouth every 8 (eight) hours as needed for Pain.     LEVEMIR FLEXTOUCH U-100 INSULN 100 unit/mL (3 mL) Inpn pen  Generic drug:  insulin detemir U-100  INJECT 30 UNITS SUBCUTANEOUSLY ONCE DAILY AT BEDTIME     levothyroxine 75 MCG tablet  Commonly known as:  SYNTHROID  TAKE ONE TABLET BY MOUTH ONCE DAILY     losartan-hydrochlorothiazide 100-25 mg 100-25 mg per tablet  Commonly known as:  HYZAAR  Take 1 tablet by mouth once daily.     MULTIVITAMIN ORAL     NovoLOG Flexpen U-100 Insulin 100 unit/mL Inpn pen  Generic drug:   "insulin aspart U-100  INJECT 8 UNITS SUBCUTANEOUSLY TWICE DAILY WITH MEALS     ondansetron 4 MG Tbdl  Commonly known as:  ZOFRAN-ODT  DISSOLVE ONE TABLET UNDER THE TONGUE EVERY 6 HOURS AS NEEDED FOR NAUSEA     pantoprazole 40 MG tablet  Commonly known as:  PROTONIX  Take 1 tablet (40 mg total) by mouth once daily.     pen needle, diabetic 32 gauge x 1/5" Ndle  Commonly known as:  NOVOTWIST  Use with insulin pen as directed           Where to Get Your Medications      These medications were sent to [facility] [place] - [address]  [address] [place] [address]    Phone:  [phone]   · carvedilol 3.125 MG tablet  · ibandronate 150 mg tablet  · pantoprazole 40 MG tablet           "

## 2019-01-11 ENCOUNTER — TELEPHONE (OUTPATIENT)
Dept: ADMINISTRATIVE | Facility: CLINIC | Age: 74
End: 2019-01-11

## 2019-01-11 NOTE — TELEPHONE ENCOUNTER
Home Health SOC 12/28/2018 - 02/25/2019 with [facility] - Dr. Debbie Abad. OT services.

## 2019-01-21 DIAGNOSIS — I10 ESSENTIAL HYPERTENSION: ICD-10-CM

## 2019-01-21 RX ORDER — LOSARTAN POTASSIUM AND HYDROCHLOROTHIAZIDE 25; 100 MG/1; MG/1
1 TABLET ORAL DAILY
Qty: 90 TABLET | Refills: 1 | Status: SHIPPED | OUTPATIENT
Start: 2019-01-21 | End: 2020-01-20

## 2019-01-21 RX ORDER — LOSARTAN POTASSIUM AND HYDROCHLOROTHIAZIDE 25; 100 MG/1; MG/1
TABLET ORAL
Qty: 30 TABLET | Refills: 5 | Status: SHIPPED | OUTPATIENT
Start: 2019-01-21 | End: 2020-01-20

## 2019-01-28 DIAGNOSIS — M15.9 POLYARTICULAR OSTEOARTHRITIS: Chronic | ICD-10-CM

## 2019-01-28 DIAGNOSIS — I10 ESSENTIAL HYPERTENSION: ICD-10-CM

## 2019-01-28 RX ORDER — HYDROCODONE BITARTRATE AND ACETAMINOPHEN 7.5; 325 MG/1; MG/1
1 TABLET ORAL EVERY 8 HOURS PRN
Qty: 60 TABLET | Refills: 0 | Status: SHIPPED | OUTPATIENT
Start: 2019-01-28 | End: 2019-02-27 | Stop reason: SDUPTHER

## 2019-01-28 RX ORDER — CARVEDILOL 3.12 MG/1
3.12 TABLET ORAL 2 TIMES DAILY
Qty: 180 TABLET | Refills: 1 | Status: CANCELLED | OUTPATIENT
Start: 2019-01-28

## 2019-02-14 DIAGNOSIS — J98.4 RESTRICTIVE LUNG DISEASE: ICD-10-CM

## 2019-02-20 ENCOUNTER — TELEPHONE (OUTPATIENT)
Dept: PRIMARY CARE CLINIC | Facility: CLINIC | Age: 74
End: 2019-02-20

## 2019-02-20 ENCOUNTER — PATIENT MESSAGE (OUTPATIENT)
Dept: PRIMARY CARE CLINIC | Facility: CLINIC | Age: 74
End: 2019-02-20

## 2019-02-20 NOTE — TELEPHONE ENCOUNTER
----- Message from Ledy Freed sent at 2/20/2019 12:31 PM CST -----  Contact: pt [phone]  Patient called she is returning a call back from Monday she is not sure if the call was for her test results or not.

## 2019-02-27 DIAGNOSIS — M15.9 POLYARTICULAR OSTEOARTHRITIS: Chronic | ICD-10-CM

## 2019-02-27 RX ORDER — INSULIN ASPART 100 [IU]/ML
8 INJECTION, SOLUTION INTRAVENOUS; SUBCUTANEOUS 2 TIMES DAILY
Qty: 15 ML | Refills: 11 | Status: SHIPPED | OUTPATIENT
Start: 2019-02-27 | End: 2019-10-11

## 2019-02-27 RX ORDER — HYDROCODONE BITARTRATE AND ACETAMINOPHEN 7.5; 325 MG/1; MG/1
1 TABLET ORAL EVERY 8 HOURS PRN
Qty: 60 TABLET | Refills: 0 | Status: SHIPPED | OUTPATIENT
Start: 2019-02-27 | End: 2019-03-26 | Stop reason: SDUPTHER

## 2019-03-26 DIAGNOSIS — M15.9 POLYARTICULAR OSTEOARTHRITIS: Chronic | ICD-10-CM

## 2019-03-26 RX ORDER — HYDROCODONE BITARTRATE AND ACETAMINOPHEN 7.5; 325 MG/1; MG/1
1 TABLET ORAL EVERY 8 HOURS PRN
Qty: 60 TABLET | Refills: 0 | Status: SHIPPED | OUTPATIENT
Start: 2019-03-26 | End: 2019-04-25 | Stop reason: SDUPTHER

## 2019-04-25 DIAGNOSIS — M15.9 POLYARTICULAR OSTEOARTHRITIS: Chronic | ICD-10-CM

## 2019-04-25 RX ORDER — HYDROCODONE BITARTRATE AND ACETAMINOPHEN 7.5; 325 MG/1; MG/1
1 TABLET ORAL EVERY 8 HOURS PRN
Qty: 60 TABLET | Refills: 0 | Status: SHIPPED | OUTPATIENT
Start: 2019-04-25 | End: 2019-04-29 | Stop reason: SDUPTHER

## 2019-04-28 ENCOUNTER — PATIENT MESSAGE (OUTPATIENT)
Dept: PRIMARY CARE CLINIC | Facility: CLINIC | Age: 74
End: 2019-04-28

## 2019-04-28 DIAGNOSIS — M15.9 POLYARTICULAR OSTEOARTHRITIS: Chronic | ICD-10-CM

## 2019-04-29 RX ORDER — HYDROCODONE BITARTRATE AND ACETAMINOPHEN 7.5; 325 MG/1; MG/1
1 TABLET ORAL EVERY 8 HOURS PRN
Qty: 60 TABLET | Refills: 0 | Status: SHIPPED | OUTPATIENT
Start: 2019-04-29 | End: 2019-05-27 | Stop reason: SDUPTHER

## 2019-05-07 RX ORDER — DICYCLOMINE HYDROCHLORIDE 20 MG/1
TABLET ORAL
Qty: 90 TABLET | Refills: 3 | Status: SHIPPED | OUTPATIENT
Start: 2019-05-07 | End: 2020-03-12 | Stop reason: SDUPTHER

## 2019-05-07 RX ORDER — LEVOTHYROXINE SODIUM 75 UG/1
75 TABLET ORAL DAILY
Qty: 90 TABLET | Refills: 3 | OUTPATIENT
Start: 2019-05-07

## 2019-05-07 RX ORDER — LEVOTHYROXINE SODIUM 75 UG/1
TABLET ORAL
Qty: 90 TABLET | Refills: 3 | Status: SHIPPED | OUTPATIENT
Start: 2019-05-07 | End: 2020-03-12 | Stop reason: SDUPTHER

## 2019-05-07 RX ORDER — DICYCLOMINE HYDROCHLORIDE 20 MG/1
TABLET ORAL
Qty: 90 TABLET | Refills: 3 | OUTPATIENT
Start: 2019-05-07

## 2019-05-16 ENCOUNTER — PATIENT MESSAGE (OUTPATIENT)
Dept: PRIMARY CARE CLINIC | Facility: CLINIC | Age: 74
End: 2019-05-16

## 2019-05-16 DIAGNOSIS — Z12.31 ENCOUNTER FOR SCREENING MAMMOGRAM FOR BREAST CANCER: Primary | ICD-10-CM

## 2019-05-16 NOTE — TELEPHONE ENCOUNTER
I tried to order this mammogram for the patient but it tell me the code cannot be used for Medicare. Is there a different code to use?

## 2019-05-27 DIAGNOSIS — M15.9 POLYARTICULAR OSTEOARTHRITIS: Chronic | ICD-10-CM

## 2019-05-27 RX ORDER — HYDROCODONE BITARTRATE AND ACETAMINOPHEN 7.5; 325 MG/1; MG/1
1 TABLET ORAL EVERY 8 HOURS PRN
Qty: 60 TABLET | Refills: 0 | Status: SHIPPED | OUTPATIENT
Start: 2019-05-27 | End: 2019-06-26 | Stop reason: SDUPTHER

## 2019-05-30 ENCOUNTER — CLINICAL SUPPORT (OUTPATIENT)
Dept: PRIMARY CARE CLINIC | Facility: CLINIC | Age: 74
End: 2019-05-30
Payer: MEDICARE

## 2019-05-30 DIAGNOSIS — Z79.4 TYPE 2 DIABETES MELLITUS WITH DIABETIC POLYNEUROPATHY, WITH LONG-TERM CURRENT USE OF INSULIN: Chronic | ICD-10-CM

## 2019-05-30 DIAGNOSIS — J96.01 ACUTE RESPIRATORY FAILURE WITH HYPOXIA: ICD-10-CM

## 2019-05-30 DIAGNOSIS — R53.83 FATIGUE: ICD-10-CM

## 2019-05-30 DIAGNOSIS — R06.02 SOB (SHORTNESS OF BREATH) ON EXERTION: ICD-10-CM

## 2019-05-30 DIAGNOSIS — E11.42 TYPE 2 DIABETES MELLITUS WITH DIABETIC POLYNEUROPATHY, WITH LONG-TERM CURRENT USE OF INSULIN: Chronic | ICD-10-CM

## 2019-05-30 DIAGNOSIS — E78.5 HYPERLIPIDEMIA, UNSPECIFIED HYPERLIPIDEMIA TYPE: ICD-10-CM

## 2019-05-30 DIAGNOSIS — K29.70 GASTRITIS WITHOUT BLEEDING, UNSPECIFIED CHRONICITY, UNSPECIFIED GASTRITIS TYPE: ICD-10-CM

## 2019-05-30 DIAGNOSIS — Z11.59 NEED FOR HEPATITIS C SCREENING TEST: ICD-10-CM

## 2019-05-30 DIAGNOSIS — E66.01 MORBID OBESITY WITH BMI OF 40.0-44.9, ADULT: Chronic | ICD-10-CM

## 2019-05-30 DIAGNOSIS — R09.02 HYPOXIA: ICD-10-CM

## 2019-05-30 DIAGNOSIS — R53.1 WEAKNESS: ICD-10-CM

## 2019-05-30 DIAGNOSIS — E03.9 HYPOTHYROIDISM (ACQUIRED): Chronic | ICD-10-CM

## 2019-05-30 LAB
ALBUMIN SERPL BCP-MCNC: 3.8 G/DL (ref 3.5–5.2)
ALBUMIN/CREAT UR: 19.7 UG/MG (ref 0–30)
ALP SERPL-CCNC: 73 U/L (ref 38–126)
ALT SERPL W/O P-5'-P-CCNC: 10 U/L (ref 14–54)
ANION GAP SERPL CALC-SCNC: 7 MMOL/L (ref 8–16)
AST SERPL-CCNC: 15 U/L (ref 15–41)
BASOPHILS # BLD AUTO: 0.1 K/UL (ref 0–0.2)
BASOPHILS NFR BLD: 0.8 % (ref 0–1.9)
BILIRUB SERPL-MCNC: 0.2 MG/DL (ref 0.3–1.2)
BUN SERPL-MCNC: 28 MG/DL (ref 8–23)
CALCIUM SERPL-MCNC: 9.8 MG/DL (ref 8.6–10)
CHLORIDE SERPL-SCNC: 101 MMOL/L (ref 101–111)
CHOLEST SERPL-MCNC: 169 MG/DL (ref 80–200)
CHOLEST/HDLC SERPL: 3.3 {RATIO} (ref 2–5)
CO2 SERPL-SCNC: 34 MMOL/L (ref 23–29)
CREAT SERPL-MCNC: 1.4 MG/DL (ref 0.5–1.4)
CREAT UR-MCNC: 254 MG/DL (ref 15–325)
DIFFERENTIAL METHOD: ABNORMAL
EOSINOPHIL # BLD AUTO: 0.3 K/UL (ref 0–0.5)
EOSINOPHIL NFR BLD: 4.9 % (ref 0–8)
ERYTHROCYTE [DISTWIDTH] IN BLOOD BY AUTOMATED COUNT: 13.2 % (ref 11.5–14.5)
EST. GFR  (AFRICAN AMERICAN): 43.6 ML/MIN/1.73 M^2
EST. GFR  (NON AFRICAN AMERICAN): 37.8 ML/MIN/1.73 M^2
ESTIMATED AVG GLUCOSE: 134 MG/DL (ref 68–131)
GLUCOSE SERPL-MCNC: 182 MG/DL (ref 74–118)
HBA1C MFR BLD HPLC: 6.3 % (ref 4–5.6)
HCT VFR BLD AUTO: 43.4 % (ref 37–48.5)
HDLC SERPL-MCNC: 51 MG/DL (ref 40–75)
HDLC SERPL: 30.2 % (ref 20–50)
HGB BLD-MCNC: 14 G/DL (ref 12–16)
HGB BLD-MCNC: 14 G/DL (ref 12–16)
LDLC SERPL CALC-MCNC: 98 MG/DL
LYMPHOCYTES # BLD AUTO: 1.8 K/UL (ref 1–4.8)
LYMPHOCYTES NFR BLD: 26.8 % (ref 18–48)
MCH RBC QN AUTO: 31.5 PG (ref 27–31)
MCHC RBC AUTO-ENTMCNC: 32.3 G/DL (ref 32–36)
MCV RBC AUTO: 98 FL (ref 82–98)
MICROALBUMIN UR DL<=1MG/L-MCNC: 50 UG/ML
MONOCYTES # BLD AUTO: 0.5 K/UL (ref 0.3–1)
MONOCYTES NFR BLD: 7.6 % (ref 4–15)
NEUTROPHILS # BLD AUTO: 4 K/UL (ref 1.8–7.7)
NEUTROPHILS NFR BLD: 59.9 % (ref 38–73)
NONHDLC SERPL-MCNC: 118 MG/DL
PLATELET # BLD AUTO: 272 K/UL (ref 150–350)
PMV BLD AUTO: 9.8 FL (ref 9.2–12.9)
POTASSIUM SERPL-SCNC: 4 MMOL/L (ref 3.5–5.1)
PROT SERPL-MCNC: 7.5 G/DL (ref 6–8.4)
RBC # BLD AUTO: 4.45 M/UL (ref 4–5.4)
SODIUM SERPL-SCNC: 142 MMOL/L (ref 136–145)
T3 SERPL-MCNC: 73 NG/DL (ref 60–180)
T4 FREE SERPL-MCNC: 1.05 NG/DL (ref 0.61–1.12)
TRIGL SERPL-MCNC: 99 MG/DL (ref 30–150)
TSH SERPL DL<=0.005 MIU/L-ACNC: 4.95 UIU/ML (ref 0.45–5.33)
WBC # BLD AUTO: 6.7 K/UL (ref 3.9–12.7)

## 2019-05-30 PROCEDURE — 86803 HEPATITIS C AB TEST: CPT

## 2019-05-30 PROCEDURE — 84443 ASSAY THYROID STIM HORMONE: CPT

## 2019-05-30 PROCEDURE — 84439 ASSAY OF FREE THYROXINE: CPT

## 2019-05-30 PROCEDURE — 85025 COMPLETE CBC W/AUTO DIFF WBC: CPT

## 2019-05-30 PROCEDURE — 80061 LIPID PANEL: CPT

## 2019-05-30 PROCEDURE — 82043 UR ALBUMIN QUANTITATIVE: CPT

## 2019-05-30 PROCEDURE — 83036 HEMOGLOBIN GLYCOSYLATED A1C: CPT

## 2019-05-30 PROCEDURE — 80053 COMPREHEN METABOLIC PANEL: CPT

## 2019-05-30 PROCEDURE — 84480 ASSAY TRIIODOTHYRONINE (T3): CPT

## 2019-05-31 LAB — HCV AB SERPL QL IA: NEGATIVE

## 2019-06-04 DIAGNOSIS — E03.9 HYPOTHYROIDISM (ACQUIRED): Chronic | ICD-10-CM

## 2019-06-04 DIAGNOSIS — N18.30 CKD (CHRONIC KIDNEY DISEASE) STAGE 3, GFR 30-59 ML/MIN: Primary | ICD-10-CM

## 2019-06-04 DIAGNOSIS — E11.22 TYPE 2 DIABETES MELLITUS WITH STAGE 3 CHRONIC KIDNEY DISEASE, WITH LONG-TERM CURRENT USE OF INSULIN: ICD-10-CM

## 2019-06-04 DIAGNOSIS — N18.30 TYPE 2 DIABETES MELLITUS WITH STAGE 3 CHRONIC KIDNEY DISEASE, WITH LONG-TERM CURRENT USE OF INSULIN: ICD-10-CM

## 2019-06-04 DIAGNOSIS — Z79.4 TYPE 2 DIABETES MELLITUS WITH STAGE 3 CHRONIC KIDNEY DISEASE, WITH LONG-TERM CURRENT USE OF INSULIN: ICD-10-CM

## 2019-06-04 DIAGNOSIS — E78.5 HYPERLIPIDEMIA, UNSPECIFIED HYPERLIPIDEMIA TYPE: ICD-10-CM

## 2019-06-12 RX ORDER — AMLODIPINE BESYLATE 10 MG/1
TABLET ORAL
Qty: 90 TABLET | Refills: 3 | Status: SHIPPED | OUTPATIENT
Start: 2019-06-12 | End: 2020-06-01 | Stop reason: SDUPTHER

## 2019-06-12 RX ORDER — CITALOPRAM 20 MG/1
20 TABLET, FILM COATED ORAL DAILY
Qty: 90 TABLET | Refills: 3 | Status: SHIPPED | OUTPATIENT
Start: 2019-06-12 | End: 2020-03-12 | Stop reason: SDUPTHER

## 2019-06-12 RX ORDER — CITALOPRAM 20 MG/1
TABLET, FILM COATED ORAL
Qty: 90 TABLET | Refills: 3 | Status: SHIPPED | OUTPATIENT
Start: 2019-06-12 | End: 2019-06-12 | Stop reason: SDUPTHER

## 2019-06-21 ENCOUNTER — PATIENT OUTREACH (OUTPATIENT)
Dept: ADMINISTRATIVE | Facility: HOSPITAL | Age: 74
End: 2019-06-21

## 2019-06-26 DIAGNOSIS — M15.9 POLYARTICULAR OSTEOARTHRITIS: Chronic | ICD-10-CM

## 2019-06-26 RX ORDER — HYDROCODONE BITARTRATE AND ACETAMINOPHEN 7.5; 325 MG/1; MG/1
1 TABLET ORAL EVERY 8 HOURS PRN
Qty: 60 TABLET | Refills: 0 | Status: SHIPPED | OUTPATIENT
Start: 2019-06-26 | End: 2019-07-29 | Stop reason: SDUPTHER

## 2019-07-22 RX ORDER — LOSARTAN POTASSIUM AND HYDROCHLOROTHIAZIDE 25; 100 MG/1; MG/1
TABLET ORAL
Qty: 90 TABLET | Refills: 1 | Status: SHIPPED | OUTPATIENT
Start: 2019-07-22 | End: 2020-01-20

## 2019-07-25 DIAGNOSIS — M15.9 POLYARTICULAR OSTEOARTHRITIS: Chronic | ICD-10-CM

## 2019-07-29 DIAGNOSIS — M15.9 POLYARTICULAR OSTEOARTHRITIS: Chronic | ICD-10-CM

## 2019-07-29 DIAGNOSIS — I10 ESSENTIAL HYPERTENSION: ICD-10-CM

## 2019-07-29 DIAGNOSIS — K21.9 GASTROESOPHAGEAL REFLUX DISEASE, ESOPHAGITIS PRESENCE NOT SPECIFIED: ICD-10-CM

## 2019-07-29 RX ORDER — HYDROCODONE BITARTRATE AND ACETAMINOPHEN 7.5; 325 MG/1; MG/1
1 TABLET ORAL EVERY 8 HOURS PRN
Qty: 60 TABLET | Refills: 0 | OUTPATIENT
Start: 2019-07-29

## 2019-07-29 RX ORDER — PANTOPRAZOLE SODIUM 40 MG/1
40 TABLET, DELAYED RELEASE ORAL DAILY
Qty: 90 TABLET | Refills: 1 | Status: SHIPPED | OUTPATIENT
Start: 2019-07-29 | End: 2020-01-23 | Stop reason: SDUPTHER

## 2019-07-29 RX ORDER — CARVEDILOL 3.12 MG/1
3.12 TABLET ORAL 2 TIMES DAILY
Qty: 180 TABLET | Refills: 1 | Status: SHIPPED | OUTPATIENT
Start: 2019-07-29 | End: 2019-12-23

## 2019-07-29 RX ORDER — ONDANSETRON 4 MG/1
TABLET, ORALLY DISINTEGRATING ORAL
Qty: 20 TABLET | Refills: 2 | Status: SHIPPED | OUTPATIENT
Start: 2019-07-29 | End: 2020-03-12 | Stop reason: SDUPTHER

## 2019-07-29 RX ORDER — HYDROCODONE BITARTRATE AND ACETAMINOPHEN 7.5; 325 MG/1; MG/1
1 TABLET ORAL EVERY 8 HOURS PRN
Qty: 60 TABLET | Refills: 0 | Status: SHIPPED | OUTPATIENT
Start: 2019-07-29 | End: 2019-08-23 | Stop reason: SDUPTHER

## 2019-08-01 RX ORDER — ATORVASTATIN CALCIUM 40 MG/1
TABLET, FILM COATED ORAL
Qty: 90 TABLET | Refills: 3 | Status: SHIPPED | OUTPATIENT
Start: 2019-08-01 | End: 2020-03-12 | Stop reason: SDUPTHER

## 2019-08-02 NOTE — TELEPHONE ENCOUNTER
Prescription sent to [facility].  [place] at [place].  
rx canceled with [facility]   
I have personally seen and examined this patient.  I have fully participated in the care of this patient. I have reviewed all pertinent clinical information, including history, physical exam, plan and the Resident’s note and agree except as noted.
Private car

## 2019-08-06 ENCOUNTER — TELEPHONE (OUTPATIENT)
Dept: PRIMARY CARE CLINIC | Facility: CLINIC | Age: 74
End: 2019-08-06

## 2019-08-06 NOTE — TELEPHONE ENCOUNTER
----- Message from Dorene Landrum sent at 8/6/2019 10:53 AM CDT -----  Contact: Vanessa with [employer] phone [phone]  Vanessa with [employer] phone [phone], Calling for an order, Medical Necessity Form and clinicals for new diabetic supplies, testing strips and lancets. Please advise. Thanks.

## 2019-08-23 DIAGNOSIS — M15.9 POLYARTICULAR OSTEOARTHRITIS: Chronic | ICD-10-CM

## 2019-08-26 RX ORDER — HYDROCODONE BITARTRATE AND ACETAMINOPHEN 7.5; 325 MG/1; MG/1
1 TABLET ORAL EVERY 8 HOURS PRN
Qty: 60 TABLET | Refills: 0 | Status: SHIPPED | OUTPATIENT
Start: 2019-08-26 | End: 2019-09-26 | Stop reason: SDUPTHER

## 2019-09-04 NOTE — PATIENT INSTRUCTIONS

## 2019-09-05 ENCOUNTER — TELEPHONE (OUTPATIENT)
Dept: PRIMARY CARE CLINIC | Facility: CLINIC | Age: 74
End: 2019-09-05

## 2019-09-05 ENCOUNTER — PATIENT MESSAGE (OUTPATIENT)
Dept: PRIMARY CARE CLINIC | Facility: CLINIC | Age: 74
End: 2019-09-05

## 2019-09-05 NOTE — TELEPHONE ENCOUNTER
Tried calling patient, no answer. Message left for her to return my call. I also made her a lab appointment and sent a message via patient portal.

## 2019-09-05 NOTE — TELEPHONE ENCOUNTER
----- Message from Sanna Garcia sent at 9/5/2019  2:47 PM CDT -----  Contact: self  Appointment Request From: Melinda Knight    With Provider: Blood work ordered by Dr. Cole    Preferred Date Range: Any date 10/3/2019 or later    Preferred Times: Thursday Morning    Reason for visit: Blood work    Comments:  This appointment is for blood work Dr. Coel ordered

## 2019-09-26 ENCOUNTER — PATIENT MESSAGE (OUTPATIENT)
Dept: PRIMARY CARE CLINIC | Facility: CLINIC | Age: 74
End: 2019-09-26

## 2019-09-26 DIAGNOSIS — M15.9 POLYARTICULAR OSTEOARTHRITIS: Chronic | ICD-10-CM

## 2019-09-26 RX ORDER — HYDROCODONE BITARTRATE AND ACETAMINOPHEN 7.5; 325 MG/1; MG/1
1 TABLET ORAL EVERY 8 HOURS PRN
Qty: 60 TABLET | Refills: 0 | Status: SHIPPED | OUTPATIENT
Start: 2019-09-26 | End: 2019-10-24 | Stop reason: SDUPTHER

## 2019-10-11 ENCOUNTER — OFFICE VISIT (OUTPATIENT)
Dept: PRIMARY CARE CLINIC | Facility: CLINIC | Age: 74
End: 2019-10-11
Payer: MEDICARE

## 2019-10-11 VITALS
SYSTOLIC BLOOD PRESSURE: 134 MMHG | WEIGHT: 211 LBS | OXYGEN SATURATION: 97 % | RESPIRATION RATE: 16 BRPM | BODY MASS INDEX: 39.84 KG/M2 | HEIGHT: 61 IN | DIASTOLIC BLOOD PRESSURE: 62 MMHG | HEART RATE: 73 BPM | TEMPERATURE: 99 F

## 2019-10-11 DIAGNOSIS — Z79.899 HIGH RISK MEDICATION USE: ICD-10-CM

## 2019-10-11 DIAGNOSIS — E11.319 TYPE 2 DIABETES MELLITUS WITH RETINOPATHY, WITH LONG-TERM CURRENT USE OF INSULIN, MACULAR EDEMA PRESENCE UNSPECIFIED, UNSPECIFIED LATERALITY, UNSPECIFIED RETINOPATHY SEVERITY: Primary | Chronic | ICD-10-CM

## 2019-10-11 DIAGNOSIS — Z23 NEED FOR VACCINATION: ICD-10-CM

## 2019-10-11 DIAGNOSIS — M15.9 POLYARTICULAR OSTEOARTHRITIS: Chronic | ICD-10-CM

## 2019-10-11 DIAGNOSIS — I70.0 ATHEROSCLEROSIS OF ABDOMINAL AORTA: ICD-10-CM

## 2019-10-11 DIAGNOSIS — M81.0 AGE-RELATED OSTEOPOROSIS WITHOUT CURRENT PATHOLOGICAL FRACTURE: ICD-10-CM

## 2019-10-11 DIAGNOSIS — Z79.4 TYPE 2 DIABETES MELLITUS WITH RETINOPATHY, WITH LONG-TERM CURRENT USE OF INSULIN, MACULAR EDEMA PRESENCE UNSPECIFIED, UNSPECIFIED LATERALITY, UNSPECIFIED RETINOPATHY SEVERITY: Primary | Chronic | ICD-10-CM

## 2019-10-11 PROCEDURE — 3078F PR MOST RECENT DIASTOLIC BLOOD PRESSURE < 80 MM HG: ICD-10-PCS | Mod: CPTII,S$GLB,, | Performed by: FAMILY MEDICINE

## 2019-10-11 PROCEDURE — 90471 TD VACCINE GREATER THAN OR EQUAL TO 7YO PRESERVATIVE FREE IM: ICD-10-PCS | Mod: S$GLB,,, | Performed by: FAMILY MEDICINE

## 2019-10-11 PROCEDURE — 90471 IMMUNIZATION ADMIN: CPT | Mod: S$GLB,,, | Performed by: FAMILY MEDICINE

## 2019-10-11 PROCEDURE — 3075F SYST BP GE 130 - 139MM HG: CPT | Mod: CPTII,S$GLB,, | Performed by: FAMILY MEDICINE

## 2019-10-11 PROCEDURE — 99214 PR OFFICE/OUTPT VISIT, EST, LEVL IV, 30-39 MIN: ICD-10-PCS | Mod: 25,S$GLB,, | Performed by: FAMILY MEDICINE

## 2019-10-11 PROCEDURE — 90662 FLU VACCINE - HIGH DOSE (65+) PRESERVATIVE FREE IM: ICD-10-PCS | Mod: S$GLB,,, | Performed by: FAMILY MEDICINE

## 2019-10-11 PROCEDURE — 1101F PR PT FALLS ASSESS DOC 0-1 FALLS W/OUT INJ PAST YR: ICD-10-PCS | Mod: CPTII,S$GLB,, | Performed by: FAMILY MEDICINE

## 2019-10-11 PROCEDURE — 1101F PT FALLS ASSESS-DOCD LE1/YR: CPT | Mod: CPTII,S$GLB,, | Performed by: FAMILY MEDICINE

## 2019-10-11 PROCEDURE — G0008 FLU VACCINE - HIGH DOSE (65+) PRESERVATIVE FREE IM: ICD-10-PCS | Mod: 59,S$GLB,, | Performed by: FAMILY MEDICINE

## 2019-10-11 PROCEDURE — 90714 TD VACCINE GREATER THAN OR EQUAL TO 7YO PRESERVATIVE FREE IM: ICD-10-PCS | Mod: S$GLB,,, | Performed by: FAMILY MEDICINE

## 2019-10-11 PROCEDURE — 3078F DIAST BP <80 MM HG: CPT | Mod: CPTII,S$GLB,, | Performed by: FAMILY MEDICINE

## 2019-10-11 PROCEDURE — 99499 RISK ADDL DX/OHS AUDIT: ICD-10-PCS | Mod: S$GLB,,, | Performed by: FAMILY MEDICINE

## 2019-10-11 PROCEDURE — 99214 OFFICE O/P EST MOD 30 MIN: CPT | Mod: 25,S$GLB,, | Performed by: FAMILY MEDICINE

## 2019-10-11 PROCEDURE — 99999 PR PBB SHADOW E&M-EST. PATIENT-LVL IV: CPT | Mod: PBBFAC,,, | Performed by: FAMILY MEDICINE

## 2019-10-11 PROCEDURE — 90714 TD VACC NO PRESV 7 YRS+ IM: CPT | Mod: S$GLB,,, | Performed by: FAMILY MEDICINE

## 2019-10-11 PROCEDURE — 3075F PR MOST RECENT SYSTOLIC BLOOD PRESS GE 130-139MM HG: ICD-10-PCS | Mod: CPTII,S$GLB,, | Performed by: FAMILY MEDICINE

## 2019-10-11 PROCEDURE — 3044F HG A1C LEVEL LT 7.0%: CPT | Mod: CPTII,S$GLB,, | Performed by: FAMILY MEDICINE

## 2019-10-11 PROCEDURE — 99999 PR PBB SHADOW E&M-EST. PATIENT-LVL IV: ICD-10-PCS | Mod: PBBFAC,,, | Performed by: FAMILY MEDICINE

## 2019-10-11 PROCEDURE — 90662 IIV NO PRSV INCREASED AG IM: CPT | Mod: S$GLB,,, | Performed by: FAMILY MEDICINE

## 2019-10-11 PROCEDURE — G0008 ADMIN INFLUENZA VIRUS VAC: HCPCS | Mod: 59,S$GLB,, | Performed by: FAMILY MEDICINE

## 2019-10-11 PROCEDURE — 3044F PR MOST RECENT HEMOGLOBIN A1C LEVEL <7.0%: ICD-10-PCS | Mod: CPTII,S$GLB,, | Performed by: FAMILY MEDICINE

## 2019-10-11 PROCEDURE — 99499 UNLISTED E&M SERVICE: CPT | Mod: S$GLB,,, | Performed by: FAMILY MEDICINE

## 2019-10-11 RX ORDER — INSULIN ASPART 100 [IU]/ML
8 INJECTION, SOLUTION INTRAVENOUS; SUBCUTANEOUS
Qty: 15 ML | Refills: 11
Start: 2019-10-11 | End: 2020-03-12 | Stop reason: SDUPTHER

## 2019-10-11 RX ORDER — GABAPENTIN 300 MG/1
300 CAPSULE ORAL 3 TIMES DAILY
Qty: 270 CAPSULE | Refills: 0 | Status: SHIPPED | OUTPATIENT
Start: 2019-10-11 | End: 2020-01-23 | Stop reason: SDUPTHER

## 2019-10-11 RX ORDER — IBANDRONATE SODIUM 150 MG/1
150 TABLET, FILM COATED ORAL
Qty: 3 TABLET | Refills: 3 | Status: SHIPPED | OUTPATIENT
Start: 2019-10-11 | End: 2020-03-12 | Stop reason: SDUPTHER

## 2019-10-11 NOTE — PROGRESS NOTES
Pt ID verified using name and . Alleigies verified. High dose flu and Td vaccine given IM as ordered using aseptic technique. Pt tolerated well.

## 2019-10-11 NOTE — PROGRESS NOTES
"Subjective:       Patient ID: Melinda Knight is a 74 y.o. female.    Chief Complaint: Osteoarthritis (refill pain medication) and Flu Vaccine    Diabetes-A1c down to 5.9%.  Taking basal insulin consistently, taking NovoLog twice daily, but does not take care p.m. dose with a meal.  Having occasional hypoglycemia, particularly at night.  Says she is eating better overall.  Polyarticular arthritis-chronic pain in lower back, hips and knees.  Has been taking hydrocodone twice daily most days for quite a while.  Has never tried gabapentin, but is very willing to try something else in addition for pain.    Review of Systems   Constitutional: Negative for chills, fatigue and fever.   HENT: Negative for congestion.    Eyes: Negative for visual disturbance.   Respiratory: Negative for cough and shortness of breath.    Cardiovascular: Negative for chest pain.   Gastrointestinal: Negative for abdominal pain, nausea and vomiting.   Genitourinary: Negative for difficulty urinating.   Musculoskeletal: Positive for arthralgias and back pain.   Skin: Negative for rash.   Neurological: Negative for dizziness.   Psychiatric/Behavioral: Negative for agitation, confusion and sleep disturbance.       Objective:      Vitals:    10/11/19 1518   BP: 134/62   BP Location: Right arm   Patient Position: Sitting   BP Method: Large (Manual)   Pulse: 73   Resp: 16   Temp: 98.8 °F (37.1 °C)   TempSrc: Oral   SpO2: 97%   Weight: 95.7 kg (211 lb)   Height: 5' 1" (1.549 m)     Lab Results   Component Value Date    WBC 6.70 05/30/2019    HGB 14.0 05/30/2019    HGB 14.0 05/30/2019    HCT 43.4 05/30/2019     05/30/2019    CHOL 166 10/03/2019    TRIG 89 10/03/2019    HDL 45 10/03/2019    ALT 17 10/03/2019    AST 16 10/03/2019     10/03/2019    K 4.7 10/03/2019     10/03/2019    CREATININE 1.6 (H) 10/03/2019    BUN 37 (H) 10/03/2019    CO2 32 (H) 10/03/2019    TSH 3.34 10/03/2019    INR 1.0 03/18/2016    HGBA1C 5.9 (H) 10/03/2019 "     Physical Exam   Constitutional: She is oriented to person, place, and time. She appears well-developed and well-nourished.   HENT:   Head: Normocephalic and atraumatic.   Cardiovascular: Normal rate and regular rhythm.   Murmur heard.  Pulses:       Dorsalis pedis pulses are 2+ on the right side, and 2+ on the left side.   Pulmonary/Chest: Effort normal and breath sounds normal.   Musculoskeletal: She exhibits no edema.   Feet:   Right Foot:   Protective Sensation: 10 sites tested. 10 sites sensed.   Skin Integrity: Negative for ulcer, blister or skin breakdown.   Left Foot:   Protective Sensation: 10 sites tested. 10 sites sensed.   Skin Integrity: Negative for ulcer, blister or skin breakdown.   Neurological: She is alert and oriented to person, place, and time.   Skin: Skin is warm and dry.   Psychiatric: She has a normal mood and affect. Her behavior is normal.   Nursing note and vitals reviewed.      Assessment:       1. Type 2 diabetes mellitus with retinopathy, with long-term current use of insulin, macular edema presence unspecified, unspecified laterality, unspecified retinopathy severity    2. Atherosclerosis of abdominal aorta    3. Need for vaccination    4. Polyarticular osteoarthritis    5. High risk medication use    6. Age-related osteoporosis without current pathological fracture        Plan:       Type 2 diabetes mellitus with retinopathy, with long-term current use of insulin, macular edema presence unspecified, unspecified laterality, unspecified retinopathy severity  -     Foot Exam Performed  -     Ambulatory Referral to Ophthalmology  -     insulin aspart U-100 (NOVOLOG FLEXPEN U-100 INSULIN) 100 unit/mL (3 mL) InPn pen; Inject 8 Units into the skin daily with dinner or evening meal.  Dispense: 15 mL; Refill: 11  -     Basic metabolic panel; Future; Expected date: 01/11/2020  -     Hemoglobin A1c; Future; Expected date: 01/11/2020  Decrease mealtime insulin to once daily with largest meal  of the day to reduce risk of hypoglycemia.  Repeat A1c in 3 months  Atherosclerosis of abdominal aorta  Maintain good control of blood pressure and dyslipidemia  Need for vaccination  -     Influenza - High Dose (65+) (PF) (IM)  -     Td Vaccine (Adult) - Preservative Free  -     varicella-zoster gE-AS01B, PF, (SHINGRIX, PF,) 50 mcg/0.5 mL injection; Inject 0.5 mLs into the muscle once. for 1 dose  Dispense: 0.5 mL; Refill: 0    Polyarticular osteoarthritis  -     gabapentin (NEURONTIN) 300 MG capsule; Take 1 capsule (300 mg total) by mouth 3 (three) times daily.  Dispense: 270 capsule; Refill: 0  Advised to use hydrocodone as sparingly as possible  High risk medication use  Comments:  CSA signed today    Age-related osteoporosis without current pathological fracture  -     ibandronate (BONIVA) 150 mg tablet; Take 1 tablet (150 mg total) by mouth every 30 days.  Dispense: 3 tablet; Refill: 3      Medication List with Changes/Refills   New Medications    GABAPENTIN (NEURONTIN) 300 MG CAPSULE    Take 1 capsule (300 mg total) by mouth 3 (three) times daily.    VARICELLA-ZOSTER GE-AS01B, PF, (SHINGRIX, PF,) 50 MCG/0.5 ML INJECTION    Inject 0.5 mLs into the muscle once. for 1 dose   Current Medications    ALBUTEROL (ACCUNEB) 0.63 MG/3 ML NEBU    Take 3 mLs (0.63 mg total) by nebulization 3 (three) times daily as needed. Rescue    AMLODIPINE (NORVASC) 10 MG TABLET    TAKE 1 TABLET BY MOUTH ONCE DAILY    ASPIRIN 325 MG TABLET    Take 325 mg by mouth every evening.     ATORVASTATIN (LIPITOR) 40 MG TABLET    TAKE 1 TABLET BY MOUTH ONCE DAILY    BLOOD SUGAR DIAGNOSTIC (BLOOD GLUCOSE TEST) STRP    1 strip by Misc.(Non-Drug; Combo Route) route 2 (two) times daily. Prodigy    CARVEDILOL (COREG) 3.125 MG TABLET    Take 1 tablet (3.125 mg total) by mouth 2 (two) times daily.    CITALOPRAM (CELEXA) 20 MG TABLET    Take 1 tablet (20 mg total) by mouth once daily.    DICYCLOMINE (BENTYL) 20 MG TABLET    TAKE 1 TABLET BY MOUTH ONCE  "DAILY AS NEEDED (GI  CRAMPS)    DIPHENOXYLATE-ATROPINE 2.5-0.025 MG (LOMOTIL) 2.5-0.025 MG PER TABLET    Take 1 tablet by mouth every 6 (six) hours as needed.    DULAGLUTIDE (TRULICITY) 1.5 MG/0.5 ML PNIJ    Inject 1.5 mg into the skin every 7 days. (Mondays)    GREEN TEA EXTRACT ORAL    Take 1 tablet by mouth once daily.    HYDROCODONE-ACETAMINOPHEN (NORCO) 7.5-325 MG PER TABLET    Take 1 tablet by mouth every 8 (eight) hours as needed for Pain.    LEVEMIR FLEXTOUCH U-100 INSULN 100 UNIT/ML (3 ML) INPN PEN    INJECT 30 UNITS SUBCUTANEOUSLY ONCE DAILY AT BEDTIME    LEVOTHYROXINE (SYNTHROID) 75 MCG TABLET    TAKE 1 TABLET BY MOUTH ONCE DAILY    LOSARTAN-HYDROCHLOROTHIAZIDE 100-25 MG (HYZAAR) 100-25 MG PER TABLET    TAKE ONE TABLET BY MOUTH ONCE DAILY    LOSARTAN-HYDROCHLOROTHIAZIDE 100-25 MG (HYZAAR) 100-25 MG PER TABLET    Take 1 tablet by mouth once daily.    LOSARTAN-HYDROCHLOROTHIAZIDE 100-25 MG (HYZAAR) 100-25 MG PER TABLET    TAKE 1 TABLET BY MOUTH EVERY DAY    MULTIVITAMIN ORAL    Take 1 tablet by mouth once daily. [employer] Diabetes Pack     ONDANSETRON (ZOFRAN-ODT) 4 MG TBDL    DISSOLVE 1 TABLET UNDER THE TONGUE EVERY 6 HOURS AS NEEDED FOR NAUSEA    PANTOPRAZOLE (PROTONIX) 40 MG TABLET    Take 1 tablet (40 mg total) by mouth once daily.    PEN NEEDLE, DIABETIC (NOVOTWIST) 32 GAUGE X 1/5" NDLE    Use with insulin pen as directed   Changed and/or Refilled Medications    Modified Medication Previous Medication    IBANDRONATE (BONIVA) 150 MG TABLET ibandronate (BONIVA) 150 mg tablet       Take 1 tablet (150 mg total) by mouth every 30 days.    Take 1 tablet (150 mg total) by mouth every 30 days.    INSULIN ASPART U-100 (NOVOLOG FLEXPEN U-100 INSULIN) 100 UNIT/ML (3 ML) INPN PEN insulin aspart U-100 (NOVOLOG FLEXPEN U-100 INSULIN) 100 unit/mL InPn pen       Inject 8 Units into the skin daily with dinner or evening meal.    Inject 8 Units into the skin 2 (two) times daily.         "

## 2019-10-22 DIAGNOSIS — E11.42 TYPE 2 DIABETES MELLITUS WITH DIABETIC POLYNEUROPATHY, WITH LONG-TERM CURRENT USE OF INSULIN: Chronic | ICD-10-CM

## 2019-10-22 DIAGNOSIS — Z79.4 TYPE 2 DIABETES MELLITUS WITH DIABETIC POLYNEUROPATHY, WITH LONG-TERM CURRENT USE OF INSULIN: Chronic | ICD-10-CM

## 2019-10-22 RX ORDER — DULAGLUTIDE 1.5 MG/.5ML
INJECTION, SOLUTION SUBCUTANEOUS
Qty: 12 SYRINGE | Refills: 3 | Status: SHIPPED | OUTPATIENT
Start: 2019-10-22 | End: 2020-01-10 | Stop reason: SDUPTHER

## 2019-10-24 DIAGNOSIS — M15.9 POLYARTICULAR OSTEOARTHRITIS: Chronic | ICD-10-CM

## 2019-10-24 RX ORDER — HYDROCODONE BITARTRATE AND ACETAMINOPHEN 7.5; 325 MG/1; MG/1
1 TABLET ORAL EVERY 8 HOURS PRN
Qty: 60 TABLET | Refills: 0 | Status: SHIPPED | OUTPATIENT
Start: 2019-10-24 | End: 2019-10-25 | Stop reason: SDUPTHER

## 2019-10-24 NOTE — TELEPHONE ENCOUNTER
----- Message from Dorene Landrum sent at 10/24/2019  2:46 PM CDT -----  Contact: Benita with [employer] phone [phone]  Benita with [employer] phone [phone], Calling because is told them Rx HYDROcodone-acetaminophen (NORCO) 7.5-325 mg per tablet was suppose to go to [facility]. Please transfer. Thanks.

## 2019-10-25 ENCOUNTER — PATIENT MESSAGE (OUTPATIENT)
Dept: PRIMARY CARE CLINIC | Facility: CLINIC | Age: 74
End: 2019-10-25

## 2019-10-25 DIAGNOSIS — M15.9 POLYARTICULAR OSTEOARTHRITIS: Chronic | ICD-10-CM

## 2019-10-25 RX ORDER — HYDROCODONE BITARTRATE AND ACETAMINOPHEN 7.5; 325 MG/1; MG/1
1 TABLET ORAL EVERY 8 HOURS PRN
Qty: 60 TABLET | Refills: 0 | Status: SHIPPED | OUTPATIENT
Start: 2019-10-25 | End: 2019-10-28 | Stop reason: SDUPTHER

## 2019-10-28 DIAGNOSIS — M15.9 POLYARTICULAR OSTEOARTHRITIS: Chronic | ICD-10-CM

## 2019-10-28 RX ORDER — HYDROCODONE BITARTRATE AND ACETAMINOPHEN 7.5; 325 MG/1; MG/1
1 TABLET ORAL EVERY 8 HOURS PRN
Qty: 60 TABLET | Refills: 0 | Status: SHIPPED | OUTPATIENT
Start: 2019-10-28 | End: 2019-11-25 | Stop reason: SDUPTHER

## 2019-11-25 DIAGNOSIS — M15.9 POLYARTICULAR OSTEOARTHRITIS: Chronic | ICD-10-CM

## 2019-11-25 RX ORDER — HYDROCODONE BITARTRATE AND ACETAMINOPHEN 7.5; 325 MG/1; MG/1
1 TABLET ORAL EVERY 8 HOURS PRN
Qty: 60 TABLET | Refills: 0 | Status: SHIPPED | OUTPATIENT
Start: 2019-11-25 | End: 2019-12-26 | Stop reason: SDUPTHER

## 2019-12-22 DIAGNOSIS — I10 ESSENTIAL HYPERTENSION: ICD-10-CM

## 2019-12-23 RX ORDER — CARVEDILOL 3.12 MG/1
TABLET ORAL
Qty: 180 TABLET | Refills: 1 | Status: SHIPPED | OUTPATIENT
Start: 2019-12-23 | End: 2020-01-23 | Stop reason: SDUPTHER

## 2019-12-26 DIAGNOSIS — M15.9 POLYARTICULAR OSTEOARTHRITIS: Chronic | ICD-10-CM

## 2019-12-26 RX ORDER — HYDROCODONE BITARTRATE AND ACETAMINOPHEN 7.5; 325 MG/1; MG/1
1 TABLET ORAL EVERY 8 HOURS PRN
Qty: 60 TABLET | Refills: 0 | Status: SHIPPED | OUTPATIENT
Start: 2019-12-26 | End: 2020-01-23 | Stop reason: SDUPTHER

## 2020-01-10 DIAGNOSIS — Z79.4 TYPE 2 DIABETES MELLITUS WITH DIABETIC POLYNEUROPATHY, WITH LONG-TERM CURRENT USE OF INSULIN: Chronic | ICD-10-CM

## 2020-01-10 DIAGNOSIS — E11.42 TYPE 2 DIABETES MELLITUS WITH DIABETIC POLYNEUROPATHY, WITH LONG-TERM CURRENT USE OF INSULIN: Chronic | ICD-10-CM

## 2020-01-20 RX ORDER — LOSARTAN POTASSIUM AND HYDROCHLOROTHIAZIDE 25; 100 MG/1; MG/1
TABLET ORAL
Qty: 90 TABLET | Refills: 1 | Status: SHIPPED | OUTPATIENT
Start: 2020-01-20 | End: 2020-03-12 | Stop reason: SDUPTHER

## 2020-01-23 DIAGNOSIS — M15.9 POLYARTICULAR OSTEOARTHRITIS: Chronic | ICD-10-CM

## 2020-01-23 DIAGNOSIS — I10 ESSENTIAL HYPERTENSION: ICD-10-CM

## 2020-01-23 DIAGNOSIS — K21.9 GASTROESOPHAGEAL REFLUX DISEASE, ESOPHAGITIS PRESENCE NOT SPECIFIED: ICD-10-CM

## 2020-01-23 RX ORDER — GABAPENTIN 300 MG/1
300 CAPSULE ORAL 3 TIMES DAILY
Qty: 270 CAPSULE | Refills: 0 | Status: SHIPPED | OUTPATIENT
Start: 2020-01-23 | End: 2020-03-12 | Stop reason: SDUPTHER

## 2020-01-23 RX ORDER — CARVEDILOL 3.12 MG/1
3.12 TABLET ORAL 2 TIMES DAILY
Qty: 180 TABLET | Refills: 1 | Status: SHIPPED | OUTPATIENT
Start: 2020-01-23 | End: 2020-03-12 | Stop reason: SDUPTHER

## 2020-01-23 RX ORDER — INSULIN DETEMIR 100 [IU]/ML
INJECTION, SOLUTION SUBCUTANEOUS
Qty: 27 ML | Refills: 1 | Status: SHIPPED | OUTPATIENT
Start: 2020-01-23 | End: 2020-03-12 | Stop reason: SDUPTHER

## 2020-01-23 RX ORDER — HYDROCODONE BITARTRATE AND ACETAMINOPHEN 7.5; 325 MG/1; MG/1
1 TABLET ORAL EVERY 8 HOURS PRN
Qty: 60 TABLET | Refills: 0 | Status: SHIPPED | OUTPATIENT
Start: 2020-01-23 | End: 2020-02-25 | Stop reason: SDUPTHER

## 2020-01-23 RX ORDER — DIPHENOXYLATE HYDROCHLORIDE AND ATROPINE SULFATE 2.5; .025 MG/1; MG/1
1 TABLET ORAL EVERY 6 HOURS PRN
Qty: 30 TABLET | Refills: 0 | Status: SHIPPED | OUTPATIENT
Start: 2020-01-23 | End: 2020-03-12 | Stop reason: SDUPTHER

## 2020-01-23 RX ORDER — PANTOPRAZOLE SODIUM 40 MG/1
40 TABLET, DELAYED RELEASE ORAL DAILY
Qty: 90 TABLET | Refills: 1 | Status: SHIPPED | OUTPATIENT
Start: 2020-01-23 | End: 2020-03-12 | Stop reason: SDUPTHER

## 2020-01-31 ENCOUNTER — TELEPHONE (OUTPATIENT)
Dept: PRIMARY CARE CLINIC | Facility: CLINIC | Age: 75
End: 2020-01-31

## 2020-01-31 RX ORDER — LOSARTAN POTASSIUM AND HYDROCHLOROTHIAZIDE 25; 100 MG/1; MG/1
1 TABLET ORAL DAILY
Qty: 90 TABLET | Refills: 1 | Status: CANCELLED | OUTPATIENT
Start: 2020-01-31

## 2020-01-31 NOTE — TELEPHONE ENCOUNTER
losartan-hydrochlorothiazide 100-25 mg (HYZAAR) 100-25 mg per tablet [Terrie Campos NP]       Patient Comment: can you please call in an alternative to this medicine due to manufactor back order?     Preferred pharmacy: [id number] - [place] - [address]   Delivery method: Pickup

## 2020-01-31 NOTE — TELEPHONE ENCOUNTER
Spoke with pharmacist at [facility]. Advised him to separate losartan-hctz in to two separate medications since medication is on back order. States understanding.

## 2020-02-21 DIAGNOSIS — M15.9 POLYARTICULAR OSTEOARTHRITIS: Chronic | ICD-10-CM

## 2020-02-21 RX ORDER — HYDROCODONE BITARTRATE AND ACETAMINOPHEN 7.5; 325 MG/1; MG/1
1 TABLET ORAL EVERY 8 HOURS PRN
Qty: 60 TABLET | Refills: 0 | Status: CANCELLED | OUTPATIENT
Start: 2020-02-21

## 2020-02-24 ENCOUNTER — PATIENT MESSAGE (OUTPATIENT)
Dept: PRIMARY CARE CLINIC | Facility: CLINIC | Age: 75
End: 2020-02-24

## 2020-02-24 DIAGNOSIS — M15.9 POLYARTICULAR OSTEOARTHRITIS: Chronic | ICD-10-CM

## 2020-02-25 RX ORDER — HYDROCODONE BITARTRATE AND ACETAMINOPHEN 7.5; 325 MG/1; MG/1
1 TABLET ORAL EVERY 8 HOURS PRN
Qty: 60 TABLET | Refills: 0 | Status: SHIPPED | OUTPATIENT
Start: 2020-02-25 | End: 2020-03-23 | Stop reason: SDUPTHER

## 2020-03-12 DIAGNOSIS — Z79.4 TYPE 2 DIABETES MELLITUS WITH DIABETIC POLYNEUROPATHY, WITH LONG-TERM CURRENT USE OF INSULIN: Chronic | ICD-10-CM

## 2020-03-12 DIAGNOSIS — E11.42 TYPE 2 DIABETES MELLITUS WITH DIABETIC POLYNEUROPATHY, WITH LONG-TERM CURRENT USE OF INSULIN: Chronic | ICD-10-CM

## 2020-03-12 DIAGNOSIS — I10 ESSENTIAL HYPERTENSION: ICD-10-CM

## 2020-03-12 DIAGNOSIS — M81.0 AGE-RELATED OSTEOPOROSIS WITHOUT CURRENT PATHOLOGICAL FRACTURE: ICD-10-CM

## 2020-03-12 DIAGNOSIS — E11.319 TYPE 2 DIABETES MELLITUS WITH RETINOPATHY, WITH LONG-TERM CURRENT USE OF INSULIN, MACULAR EDEMA PRESENCE UNSPECIFIED, UNSPECIFIED LATERALITY, UNSPECIFIED RETINOPATHY SEVERITY: Chronic | ICD-10-CM

## 2020-03-12 DIAGNOSIS — K21.9 GASTROESOPHAGEAL REFLUX DISEASE, ESOPHAGITIS PRESENCE NOT SPECIFIED: ICD-10-CM

## 2020-03-12 DIAGNOSIS — M15.9 POLYARTICULAR OSTEOARTHRITIS: Chronic | ICD-10-CM

## 2020-03-12 DIAGNOSIS — Z79.4 TYPE 2 DIABETES MELLITUS WITH RETINOPATHY, WITH LONG-TERM CURRENT USE OF INSULIN, MACULAR EDEMA PRESENCE UNSPECIFIED, UNSPECIFIED LATERALITY, UNSPECIFIED RETINOPATHY SEVERITY: Chronic | ICD-10-CM

## 2020-03-12 RX ORDER — PANTOPRAZOLE SODIUM 40 MG/1
40 TABLET, DELAYED RELEASE ORAL DAILY
Qty: 90 TABLET | Refills: 1 | Status: SHIPPED | OUTPATIENT
Start: 2020-03-12 | End: 2020-07-21 | Stop reason: SDUPTHER

## 2020-03-12 RX ORDER — ATORVASTATIN CALCIUM 40 MG/1
40 TABLET, FILM COATED ORAL DAILY
Qty: 90 TABLET | Refills: 1 | Status: SHIPPED | OUTPATIENT
Start: 2020-03-12 | End: 2020-07-21 | Stop reason: SDUPTHER

## 2020-03-12 RX ORDER — GABAPENTIN 300 MG/1
300 CAPSULE ORAL 3 TIMES DAILY
Qty: 270 CAPSULE | Refills: 1 | Status: SHIPPED | OUTPATIENT
Start: 2020-03-12 | End: 2020-06-01 | Stop reason: SDUPTHER

## 2020-03-12 RX ORDER — HYDROCODONE BITARTRATE AND ACETAMINOPHEN 7.5; 325 MG/1; MG/1
1 TABLET ORAL EVERY 8 HOURS PRN
Qty: 60 TABLET | Refills: 0 | OUTPATIENT
Start: 2020-03-12

## 2020-03-12 RX ORDER — CARVEDILOL 3.12 MG/1
3.12 TABLET ORAL 2 TIMES DAILY
Qty: 180 TABLET | Refills: 1 | Status: SHIPPED | OUTPATIENT
Start: 2020-03-12 | End: 2020-07-21 | Stop reason: SDUPTHER

## 2020-03-12 RX ORDER — IBANDRONATE SODIUM 150 MG/1
150 TABLET, FILM COATED ORAL
Qty: 3 TABLET | Refills: 1 | Status: ON HOLD | OUTPATIENT
Start: 2020-03-12 | End: 2021-01-01 | Stop reason: HOSPADM

## 2020-03-12 RX ORDER — DIPHENOXYLATE HYDROCHLORIDE AND ATROPINE SULFATE 2.5; .025 MG/1; MG/1
1 TABLET ORAL EVERY 6 HOURS PRN
Qty: 30 TABLET | Refills: 0 | Status: ON HOLD | OUTPATIENT
Start: 2020-03-12 | End: 2021-01-01 | Stop reason: HOSPADM

## 2020-03-12 RX ORDER — CITALOPRAM 20 MG/1
20 TABLET, FILM COATED ORAL DAILY
Qty: 90 TABLET | Refills: 1 | Status: SHIPPED | OUTPATIENT
Start: 2020-03-12 | End: 2020-06-01 | Stop reason: SDUPTHER

## 2020-03-12 RX ORDER — ONDANSETRON 4 MG/1
4 TABLET, ORALLY DISINTEGRATING ORAL EVERY 6 HOURS PRN
Qty: 30 TABLET | Refills: 1 | Status: ON HOLD | OUTPATIENT
Start: 2020-03-12 | End: 2021-01-01 | Stop reason: HOSPADM

## 2020-03-12 RX ORDER — ALBUTEROL SULFATE 0.63 MG/3ML
0.63 SOLUTION RESPIRATORY (INHALATION) 3 TIMES DAILY PRN
Qty: 75 ML | Refills: 3 | Status: ON HOLD | OUTPATIENT
Start: 2020-03-12 | End: 2021-01-01

## 2020-03-12 RX ORDER — DICYCLOMINE HYDROCHLORIDE 20 MG/1
20 TABLET ORAL 3 TIMES DAILY PRN
Qty: 90 TABLET | Refills: 3 | Status: ON HOLD | OUTPATIENT
Start: 2020-03-12 | End: 2021-01-01 | Stop reason: HOSPADM

## 2020-03-12 RX ORDER — LEVOTHYROXINE SODIUM 75 UG/1
75 TABLET ORAL DAILY
Qty: 90 TABLET | Refills: 1 | Status: SHIPPED | OUTPATIENT
Start: 2020-03-12 | End: 2020-11-03 | Stop reason: SDUPTHER

## 2020-03-12 RX ORDER — LOSARTAN POTASSIUM AND HYDROCHLOROTHIAZIDE 25; 100 MG/1; MG/1
1 TABLET ORAL DAILY
Qty: 90 TABLET | Refills: 1 | Status: SHIPPED | OUTPATIENT
Start: 2020-03-12 | End: 2020-04-23

## 2020-03-12 RX ORDER — INSULIN ASPART 100 [IU]/ML
8 INJECTION, SOLUTION INTRAVENOUS; SUBCUTANEOUS
Qty: 15 ML | Refills: 3 | Status: SHIPPED | OUTPATIENT
Start: 2020-03-12 | End: 2020-05-27 | Stop reason: CLARIF

## 2020-03-23 DIAGNOSIS — M15.9 POLYARTICULAR OSTEOARTHRITIS: Chronic | ICD-10-CM

## 2020-03-23 RX ORDER — HYDROCODONE BITARTRATE AND ACETAMINOPHEN 7.5; 325 MG/1; MG/1
1 TABLET ORAL EVERY 8 HOURS PRN
Qty: 60 TABLET | Refills: 0 | Status: SHIPPED | OUTPATIENT
Start: 2020-03-23 | End: 2020-04-22 | Stop reason: SDUPTHER

## 2020-04-22 DIAGNOSIS — M15.9 POLYARTICULAR OSTEOARTHRITIS: Chronic | ICD-10-CM

## 2020-04-22 RX ORDER — HYDROCODONE BITARTRATE AND ACETAMINOPHEN 7.5; 325 MG/1; MG/1
1 TABLET ORAL EVERY 8 HOURS PRN
Qty: 60 TABLET | Refills: 0 | Status: SHIPPED | OUTPATIENT
Start: 2020-04-22 | End: 2020-05-27 | Stop reason: SDUPTHER

## 2020-04-23 RX ORDER — HYDROCHLOROTHIAZIDE 25 MG/1
TABLET ORAL
Qty: 90 TABLET | Refills: 0 | Status: SHIPPED | OUTPATIENT
Start: 2020-04-23 | End: 2020-07-24

## 2020-04-24 RX ORDER — LOSARTAN POTASSIUM 100 MG/1
TABLET ORAL
Qty: 90 TABLET | Refills: 0 | Status: SHIPPED | OUTPATIENT
Start: 2020-04-24 | End: 2020-07-24

## 2020-05-21 DIAGNOSIS — M15.9 POLYARTICULAR OSTEOARTHRITIS: Chronic | ICD-10-CM

## 2020-05-21 RX ORDER — HYDROCODONE BITARTRATE AND ACETAMINOPHEN 7.5; 325 MG/1; MG/1
1 TABLET ORAL EVERY 8 HOURS PRN
Qty: 60 TABLET | Refills: 0 | OUTPATIENT
Start: 2020-05-21

## 2020-05-22 ENCOUNTER — PATIENT MESSAGE (OUTPATIENT)
Dept: PRIMARY CARE CLINIC | Facility: CLINIC | Age: 75
End: 2020-05-22

## 2020-05-27 ENCOUNTER — OFFICE VISIT (OUTPATIENT)
Dept: PRIMARY CARE CLINIC | Facility: CLINIC | Age: 75
End: 2020-05-27
Payer: MEDICARE

## 2020-05-27 ENCOUNTER — PATIENT MESSAGE (OUTPATIENT)
Dept: PRIMARY CARE CLINIC | Facility: CLINIC | Age: 75
End: 2020-05-27

## 2020-05-27 VITALS — HEART RATE: 88 BPM | DIASTOLIC BLOOD PRESSURE: 80 MMHG | SYSTOLIC BLOOD PRESSURE: 129 MMHG

## 2020-05-27 DIAGNOSIS — Z79.4 TYPE 2 DIABETES MELLITUS WITH STAGE 2 CHRONIC KIDNEY DISEASE, WITH LONG-TERM CURRENT USE OF INSULIN: ICD-10-CM

## 2020-05-27 DIAGNOSIS — M15.9 POLYARTICULAR OSTEOARTHRITIS: Primary | Chronic | ICD-10-CM

## 2020-05-27 DIAGNOSIS — I10 ESSENTIAL HYPERTENSION: ICD-10-CM

## 2020-05-27 DIAGNOSIS — N18.2 TYPE 2 DIABETES MELLITUS WITH STAGE 2 CHRONIC KIDNEY DISEASE, WITH LONG-TERM CURRENT USE OF INSULIN: ICD-10-CM

## 2020-05-27 DIAGNOSIS — E11.22 TYPE 2 DIABETES MELLITUS WITH STAGE 2 CHRONIC KIDNEY DISEASE, WITH LONG-TERM CURRENT USE OF INSULIN: ICD-10-CM

## 2020-05-27 PROCEDURE — 3044F PR MOST RECENT HEMOGLOBIN A1C LEVEL <7.0%: ICD-10-PCS | Mod: CPTII,95,, | Performed by: FAMILY MEDICINE

## 2020-05-27 PROCEDURE — 1101F PR PT FALLS ASSESS DOC 0-1 FALLS W/OUT INJ PAST YR: ICD-10-PCS | Mod: CPTII,95,, | Performed by: FAMILY MEDICINE

## 2020-05-27 PROCEDURE — 1159F MED LIST DOCD IN RCRD: CPT | Mod: 95,,, | Performed by: FAMILY MEDICINE

## 2020-05-27 PROCEDURE — 3074F SYST BP LT 130 MM HG: CPT | Mod: CPTII,95,, | Performed by: FAMILY MEDICINE

## 2020-05-27 PROCEDURE — 99214 PR OFFICE/OUTPT VISIT, EST, LEVL IV, 30-39 MIN: ICD-10-PCS | Mod: 95,,, | Performed by: FAMILY MEDICINE

## 2020-05-27 PROCEDURE — 3044F HG A1C LEVEL LT 7.0%: CPT | Mod: CPTII,95,, | Performed by: FAMILY MEDICINE

## 2020-05-27 PROCEDURE — 3079F PR MOST RECENT DIASTOLIC BLOOD PRESSURE 80-89 MM HG: ICD-10-PCS | Mod: CPTII,95,, | Performed by: FAMILY MEDICINE

## 2020-05-27 PROCEDURE — 1101F PT FALLS ASSESS-DOCD LE1/YR: CPT | Mod: CPTII,95,, | Performed by: FAMILY MEDICINE

## 2020-05-27 PROCEDURE — 3074F PR MOST RECENT SYSTOLIC BLOOD PRESSURE < 130 MM HG: ICD-10-PCS | Mod: CPTII,95,, | Performed by: FAMILY MEDICINE

## 2020-05-27 PROCEDURE — 99214 OFFICE O/P EST MOD 30 MIN: CPT | Mod: 95,,, | Performed by: FAMILY MEDICINE

## 2020-05-27 PROCEDURE — 3079F DIAST BP 80-89 MM HG: CPT | Mod: CPTII,95,, | Performed by: FAMILY MEDICINE

## 2020-05-27 PROCEDURE — 1159F PR MEDICATION LIST DOCUMENTED IN MEDICAL RECORD: ICD-10-PCS | Mod: 95,,, | Performed by: FAMILY MEDICINE

## 2020-05-27 PROCEDURE — 99499 UNLISTED E&M SERVICE: CPT | Mod: 95,,, | Performed by: FAMILY MEDICINE

## 2020-05-27 PROCEDURE — 99499 RISK ADDL DX/OHS AUDIT: ICD-10-PCS | Mod: 95,,, | Performed by: FAMILY MEDICINE

## 2020-05-27 RX ORDER — HYDROCODONE BITARTRATE AND ACETAMINOPHEN 7.5; 325 MG/1; MG/1
1 TABLET ORAL EVERY 12 HOURS PRN
Qty: 60 TABLET | Refills: 0 | Status: SHIPPED | OUTPATIENT
Start: 2020-07-26 | End: 2020-08-28 | Stop reason: SDUPTHER

## 2020-05-27 RX ORDER — HYDROCODONE BITARTRATE AND ACETAMINOPHEN 7.5; 325 MG/1; MG/1
1 TABLET ORAL EVERY 12 HOURS PRN
Qty: 60 TABLET | Refills: 0 | Status: SHIPPED | OUTPATIENT
Start: 2020-06-26 | End: 2020-08-28 | Stop reason: SDUPTHER

## 2020-05-27 RX ORDER — HYDROCODONE BITARTRATE AND ACETAMINOPHEN 7.5; 325 MG/1; MG/1
1 TABLET ORAL EVERY 12 HOURS PRN
Qty: 60 TABLET | Refills: 0 | Status: SHIPPED | OUTPATIENT
Start: 2020-05-27 | End: 2020-08-28 | Stop reason: SDUPTHER

## 2020-05-27 RX ORDER — INSULIN LISPRO 100 [IU]/ML
8 INJECTION, SOLUTION INTRAVENOUS; SUBCUTANEOUS
Qty: 9 ML | Refills: 3 | Status: ON HOLD | OUTPATIENT
Start: 2020-05-27 | End: 2021-01-01 | Stop reason: HOSPADM

## 2020-05-27 NOTE — PROGRESS NOTES
Subjective:       Patient ID: Melinda Knight is a 74 y.o. female.    Chief Complaint: No chief complaint on file.    The patient location is:  Home  The chief complaint leading to consultation is:  Checkup, med refill    Visit type: audiovisual    Face to Face time with patient:  7 minutes  Eleven minutes of total time spent on the encounter, which includes face to face time and non-face to face time preparing to see the patient (eg, review of tests), Obtaining and/or reviewing separately obtained history, Documenting clinical information in the electronic or other health record, Independently interpreting results (not separately reported) and communicating results to the patient/family/caregiver, or Care coordination (not separately reported).         Each patient to whom he or she provides medical services by telemedicine is:  (1) informed of the relationship between the physician and patient and the respective role of any other health care provider with respect to management of the patient; and (2) notified that he or she may decline to receive medical services by telemedicine and may withdraw from such care at any time.    Notes:  Polyarticular arthritis-chronic pain, stable on current regimen.  Pain contract in place.   reviewed, no worrisome findings.  No adverse side effect from prescribed regimen, which has improved patient's ability to perform ADLs  Hypertension-monitoring blood pressure at home, consistently good.  No chest pain or shortness of breath.  Diabetes-blood sugars have been good, but needs to switch from NovoLog to Humalog due to insurance formulary changes.  No significant hypoglycemia.  Due for A1c.    Review of Systems   Constitutional: Negative for fever.   HENT: Negative for trouble swallowing.    Respiratory: Negative for shortness of breath.    Gastrointestinal: Negative for constipation, nausea and vomiting.   Genitourinary: Negative for difficulty urinating.   Musculoskeletal:  Positive for arthralgias and back pain.   Neurological: Negative for weakness.   Psychiatric/Behavioral: Negative for agitation and confusion.       Objective:      Vitals:    05/27/20 1426   BP: 129/80   Pulse: 88     Physical Exam   Constitutional: She is oriented to person, place, and time. She appears well-developed and well-nourished.   Pulmonary/Chest: No respiratory distress.   Neurological: She is alert and oriented to person, place, and time.   Psychiatric: She has a normal mood and affect. Her behavior is normal.       Lab Results   Component Value Date    WBC 6.70 05/30/2019    HGB 14.0 05/30/2019    HGB 14.0 05/30/2019    HCT 43.4 05/30/2019     05/30/2019    CHOL 166 10/03/2019    TRIG 89 10/03/2019    HDL 45 10/03/2019    ALT 17 10/03/2019    AST 16 10/03/2019     10/03/2019    K 4.7 10/03/2019     10/03/2019    CREATININE 1.6 (H) 10/03/2019    BUN 37 (H) 10/03/2019    CO2 32 (H) 10/03/2019    TSH 3.34 10/03/2019    INR 1.0 03/18/2016    HGBA1C 5.9 (H) 10/03/2019      Assessment:       1. Polyarticular osteoarthritis Sub-optimally controlled   2. Type 2 diabetes mellitus with stage 2 chronic kidney disease, with long-term current use of insulin    3. Essential hypertension        Plan:       Polyarticular osteoarthritis  -     HYDROcodone-acetaminophen (NORCO) 7.5-325 mg per tablet; Take 1 tablet by mouth every 12 (twelve) hours as needed for Pain.  Dispense: 60 tablet; Refill: 0  -     HYDROcodone-acetaminophen (NORCO) 7.5-325 mg per tablet; Take 1 tablet by mouth every 12 (twelve) hours as needed for Pain.  Dispense: 60 tablet; Refill: 0  -     HYDROcodone-acetaminophen (NORCO) 7.5-325 mg per tablet; Take 1 tablet by mouth every 12 (twelve) hours as needed for Pain.  Dispense: 60 tablet; Refill: 0  Stable on current regimen   Type 2 diabetes mellitus with stage 2 chronic kidney disease, with long-term current use of insulin  -     insulin lispro (HUMALOG KWIKPEN INSULIN) 100  unit/mL pen; Inject 8 Units into the skin daily with dinner or evening meal.  Dispense: 9 mL; Refill: 3  Switch to Humalog per formulary change.  Needs updated A1c in the next couple of weeks.  Patient verbalized understanding  Essential hypertension  Stable on current regimen    Medication List with Changes/Refills   New Medications    HYDROCODONE-ACETAMINOPHEN (NORCO) 7.5-325 MG PER TABLET    Take 1 tablet by mouth every 12 (twelve) hours as needed for Pain.    HYDROCODONE-ACETAMINOPHEN (NORCO) 7.5-325 MG PER TABLET    Take 1 tablet by mouth every 12 (twelve) hours as needed for Pain.    INSULIN LISPRO (HUMALOG KWIKPEN INSULIN) 100 UNIT/ML PEN    Inject 8 Units into the skin daily with dinner or evening meal.   Current Medications    ALBUTEROL (ACCUNEB) 0.63 MG/3 ML NEBU    Take 3 mLs (0.63 mg total) by nebulization 3 (three) times daily as needed. Rescue    AMLODIPINE (NORVASC) 10 MG TABLET    TAKE 1 TABLET BY MOUTH ONCE DAILY    ASPIRIN 325 MG TABLET    Take 325 mg by mouth every evening.     ATORVASTATIN (LIPITOR) 40 MG TABLET    Take 1 tablet (40 mg total) by mouth once daily.    BLOOD SUGAR DIAGNOSTIC (BLOOD GLUCOSE TEST) STRP    1 strip by Misc.(Non-Drug; Combo Route) route 2 (two) times daily. Prodigy    CARVEDILOL (COREG) 3.125 MG TABLET    Take 1 tablet (3.125 mg total) by mouth 2 (two) times daily.    CITALOPRAM (CELEXA) 20 MG TABLET    Take 1 tablet (20 mg total) by mouth once daily.    DICYCLOMINE (BENTYL) 20 MG TABLET    Take 1 tablet (20 mg total) by mouth 3 (three) times daily as needed.    DIPHENOXYLATE-ATROPINE 2.5-0.025 MG (LOMOTIL) 2.5-0.025 MG PER TABLET    Take 1 tablet by mouth every 6 (six) hours as needed.    DULAGLUTIDE (TRULICITY) 1.5 MG/0.5 ML PNIJ    Inject 1.5 mg into the skin every 7 days.    GABAPENTIN (NEURONTIN) 300 MG CAPSULE    Take 1 capsule (300 mg total) by mouth 3 (three) times daily.    GREEN TEA EXTRACT ORAL    Take 1 tablet by mouth once daily.    HYDROCHLOROTHIAZIDE  "(HYDRODIURIL) 25 MG TABLET    TAKE 1 TABLET BY MOUTH EVERY DAY    IBANDRONATE (BONIVA) 150 MG TABLET    Take 1 tablet (150 mg total) by mouth every 30 days.    INSULIN DETEMIR U-100 (LEVEMIR FLEXTOUCH U-100 INSULN) 100 UNIT/ML (3 ML) SUBQ INPN PEN    Inject 30 Units into the skin every evening.    LEVOTHYROXINE (SYNTHROID) 75 MCG TABLET    Take 1 tablet (75 mcg total) by mouth once daily.    LOSARTAN (COZAAR) 100 MG TABLET    TAKE 1 TABLET BY MOUTH EVERY DAY    MULTIVITAMIN ORAL    Take 1 tablet by mouth once daily. [facility] Diabetes Pack     ONDANSETRON (ZOFRAN-ODT) 4 MG TBDL    Take 1 tablet (4 mg total) by mouth every 6 (six) hours as needed (nausea).    PANTOPRAZOLE (PROTONIX) 40 MG TABLET    Take 1 tablet (40 mg total) by mouth once daily.    PEN NEEDLE, DIABETIC (NOVOTWIST) 32 GAUGE X 1/5" NDLE    USE AS DIRECTED WITH  INSULIN  PEN   Changed and/or Refilled Medications    Modified Medication Previous Medication    HYDROCODONE-ACETAMINOPHEN (NORCO) 7.5-325 MG PER TABLET HYDROcodone-acetaminophen (NORCO) 7.5-325 mg per tablet       Take 1 tablet by mouth every 12 (twelve) hours as needed for Pain.    Take 1 tablet by mouth every 8 (eight) hours as needed for Pain.   Discontinued Medications    DULAGLUTIDE (TRULICITY) 1.5 MG/0.5 ML PNIJ    Inject 1.5 mg into the skin every 7 days. (Mondays)    INSULIN ASPART U-100 (NOVOLOG FLEXPEN U-100 INSULIN) 100 UNIT/ML (3 ML) INPN PEN    Inject 8 Units into the skin daily with dinner or evening meal.         "

## 2020-06-01 DIAGNOSIS — M15.9 POLYARTICULAR OSTEOARTHRITIS: Chronic | ICD-10-CM

## 2020-06-01 RX ORDER — CITALOPRAM 20 MG/1
20 TABLET, FILM COATED ORAL DAILY
Qty: 90 TABLET | Refills: 1 | Status: SHIPPED | OUTPATIENT
Start: 2020-06-01 | End: 2020-11-24 | Stop reason: SDUPTHER

## 2020-06-01 RX ORDER — GABAPENTIN 300 MG/1
300 CAPSULE ORAL 3 TIMES DAILY
Qty: 270 CAPSULE | Refills: 1 | Status: ON HOLD | OUTPATIENT
Start: 2020-06-01 | End: 2021-01-01 | Stop reason: HOSPADM

## 2020-06-22 RX ORDER — AMLODIPINE BESYLATE 10 MG/1
10 TABLET ORAL DAILY
Qty: 90 TABLET | Refills: 1 | Status: SHIPPED | OUTPATIENT
Start: 2020-06-22 | End: 2020-12-23 | Stop reason: SDUPTHER

## 2020-06-22 RX ORDER — AMLODIPINE BESYLATE 10 MG/1
10 TABLET ORAL DAILY
Qty: 90 TABLET | Refills: 1 | Status: SHIPPED | OUTPATIENT
Start: 2020-06-22 | End: 2020-06-22

## 2020-06-26 ENCOUNTER — PATIENT MESSAGE (OUTPATIENT)
Dept: PRIMARY CARE CLINIC | Facility: CLINIC | Age: 75
End: 2020-06-26

## 2020-07-03 DIAGNOSIS — Z12.31 ENCOUNTER FOR SCREENING MAMMOGRAM FOR BREAST CANCER: ICD-10-CM

## 2020-07-21 DIAGNOSIS — K21.9 GASTROESOPHAGEAL REFLUX DISEASE, ESOPHAGITIS PRESENCE NOT SPECIFIED: ICD-10-CM

## 2020-07-21 DIAGNOSIS — I10 ESSENTIAL HYPERTENSION: ICD-10-CM

## 2020-07-22 RX ORDER — ATORVASTATIN CALCIUM 40 MG/1
40 TABLET, FILM COATED ORAL DAILY
Qty: 90 TABLET | Refills: 1 | Status: SHIPPED | OUTPATIENT
Start: 2020-07-22 | End: 2021-03-02 | Stop reason: SDUPTHER

## 2020-07-22 RX ORDER — PANTOPRAZOLE SODIUM 40 MG/1
40 TABLET, DELAYED RELEASE ORAL DAILY
Qty: 90 TABLET | Refills: 1 | Status: SHIPPED | OUTPATIENT
Start: 2020-07-22 | End: 2021-02-01 | Stop reason: SDUPTHER

## 2020-07-22 RX ORDER — CARVEDILOL 3.12 MG/1
3.12 TABLET ORAL 2 TIMES DAILY
Qty: 180 TABLET | Refills: 1 | Status: ON HOLD | OUTPATIENT
Start: 2020-07-22 | End: 2020-11-09 | Stop reason: SDUPTHER

## 2020-07-26 DIAGNOSIS — M15.9 POLYARTICULAR OSTEOARTHRITIS: Chronic | ICD-10-CM

## 2020-07-27 RX ORDER — HYDROCODONE BITARTRATE AND ACETAMINOPHEN 7.5; 325 MG/1; MG/1
1 TABLET ORAL EVERY 12 HOURS PRN
Qty: 60 TABLET | Refills: 0 | OUTPATIENT
Start: 2020-07-27

## 2020-08-27 DIAGNOSIS — M15.9 POLYARTICULAR OSTEOARTHRITIS: Chronic | ICD-10-CM

## 2020-08-27 RX ORDER — HYDROCODONE BITARTRATE AND ACETAMINOPHEN 7.5; 325 MG/1; MG/1
1 TABLET ORAL EVERY 12 HOURS PRN
Qty: 60 TABLET | Refills: 0 | OUTPATIENT
Start: 2020-08-27

## 2020-08-28 ENCOUNTER — OFFICE VISIT (OUTPATIENT)
Dept: PRIMARY CARE CLINIC | Facility: CLINIC | Age: 75
End: 2020-08-28
Payer: MEDICARE

## 2020-08-28 DIAGNOSIS — Z79.4 TYPE 2 DIABETES MELLITUS WITH DIABETIC POLYNEUROPATHY, WITH LONG-TERM CURRENT USE OF INSULIN: Chronic | ICD-10-CM

## 2020-08-28 DIAGNOSIS — E11.42 TYPE 2 DIABETES MELLITUS WITH DIABETIC POLYNEUROPATHY, WITH LONG-TERM CURRENT USE OF INSULIN: Chronic | ICD-10-CM

## 2020-08-28 DIAGNOSIS — M15.9 POLYARTICULAR OSTEOARTHRITIS: Primary | Chronic | ICD-10-CM

## 2020-08-28 PROBLEM — J96.91 RESPIRATORY FAILURE WITH HYPOXIA: Status: RESOLVED | Noted: 2018-12-23 | Resolved: 2020-08-28

## 2020-08-28 PROCEDURE — 3051F PR MOST RECENT HEMOGLOBIN A1C LEVEL 7.0 - < 8.0%: ICD-10-PCS | Mod: CPTII,95,, | Performed by: FAMILY MEDICINE

## 2020-08-28 PROCEDURE — 99213 PR OFFICE/OUTPT VISIT, EST, LEVL III, 20-29 MIN: ICD-10-PCS | Mod: 95,,, | Performed by: FAMILY MEDICINE

## 2020-08-28 PROCEDURE — 1159F MED LIST DOCD IN RCRD: CPT | Mod: 95,,, | Performed by: FAMILY MEDICINE

## 2020-08-28 PROCEDURE — 1101F PT FALLS ASSESS-DOCD LE1/YR: CPT | Mod: CPTII,95,, | Performed by: FAMILY MEDICINE

## 2020-08-28 PROCEDURE — 99499 UNLISTED E&M SERVICE: CPT | Mod: 95,,, | Performed by: FAMILY MEDICINE

## 2020-08-28 PROCEDURE — 99499 RISK ADDL DX/OHS AUDIT: ICD-10-PCS | Mod: 95,,, | Performed by: FAMILY MEDICINE

## 2020-08-28 PROCEDURE — 1159F PR MEDICATION LIST DOCUMENTED IN MEDICAL RECORD: ICD-10-PCS | Mod: 95,,, | Performed by: FAMILY MEDICINE

## 2020-08-28 PROCEDURE — 3051F HG A1C>EQUAL 7.0%<8.0%: CPT | Mod: CPTII,95,, | Performed by: FAMILY MEDICINE

## 2020-08-28 PROCEDURE — 99213 OFFICE O/P EST LOW 20 MIN: CPT | Mod: 95,,, | Performed by: FAMILY MEDICINE

## 2020-08-28 PROCEDURE — 1101F PR PT FALLS ASSESS DOC 0-1 FALLS W/OUT INJ PAST YR: ICD-10-PCS | Mod: CPTII,95,, | Performed by: FAMILY MEDICINE

## 2020-08-28 RX ORDER — HYDROCODONE BITARTRATE AND ACETAMINOPHEN 7.5; 325 MG/1; MG/1
1 TABLET ORAL EVERY 12 HOURS PRN
Qty: 60 TABLET | Refills: 0 | Status: ON HOLD | OUTPATIENT
Start: 2020-08-28 | End: 2020-11-09 | Stop reason: HOSPADM

## 2020-08-28 RX ORDER — HYDROCODONE BITARTRATE AND ACETAMINOPHEN 7.5; 325 MG/1; MG/1
1 TABLET ORAL EVERY 12 HOURS PRN
Qty: 60 TABLET | Refills: 0 | Status: SHIPPED | OUTPATIENT
Start: 2020-10-27 | End: 2020-11-24 | Stop reason: SDUPTHER

## 2020-08-28 RX ORDER — HYDROCODONE BITARTRATE AND ACETAMINOPHEN 7.5; 325 MG/1; MG/1
1 TABLET ORAL EVERY 12 HOURS PRN
Qty: 60 TABLET | Refills: 0 | Status: ON HOLD | OUTPATIENT
Start: 2020-09-27 | End: 2020-11-09 | Stop reason: HOSPADM

## 2020-08-28 RX ORDER — PEN NEEDLE, DIABETIC 30 GX 1/3"
NEEDLE, DISPOSABLE MISCELLANEOUS
Qty: 200 EACH | Refills: 3 | Status: SHIPPED | OUTPATIENT
Start: 2020-08-28 | End: 2021-01-01 | Stop reason: SDUPTHER

## 2020-08-28 NOTE — PROGRESS NOTES
Subjective:       Patient ID: Melinda Knight is a 75 y.o. female.    Chief Complaint: No chief complaint on file.    The patient location is:  Home  The chief complaint leading to consultation is:  Medication refill    Visit type: audiovisual    Face to Face time with patient:  4 min  Nine minutes of total time spent on the encounter, which includes face to face time and non-face to face time preparing to see the patient (eg, review of tests), Obtaining and/or reviewing separately obtained history, Documenting clinical information in the electronic or other health record, Independently interpreting results (not separately reported) and communicating results to the patient/family/caregiver, or Care coordination (not separately reported).         Each patient to whom he or she provides medical services by telemedicine is:  (1) informed of the relationship between the physician and patient and the respective role of any other health care provider with respect to management of the patient; and (2) notified that he or she may decline to receive medical services by telemedicine and may withdraw from such care at any time.    Notes: Polyarticular arthritis-chronic pain, stable on current regimen.  Pain contract in place.   reviewed, no worrisome findings.  No adverse side effect from prescribed regimen, which has improved patient's ability to perform ADLs    Review of Systems   Constitutional: Negative for fever.   Respiratory: Negative for shortness of breath.    Cardiovascular: Negative for chest pain.   Gastrointestinal: Negative for constipation.   Musculoskeletal: Positive for arthralgias and back pain.   Skin: Negative for wound.   Psychiatric/Behavioral: Negative for agitation and confusion.       Objective:      There were no vitals filed for this visit.  Physical Exam  Constitutional:       Appearance: She is well-developed.   Pulmonary:      Effort: No respiratory distress.   Neurological:      Mental Status:  "She is alert and oriented to person, place, and time.   Psychiatric:         Behavior: Behavior normal.         Lab Results   Component Value Date    WBC 6.70 05/30/2019    HGB 14.0 05/30/2019    HGB 14.0 05/30/2019    HCT 43.4 05/30/2019     05/30/2019    CHOL 166 10/03/2019    TRIG 89 10/03/2019    HDL 45 10/03/2019    ALT 17 10/03/2019    AST 16 10/03/2019     08/03/2020    K 4.3 08/03/2020     08/03/2020    CREATININE 1.9 (H) 08/03/2020    BUN 43 (H) 08/03/2020    CO2 31 (H) 08/03/2020    TSH 3.34 10/03/2019    INR 1.0 03/18/2016    HGBA1C 7.2 (H) 08/03/2020      Assessment:       1. Polyarticular osteoarthritis Stable   2. Type 2 diabetes mellitus with diabetic polyneuropathy, with long-term current use of insulin        Plan:       Polyarticular osteoarthritis  -     HYDROcodone-acetaminophen (NORCO) 7.5-325 mg per tablet; Take 1 tablet by mouth every 12 (twelve) hours as needed for Pain.  Dispense: 60 tablet; Refill: 0  -     HYDROcodone-acetaminophen (NORCO) 7.5-325 mg per tablet; Take 1 tablet by mouth every 12 (twelve) hours as needed for Pain.  Dispense: 60 tablet; Refill: 0  -     HYDROcodone-acetaminophen (NORCO) 7.5-325 mg per tablet; Take 1 tablet by mouth every 12 (twelve) hours as needed for Pain.  Dispense: 60 tablet; Refill: 0    Type 2 diabetes mellitus with diabetic polyneuropathy, with long-term current use of insulin  -     pen needle, diabetic (NOVOTWIST) 32 gauge x 1/5" Ndle; USE AS DIRECTED WITH  INSULIN  PEN  Dispense: 200 each; Refill: 3      Medication List with Changes/Refills   Current Medications    ALBUTEROL (ACCUNEB) 0.63 MG/3 ML NEBU    Take 3 mLs (0.63 mg total) by nebulization 3 (three) times daily as needed. Rescue    AMLODIPINE (NORVASC) 10 MG TABLET    Take 1 tablet (10 mg total) by mouth once daily.    ASPIRIN 325 MG TABLET    Take 325 mg by mouth every evening.     ATORVASTATIN (LIPITOR) 40 MG TABLET    Take 1 tablet (40 mg total) by mouth once daily.    " BLOOD SUGAR DIAGNOSTIC (BLOOD GLUCOSE TEST) STRP    1 strip by Misc.(Non-Drug; Combo Route) route 2 (two) times daily. Prodigy    CARVEDILOL (COREG) 3.125 MG TABLET    Take 1 tablet (3.125 mg total) by mouth 2 (two) times daily.    CITALOPRAM (CELEXA) 20 MG TABLET    Take 1 tablet (20 mg total) by mouth once daily.    DICYCLOMINE (BENTYL) 20 MG TABLET    Take 1 tablet (20 mg total) by mouth 3 (three) times daily as needed.    DIPHENOXYLATE-ATROPINE 2.5-0.025 MG (LOMOTIL) 2.5-0.025 MG PER TABLET    Take 1 tablet by mouth every 6 (six) hours as needed.    DULAGLUTIDE (TRULICITY) 1.5 MG/0.5 ML PNIJ    Inject 1.5 mg into the skin every 7 days.    GABAPENTIN (NEURONTIN) 300 MG CAPSULE    Take 1 capsule (300 mg total) by mouth 3 (three) times daily.    GREEN TEA EXTRACT ORAL    Take 1 tablet by mouth once daily.    HYDROCHLOROTHIAZIDE (HYDRODIURIL) 25 MG TABLET    TAKE 1 TABLET BY MOUTH EVERY DAY    IBANDRONATE (BONIVA) 150 MG TABLET    Take 1 tablet (150 mg total) by mouth every 30 days.    INSULIN DETEMIR U-100 (LEVEMIR FLEXTOUCH U-100 INSULN) 100 UNIT/ML (3 ML) INPN PEN    Inject 30 Units into the skin every evening.    INSULIN LISPRO (HUMALOG KWIKPEN INSULIN) 100 UNIT/ML PEN    Inject 8 Units into the skin daily with dinner or evening meal.    LEVOTHYROXINE (SYNTHROID) 75 MCG TABLET    Take 1 tablet (75 mcg total) by mouth once daily.    LOSARTAN (COZAAR) 100 MG TABLET    TAKE 1 TABLET BY MOUTH EVERY DAY    MULTIVITAMIN ORAL    Take 1 tablet by mouth once daily. [place] Diabetes Pack     ONDANSETRON (ZOFRAN-ODT) 4 MG TBDL    Take 1 tablet (4 mg total) by mouth every 6 (six) hours as needed (nausea).    PANTOPRAZOLE (PROTONIX) 40 MG TABLET    Take 1 tablet (40 mg total) by mouth once daily.   Changed and/or Refilled Medications    Modified Medication Previous Medication    HYDROCODONE-ACETAMINOPHEN (NORCO) 7.5-325 MG PER TABLET HYDROcodone-acetaminophen (NORCO) 7.5-325 mg per tablet       Take 1 tablet by mouth  "every 12 (twelve) hours as needed for Pain.    Take 1 tablet by mouth every 12 (twelve) hours as needed for Pain.    HYDROCODONE-ACETAMINOPHEN (NORCO) 7.5-325 MG PER TABLET HYDROcodone-acetaminophen (NORCO) 7.5-325 mg per tablet       Take 1 tablet by mouth every 12 (twelve) hours as needed for Pain.    Take 1 tablet by mouth every 12 (twelve) hours as needed for Pain.    HYDROCODONE-ACETAMINOPHEN (NORCO) 7.5-325 MG PER TABLET HYDROcodone-acetaminophen (NORCO) 7.5-325 mg per tablet       Take 1 tablet by mouth every 12 (twelve) hours as needed for Pain.    Take 1 tablet by mouth every 12 (twelve) hours as needed for Pain.    PEN NEEDLE, DIABETIC (NOVOTWIST) 32 GAUGE X 1/5" NDLE pen needle, diabetic (NOVOTWIST) 32 gauge x 1/5" Ndle       USE AS DIRECTED WITH  INSULIN  PEN    USE AS DIRECTED WITH  INSULIN  PEN           "

## 2020-09-28 ENCOUNTER — PATIENT MESSAGE (OUTPATIENT)
Dept: PRIMARY CARE CLINIC | Facility: CLINIC | Age: 75
End: 2020-09-28

## 2020-10-15 RX ORDER — LOSARTAN POTASSIUM AND HYDROCHLOROTHIAZIDE 25; 100 MG/1; MG/1
1 TABLET ORAL DAILY
Qty: 90 TABLET | Refills: 0 | Status: SHIPPED | OUTPATIENT
Start: 2020-10-15 | End: 2020-10-27

## 2020-10-15 NOTE — TELEPHONE ENCOUNTER
Please let patient know that I am refilling her medications for 3 months but am concerned that she has not completed her health maintenance as below. Please reinforce the importance of my role in healthcare prevention and the importance of completing screenings and immunizations to keep her well. Patient is due for:    mammogram and influenza shot     We also need to clarify her BP medications. Make sure she is not taking this combination in addition to her individual losartan and hctz.

## 2020-10-22 DIAGNOSIS — E11.9 TYPE 2 DIABETES MELLITUS WITHOUT COMPLICATION: ICD-10-CM

## 2020-10-27 RX ORDER — LOSARTAN POTASSIUM 100 MG/1
TABLET ORAL
Qty: 90 TABLET | Refills: 0 | Status: SHIPPED | OUTPATIENT
Start: 2020-10-27 | End: 2021-02-10

## 2020-10-27 RX ORDER — HYDROCHLOROTHIAZIDE 25 MG/1
TABLET ORAL
Qty: 90 TABLET | Refills: 0 | Status: ON HOLD | OUTPATIENT
Start: 2020-10-27 | End: 2020-11-09 | Stop reason: HOSPADM

## 2020-10-27 NOTE — TELEPHONE ENCOUNTER
This patient needs to be seen in person for any further refills. All of her health maintenance is not up to date. She needs lab work and a mammogram among other things. Please let her know. I have refilled for 3 months only.

## 2020-11-03 ENCOUNTER — PATIENT MESSAGE (OUTPATIENT)
Dept: PRIMARY CARE CLINIC | Facility: CLINIC | Age: 75
End: 2020-11-03

## 2020-11-03 RX ORDER — LEVOTHYROXINE SODIUM 75 UG/1
75 TABLET ORAL DAILY
Qty: 90 TABLET | Refills: 1 | Status: ON HOLD | OUTPATIENT
Start: 2020-11-03 | End: 2021-01-01 | Stop reason: HOSPADM

## 2020-11-06 PROBLEM — N17.9 ACUTE KIDNEY INJURY: Status: ACTIVE | Noted: 2020-11-06

## 2020-11-10 ENCOUNTER — PATIENT OUTREACH (OUTPATIENT)
Dept: ADMINISTRATIVE | Facility: CLINIC | Age: 75
End: 2020-11-10

## 2020-11-10 NOTE — PROGRESS NOTES
C3 nurse attempted to contact patient. No answer. The following message was left for the patient to return the call:  Good morning  I am a nurse calling on behalf of [facility] from the [facility].  This is a Transitional Care Call for Melinda Knight. When you have a moment please contact us at [phone] or [phone] Monday through Friday, between the hours of 8 am to 4 pm. We look forward to speaking with you. On behalf of [facility] have a nice day.    The patient has a scheduled HOSFU appointment with Dr Cole on 11/24/2020 @ 7636. Message sent to Physician staff.

## 2020-11-13 ENCOUNTER — PATIENT MESSAGE (OUTPATIENT)
Dept: PRIMARY CARE CLINIC | Facility: CLINIC | Age: 75
End: 2020-11-13

## 2020-11-13 ENCOUNTER — TELEPHONE (OUTPATIENT)
Dept: PRIMARY CARE CLINIC | Facility: CLINIC | Age: 75
End: 2020-11-13

## 2020-11-13 DIAGNOSIS — R53.1 WEAKNESS: Primary | ICD-10-CM

## 2020-11-24 ENCOUNTER — TELEPHONE (OUTPATIENT)
Dept: PRIMARY CARE CLINIC | Facility: CLINIC | Age: 75
End: 2020-11-24

## 2020-11-24 ENCOUNTER — OFFICE VISIT (OUTPATIENT)
Dept: PRIMARY CARE CLINIC | Facility: CLINIC | Age: 75
End: 2020-11-24
Payer: MEDICARE

## 2020-11-24 VITALS — OXYGEN SATURATION: 95 % | DIASTOLIC BLOOD PRESSURE: 77 MMHG | SYSTOLIC BLOOD PRESSURE: 124 MMHG

## 2020-11-24 DIAGNOSIS — I70.0 ATHEROSCLEROSIS OF ABDOMINAL AORTA: ICD-10-CM

## 2020-11-24 DIAGNOSIS — F32.0 CURRENT MILD EPISODE OF MAJOR DEPRESSIVE DISORDER, UNSPECIFIED WHETHER RECURRENT: ICD-10-CM

## 2020-11-24 DIAGNOSIS — Z74.09 IMPAIRED MOBILITY: ICD-10-CM

## 2020-11-24 DIAGNOSIS — E78.5 HYPERLIPIDEMIA, UNSPECIFIED HYPERLIPIDEMIA TYPE: ICD-10-CM

## 2020-11-24 DIAGNOSIS — M15.9 POLYARTICULAR OSTEOARTHRITIS: Chronic | ICD-10-CM

## 2020-11-24 DIAGNOSIS — E11.22 TYPE 2 DIABETES MELLITUS WITH STAGE 3 CHRONIC KIDNEY DISEASE, WITH LONG-TERM CURRENT USE OF INSULIN, UNSPECIFIED WHETHER STAGE 3A OR 3B CKD: ICD-10-CM

## 2020-11-24 DIAGNOSIS — N17.9 ACUTE KIDNEY INJURY: Primary | ICD-10-CM

## 2020-11-24 DIAGNOSIS — N18.30 STAGE 3 CHRONIC KIDNEY DISEASE, UNSPECIFIED WHETHER STAGE 3A OR 3B CKD: ICD-10-CM

## 2020-11-24 DIAGNOSIS — N18.30 TYPE 2 DIABETES MELLITUS WITH STAGE 3 CHRONIC KIDNEY DISEASE, WITH LONG-TERM CURRENT USE OF INSULIN, UNSPECIFIED WHETHER STAGE 3A OR 3B CKD: ICD-10-CM

## 2020-11-24 DIAGNOSIS — Z79.4 TYPE 2 DIABETES MELLITUS WITH STAGE 3 CHRONIC KIDNEY DISEASE, WITH LONG-TERM CURRENT USE OF INSULIN, UNSPECIFIED WHETHER STAGE 3A OR 3B CKD: ICD-10-CM

## 2020-11-24 DIAGNOSIS — I35.0 AORTIC STENOSIS, MODERATE: ICD-10-CM

## 2020-11-24 PROCEDURE — 1159F PR MEDICATION LIST DOCUMENTED IN MEDICAL RECORD: ICD-10-PCS | Mod: 95,,, | Performed by: FAMILY MEDICINE

## 2020-11-24 PROCEDURE — 99499 RISK ADDL DX/OHS AUDIT: ICD-10-PCS | Mod: 95,,, | Performed by: FAMILY MEDICINE

## 2020-11-24 PROCEDURE — 3074F PR MOST RECENT SYSTOLIC BLOOD PRESSURE < 130 MM HG: ICD-10-PCS | Mod: CPTII,95,, | Performed by: FAMILY MEDICINE

## 2020-11-24 PROCEDURE — 3078F PR MOST RECENT DIASTOLIC BLOOD PRESSURE < 80 MM HG: ICD-10-PCS | Mod: CPTII,95,, | Performed by: FAMILY MEDICINE

## 2020-11-24 PROCEDURE — 3078F DIAST BP <80 MM HG: CPT | Mod: CPTII,95,, | Performed by: FAMILY MEDICINE

## 2020-11-24 PROCEDURE — 3051F HG A1C>EQUAL 7.0%<8.0%: CPT | Mod: CPTII,95,, | Performed by: FAMILY MEDICINE

## 2020-11-24 PROCEDURE — 99214 PR OFFICE/OUTPT VISIT, EST, LEVL IV, 30-39 MIN: ICD-10-PCS | Mod: 95,,, | Performed by: FAMILY MEDICINE

## 2020-11-24 PROCEDURE — 3051F PR MOST RECENT HEMOGLOBIN A1C LEVEL 7.0 - < 8.0%: ICD-10-PCS | Mod: CPTII,95,, | Performed by: FAMILY MEDICINE

## 2020-11-24 PROCEDURE — 99214 OFFICE O/P EST MOD 30 MIN: CPT | Mod: 95,,, | Performed by: FAMILY MEDICINE

## 2020-11-24 PROCEDURE — 99499 UNLISTED E&M SERVICE: CPT | Mod: 95,,, | Performed by: FAMILY MEDICINE

## 2020-11-24 PROCEDURE — 1157F ADVNC CARE PLAN IN RCRD: CPT | Mod: 95,,, | Performed by: FAMILY MEDICINE

## 2020-11-24 PROCEDURE — 1159F MED LIST DOCD IN RCRD: CPT | Mod: 95,,, | Performed by: FAMILY MEDICINE

## 2020-11-24 PROCEDURE — 1157F PR ADVANCE CARE PLAN OR EQUIV PRESENT IN MEDICAL RECORD: ICD-10-PCS | Mod: 95,,, | Performed by: FAMILY MEDICINE

## 2020-11-24 PROCEDURE — 3074F SYST BP LT 130 MM HG: CPT | Mod: CPTII,95,, | Performed by: FAMILY MEDICINE

## 2020-11-24 RX ORDER — CITALOPRAM 20 MG/1
20 TABLET, FILM COATED ORAL DAILY
Qty: 90 TABLET | Refills: 1 | Status: SHIPPED | OUTPATIENT
Start: 2020-11-24 | End: 2021-01-01

## 2020-11-24 RX ORDER — PROMETHAZINE HYDROCHLORIDE AND DEXTROMETHORPHAN HYDROBROMIDE 6.25; 15 MG/5ML; MG/5ML
5 SYRUP ORAL EVERY 6 HOURS PRN
Qty: 180 ML | Refills: 0 | Status: ON HOLD | OUTPATIENT
Start: 2020-11-24 | End: 2021-01-01 | Stop reason: HOSPADM

## 2020-11-24 RX ORDER — HYDROCODONE BITARTRATE AND ACETAMINOPHEN 7.5; 325 MG/1; MG/1
1 TABLET ORAL EVERY 12 HOURS PRN
Qty: 60 TABLET | Refills: 0 | Status: SHIPPED | OUTPATIENT
Start: 2021-01-23 | End: 2021-02-24 | Stop reason: SDUPTHER

## 2020-11-24 RX ORDER — HYDROCODONE BITARTRATE AND ACETAMINOPHEN 7.5; 325 MG/1; MG/1
1 TABLET ORAL EVERY 12 HOURS PRN
Qty: 60 TABLET | Refills: 0 | Status: SHIPPED | OUTPATIENT
Start: 2020-11-24 | End: 2021-02-24 | Stop reason: SDUPTHER

## 2020-11-24 RX ORDER — HYDROCODONE BITARTRATE AND ACETAMINOPHEN 7.5; 325 MG/1; MG/1
1 TABLET ORAL EVERY 12 HOURS PRN
Qty: 60 TABLET | Refills: 0 | Status: SHIPPED | OUTPATIENT
Start: 2020-12-24 | End: 2021-02-24 | Stop reason: SDUPTHER

## 2020-11-24 NOTE — PROGRESS NOTES
Patient, Melinda Knight (MRN #[id number]), presented with a recent Estimated Glumerular Filtration Rate (EGFR) between 30 and 45 consistent with the definition of chronic kidney disease stage 3 - moderate (ICD10 - N18.3).    eGFR if non    Date Value Ref Range Status   11/09/2020 44.9 (A) >60 mL/min/1.73 m^2 Final     Comment:     Calculation used to obtain the estimated glomerular filtration  rate (eGFR) is the CKD-EPI equation.          The patient's chronic kidney disease stage 3 was monitored, evaluated, addressed and/or treated. This addendum to the medical record is made on 11/24/2020.

## 2020-11-24 NOTE — TELEPHONE ENCOUNTER
----- Message from Duke Cole MD sent at 11/24/2020  9:36 AM CST -----  [facility] as [facility] is no longer needed.  Also needs transfer bench for her tub.

## 2020-11-24 NOTE — LETTER
November 25, 2020    Melinda Knight  [address] [place] [address]             [facility] [address], [facility] [address]  [facility] [address]  Phone: [phone]  Fax: [phone] To whom it may concern:    Ms. Melinda Knight is no longer in need of home oxygen.    Respectfully,        Duke Cole MD

## 2020-11-24 NOTE — PROGRESS NOTES
Subjective:       Patient ID: Melinda Knight is a 75 y.o. female.    Chief Complaint: No chief complaint on file.      HPI  The patient location is:  [place]  The chief complaint leading to consultation is:  Hospital follow-up and medication refill    Visit type: audiovisual    Face to Face time with patient:  9 minutes  Seventeen minutes of total time spent on the encounter, which includes face to face time and non-face to face time preparing to see the patient (eg, review of tests), Obtaining and/or reviewing separately obtained history, Documenting clinical information in the electronic or other health record, Independently interpreting results (not separately reported) and communicating results to the patient/family/caregiver, or Care coordination (not separately reported).         Each patient to whom he or she provides medical services by telemedicine is:  (1) informed of the relationship between the physician and patient and the respective role of any other health care provider with respect to management of the patient; and (2) notified that he or she may decline to receive medical services by telemedicine and may withdraw from such care at any time.    Notes:  Admitted to the hospital in early November with acute shortness of breath with hypoxia, as well as acute on chronic renal insufficiency.  Diagnosed with mild pneumonia and treated with antibiotics.  Discharged home on supplemental oxygen, which she has been using at night.  Renal function normalized after administration of IV fluids.  Echocardiogram done during the course of her hospitalization showed moderate aortic stenosis, which represents a change from prior study approximately 2 years ago.  Since discharge, the patient reports that she is feeling much better overall, essentially back to her baseline status.  Still having some mobility issues, however, requesting transfer tub bench for home use.  Has been using her oxygen at night, but also  has been monitoring her pulse ox on room air, reports 95% on room air, and feels she no longer needs home oxygen, requesting that it be picked up.  Still has slight cough, but no fevers or chills.  Appetite has normalized.  Requesting to be set up with cardiologist close to home.  Review of Systems   Constitutional: Negative for fever.   HENT: Negative for trouble swallowing.    Respiratory: Positive for cough. Negative for shortness of breath.    Cardiovascular: Negative for chest pain.   Gastrointestinal: Negative for constipation.   Endocrine: Negative for polydipsia and polyuria.   Genitourinary: Negative for difficulty urinating.   Musculoskeletal: Positive for arthralgias, back pain and gait problem.   Skin: Negative for wound.   Neurological: Negative for weakness.   Psychiatric/Behavioral: Negative for agitation and confusion.       Objective:      Vitals:    11/24/20 0930   BP: 124/77   SpO2: 95%     Physical Exam  Constitutional:       Appearance: She is well-developed.   Pulmonary:      Effort: No respiratory distress.   Neurological:      Mental Status: She is alert and oriented to person, place, and time.   Psychiatric:         Behavior: Behavior normal.         Lab Results   Component Value Date    WBC 8.30 11/06/2020    HGB 13.3 11/06/2020    HCT 36 11/06/2020     11/06/2020    CHOL 166 10/03/2019    TRIG 89 10/03/2019    HDL 45 10/03/2019    ALT 29 11/06/2020    AST 24 11/06/2020     11/09/2020    K 3.8 11/09/2020    CL 98 (L) 11/09/2020    CREATININE 1.2 11/09/2020    BUN 22 11/09/2020    CO2 31 (H) 11/09/2020    TSH 1.71 11/06/2020    INR 1.0 03/18/2016    HGBA1C 7.2 (H) 08/03/2020      Assessment:       1. Acute kidney injury    2. Atherosclerosis of abdominal aorta    3. Aortic stenosis, moderate    4. Stage 3 chronic kidney disease, unspecified whether stage 3a or 3b CKD    5. Type 2 diabetes mellitus with stage 3 chronic kidney disease, with long-term current use of insulin,  unspecified whether stage 3a or 3b CKD    6. Hyperlipidemia, unspecified hyperlipidemia type    7. Polyarticular osteoarthritis Stable   8. Current mild episode of major depressive disorder, unspecified whether recurrent    9. Impaired mobility        Plan:       Acute kidney injury  Resolved  Atherosclerosis of abdominal aorta  Maintain good control blood pressure  Aortic stenosis, moderate  -     Ambulatory referral/consult to Cardiology; Future; Expected date: 12/01/2020  Needs follow-up with Cardiology  Stage 3 chronic kidney disease, unspecified whether stage 3a or 3b CKD  -     CBC Auto Differential; Future; Expected date: 02/24/2021  -     Comprehensive Metabolic Panel; Future; Expected date: 02/24/2021  Renal function back to baseline prior to discharge  Type 2 diabetes mellitus with stage 3 chronic kidney disease, with long-term current use of insulin, unspecified whether stage 3a or 3b CKD  -     Comprehensive Metabolic Panel; Future; Expected date: 02/24/2021  -     Hemoglobin A1C; Future; Expected date: 02/24/2021  -     Microalbumin/Creatinine Ratio, Urine; Future; Expected date: 02/24/2021  Continue current regimen, recheck labs in 3 months  Hyperlipidemia, unspecified hyperlipidemia type  -     Comprehensive Metabolic Panel; Future; Expected date: 02/24/2021  -     Lipid Panel; Future; Expected date: 02/24/2021    Polyarticular osteoarthritis  -     TRANSFER TUB BENCH FOR HOME USE  -     HYDROcodone-acetaminophen (NORCO) 7.5-325 mg per tablet; Take 1 tablet by mouth every 12 (twelve) hours as needed for Pain.  Dispense: 60 tablet; Refill: 0  -     HYDROcodone-acetaminophen (NORCO) 7.5-325 mg per tablet; Take 1 tablet by mouth every 12 (twelve) hours as needed for Pain.  Dispense: 60 tablet; Refill: 0  -     HYDROcodone-acetaminophen (NORCO) 7.5-325 mg per tablet; Take 1 tablet by mouth every 12 (twelve) hours as needed for Pain.  Dispense: 60 tablet; Refill: 0  Stable on current regimen.    reviewed  Current mild episode of major depressive disorder, unspecified whether recurrent  -     citalopram (CELEXA) 20 MG tablet; Take 1 tablet (20 mg total) by mouth once daily.  Dispense: 90 tablet; Refill: 1    Impaired mobility  -     TRANSFER TUB BENCH FOR HOME USE    Other orders  -     promethazine-dextromethorphan (PROMETHAZINE-DM) 6.25-15 mg/5 mL Syrp; Take 5 mLs by mouth every 6 (six) hours as needed (cough).  Dispense: 180 mL; Refill: 0      Medication List with Changes/Refills   New Medications    HYDROCODONE-ACETAMINOPHEN (NORCO) 7.5-325 MG PER TABLET    Take 1 tablet by mouth every 12 (twelve) hours as needed for Pain.    HYDROCODONE-ACETAMINOPHEN (NORCO) 7.5-325 MG PER TABLET    Take 1 tablet by mouth every 12 (twelve) hours as needed for Pain.    PROMETHAZINE-DEXTROMETHORPHAN (PROMETHAZINE-DM) 6.25-15 MG/5 ML SYRP    Take 5 mLs by mouth every 6 (six) hours as needed (cough).   Current Medications    ALBUTEROL (ACCUNEB) 0.63 MG/3 ML NEBU    Take 3 mLs (0.63 mg total) by nebulization 3 (three) times daily as needed. Rescue    AMLODIPINE (NORVASC) 10 MG TABLET    Take 1 tablet (10 mg total) by mouth once daily.    ASPIRIN 325 MG TABLET    Take 325 mg by mouth every evening.     ATORVASTATIN (LIPITOR) 40 MG TABLET    Take 1 tablet (40 mg total) by mouth once daily.    BLOOD SUGAR DIAGNOSTIC (BLOOD GLUCOSE TEST) STRP    1 strip by Misc.(Non-Drug; Combo Route) route 2 (two) times daily. Prodigy    CARVEDILOL (COREG) 3.125 MG TABLET    Take 2 tablets (6.25 mg total) by mouth 2 (two) times daily.    DICYCLOMINE (BENTYL) 20 MG TABLET    Take 1 tablet (20 mg total) by mouth 3 (three) times daily as needed.    DIPHENOXYLATE-ATROPINE 2.5-0.025 MG (LOMOTIL) 2.5-0.025 MG PER TABLET    Take 1 tablet by mouth every 6 (six) hours as needed.    DULAGLUTIDE (TRULICITY) 1.5 MG/0.5 ML PNIJ    Inject 1.5 mg into the skin every 7 days.    GABAPENTIN (NEURONTIN) 300 MG CAPSULE    Take 1 capsule (300 mg total) by mouth 3  "(three) times daily.    GREEN TEA EXTRACT ORAL    Take 1 tablet by mouth once daily.    IBANDRONATE (BONIVA) 150 MG TABLET    Take 1 tablet (150 mg total) by mouth every 30 days.    INSULIN DETEMIR U-100 (LEVEMIR FLEXTOUCH U-100 INSULN) 100 UNIT/ML (3 ML) INPN PEN    Inject 25 Units into the skin every evening.    INSULIN LISPRO (HUMALOG KWIKPEN INSULIN) 100 UNIT/ML PEN    Inject 8 Units into the skin daily with dinner or evening meal.    LEVOTHYROXINE (SYNTHROID) 75 MCG TABLET    Take 1 tablet (75 mcg total) by mouth once daily.    LOSARTAN (COZAAR) 100 MG TABLET    TAKE 1 TABLET BY MOUTH EVERY DAY    MULTIVITAMIN ORAL    Take 1 tablet by mouth once daily. [place] Diabetes Pack     ONDANSETRON (ZOFRAN-ODT) 4 MG TBDL    Take 1 tablet (4 mg total) by mouth every 6 (six) hours as needed (nausea).    PANTOPRAZOLE (PROTONIX) 40 MG TABLET    Take 1 tablet (40 mg total) by mouth once daily.    PEN NEEDLE, DIABETIC (NOVOTWIST) 32 GAUGE X 1/5" NDLE    USE AS DIRECTED WITH  INSULIN  PEN   Changed and/or Refilled Medications    Modified Medication Previous Medication    CITALOPRAM (CELEXA) 20 MG TABLET citalopram (CELEXA) 20 MG tablet       Take 1 tablet (20 mg total) by mouth once daily.    Take 1 tablet (20 mg total) by mouth once daily.    HYDROCODONE-ACETAMINOPHEN (NORCO) 7.5-325 MG PER TABLET HYDROcodone-acetaminophen (NORCO) 7.5-325 mg per tablet       Take 1 tablet by mouth every 12 (twelve) hours as needed for Pain.    Take 1 tablet by mouth every 12 (twelve) hours as needed for Pain.           "

## 2020-11-25 NOTE — TELEPHONE ENCOUNTER
Adelso will need a letter stating that the patient no longer needs her oxygen. Please sign a prescription for me to send to [facility] for a tranfer bench

## 2020-12-10 ENCOUNTER — PATIENT OUTREACH (OUTPATIENT)
Dept: ADMINISTRATIVE | Facility: OTHER | Age: 75
End: 2020-12-10

## 2020-12-10 NOTE — PROGRESS NOTES
LINKS immunization registry not responding  Care Everywhere updated  Health Maintenance updated  DIS/Chart reviewed for overdue Proactive [facility] Encounters (PETRA) health maintenance testing (CRS, Breast Ca, Diabetic Eye Exam)   Orders entered:N/A

## 2021-01-01 ENCOUNTER — OFFICE VISIT (OUTPATIENT)
Dept: PRIMARY CARE CLINIC | Facility: CLINIC | Age: 76
End: 2021-01-01
Payer: MEDICARE

## 2021-01-01 ENCOUNTER — PATIENT MESSAGE (OUTPATIENT)
Dept: PRIMARY CARE CLINIC | Facility: CLINIC | Age: 76
End: 2021-01-01

## 2021-01-01 ENCOUNTER — TELEPHONE (OUTPATIENT)
Dept: PRIMARY CARE CLINIC | Facility: CLINIC | Age: 76
End: 2021-01-01

## 2021-01-01 ENCOUNTER — HOSPITAL ENCOUNTER (INPATIENT)
Facility: HOSPITAL | Age: 76
LOS: 20 days | Discharge: NURSING, REHAB OR LONG-TERM CARE | DRG: 280 | End: 2021-04-13
Attending: EMERGENCY MEDICINE | Admitting: EMERGENCY MEDICINE
Payer: MEDICARE

## 2021-01-01 ENCOUNTER — EXTERNAL HOME HEALTH (OUTPATIENT)
Dept: HOME HEALTH SERVICES | Facility: HOSPITAL | Age: 76
End: 2021-01-01

## 2021-01-01 ENCOUNTER — TELEPHONE (OUTPATIENT)
Dept: SLEEP MEDICINE | Facility: CLINIC | Age: 76
End: 2021-01-01

## 2021-01-01 ENCOUNTER — OFFICE VISIT (OUTPATIENT)
Dept: SLEEP MEDICINE | Facility: CLINIC | Age: 76
End: 2021-01-01
Payer: MEDICARE

## 2021-01-01 ENCOUNTER — PATIENT MESSAGE (OUTPATIENT)
Dept: ADMINISTRATIVE | Facility: CLINIC | Age: 76
End: 2021-01-01

## 2021-01-01 ENCOUNTER — TELEPHONE (OUTPATIENT)
Dept: SLEEP MEDICINE | Facility: OTHER | Age: 76
End: 2021-01-01

## 2021-01-01 ENCOUNTER — TELEPHONE (OUTPATIENT)
Dept: NEPHROLOGY | Facility: CLINIC | Age: 76
End: 2021-01-01

## 2021-01-01 ENCOUNTER — PATIENT OUTREACH (OUTPATIENT)
Dept: ADMINISTRATIVE | Facility: CLINIC | Age: 76
End: 2021-01-01

## 2021-01-01 ENCOUNTER — DOCUMENT SCAN (OUTPATIENT)
Dept: HOME HEALTH SERVICES | Facility: HOSPITAL | Age: 76
End: 2021-01-01
Payer: MEDICARE

## 2021-01-01 ENCOUNTER — EXTERNAL HOME HEALTH (OUTPATIENT)
Dept: HOME HEALTH SERVICES | Facility: HOSPITAL | Age: 76
End: 2021-01-01
Payer: MEDICARE

## 2021-01-01 ENCOUNTER — OFFICE VISIT (OUTPATIENT)
Dept: NEPHROLOGY | Facility: CLINIC | Age: 76
End: 2021-01-01
Payer: MEDICARE

## 2021-01-01 ENCOUNTER — PATIENT OUTREACH (OUTPATIENT)
Dept: ADMINISTRATIVE | Facility: OTHER | Age: 76
End: 2021-01-01

## 2021-01-01 VITALS
DIASTOLIC BLOOD PRESSURE: 62 MMHG | HEART RATE: 68 BPM | BODY MASS INDEX: 39.87 KG/M2 | HEART RATE: 87 BPM | SYSTOLIC BLOOD PRESSURE: 150 MMHG | SYSTOLIC BLOOD PRESSURE: 128 MMHG | OXYGEN SATURATION: 92 % | BODY MASS INDEX: 42.67 KG/M2 | DIASTOLIC BLOOD PRESSURE: 65 MMHG | WEIGHT: 226 LBS | WEIGHT: 211.19 LBS | HEIGHT: 61 IN | HEIGHT: 61 IN

## 2021-01-01 VITALS
WEIGHT: 194.88 LBS | HEIGHT: 61 IN | DIASTOLIC BLOOD PRESSURE: 78 MMHG | OXYGEN SATURATION: 95 % | SYSTOLIC BLOOD PRESSURE: 132 MMHG | RESPIRATION RATE: 18 BRPM | HEART RATE: 65 BPM | BODY MASS INDEX: 36.8 KG/M2

## 2021-01-01 VITALS
DIASTOLIC BLOOD PRESSURE: 61 MMHG | SYSTOLIC BLOOD PRESSURE: 138 MMHG | HEIGHT: 60 IN | OXYGEN SATURATION: 92 % | TEMPERATURE: 97 F | WEIGHT: 227 LBS | HEART RATE: 82 BPM | RESPIRATION RATE: 16 BRPM | BODY MASS INDEX: 44.57 KG/M2

## 2021-01-01 DIAGNOSIS — J96.12 CHRONIC RESPIRATORY FAILURE WITH HYPOXIA AND HYPERCAPNIA: ICD-10-CM

## 2021-01-01 DIAGNOSIS — I50.9 HEART FAILURE: ICD-10-CM

## 2021-01-01 DIAGNOSIS — R06.83 SNORING: ICD-10-CM

## 2021-01-01 DIAGNOSIS — Z79.4 TYPE 2 DIABETES MELLITUS WITH DIABETIC POLYNEUROPATHY, WITH LONG-TERM CURRENT USE OF INSULIN: Chronic | ICD-10-CM

## 2021-01-01 DIAGNOSIS — E11.319 TYPE 2 DIABETES MELLITUS WITH RETINOPATHY, WITH LONG-TERM CURRENT USE OF INSULIN, MACULAR EDEMA PRESENCE UNSPECIFIED, UNSPECIFIED LATERALITY, UNSPECIFIED RETINOPATHY SEVERITY: ICD-10-CM

## 2021-01-01 DIAGNOSIS — J96.01 ACUTE RESPIRATORY FAILURE WITH HYPOXIA: Primary | ICD-10-CM

## 2021-01-01 DIAGNOSIS — N18.4 CKD (CHRONIC KIDNEY DISEASE) STAGE 4, GFR 15-29 ML/MIN: Chronic | ICD-10-CM

## 2021-01-01 DIAGNOSIS — J96.02 ACUTE RESPIRATORY FAILURE WITH HYPOXIA AND HYPERCAPNIA: ICD-10-CM

## 2021-01-01 DIAGNOSIS — Z99.2 ESRD (END STAGE RENAL DISEASE) ON DIALYSIS: Primary | ICD-10-CM

## 2021-01-01 DIAGNOSIS — J96.11 CHRONIC RESPIRATORY FAILURE WITH HYPOXIA AND HYPERCAPNIA: Primary | ICD-10-CM

## 2021-01-01 DIAGNOSIS — J96.21 ACUTE ON CHRONIC RESPIRATORY FAILURE WITH HYPOXIA AND HYPERCAPNIA: ICD-10-CM

## 2021-01-01 DIAGNOSIS — K59.09 CHRONIC CONSTIPATION: ICD-10-CM

## 2021-01-01 DIAGNOSIS — J96.12 CHRONIC RESPIRATORY FAILURE WITH HYPOXIA AND HYPERCAPNIA: Primary | ICD-10-CM

## 2021-01-01 DIAGNOSIS — F32.0 CURRENT MILD EPISODE OF MAJOR DEPRESSIVE DISORDER, UNSPECIFIED WHETHER RECURRENT: ICD-10-CM

## 2021-01-01 DIAGNOSIS — R07.9 CHEST PAIN: ICD-10-CM

## 2021-01-01 DIAGNOSIS — Z01.818 ENCOUNTER FOR INTUBATION: ICD-10-CM

## 2021-01-01 DIAGNOSIS — J96.11 CHRONIC RESPIRATORY FAILURE WITH HYPOXIA AND HYPERCAPNIA: ICD-10-CM

## 2021-01-01 DIAGNOSIS — E11.42 TYPE 2 DIABETES MELLITUS WITH DIABETIC POLYNEUROPATHY, WITH LONG-TERM CURRENT USE OF INSULIN: Chronic | ICD-10-CM

## 2021-01-01 DIAGNOSIS — J10.1 INFLUENZA B: ICD-10-CM

## 2021-01-01 DIAGNOSIS — M15.9 POLYARTICULAR OSTEOARTHRITIS: ICD-10-CM

## 2021-01-01 DIAGNOSIS — D63.8 ANEMIA OF CHRONIC DISEASE: ICD-10-CM

## 2021-01-01 DIAGNOSIS — J98.4 RESTRICTIVE LUNG DISEASE: ICD-10-CM

## 2021-01-01 DIAGNOSIS — Z74.2 NEED FOR HOME HEALTH CARE: ICD-10-CM

## 2021-01-01 DIAGNOSIS — N17.9 ACUTE RENAL FAILURE SUPERIMPOSED ON CHRONIC KIDNEY DISEASE, UNSPECIFIED CKD STAGE, UNSPECIFIED ACUTE RENAL FAILURE TYPE: ICD-10-CM

## 2021-01-01 DIAGNOSIS — J96.01 ACUTE HYPOXEMIC RESPIRATORY FAILURE: ICD-10-CM

## 2021-01-01 DIAGNOSIS — Z92.89 HISTORY OF ETT: ICD-10-CM

## 2021-01-01 DIAGNOSIS — N18.6 ESRD (END STAGE RENAL DISEASE) ON DIALYSIS: Primary | ICD-10-CM

## 2021-01-01 DIAGNOSIS — R06.02 SHORTNESS OF BREATH: ICD-10-CM

## 2021-01-01 DIAGNOSIS — N39.42 URINARY INCONTINENCE WITHOUT SENSORY AWARENESS: ICD-10-CM

## 2021-01-01 DIAGNOSIS — E11.42 TYPE 2 DIABETES MELLITUS WITH DIABETIC POLYNEUROPATHY, WITH LONG-TERM CURRENT USE OF INSULIN: ICD-10-CM

## 2021-01-01 DIAGNOSIS — E78.2 MIXED HYPERLIPIDEMIA: ICD-10-CM

## 2021-01-01 DIAGNOSIS — I50.9 ACUTE DECOMPENSATED HEART FAILURE: ICD-10-CM

## 2021-01-01 DIAGNOSIS — E87.8 FLUID IMBALANCE: ICD-10-CM

## 2021-01-01 DIAGNOSIS — G47.30 SLEEP APNEA, UNSPECIFIED TYPE: ICD-10-CM

## 2021-01-01 DIAGNOSIS — Z20.822 ENCOUNTER FOR LABORATORY TESTING FOR COVID-19 VIRUS: ICD-10-CM

## 2021-01-01 DIAGNOSIS — I10 HYPERTENSION, UNSPECIFIED TYPE: ICD-10-CM

## 2021-01-01 DIAGNOSIS — I35.0 AORTIC VALVE STENOSIS, ETIOLOGY OF CARDIAC VALVE DISEASE UNSPECIFIED: ICD-10-CM

## 2021-01-01 DIAGNOSIS — I21.4 NSTEMI (NON-ST ELEVATED MYOCARDIAL INFARCTION): ICD-10-CM

## 2021-01-01 DIAGNOSIS — K21.9 GASTROESOPHAGEAL REFLUX DISEASE WITHOUT ESOPHAGITIS: ICD-10-CM

## 2021-01-01 DIAGNOSIS — J96.22 ACUTE ON CHRONIC RESPIRATORY FAILURE WITH HYPOXIA AND HYPERCAPNIA: ICD-10-CM

## 2021-01-01 DIAGNOSIS — Z79.4 TYPE 2 DIABETES MELLITUS WITH RETINOPATHY, WITH LONG-TERM CURRENT USE OF INSULIN, MACULAR EDEMA PRESENCE UNSPECIFIED, UNSPECIFIED LATERALITY, UNSPECIFIED RETINOPATHY SEVERITY: ICD-10-CM

## 2021-01-01 DIAGNOSIS — Z99.11 DEPENDENCE ON NON-INVASIVE VENTILATION: ICD-10-CM

## 2021-01-01 DIAGNOSIS — R09.02 HYPOXIA: Primary | ICD-10-CM

## 2021-01-01 DIAGNOSIS — R80.9 PROTEINURIA, UNSPECIFIED TYPE: ICD-10-CM

## 2021-01-01 DIAGNOSIS — N18.4 CKD (CHRONIC KIDNEY DISEASE) STAGE 4, GFR 15-29 ML/MIN: Primary | Chronic | ICD-10-CM

## 2021-01-01 DIAGNOSIS — E03.9 HYPOTHYROIDISM (ACQUIRED): ICD-10-CM

## 2021-01-01 DIAGNOSIS — Z79.4 TYPE 2 DIABETES MELLITUS WITH DIABETIC POLYNEUROPATHY, WITH LONG-TERM CURRENT USE OF INSULIN: ICD-10-CM

## 2021-01-01 DIAGNOSIS — J96.01 ACUTE RESPIRATORY FAILURE WITH HYPOXIA AND HYPERCAPNIA: ICD-10-CM

## 2021-01-01 DIAGNOSIS — I10 ESSENTIAL HYPERTENSION: Primary | ICD-10-CM

## 2021-01-01 DIAGNOSIS — Z74.09 LIMITED MOBILITY: ICD-10-CM

## 2021-01-01 DIAGNOSIS — R04.0 EPISTAXIS: ICD-10-CM

## 2021-01-01 DIAGNOSIS — G47.39 OTHER SLEEP APNEA: ICD-10-CM

## 2021-01-01 DIAGNOSIS — N18.9 ACUTE RENAL FAILURE SUPERIMPOSED ON CHRONIC KIDNEY DISEASE, UNSPECIFIED CKD STAGE, UNSPECIFIED ACUTE RENAL FAILURE TYPE: ICD-10-CM

## 2021-01-01 DIAGNOSIS — R79.89 TROPONIN LEVEL ELEVATED: ICD-10-CM

## 2021-01-01 LAB
ABO + RH BLD: NORMAL
ABO + RH BLD: NORMAL
ALBUMIN SERPL BCP-MCNC: 2 G/DL (ref 3.5–5.2)
ALBUMIN SERPL BCP-MCNC: 2.2 G/DL (ref 3.5–5.2)
ALBUMIN SERPL BCP-MCNC: 2.3 G/DL (ref 3.5–5.2)
ALBUMIN SERPL BCP-MCNC: 2.3 G/DL (ref 3.5–5.2)
ALBUMIN SERPL BCP-MCNC: 2.4 G/DL (ref 3.5–5.2)
ALBUMIN SERPL BCP-MCNC: 2.5 G/DL (ref 3.5–5.2)
ALBUMIN SERPL BCP-MCNC: 2.6 G/DL (ref 3.5–5.2)
ALBUMIN SERPL BCP-MCNC: 2.7 G/DL (ref 3.5–5.2)
ALBUMIN SERPL BCP-MCNC: 2.7 G/DL (ref 3.5–5.2)
ALBUMIN SERPL BCP-MCNC: 2.8 G/DL (ref 3.5–5.2)
ALBUMIN SERPL BCP-MCNC: 2.8 G/DL (ref 3.5–5.2)
ALLENS TEST: ABNORMAL
ALP SERPL-CCNC: 61 U/L (ref 55–135)
ALP SERPL-CCNC: 63 U/L (ref 55–135)
ALP SERPL-CCNC: 66 U/L (ref 55–135)
ALP SERPL-CCNC: 67 U/L (ref 55–135)
ALP SERPL-CCNC: 74 U/L (ref 55–135)
ALP SERPL-CCNC: 74 U/L (ref 55–135)
ALP SERPL-CCNC: 77 U/L (ref 55–135)
ALP SERPL-CCNC: 83 U/L (ref 55–135)
ALP SERPL-CCNC: 87 U/L (ref 55–135)
ALP SERPL-CCNC: 95 U/L (ref 55–135)
ALP SERPL-CCNC: 96 U/L (ref 55–135)
ALT SERPL W/O P-5'-P-CCNC: 15 U/L (ref 10–44)
ALT SERPL W/O P-5'-P-CCNC: 16 U/L (ref 10–44)
ALT SERPL W/O P-5'-P-CCNC: 20 U/L (ref 10–44)
ALT SERPL W/O P-5'-P-CCNC: 22 U/L (ref 10–44)
ALT SERPL W/O P-5'-P-CCNC: 27 U/L (ref 10–44)
ALT SERPL W/O P-5'-P-CCNC: 30 U/L (ref 10–44)
ALT SERPL W/O P-5'-P-CCNC: 34 U/L (ref 10–44)
ALT SERPL W/O P-5'-P-CCNC: 45 U/L (ref 10–44)
ALT SERPL W/O P-5'-P-CCNC: 62 U/L (ref 10–44)
ALT SERPL W/O P-5'-P-CCNC: 68 U/L (ref 10–44)
ALT SERPL W/O P-5'-P-CCNC: 94 U/L (ref 10–44)
ANION GAP SERPL CALC-SCNC: 10 MMOL/L (ref 8–16)
ANION GAP SERPL CALC-SCNC: 11 MMOL/L (ref 8–16)
ANION GAP SERPL CALC-SCNC: 12 MMOL/L (ref 8–16)
ANION GAP SERPL CALC-SCNC: 13 MMOL/L (ref 8–16)
ANION GAP SERPL CALC-SCNC: 13 MMOL/L (ref 8–16)
ANION GAP SERPL CALC-SCNC: 14 MMOL/L (ref 8–16)
ANION GAP SERPL CALC-SCNC: 15 MMOL/L (ref 8–16)
ANION GAP SERPL CALC-SCNC: 7 MMOL/L (ref 8–16)
ANION GAP SERPL CALC-SCNC: 7 MMOL/L (ref 8–16)
ANION GAP SERPL CALC-SCNC: 8 MMOL/L (ref 8–16)
ANION GAP SERPL CALC-SCNC: 8 MMOL/L (ref 8–16)
ANION GAP SERPL CALC-SCNC: 9 MMOL/L (ref 8–16)
ANISOCYTOSIS BLD QL SMEAR: SLIGHT
AST SERPL-CCNC: 13 U/L (ref 10–40)
AST SERPL-CCNC: 16 U/L (ref 10–40)
AST SERPL-CCNC: 17 U/L (ref 10–40)
AST SERPL-CCNC: 19 U/L (ref 10–40)
AST SERPL-CCNC: 21 U/L (ref 10–40)
AST SERPL-CCNC: 21 U/L (ref 10–40)
AST SERPL-CCNC: 24 U/L (ref 10–40)
AST SERPL-CCNC: 40 U/L (ref 10–40)
AST SERPL-CCNC: 71 U/L (ref 10–40)
AST SERPL-CCNC: 73 U/L (ref 10–40)
AST SERPL-CCNC: 75 U/L (ref 10–40)
AV INDEX (PROSTH): 0.43
AV MEAN GRADIENT: 5 MMHG
AV PEAK GRADIENT: 9 MMHG
AV VALVE AREA: 1.62 CM2
AV VELOCITY RATIO: 0.4
BACTERIA #/AREA URNS AUTO: ABNORMAL /HPF
BACTERIA #/AREA URNS AUTO: ABNORMAL /HPF
BACTERIA BLD CULT: NORMAL
BACTERIA SPEC AEROBE CULT: NORMAL
BACTERIA SPEC AEROBE CULT: NORMAL
BASO STIPL BLD QL SMEAR: ABNORMAL
BASOPHILS # BLD AUTO: 0.01 K/UL (ref 0–0.2)
BASOPHILS # BLD AUTO: 0.01 K/UL (ref 0–0.2)
BASOPHILS # BLD AUTO: 0.02 K/UL (ref 0–0.2)
BASOPHILS # BLD AUTO: 0.03 K/UL (ref 0–0.2)
BASOPHILS # BLD AUTO: 0.04 K/UL (ref 0–0.2)
BASOPHILS # BLD AUTO: 0.04 K/UL (ref 0–0.2)
BASOPHILS # BLD AUTO: 0.05 K/UL (ref 0–0.2)
BASOPHILS # BLD AUTO: 0.06 K/UL (ref 0–0.2)
BASOPHILS # BLD AUTO: 0.07 K/UL (ref 0–0.2)
BASOPHILS # BLD AUTO: 0.08 K/UL (ref 0–0.2)
BASOPHILS NFR BLD: 0.1 % (ref 0–1.9)
BASOPHILS NFR BLD: 0.2 % (ref 0–1.9)
BASOPHILS NFR BLD: 0.3 % (ref 0–1.9)
BASOPHILS NFR BLD: 0.4 % (ref 0–1.9)
BASOPHILS NFR BLD: 0.5 % (ref 0–1.9)
BASOPHILS NFR BLD: 0.6 % (ref 0–1.9)
BASOPHILS NFR BLD: 0.7 % (ref 0–1.9)
BASOPHILS NFR BLD: 0.9 % (ref 0–1.9)
BILIRUB DIRECT SERPL-MCNC: 0.2 MG/DL (ref 0.1–0.3)
BILIRUB SERPL-MCNC: 0.3 MG/DL (ref 0.1–1)
BILIRUB SERPL-MCNC: 0.3 MG/DL (ref 0.1–1)
BILIRUB SERPL-MCNC: 0.4 MG/DL (ref 0.1–1)
BILIRUB SERPL-MCNC: 0.5 MG/DL (ref 0.1–1)
BILIRUB SERPL-MCNC: 0.5 MG/DL (ref 0.1–1)
BILIRUB SERPL-MCNC: 0.6 MG/DL (ref 0.1–1)
BILIRUB SERPL-MCNC: 0.6 MG/DL (ref 0.1–1)
BILIRUB SERPL-MCNC: 0.7 MG/DL (ref 0.1–1)
BILIRUB SERPL-MCNC: 1 MG/DL (ref 0.1–1)
BILIRUB UR QL STRIP: NEGATIVE
BILIRUB UR QL STRIP: NEGATIVE
BLD GP AB SCN CELLS X3 SERPL QL: NORMAL
BLD GP AB SCN CELLS X3 SERPL QL: NORMAL
BLD PROD TYP BPU: NORMAL
BLOOD UNIT EXPIRATION DATE: NORMAL
BLOOD UNIT TYPE CODE: 5100
BLOOD UNIT TYPE: NORMAL
BNP SERPL-MCNC: 912 PG/ML (ref 0–99)
BNP SERPL-MCNC: 975 PG/ML (ref 0–99)
BSA FOR ECHO PROCEDURE: 2.09 M2
BSA FOR ECHO PROCEDURE: 2.18 M2
BUN SERPL-MCNC: 33 MG/DL (ref 8–23)
BUN SERPL-MCNC: 34 MG/DL (ref 8–23)
BUN SERPL-MCNC: 38 MG/DL (ref 8–23)
BUN SERPL-MCNC: 38 MG/DL (ref 8–23)
BUN SERPL-MCNC: 39 MG/DL (ref 8–23)
BUN SERPL-MCNC: 42 MG/DL (ref 8–23)
BUN SERPL-MCNC: 42 MG/DL (ref 8–23)
BUN SERPL-MCNC: 43 MG/DL (ref 8–23)
BUN SERPL-MCNC: 44 MG/DL (ref 8–23)
BUN SERPL-MCNC: 44 MG/DL (ref 8–23)
BUN SERPL-MCNC: 45 MG/DL (ref 8–23)
BUN SERPL-MCNC: 46 MG/DL (ref 8–23)
BUN SERPL-MCNC: 46 MG/DL (ref 8–23)
BUN SERPL-MCNC: 47 MG/DL (ref 8–23)
BUN SERPL-MCNC: 47 MG/DL (ref 8–23)
BUN SERPL-MCNC: 48 MG/DL (ref 8–23)
BUN SERPL-MCNC: 49 MG/DL (ref 8–23)
BUN SERPL-MCNC: 50 MG/DL (ref 8–23)
BUN SERPL-MCNC: 52 MG/DL (ref 8–23)
BUN SERPL-MCNC: 54 MG/DL (ref 8–23)
BUN SERPL-MCNC: 56 MG/DL (ref 8–23)
BUN SERPL-MCNC: 59 MG/DL (ref 8–23)
BUN SERPL-MCNC: 61 MG/DL (ref 8–23)
BUN SERPL-MCNC: 62 MG/DL (ref 8–23)
BUN SERPL-MCNC: 64 MG/DL (ref 8–23)
BUN SERPL-MCNC: 67 MG/DL (ref 8–23)
BUN SERPL-MCNC: 67 MG/DL (ref 8–23)
BUN SERPL-MCNC: 69 MG/DL (ref 8–23)
BUN SERPL-MCNC: 70 MG/DL (ref 8–23)
BUN SERPL-MCNC: 72 MG/DL (ref 8–23)
BUN SERPL-MCNC: 75 MG/DL (ref 8–23)
BUN SERPL-MCNC: 75 MG/DL (ref 8–23)
BUN SERPL-MCNC: 77 MG/DL (ref 8–23)
BUN SERPL-MCNC: 77 MG/DL (ref 8–23)
BUN SERPL-MCNC: 79 MG/DL (ref 8–23)
CALCIUM SERPL-MCNC: 7.9 MG/DL (ref 8.7–10.5)
CALCIUM SERPL-MCNC: 8 MG/DL (ref 8.7–10.5)
CALCIUM SERPL-MCNC: 8 MG/DL (ref 8.7–10.5)
CALCIUM SERPL-MCNC: 8.2 MG/DL (ref 8.7–10.5)
CALCIUM SERPL-MCNC: 8.3 MG/DL (ref 8.7–10.5)
CALCIUM SERPL-MCNC: 8.4 MG/DL (ref 8.7–10.5)
CALCIUM SERPL-MCNC: 8.6 MG/DL (ref 8.7–10.5)
CALCIUM SERPL-MCNC: 8.7 MG/DL (ref 8.7–10.5)
CALCIUM SERPL-MCNC: 8.8 MG/DL (ref 8.7–10.5)
CALCIUM SERPL-MCNC: 8.9 MG/DL (ref 8.7–10.5)
CALCIUM SERPL-MCNC: 9 MG/DL (ref 8.7–10.5)
CALCIUM SERPL-MCNC: 9.1 MG/DL (ref 8.7–10.5)
CALCIUM SERPL-MCNC: 9.2 MG/DL (ref 8.7–10.5)
CALCIUM SERPL-MCNC: 9.2 MG/DL (ref 8.7–10.5)
CALCIUM SERPL-MCNC: 9.3 MG/DL (ref 8.7–10.5)
CALCIUM SERPL-MCNC: 9.3 MG/DL (ref 8.7–10.5)
CALCIUM SERPL-MCNC: 9.4 MG/DL (ref 8.7–10.5)
CALCIUM SERPL-MCNC: 9.5 MG/DL (ref 8.7–10.5)
CHLORIDE SERPL-SCNC: 100 MMOL/L (ref 95–110)
CHLORIDE SERPL-SCNC: 101 MMOL/L (ref 95–110)
CHLORIDE SERPL-SCNC: 101 MMOL/L (ref 95–110)
CHLORIDE SERPL-SCNC: 102 MMOL/L (ref 95–110)
CHLORIDE SERPL-SCNC: 103 MMOL/L (ref 95–110)
CHLORIDE SERPL-SCNC: 85 MMOL/L (ref 95–110)
CHLORIDE SERPL-SCNC: 86 MMOL/L (ref 95–110)
CHLORIDE SERPL-SCNC: 86 MMOL/L (ref 95–110)
CHLORIDE SERPL-SCNC: 88 MMOL/L (ref 95–110)
CHLORIDE SERPL-SCNC: 90 MMOL/L (ref 95–110)
CHLORIDE SERPL-SCNC: 90 MMOL/L (ref 95–110)
CHLORIDE SERPL-SCNC: 92 MMOL/L (ref 95–110)
CHLORIDE SERPL-SCNC: 94 MMOL/L (ref 95–110)
CHLORIDE SERPL-SCNC: 95 MMOL/L (ref 95–110)
CHLORIDE SERPL-SCNC: 96 MMOL/L (ref 95–110)
CHLORIDE SERPL-SCNC: 96 MMOL/L (ref 95–110)
CHLORIDE SERPL-SCNC: 97 MMOL/L (ref 95–110)
CHLORIDE SERPL-SCNC: 98 MMOL/L (ref 95–110)
CHLORIDE SERPL-SCNC: 99 MMOL/L (ref 95–110)
CHLORIDE UR-SCNC: <20 MMOL/L (ref 25–200)
CHLORIDE UR-SCNC: <20 MMOL/L (ref 25–200)
CK MB SERPL-MCNC: 1 NG/ML (ref 0.1–6.5)
CK MB SERPL-RTO: 6.3 % (ref 0–5)
CK SERPL-CCNC: 16 U/L (ref 20–180)
CLARITY UR REFRACT.AUTO: ABNORMAL
CLARITY UR REFRACT.AUTO: CLEAR
CO2 SERPL-SCNC: 25 MMOL/L (ref 23–29)
CO2 SERPL-SCNC: 25 MMOL/L (ref 23–29)
CO2 SERPL-SCNC: 26 MMOL/L (ref 23–29)
CO2 SERPL-SCNC: 28 MMOL/L (ref 23–29)
CO2 SERPL-SCNC: 29 MMOL/L (ref 23–29)
CO2 SERPL-SCNC: 31 MMOL/L (ref 23–29)
CO2 SERPL-SCNC: 31 MMOL/L (ref 23–29)
CO2 SERPL-SCNC: 32 MMOL/L (ref 23–29)
CO2 SERPL-SCNC: 33 MMOL/L (ref 23–29)
CO2 SERPL-SCNC: 34 MMOL/L (ref 23–29)
CO2 SERPL-SCNC: 34 MMOL/L (ref 23–29)
CO2 SERPL-SCNC: 35 MMOL/L (ref 23–29)
CO2 SERPL-SCNC: 36 MMOL/L (ref 23–29)
CO2 SERPL-SCNC: 36 MMOL/L (ref 23–29)
CO2 SERPL-SCNC: 37 MMOL/L (ref 23–29)
CO2 SERPL-SCNC: 38 MMOL/L (ref 23–29)
CO2 SERPL-SCNC: 38 MMOL/L (ref 23–29)
CO2 SERPL-SCNC: 39 MMOL/L (ref 23–29)
CO2 SERPL-SCNC: 40 MMOL/L (ref 23–29)
CO2 SERPL-SCNC: 41 MMOL/L (ref 23–29)
CO2 SERPL-SCNC: 43 MMOL/L (ref 23–29)
CO2 SERPL-SCNC: 43 MMOL/L (ref 23–29)
CO2 SERPL-SCNC: 44 MMOL/L (ref 23–29)
CO2 SERPL-SCNC: 46 MMOL/L (ref 23–29)
CO2 SERPL-SCNC: 46 MMOL/L (ref 23–29)
CODING SYSTEM: NORMAL
COLOR UR AUTO: YELLOW
COLOR UR AUTO: YELLOW
CREAT SERPL-MCNC: 1.8 MG/DL (ref 0.5–1.4)
CREAT SERPL-MCNC: 1.8 MG/DL (ref 0.5–1.4)
CREAT SERPL-MCNC: 2 MG/DL (ref 0.5–1.4)
CREAT SERPL-MCNC: 2.1 MG/DL (ref 0.5–1.4)
CREAT SERPL-MCNC: 2.2 MG/DL (ref 0.5–1.4)
CREAT SERPL-MCNC: 2.2 MG/DL (ref 0.5–1.4)
CREAT SERPL-MCNC: 2.3 MG/DL (ref 0.5–1.4)
CREAT SERPL-MCNC: 2.3 MG/DL (ref 0.5–1.4)
CREAT SERPL-MCNC: 2.4 MG/DL (ref 0.5–1.4)
CREAT SERPL-MCNC: 2.5 MG/DL (ref 0.5–1.4)
CREAT SERPL-MCNC: 2.6 MG/DL (ref 0.5–1.4)
CREAT SERPL-MCNC: 2.7 MG/DL (ref 0.5–1.4)
CREAT SERPL-MCNC: 2.7 MG/DL (ref 0.5–1.4)
CREAT SERPL-MCNC: 2.8 MG/DL (ref 0.5–1.4)
CREAT UR-MCNC: 73 MG/DL (ref 15–325)
CREAT UR-MCNC: 92 MG/DL (ref 15–325)
CREAT UR-MCNC: 92 MG/DL (ref 15–325)
CTP QC/QA: YES
CTP QC/QA: YES
CV ECHO LV RWT: 0.55 CM
D DIMER PPP IA.FEU-MCNC: 3.2 MG/L FEU
DELSYS: ABNORMAL
DIFFERENTIAL METHOD: ABNORMAL
DISPENSE STATUS: NORMAL
DOP CALC AO PEAK VEL: 1.51 M/S
DOP CALC AO VTI: 34.9 CM
DOP CALC LVOT AREA: 3.8 CM2
DOP CALC LVOT DIAMETER: 2.2 CM
DOP CALC LVOT PEAK VEL: 0.6 M/S
DOP CALC LVOT STROKE VOLUME: 56.42 CM3
DOP CALCLVOT PEAK VEL VTI: 14.85 CM
E WAVE DECELERATION TIME: 165.27 MSEC
E/A RATIO: 1.03
E/E' RATIO: 20.22 M/S
ECHO LV POSTERIOR WALL: 1.02 CM (ref 0.6–1.1)
EJECTION FRACTION: 55 %
EOSINOPHIL # BLD AUTO: 0 K/UL (ref 0–0.5)
EOSINOPHIL # BLD AUTO: 0 K/UL (ref 0–0.5)
EOSINOPHIL # BLD AUTO: 0.1 K/UL (ref 0–0.5)
EOSINOPHIL # BLD AUTO: 0.2 K/UL (ref 0–0.5)
EOSINOPHIL # BLD AUTO: 0.3 K/UL (ref 0–0.5)
EOSINOPHIL # BLD AUTO: 0.4 K/UL (ref 0–0.5)
EOSINOPHIL # BLD AUTO: 0.5 K/UL (ref 0–0.5)
EOSINOPHIL NFR BLD: 0 % (ref 0–8)
EOSINOPHIL NFR BLD: 0.1 % (ref 0–8)
EOSINOPHIL NFR BLD: 0.4 % (ref 0–8)
EOSINOPHIL NFR BLD: 0.8 % (ref 0–8)
EOSINOPHIL NFR BLD: 1 % (ref 0–8)
EOSINOPHIL NFR BLD: 1.5 % (ref 0–8)
EOSINOPHIL NFR BLD: 1.8 % (ref 0–8)
EOSINOPHIL NFR BLD: 1.9 % (ref 0–8)
EOSINOPHIL NFR BLD: 1.9 % (ref 0–8)
EOSINOPHIL NFR BLD: 2.9 % (ref 0–8)
EOSINOPHIL NFR BLD: 3 % (ref 0–8)
EOSINOPHIL NFR BLD: 3.1 % (ref 0–8)
EOSINOPHIL NFR BLD: 3.1 % (ref 0–8)
EOSINOPHIL NFR BLD: 3.4 % (ref 0–8)
EOSINOPHIL NFR BLD: 3.4 % (ref 0–8)
EOSINOPHIL NFR BLD: 3.6 % (ref 0–8)
EOSINOPHIL NFR BLD: 3.8 % (ref 0–8)
EOSINOPHIL NFR BLD: 4 % (ref 0–8)
EOSINOPHIL NFR BLD: 4 % (ref 0–8)
EOSINOPHIL NFR BLD: 4.3 % (ref 0–8)
EOSINOPHIL NFR BLD: 4.5 % (ref 0–8)
EOSINOPHIL NFR BLD: 4.7 % (ref 0–8)
EOSINOPHIL NFR BLD: 4.8 % (ref 0–8)
EOSINOPHIL NFR BLD: 4.8 % (ref 0–8)
EOSINOPHIL NFR BLD: 4.9 % (ref 0–8)
EOSINOPHIL NFR BLD: 5 % (ref 0–8)
EOSINOPHIL NFR BLD: 5.2 % (ref 0–8)
EP: 5
ERYTHROCYTE [DISTWIDTH] IN BLOOD BY AUTOMATED COUNT: 13.2 % (ref 11.5–14.5)
ERYTHROCYTE [DISTWIDTH] IN BLOOD BY AUTOMATED COUNT: 13.2 % (ref 11.5–14.5)
ERYTHROCYTE [DISTWIDTH] IN BLOOD BY AUTOMATED COUNT: 13.5 % (ref 11.5–14.5)
ERYTHROCYTE [DISTWIDTH] IN BLOOD BY AUTOMATED COUNT: 13.7 % (ref 11.5–14.5)
ERYTHROCYTE [DISTWIDTH] IN BLOOD BY AUTOMATED COUNT: 13.8 % (ref 11.5–14.5)
ERYTHROCYTE [DISTWIDTH] IN BLOOD BY AUTOMATED COUNT: 13.9 % (ref 11.5–14.5)
ERYTHROCYTE [DISTWIDTH] IN BLOOD BY AUTOMATED COUNT: 14 % (ref 11.5–14.5)
ERYTHROCYTE [DISTWIDTH] IN BLOOD BY AUTOMATED COUNT: 16.3 % (ref 11.5–14.5)
ERYTHROCYTE [DISTWIDTH] IN BLOOD BY AUTOMATED COUNT: 16.5 % (ref 11.5–14.5)
ERYTHROCYTE [DISTWIDTH] IN BLOOD BY AUTOMATED COUNT: 16.6 % (ref 11.5–14.5)
ERYTHROCYTE [DISTWIDTH] IN BLOOD BY AUTOMATED COUNT: 16.7 % (ref 11.5–14.5)
ERYTHROCYTE [DISTWIDTH] IN BLOOD BY AUTOMATED COUNT: 16.7 % (ref 11.5–14.5)
ERYTHROCYTE [DISTWIDTH] IN BLOOD BY AUTOMATED COUNT: 16.8 % (ref 11.5–14.5)
ERYTHROCYTE [DISTWIDTH] IN BLOOD BY AUTOMATED COUNT: 17.2 % (ref 11.5–14.5)
ERYTHROCYTE [DISTWIDTH] IN BLOOD BY AUTOMATED COUNT: 17.4 % (ref 11.5–14.5)
ERYTHROCYTE [DISTWIDTH] IN BLOOD BY AUTOMATED COUNT: 17.6 % (ref 11.5–14.5)
ERYTHROCYTE [DISTWIDTH] IN BLOOD BY AUTOMATED COUNT: 18.2 % (ref 11.5–14.5)
ERYTHROCYTE [DISTWIDTH] IN BLOOD BY AUTOMATED COUNT: 18.2 % (ref 11.5–14.5)
ERYTHROCYTE [SEDIMENTATION RATE] IN BLOOD BY WESTERGREN METHOD: 14 MM/H
ERYTHROCYTE [SEDIMENTATION RATE] IN BLOOD BY WESTERGREN METHOD: 16 MM/H
ERYTHROCYTE [SEDIMENTATION RATE] IN BLOOD BY WESTERGREN METHOD: 16 MM/H
ERYTHROCYTE [SEDIMENTATION RATE] IN BLOOD BY WESTERGREN METHOD: 18 MM/H
ERYTHROCYTE [SEDIMENTATION RATE] IN BLOOD BY WESTERGREN METHOD: 20 MM/H
ERYTHROCYTE [SEDIMENTATION RATE] IN BLOOD BY WESTERGREN METHOD: 25 MM/H
ERYTHROCYTE [SEDIMENTATION RATE] IN BLOOD BY WESTERGREN METHOD: 30 MM/H
EST. GFR  (AFRICAN AMERICAN): 18.6 ML/MIN/1.73 M^2
EST. GFR  (AFRICAN AMERICAN): 19.4 ML/MIN/1.73 M^2
EST. GFR  (AFRICAN AMERICAN): 19.4 ML/MIN/1.73 M^2
EST. GFR  (AFRICAN AMERICAN): 20.3 ML/MIN/1.73 M^2
EST. GFR  (AFRICAN AMERICAN): 21.3 ML/MIN/1.73 M^2
EST. GFR  (AFRICAN AMERICAN): 22.4 ML/MIN/1.73 M^2
EST. GFR  (AFRICAN AMERICAN): 23.6 ML/MIN/1.73 M^2
EST. GFR  (AFRICAN AMERICAN): 23.6 ML/MIN/1.73 M^2
EST. GFR  (AFRICAN AMERICAN): 24.9 ML/MIN/1.73 M^2
EST. GFR  (AFRICAN AMERICAN): 24.9 ML/MIN/1.73 M^2
EST. GFR  (AFRICAN AMERICAN): 26.3 ML/MIN/1.73 M^2
EST. GFR  (AFRICAN AMERICAN): 27.9 ML/MIN/1.73 M^2
EST. GFR  (AFRICAN AMERICAN): 31.7 ML/MIN/1.73 M^2
EST. GFR  (AFRICAN AMERICAN): 31.7 ML/MIN/1.73 M^2
EST. GFR  (NON AFRICAN AMERICAN): 16.1 ML/MIN/1.73 M^2
EST. GFR  (NON AFRICAN AMERICAN): 16.9 ML/MIN/1.73 M^2
EST. GFR  (NON AFRICAN AMERICAN): 16.9 ML/MIN/1.73 M^2
EST. GFR  (NON AFRICAN AMERICAN): 17.6 ML/MIN/1.73 M^2
EST. GFR  (NON AFRICAN AMERICAN): 18.5 ML/MIN/1.73 M^2
EST. GFR  (NON AFRICAN AMERICAN): 19.4 ML/MIN/1.73 M^2
EST. GFR  (NON AFRICAN AMERICAN): 20.5 ML/MIN/1.73 M^2
EST. GFR  (NON AFRICAN AMERICAN): 20.5 ML/MIN/1.73 M^2
EST. GFR  (NON AFRICAN AMERICAN): 21.6 ML/MIN/1.73 M^2
EST. GFR  (NON AFRICAN AMERICAN): 21.6 ML/MIN/1.73 M^2
EST. GFR  (NON AFRICAN AMERICAN): 22.8 ML/MIN/1.73 M^2
EST. GFR  (NON AFRICAN AMERICAN): 24.2 ML/MIN/1.73 M^2
EST. GFR  (NON AFRICAN AMERICAN): 27.5 ML/MIN/1.73 M^2
EST. GFR  (NON AFRICAN AMERICAN): 27.5 ML/MIN/1.73 M^2
FACT X PPP CHRO-ACNC: 0.12 IU/ML (ref 0.3–0.7)
FACT X PPP CHRO-ACNC: 0.2 IU/ML (ref 0.3–0.7)
FACT X PPP CHRO-ACNC: 0.32 IU/ML (ref 0.3–0.7)
FACT X PPP CHRO-ACNC: 0.38 IU/ML (ref 0.3–0.7)
FACT X PPP CHRO-ACNC: 0.52 IU/ML (ref 0.3–0.7)
FACT X PPP CHRO-ACNC: 0.6 IU/ML (ref 0.3–0.7)
FACT X PPP CHRO-ACNC: 0.73 IU/ML (ref 0.3–0.7)
FACT X PPP CHRO-ACNC: <0.1 IU/ML (ref 0.3–0.7)
FERRITIN SERPL-MCNC: 164 NG/ML (ref 20–300)
FIO2: 100
FIO2: 40
FIO2: 40
FIO2: 55
FIO2: 80
FIO2: 91
FIO2: 91
FLOW: 10
FLOW: 15
FLOW: 15
FLOW: 6
FLOW: 6
FRACTIONAL SHORTENING: 19 % (ref 28–44)
FRACTIONAL SHORTENING: 22 % (ref 28–44)
GIANT PLATELETS BLD QL SMEAR: PRESENT
GLUCOSE SERPL-MCNC: 104 MG/DL (ref 70–110)
GLUCOSE SERPL-MCNC: 120 MG/DL (ref 70–110)
GLUCOSE SERPL-MCNC: 124 MG/DL (ref 70–110)
GLUCOSE SERPL-MCNC: 139 MG/DL (ref 70–110)
GLUCOSE SERPL-MCNC: 154 MG/DL (ref 70–110)
GLUCOSE SERPL-MCNC: 160 MG/DL (ref 70–110)
GLUCOSE SERPL-MCNC: 164 MG/DL (ref 70–110)
GLUCOSE SERPL-MCNC: 165 MG/DL (ref 70–110)
GLUCOSE SERPL-MCNC: 171 MG/DL (ref 70–110)
GLUCOSE SERPL-MCNC: 171 MG/DL (ref 70–110)
GLUCOSE SERPL-MCNC: 174 MG/DL (ref 70–110)
GLUCOSE SERPL-MCNC: 178 MG/DL (ref 70–110)
GLUCOSE SERPL-MCNC: 189 MG/DL (ref 70–110)
GLUCOSE SERPL-MCNC: 191 MG/DL (ref 70–110)
GLUCOSE SERPL-MCNC: 212 MG/DL (ref 70–110)
GLUCOSE SERPL-MCNC: 223 MG/DL (ref 70–110)
GLUCOSE SERPL-MCNC: 237 MG/DL (ref 70–110)
GLUCOSE SERPL-MCNC: 253 MG/DL (ref 70–110)
GLUCOSE SERPL-MCNC: 261 MG/DL (ref 70–110)
GLUCOSE SERPL-MCNC: 273 MG/DL (ref 70–110)
GLUCOSE SERPL-MCNC: 275 MG/DL (ref 70–110)
GLUCOSE SERPL-MCNC: 279 MG/DL (ref 70–110)
GLUCOSE SERPL-MCNC: 281 MG/DL (ref 70–110)
GLUCOSE SERPL-MCNC: 287 MG/DL (ref 70–110)
GLUCOSE SERPL-MCNC: 288 MG/DL (ref 70–110)
GLUCOSE SERPL-MCNC: 303 MG/DL (ref 70–110)
GLUCOSE SERPL-MCNC: 310 MG/DL (ref 70–110)
GLUCOSE SERPL-MCNC: 325 MG/DL (ref 70–110)
GLUCOSE SERPL-MCNC: 335 MG/DL (ref 70–110)
GLUCOSE SERPL-MCNC: 335 MG/DL (ref 70–110)
GLUCOSE SERPL-MCNC: 337 MG/DL (ref 70–110)
GLUCOSE SERPL-MCNC: 353 MG/DL (ref 70–110)
GLUCOSE SERPL-MCNC: 362 MG/DL (ref 70–110)
GLUCOSE SERPL-MCNC: 422 MG/DL (ref 70–110)
GLUCOSE SERPL-MCNC: 477 MG/DL (ref 70–110)
GLUCOSE UR QL STRIP: ABNORMAL
GLUCOSE UR QL STRIP: NEGATIVE
GRAM STN SPEC: NORMAL
HCO3 UR-SCNC: 27.7 MMOL/L (ref 24–28)
HCO3 UR-SCNC: 29.9 MMOL/L (ref 24–28)
HCO3 UR-SCNC: 30.1 MMOL/L (ref 24–28)
HCO3 UR-SCNC: 30.2 MMOL/L (ref 24–28)
HCO3 UR-SCNC: 31.7 MMOL/L (ref 24–28)
HCO3 UR-SCNC: 31.7 MMOL/L (ref 24–28)
HCO3 UR-SCNC: 32.5 MMOL/L (ref 24–28)
HCO3 UR-SCNC: 39.3 MMOL/L (ref 24–28)
HCO3 UR-SCNC: 44.5 MMOL/L (ref 24–28)
HCO3 UR-SCNC: 47.2 MMOL/L (ref 24–28)
HCO3 UR-SCNC: 47.2 MMOL/L (ref 24–28)
HCO3 UR-SCNC: 51 MMOL/L (ref 24–28)
HCO3 UR-SCNC: 52.5 MMOL/L (ref 24–28)
HCT VFR BLD AUTO: 23.7 % (ref 37–48.5)
HCT VFR BLD AUTO: 24.1 % (ref 37–48.5)
HCT VFR BLD AUTO: 24.8 % (ref 37–48.5)
HCT VFR BLD AUTO: 24.8 % (ref 37–48.5)
HCT VFR BLD AUTO: 24.9 % (ref 37–48.5)
HCT VFR BLD AUTO: 25 % (ref 37–48.5)
HCT VFR BLD AUTO: 25.2 % (ref 37–48.5)
HCT VFR BLD AUTO: 25.3 % (ref 37–48.5)
HCT VFR BLD AUTO: 25.3 % (ref 37–48.5)
HCT VFR BLD AUTO: 25.5 % (ref 37–48.5)
HCT VFR BLD AUTO: 25.6 % (ref 37–48.5)
HCT VFR BLD AUTO: 26.9 % (ref 37–48.5)
HCT VFR BLD AUTO: 27.1 % (ref 37–48.5)
HCT VFR BLD AUTO: 27.4 % (ref 37–48.5)
HCT VFR BLD AUTO: 28.2 % (ref 37–48.5)
HCT VFR BLD AUTO: 28.3 % (ref 37–48.5)
HCT VFR BLD AUTO: 28.6 % (ref 37–48.5)
HCT VFR BLD AUTO: 28.8 % (ref 37–48.5)
HCT VFR BLD AUTO: 28.9 % (ref 37–48.5)
HCT VFR BLD AUTO: 29 % (ref 37–48.5)
HCT VFR BLD AUTO: 29.1 % (ref 37–48.5)
HCT VFR BLD AUTO: 29.2 % (ref 37–48.5)
HCT VFR BLD AUTO: 29.2 % (ref 37–48.5)
HCT VFR BLD AUTO: 29.3 % (ref 37–48.5)
HCT VFR BLD AUTO: 30.5 % (ref 37–48.5)
HCT VFR BLD AUTO: 31.7 % (ref 37–48.5)
HCT VFR BLD AUTO: 33.4 % (ref 37–48.5)
HCV AB SERPL QL IA: NEGATIVE
HGB BLD-MCNC: 10 G/DL (ref 12–16)
HGB BLD-MCNC: 7.1 G/DL (ref 12–16)
HGB BLD-MCNC: 7.4 G/DL (ref 12–16)
HGB BLD-MCNC: 7.4 G/DL (ref 12–16)
HGB BLD-MCNC: 7.5 G/DL (ref 12–16)
HGB BLD-MCNC: 7.5 G/DL (ref 12–16)
HGB BLD-MCNC: 7.6 G/DL (ref 12–16)
HGB BLD-MCNC: 7.7 G/DL (ref 12–16)
HGB BLD-MCNC: 7.7 G/DL (ref 12–16)
HGB BLD-MCNC: 7.8 G/DL (ref 12–16)
HGB BLD-MCNC: 8.1 G/DL (ref 12–16)
HGB BLD-MCNC: 8.2 G/DL (ref 12–16)
HGB BLD-MCNC: 8.3 G/DL (ref 12–16)
HGB BLD-MCNC: 8.4 G/DL (ref 12–16)
HGB BLD-MCNC: 8.5 G/DL (ref 12–16)
HGB BLD-MCNC: 8.7 G/DL (ref 12–16)
HGB BLD-MCNC: 8.8 G/DL (ref 12–16)
HGB BLD-MCNC: 8.9 G/DL (ref 12–16)
HGB BLD-MCNC: 8.9 G/DL (ref 12–16)
HGB BLD-MCNC: 9 G/DL (ref 12–16)
HGB BLD-MCNC: 9.5 G/DL (ref 12–16)
HGB UR QL STRIP: NEGATIVE
HGB UR QL STRIP: NEGATIVE
HYALINE CASTS UR QL AUTO: 2 /LPF
HYALINE CASTS UR QL AUTO: 3 /LPF
HYPOCHROMIA BLD QL SMEAR: ABNORMAL
IMM GRANULOCYTES # BLD AUTO: 0.07 K/UL (ref 0–0.04)
IMM GRANULOCYTES # BLD AUTO: 0.08 K/UL (ref 0–0.04)
IMM GRANULOCYTES # BLD AUTO: 0.09 K/UL (ref 0–0.04)
IMM GRANULOCYTES # BLD AUTO: 0.1 K/UL (ref 0–0.04)
IMM GRANULOCYTES # BLD AUTO: 0.11 K/UL (ref 0–0.04)
IMM GRANULOCYTES # BLD AUTO: 0.12 K/UL (ref 0–0.04)
IMM GRANULOCYTES # BLD AUTO: 0.13 K/UL (ref 0–0.04)
IMM GRANULOCYTES # BLD AUTO: 0.14 K/UL (ref 0–0.04)
IMM GRANULOCYTES # BLD AUTO: 0.14 K/UL (ref 0–0.04)
IMM GRANULOCYTES # BLD AUTO: 0.15 K/UL (ref 0–0.04)
IMM GRANULOCYTES # BLD AUTO: 0.15 K/UL (ref 0–0.04)
IMM GRANULOCYTES # BLD AUTO: 0.17 K/UL (ref 0–0.04)
IMM GRANULOCYTES # BLD AUTO: 0.17 K/UL (ref 0–0.04)
IMM GRANULOCYTES # BLD AUTO: 0.2 K/UL (ref 0–0.04)
IMM GRANULOCYTES # BLD AUTO: 0.2 K/UL (ref 0–0.04)
IMM GRANULOCYTES NFR BLD AUTO: 0.6 % (ref 0–0.5)
IMM GRANULOCYTES NFR BLD AUTO: 0.7 % (ref 0–0.5)
IMM GRANULOCYTES NFR BLD AUTO: 0.8 % (ref 0–0.5)
IMM GRANULOCYTES NFR BLD AUTO: 0.9 % (ref 0–0.5)
IMM GRANULOCYTES NFR BLD AUTO: 1 % (ref 0–0.5)
IMM GRANULOCYTES NFR BLD AUTO: 1.1 % (ref 0–0.5)
IMM GRANULOCYTES NFR BLD AUTO: 1.1 % (ref 0–0.5)
IMM GRANULOCYTES NFR BLD AUTO: 1.2 % (ref 0–0.5)
IMM GRANULOCYTES NFR BLD AUTO: 1.3 % (ref 0–0.5)
IMM GRANULOCYTES NFR BLD AUTO: 1.4 % (ref 0–0.5)
IMM GRANULOCYTES NFR BLD AUTO: 1.5 % (ref 0–0.5)
IMM GRANULOCYTES NFR BLD AUTO: 1.7 % (ref 0–0.5)
IMM GRANULOCYTES NFR BLD AUTO: 2 % (ref 0–0.5)
INR PPP: 0.9 (ref 0.8–1.2)
INTERVENTRICULAR SEPTUM: 1.01 CM (ref 0.6–1.1)
IP: 10
IRON SERPL-MCNC: 52 UG/DL (ref 30–160)
KETONES UR QL STRIP: NEGATIVE
KETONES UR QL STRIP: NEGATIVE
LA MAJOR: 3.3 CM
LA MINOR: 3.9 CM
LA WIDTH: 2.93 CM
LACTATE SERPL-SCNC: 1.2 MMOL/L (ref 0.5–2.2)
LACTATE SERPL-SCNC: 2.6 MMOL/L (ref 0.5–2.2)
LEFT ATRIUM SIZE: 2.73 CM
LEFT ATRIUM VOLUME INDEX MOD: 12.8 ML/M2
LEFT ATRIUM VOLUME INDEX: 11.9 ML/M2
LEFT ATRIUM VOLUME MOD: 26.22 CM3
LEFT ATRIUM VOLUME: 24.31 CM3
LEFT INTERNAL DIMENSION IN SYSTOLE: 2.87 CM (ref 2.1–4)
LEFT INTERNAL DIMENSION IN SYSTOLE: 3.03 CM (ref 2.1–4)
LEFT VENTRICLE DIASTOLIC VOLUME INDEX: 28.75 ML/M2
LEFT VENTRICLE DIASTOLIC VOLUME INDEX: 28.83 ML/M2
LEFT VENTRICLE DIASTOLIC VOLUME: 56.79 ML
LEFT VENTRICLE DIASTOLIC VOLUME: 58.93 ML
LEFT VENTRICLE MASS INDEX: 57 G/M2
LEFT VENTRICLE SYSTOLIC VOLUME INDEX: 15.9 ML/M2
LEFT VENTRICLE SYSTOLIC VOLUME INDEX: 17.6 ML/M2
LEFT VENTRICLE SYSTOLIC VOLUME: 31.33 ML
LEFT VENTRICLE SYSTOLIC VOLUME: 36 ML
LEFT VENTRICULAR INTERNAL DIMENSION IN DIASTOLE: 3.66 CM (ref 3.5–6)
LEFT VENTRICULAR INTERNAL DIMENSION IN DIASTOLE: 3.72 CM (ref 3.5–6)
LEFT VENTRICULAR MASS: 115.94 G
LEUKOCYTE ESTERASE UR QL STRIP: NEGATIVE
LEUKOCYTE ESTERASE UR QL STRIP: NEGATIVE
LV LATERAL E/E' RATIO: 15.17 M/S
LV SEPTAL E/E' RATIO: 30.33 M/S
LYMPHOCYTES # BLD AUTO: 0.4 K/UL (ref 1–4.8)
LYMPHOCYTES # BLD AUTO: 0.8 K/UL (ref 1–4.8)
LYMPHOCYTES # BLD AUTO: 0.9 K/UL (ref 1–4.8)
LYMPHOCYTES # BLD AUTO: 1 K/UL (ref 1–4.8)
LYMPHOCYTES # BLD AUTO: 1.1 K/UL (ref 1–4.8)
LYMPHOCYTES # BLD AUTO: 1.2 K/UL (ref 1–4.8)
LYMPHOCYTES # BLD AUTO: 1.3 K/UL (ref 1–4.8)
LYMPHOCYTES # BLD AUTO: 1.5 K/UL (ref 1–4.8)
LYMPHOCYTES # BLD AUTO: 1.5 K/UL (ref 1–4.8)
LYMPHOCYTES # BLD AUTO: 1.6 K/UL (ref 1–4.8)
LYMPHOCYTES NFR BLD: 10.1 % (ref 18–48)
LYMPHOCYTES NFR BLD: 10.2 % (ref 18–48)
LYMPHOCYTES NFR BLD: 10.3 % (ref 18–48)
LYMPHOCYTES NFR BLD: 10.4 % (ref 18–48)
LYMPHOCYTES NFR BLD: 11.8 % (ref 18–48)
LYMPHOCYTES NFR BLD: 12 % (ref 18–48)
LYMPHOCYTES NFR BLD: 12.7 % (ref 18–48)
LYMPHOCYTES NFR BLD: 13.7 % (ref 18–48)
LYMPHOCYTES NFR BLD: 16 % (ref 18–48)
LYMPHOCYTES NFR BLD: 16.1 % (ref 18–48)
LYMPHOCYTES NFR BLD: 17.1 % (ref 18–48)
LYMPHOCYTES NFR BLD: 18.3 % (ref 18–48)
LYMPHOCYTES NFR BLD: 18.3 % (ref 18–48)
LYMPHOCYTES NFR BLD: 2.6 % (ref 18–48)
LYMPHOCYTES NFR BLD: 4.9 % (ref 18–48)
LYMPHOCYTES NFR BLD: 6 % (ref 18–48)
LYMPHOCYTES NFR BLD: 6.6 % (ref 18–48)
LYMPHOCYTES NFR BLD: 7 % (ref 18–48)
LYMPHOCYTES NFR BLD: 7.4 % (ref 18–48)
LYMPHOCYTES NFR BLD: 7.5 % (ref 18–48)
LYMPHOCYTES NFR BLD: 7.6 % (ref 18–48)
LYMPHOCYTES NFR BLD: 7.9 % (ref 18–48)
LYMPHOCYTES NFR BLD: 7.9 % (ref 18–48)
LYMPHOCYTES NFR BLD: 8.3 % (ref 18–48)
LYMPHOCYTES NFR BLD: 8.6 % (ref 18–48)
LYMPHOCYTES NFR BLD: 9.5 % (ref 18–48)
LYMPHOCYTES NFR BLD: 9.8 % (ref 18–48)
MAGNESIUM SERPL-MCNC: 1.6 MG/DL (ref 1.6–2.6)
MAGNESIUM SERPL-MCNC: 1.8 MG/DL (ref 1.6–2.6)
MAGNESIUM SERPL-MCNC: 1.9 MG/DL (ref 1.6–2.6)
MAGNESIUM SERPL-MCNC: 2 MG/DL (ref 1.6–2.6)
MAGNESIUM SERPL-MCNC: 2.1 MG/DL (ref 1.6–2.6)
MAGNESIUM SERPL-MCNC: 2.2 MG/DL (ref 1.6–2.6)
MAGNESIUM SERPL-MCNC: 2.3 MG/DL (ref 1.6–2.6)
MAGNESIUM SERPL-MCNC: 2.4 MG/DL (ref 1.6–2.6)
MAGNESIUM SERPL-MCNC: 2.5 MG/DL (ref 1.6–2.6)
MCH RBC QN AUTO: 29.3 PG (ref 27–31)
MCH RBC QN AUTO: 29.5 PG (ref 27–31)
MCH RBC QN AUTO: 29.8 PG (ref 27–31)
MCH RBC QN AUTO: 29.9 PG (ref 27–31)
MCH RBC QN AUTO: 30.1 PG (ref 27–31)
MCH RBC QN AUTO: 30.2 PG (ref 27–31)
MCH RBC QN AUTO: 30.3 PG (ref 27–31)
MCH RBC QN AUTO: 30.4 PG (ref 27–31)
MCH RBC QN AUTO: 30.5 PG (ref 27–31)
MCH RBC QN AUTO: 30.6 PG (ref 27–31)
MCH RBC QN AUTO: 30.7 PG (ref 27–31)
MCH RBC QN AUTO: 30.9 PG (ref 27–31)
MCH RBC QN AUTO: 30.9 PG (ref 27–31)
MCH RBC QN AUTO: 31 PG (ref 27–31)
MCH RBC QN AUTO: 31.1 PG (ref 27–31)
MCH RBC QN AUTO: 31.3 PG (ref 27–31)
MCH RBC QN AUTO: 31.4 PG (ref 27–31)
MCH RBC QN AUTO: 31.4 PG (ref 27–31)
MCH RBC QN AUTO: 31.7 PG (ref 27–31)
MCHC RBC AUTO-ENTMCNC: 28.4 G/DL (ref 32–36)
MCHC RBC AUTO-ENTMCNC: 28.8 G/DL (ref 32–36)
MCHC RBC AUTO-ENTMCNC: 29 G/DL (ref 32–36)
MCHC RBC AUTO-ENTMCNC: 29.2 G/DL (ref 32–36)
MCHC RBC AUTO-ENTMCNC: 29.4 G/DL (ref 32–36)
MCHC RBC AUTO-ENTMCNC: 29.5 G/DL (ref 32–36)
MCHC RBC AUTO-ENTMCNC: 29.7 G/DL (ref 32–36)
MCHC RBC AUTO-ENTMCNC: 29.8 G/DL (ref 32–36)
MCHC RBC AUTO-ENTMCNC: 29.9 G/DL (ref 32–36)
MCHC RBC AUTO-ENTMCNC: 30 G/DL (ref 32–36)
MCHC RBC AUTO-ENTMCNC: 30.2 G/DL (ref 32–36)
MCHC RBC AUTO-ENTMCNC: 30.4 G/DL (ref 32–36)
MCHC RBC AUTO-ENTMCNC: 30.4 G/DL (ref 32–36)
MCHC RBC AUTO-ENTMCNC: 30.5 G/DL (ref 32–36)
MCHC RBC AUTO-ENTMCNC: 30.6 G/DL (ref 32–36)
MCHC RBC AUTO-ENTMCNC: 30.7 G/DL (ref 32–36)
MCHC RBC AUTO-ENTMCNC: 30.8 G/DL (ref 32–36)
MCHC RBC AUTO-ENTMCNC: 31 G/DL (ref 32–36)
MCV RBC AUTO: 100 FL (ref 82–98)
MCV RBC AUTO: 100 FL (ref 82–98)
MCV RBC AUTO: 101 FL (ref 82–98)
MCV RBC AUTO: 102 FL (ref 82–98)
MCV RBC AUTO: 103 FL (ref 82–98)
MCV RBC AUTO: 104 FL (ref 82–98)
MCV RBC AUTO: 104 FL (ref 82–98)
MCV RBC AUTO: 105 FL (ref 82–98)
MCV RBC AUTO: 105 FL (ref 82–98)
MCV RBC AUTO: 106 FL (ref 82–98)
MCV RBC AUTO: 107 FL (ref 82–98)
MCV RBC AUTO: 98 FL (ref 82–98)
MCV RBC AUTO: 99 FL (ref 82–98)
MICROSCOPIC COMMENT: ABNORMAL
MICROSCOPIC COMMENT: ABNORMAL
MIN VOL: 13
MIN VOL: 5
MIN VOL: 5
MIN VOL: 7.42
MIN VOL: 7.55
MODE: ABNORMAL
MONOCYTES # BLD AUTO: 0.2 K/UL (ref 0.3–1)
MONOCYTES # BLD AUTO: 0.6 K/UL (ref 0.3–1)
MONOCYTES # BLD AUTO: 0.7 K/UL (ref 0.3–1)
MONOCYTES # BLD AUTO: 0.8 K/UL (ref 0.3–1)
MONOCYTES # BLD AUTO: 0.9 K/UL (ref 0.3–1)
MONOCYTES # BLD AUTO: 1 K/UL (ref 0.3–1)
MONOCYTES NFR BLD: 1.4 % (ref 4–15)
MONOCYTES NFR BLD: 10.8 % (ref 4–15)
MONOCYTES NFR BLD: 11.5 % (ref 4–15)
MONOCYTES NFR BLD: 3.5 % (ref 4–15)
MONOCYTES NFR BLD: 5.7 % (ref 4–15)
MONOCYTES NFR BLD: 5.8 % (ref 4–15)
MONOCYTES NFR BLD: 6 % (ref 4–15)
MONOCYTES NFR BLD: 6.1 % (ref 4–15)
MONOCYTES NFR BLD: 6.2 % (ref 4–15)
MONOCYTES NFR BLD: 6.3 % (ref 4–15)
MONOCYTES NFR BLD: 6.3 % (ref 4–15)
MONOCYTES NFR BLD: 6.8 % (ref 4–15)
MONOCYTES NFR BLD: 7.1 % (ref 4–15)
MONOCYTES NFR BLD: 7.1 % (ref 4–15)
MONOCYTES NFR BLD: 7.4 % (ref 4–15)
MONOCYTES NFR BLD: 7.5 % (ref 4–15)
MONOCYTES NFR BLD: 7.6 % (ref 4–15)
MONOCYTES NFR BLD: 7.6 % (ref 4–15)
MONOCYTES NFR BLD: 7.7 % (ref 4–15)
MONOCYTES NFR BLD: 8 % (ref 4–15)
MONOCYTES NFR BLD: 8 % (ref 4–15)
MONOCYTES NFR BLD: 8.8 % (ref 4–15)
MONOCYTES NFR BLD: 9.2 % (ref 4–15)
MONOCYTES NFR BLD: 9.7 % (ref 4–15)
MONOCYTES NFR BLD: 9.9 % (ref 4–15)
MV PEAK A VEL: 0.88 M/S
MV PEAK E VEL: 0.91 M/S
MV STENOSIS PRESSURE HALF TIME: 47.93 MS
MV VALVE AREA P 1/2 METHOD: 4.59 CM2
NEUTROPHILS # BLD AUTO: 10.4 K/UL (ref 1.8–7.7)
NEUTROPHILS # BLD AUTO: 12.5 K/UL (ref 1.8–7.7)
NEUTROPHILS # BLD AUTO: 12.9 K/UL (ref 1.8–7.7)
NEUTROPHILS # BLD AUTO: 17.7 K/UL (ref 1.8–7.7)
NEUTROPHILS # BLD AUTO: 4.5 K/UL (ref 1.8–7.7)
NEUTROPHILS # BLD AUTO: 5.4 K/UL (ref 1.8–7.7)
NEUTROPHILS # BLD AUTO: 5.4 K/UL (ref 1.8–7.7)
NEUTROPHILS # BLD AUTO: 5.9 K/UL (ref 1.8–7.7)
NEUTROPHILS # BLD AUTO: 6.5 K/UL (ref 1.8–7.7)
NEUTROPHILS # BLD AUTO: 6.6 K/UL (ref 1.8–7.7)
NEUTROPHILS # BLD AUTO: 6.9 K/UL (ref 1.8–7.7)
NEUTROPHILS # BLD AUTO: 7.4 K/UL (ref 1.8–7.7)
NEUTROPHILS # BLD AUTO: 8 K/UL (ref 1.8–7.7)
NEUTROPHILS # BLD AUTO: 8.3 K/UL (ref 1.8–7.7)
NEUTROPHILS # BLD AUTO: 8.5 K/UL (ref 1.8–7.7)
NEUTROPHILS # BLD AUTO: 9.1 K/UL (ref 1.8–7.7)
NEUTROPHILS # BLD AUTO: 9.2 K/UL (ref 1.8–7.7)
NEUTROPHILS # BLD AUTO: 9.3 K/UL (ref 1.8–7.7)
NEUTROPHILS # BLD AUTO: 9.6 K/UL (ref 1.8–7.7)
NEUTROPHILS # BLD AUTO: 9.8 K/UL (ref 1.8–7.7)
NEUTROPHILS # BLD AUTO: 9.9 K/UL (ref 1.8–7.7)
NEUTROPHILS NFR BLD: 63.3 % (ref 38–73)
NEUTROPHILS NFR BLD: 64.3 % (ref 38–73)
NEUTROPHILS NFR BLD: 67.1 % (ref 38–73)
NEUTROPHILS NFR BLD: 68.4 % (ref 38–73)
NEUTROPHILS NFR BLD: 72.5 % (ref 38–73)
NEUTROPHILS NFR BLD: 72.8 % (ref 38–73)
NEUTROPHILS NFR BLD: 73.6 % (ref 38–73)
NEUTROPHILS NFR BLD: 74.7 % (ref 38–73)
NEUTROPHILS NFR BLD: 76.8 % (ref 38–73)
NEUTROPHILS NFR BLD: 77.4 % (ref 38–73)
NEUTROPHILS NFR BLD: 77.5 % (ref 38–73)
NEUTROPHILS NFR BLD: 77.7 % (ref 38–73)
NEUTROPHILS NFR BLD: 78.3 % (ref 38–73)
NEUTROPHILS NFR BLD: 78.5 % (ref 38–73)
NEUTROPHILS NFR BLD: 79 % (ref 38–73)
NEUTROPHILS NFR BLD: 79.2 % (ref 38–73)
NEUTROPHILS NFR BLD: 79.4 % (ref 38–73)
NEUTROPHILS NFR BLD: 80.3 % (ref 38–73)
NEUTROPHILS NFR BLD: 80.6 % (ref 38–73)
NEUTROPHILS NFR BLD: 80.7 % (ref 38–73)
NEUTROPHILS NFR BLD: 81.2 % (ref 38–73)
NEUTROPHILS NFR BLD: 81.3 % (ref 38–73)
NEUTROPHILS NFR BLD: 82.1 % (ref 38–73)
NEUTROPHILS NFR BLD: 82.4 % (ref 38–73)
NEUTROPHILS NFR BLD: 87.5 % (ref 38–73)
NEUTROPHILS NFR BLD: 90 % (ref 38–73)
NEUTROPHILS NFR BLD: 95.2 % (ref 38–73)
NITRITE UR QL STRIP: NEGATIVE
NITRITE UR QL STRIP: NEGATIVE
NON-SQ EPI CELLS #/AREA URNS AUTO: <1 /HPF
NRBC BLD-RTO: 0 /100 WBC
NRBC BLD-RTO: 1 /100 WBC
NRBC BLD-RTO: 1 /100 WBC
OSMOLALITY UR: 438 MOSM/KG (ref 50–1200)
OVALOCYTES BLD QL SMEAR: ABNORMAL
PCO2 BLDA: 37 MMHG (ref 35–45)
PCO2 BLDA: 44.7 MMHG (ref 35–45)
PCO2 BLDA: 45.9 MMHG (ref 35–45)
PCO2 BLDA: 46.7 MMHG (ref 35–45)
PCO2 BLDA: 47.2 MMHG (ref 35–45)
PCO2 BLDA: 49 MMHG (ref 35–45)
PCO2 BLDA: 62.1 MMHG (ref 35–45)
PCO2 BLDA: 62.3 MMHG (ref 35–45)
PCO2 BLDA: 62.3 MMHG (ref 35–45)
PCO2 BLDA: 63.6 MMHG (ref 35–45)
PCO2 BLDA: 63.6 MMHG (ref 35–45)
PCO2 BLDA: 65 MMHG (ref 35–45)
PCO2 BLDA: 65.6 MMHG (ref 35–45)
PCO2 BLDA: 67.7 MMHG (ref 35–45)
PCO2 BLDA: 78.2 MMHG (ref 35–45)
PEEP: 12
PEEP: 16
PEEP: 5
PEEP: 5
PH SMN: 7.22 [PH] (ref 7.35–7.45)
PH SMN: 7.38 [PH] (ref 7.35–7.45)
PH SMN: 7.41 [PH] (ref 7.35–7.45)
PH SMN: 7.42 [PH] (ref 7.35–7.45)
PH SMN: 7.42 [PH] (ref 7.35–7.45)
PH SMN: 7.43 [PH] (ref 7.35–7.45)
PH SMN: 7.43 [PH] (ref 7.35–7.45)
PH SMN: 7.46 [PH] (ref 7.35–7.45)
PH SMN: 7.46 [PH] (ref 7.35–7.45)
PH SMN: 7.47 [PH] (ref 7.35–7.45)
PH SMN: 7.48 [PH] (ref 7.35–7.45)
PH SMN: 7.48 [PH] (ref 7.35–7.45)
PH SMN: 7.5 [PH] (ref 7.35–7.45)
PH SMN: 7.51 [PH] (ref 7.35–7.45)
PH SMN: 7.52 [PH] (ref 7.35–7.45)
PH UR STRIP: 5 [PH] (ref 5–8)
PH UR STRIP: >8 [PH] (ref 5–8)
PHOSPHATE SERPL-MCNC: 2.7 MG/DL (ref 2.7–4.5)
PHOSPHATE SERPL-MCNC: 2.8 MG/DL (ref 2.7–4.5)
PHOSPHATE SERPL-MCNC: 3.3 MG/DL (ref 2.7–4.5)
PHOSPHATE SERPL-MCNC: 3.4 MG/DL (ref 2.7–4.5)
PHOSPHATE SERPL-MCNC: 3.4 MG/DL (ref 2.7–4.5)
PHOSPHATE SERPL-MCNC: 3.5 MG/DL (ref 2.7–4.5)
PHOSPHATE SERPL-MCNC: 3.7 MG/DL (ref 2.7–4.5)
PHOSPHATE SERPL-MCNC: 3.8 MG/DL (ref 2.7–4.5)
PHOSPHATE SERPL-MCNC: 3.8 MG/DL (ref 2.7–4.5)
PHOSPHATE SERPL-MCNC: 3.9 MG/DL (ref 2.7–4.5)
PHOSPHATE SERPL-MCNC: 4 MG/DL (ref 2.7–4.5)
PHOSPHATE SERPL-MCNC: 4 MG/DL (ref 2.7–4.5)
PHOSPHATE SERPL-MCNC: 4.1 MG/DL (ref 2.7–4.5)
PHOSPHATE SERPL-MCNC: 4.2 MG/DL (ref 2.7–4.5)
PHOSPHATE SERPL-MCNC: 4.2 MG/DL (ref 2.7–4.5)
PHOSPHATE SERPL-MCNC: 4.3 MG/DL (ref 2.7–4.5)
PHOSPHATE SERPL-MCNC: 4.4 MG/DL (ref 2.7–4.5)
PHOSPHATE SERPL-MCNC: 4.6 MG/DL (ref 2.7–4.5)
PHOSPHATE SERPL-MCNC: 4.9 MG/DL (ref 2.7–4.5)
PHOSPHATE SERPL-MCNC: 4.9 MG/DL (ref 2.7–4.5)
PHOSPHATE SERPL-MCNC: 5.2 MG/DL (ref 2.7–4.5)
PHOSPHATE SERPL-MCNC: 5.5 MG/DL (ref 2.7–4.5)
PHOSPHATE SERPL-MCNC: 5.7 MG/DL (ref 2.7–4.5)
PHOSPHATE SERPL-MCNC: 5.9 MG/DL (ref 2.7–4.5)
PHOSPHATE SERPL-MCNC: 5.9 MG/DL (ref 2.7–4.5)
PIP: 30
PIP: 33
PISA TR MAX VEL: 3 M/S
PLATELET # BLD AUTO: 137 K/UL (ref 150–450)
PLATELET # BLD AUTO: 138 K/UL (ref 150–450)
PLATELET # BLD AUTO: 139 K/UL (ref 150–450)
PLATELET # BLD AUTO: 142 K/UL (ref 150–350)
PLATELET # BLD AUTO: 142 K/UL (ref 150–450)
PLATELET # BLD AUTO: 142 K/UL (ref 150–450)
PLATELET # BLD AUTO: 144 K/UL (ref 150–450)
PLATELET # BLD AUTO: 146 K/UL (ref 150–350)
PLATELET # BLD AUTO: 146 K/UL (ref 150–350)
PLATELET # BLD AUTO: 146 K/UL (ref 150–450)
PLATELET # BLD AUTO: 148 K/UL (ref 150–350)
PLATELET # BLD AUTO: 150 K/UL (ref 150–350)
PLATELET # BLD AUTO: 150 K/UL (ref 150–450)
PLATELET # BLD AUTO: 151 K/UL (ref 150–350)
PLATELET # BLD AUTO: 164 K/UL (ref 150–350)
PLATELET # BLD AUTO: 168 K/UL (ref 150–450)
PLATELET # BLD AUTO: 173 K/UL (ref 150–450)
PLATELET # BLD AUTO: 173 K/UL (ref 150–450)
PLATELET # BLD AUTO: 180 K/UL (ref 150–450)
PLATELET # BLD AUTO: 183 K/UL (ref 150–350)
PLATELET # BLD AUTO: 184 K/UL (ref 150–450)
PLATELET # BLD AUTO: 192 K/UL (ref 150–450)
PLATELET # BLD AUTO: 210 K/UL (ref 150–450)
PLATELET # BLD AUTO: 220 K/UL (ref 150–350)
PLATELET # BLD AUTO: 244 K/UL (ref 150–450)
PLATELET # BLD AUTO: 254 K/UL (ref 150–450)
PLATELET # BLD AUTO: 267 K/UL (ref 150–450)
PLATELET BLD QL SMEAR: ABNORMAL
PMV BLD AUTO: 12 FL (ref 9.2–12.9)
PMV BLD AUTO: 12.1 FL (ref 9.2–12.9)
PMV BLD AUTO: 12.1 FL (ref 9.2–12.9)
PMV BLD AUTO: 12.2 FL (ref 9.2–12.9)
PMV BLD AUTO: 12.2 FL (ref 9.2–12.9)
PMV BLD AUTO: 12.3 FL (ref 9.2–12.9)
PMV BLD AUTO: 12.4 FL (ref 9.2–12.9)
PMV BLD AUTO: 12.5 FL (ref 9.2–12.9)
PMV BLD AUTO: 12.6 FL (ref 9.2–12.9)
PMV BLD AUTO: 12.7 FL (ref 9.2–12.9)
PMV BLD AUTO: 12.8 FL (ref 9.2–12.9)
PMV BLD AUTO: 12.9 FL (ref 9.2–12.9)
PMV BLD AUTO: 13 FL (ref 9.2–12.9)
PO2 BLDA: 20 MMHG (ref 80–100)
PO2 BLDA: 222 MMHG (ref 80–100)
PO2 BLDA: 28 MMHG (ref 40–60)
PO2 BLDA: 28 MMHG (ref 40–60)
PO2 BLDA: 32 MMHG (ref 40–60)
PO2 BLDA: 32 MMHG (ref 40–60)
PO2 BLDA: 36 MMHG (ref 40–60)
PO2 BLDA: 36 MMHG (ref 40–60)
PO2 BLDA: 40 MMHG (ref 80–100)
PO2 BLDA: 50 MMHG (ref 80–100)
PO2 BLDA: 63 MMHG (ref 80–100)
PO2 BLDA: 72 MMHG (ref 80–100)
PO2 BLDA: 82 MMHG (ref 80–100)
PO2 BLDA: 82 MMHG (ref 80–100)
PO2 BLDA: 84 MMHG (ref 80–100)
POC BE: 15 MMOL/L
POC BE: 20 MMOL/L
POC BE: 21 MMOL/L
POC BE: 21 MMOL/L
POC BE: 24 MMOL/L
POC BE: 24 MMOL/L
POC BE: 28 MMOL/L
POC BE: 3 MMOL/L
POC BE: 30 MMOL/L
POC BE: 4 MMOL/L
POC BE: 5 MMOL/L
POC BE: 6 MMOL/L
POC BE: 7 MMOL/L
POC BE: 7 MMOL/L
POC BE: 9 MMOL/L
POC MOLECULAR INFLUENZA A AGN: NEGATIVE
POC MOLECULAR INFLUENZA B AGN: POSITIVE
POC SATURATED O2: 100 % (ref 95–100)
POC SATURATED O2: 36 % (ref 95–100)
POC SATURATED O2: 50 % (ref 95–100)
POC SATURATED O2: 51 % (ref 95–100)
POC SATURATED O2: 62 % (ref 95–100)
POC SATURATED O2: 62 % (ref 95–100)
POC SATURATED O2: 63 % (ref 95–100)
POC SATURATED O2: 70 % (ref 95–100)
POC SATURATED O2: 70 % (ref 95–100)
POC SATURATED O2: 87 % (ref 95–100)
POC SATURATED O2: 92 % (ref 95–100)
POC SATURATED O2: 95 % (ref 95–100)
POC SATURATED O2: 96 % (ref 95–100)
POC TCO2: 29 MMOL/L (ref 23–27)
POC TCO2: 31 MMOL/L (ref 23–27)
POC TCO2: 31 MMOL/L (ref 24–29)
POC TCO2: 32 MMOL/L (ref 24–29)
POC TCO2: 33 MMOL/L (ref 23–27)
POC TCO2: 34 MMOL/L (ref 23–27)
POC TCO2: 34 MMOL/L (ref 23–27)
POC TCO2: 41 MMOL/L (ref 24–29)
POC TCO2: 46 MMOL/L (ref 24–29)
POC TCO2: 49 MMOL/L (ref 23–27)
POC TCO2: 49 MMOL/L (ref 23–27)
POC TCO2: >50 MMOL/L (ref 23–27)
POC TCO2: >50 MMOL/L (ref 23–27)
POCT GLUCOSE: 100 MG/DL (ref 70–110)
POCT GLUCOSE: 109 MG/DL (ref 70–110)
POCT GLUCOSE: 114 MG/DL (ref 70–110)
POCT GLUCOSE: 122 MG/DL (ref 70–110)
POCT GLUCOSE: 123 MG/DL (ref 70–110)
POCT GLUCOSE: 135 MG/DL (ref 70–110)
POCT GLUCOSE: 145 MG/DL (ref 70–110)
POCT GLUCOSE: 148 MG/DL (ref 70–110)
POCT GLUCOSE: 155 MG/DL (ref 70–110)
POCT GLUCOSE: 157 MG/DL (ref 70–110)
POCT GLUCOSE: 160 MG/DL (ref 70–110)
POCT GLUCOSE: 161 MG/DL (ref 70–110)
POCT GLUCOSE: 161 MG/DL (ref 70–110)
POCT GLUCOSE: 164 MG/DL (ref 70–110)
POCT GLUCOSE: 164 MG/DL (ref 70–110)
POCT GLUCOSE: 165 MG/DL (ref 70–110)
POCT GLUCOSE: 167 MG/DL (ref 70–110)
POCT GLUCOSE: 169 MG/DL (ref 70–110)
POCT GLUCOSE: 169 MG/DL (ref 70–110)
POCT GLUCOSE: 171 MG/DL (ref 70–110)
POCT GLUCOSE: 172 MG/DL (ref 70–110)
POCT GLUCOSE: 175 MG/DL (ref 70–110)
POCT GLUCOSE: 177 MG/DL (ref 70–110)
POCT GLUCOSE: 178 MG/DL (ref 70–110)
POCT GLUCOSE: 179 MG/DL (ref 70–110)
POCT GLUCOSE: 180 MG/DL (ref 70–110)
POCT GLUCOSE: 182 MG/DL (ref 70–110)
POCT GLUCOSE: 183 MG/DL (ref 70–110)
POCT GLUCOSE: 185 MG/DL (ref 70–110)
POCT GLUCOSE: 188 MG/DL (ref 70–110)
POCT GLUCOSE: 191 MG/DL (ref 70–110)
POCT GLUCOSE: 191 MG/DL (ref 70–110)
POCT GLUCOSE: 192 MG/DL (ref 70–110)
POCT GLUCOSE: 193 MG/DL (ref 70–110)
POCT GLUCOSE: 194 MG/DL (ref 70–110)
POCT GLUCOSE: 197 MG/DL (ref 70–110)
POCT GLUCOSE: 197 MG/DL (ref 70–110)
POCT GLUCOSE: 198 MG/DL (ref 70–110)
POCT GLUCOSE: 199 MG/DL (ref 70–110)
POCT GLUCOSE: 200 MG/DL (ref 70–110)
POCT GLUCOSE: 201 MG/DL (ref 70–110)
POCT GLUCOSE: 201 MG/DL (ref 70–110)
POCT GLUCOSE: 202 MG/DL (ref 70–110)
POCT GLUCOSE: 205 MG/DL (ref 70–110)
POCT GLUCOSE: 207 MG/DL (ref 70–110)
POCT GLUCOSE: 210 MG/DL (ref 70–110)
POCT GLUCOSE: 214 MG/DL (ref 70–110)
POCT GLUCOSE: 216 MG/DL (ref 70–110)
POCT GLUCOSE: 216 MG/DL (ref 70–110)
POCT GLUCOSE: 221 MG/DL (ref 70–110)
POCT GLUCOSE: 223 MG/DL (ref 70–110)
POCT GLUCOSE: 229 MG/DL (ref 70–110)
POCT GLUCOSE: 231 MG/DL (ref 70–110)
POCT GLUCOSE: 232 MG/DL (ref 70–110)
POCT GLUCOSE: 233 MG/DL (ref 70–110)
POCT GLUCOSE: 238 MG/DL (ref 70–110)
POCT GLUCOSE: 241 MG/DL (ref 70–110)
POCT GLUCOSE: 241 MG/DL (ref 70–110)
POCT GLUCOSE: 242 MG/DL (ref 70–110)
POCT GLUCOSE: 245 MG/DL (ref 70–110)
POCT GLUCOSE: 246 MG/DL (ref 70–110)
POCT GLUCOSE: 246 MG/DL (ref 70–110)
POCT GLUCOSE: 247 MG/DL (ref 70–110)
POCT GLUCOSE: 253 MG/DL (ref 70–110)
POCT GLUCOSE: 254 MG/DL (ref 70–110)
POCT GLUCOSE: 256 MG/DL (ref 70–110)
POCT GLUCOSE: 262 MG/DL (ref 70–110)
POCT GLUCOSE: 268 MG/DL (ref 70–110)
POCT GLUCOSE: 273 MG/DL (ref 70–110)
POCT GLUCOSE: 277 MG/DL (ref 70–110)
POCT GLUCOSE: 277 MG/DL (ref 70–110)
POCT GLUCOSE: 279 MG/DL (ref 70–110)
POCT GLUCOSE: 283 MG/DL (ref 70–110)
POCT GLUCOSE: 284 MG/DL (ref 70–110)
POCT GLUCOSE: 285 MG/DL (ref 70–110)
POCT GLUCOSE: 286 MG/DL (ref 70–110)
POCT GLUCOSE: 286 MG/DL (ref 70–110)
POCT GLUCOSE: 291 MG/DL (ref 70–110)
POCT GLUCOSE: 296 MG/DL (ref 70–110)
POCT GLUCOSE: 297 MG/DL (ref 70–110)
POCT GLUCOSE: 299 MG/DL (ref 70–110)
POCT GLUCOSE: 299 MG/DL (ref 70–110)
POCT GLUCOSE: 302 MG/DL (ref 70–110)
POCT GLUCOSE: 302 MG/DL (ref 70–110)
POCT GLUCOSE: 304 MG/DL (ref 70–110)
POCT GLUCOSE: 304 MG/DL (ref 70–110)
POCT GLUCOSE: 307 MG/DL (ref 70–110)
POCT GLUCOSE: 309 MG/DL (ref 70–110)
POCT GLUCOSE: 311 MG/DL (ref 70–110)
POCT GLUCOSE: 312 MG/DL (ref 70–110)
POCT GLUCOSE: 315 MG/DL (ref 70–110)
POCT GLUCOSE: 316 MG/DL (ref 70–110)
POCT GLUCOSE: 320 MG/DL (ref 70–110)
POCT GLUCOSE: 321 MG/DL (ref 70–110)
POCT GLUCOSE: 321 MG/DL (ref 70–110)
POCT GLUCOSE: 325 MG/DL (ref 70–110)
POCT GLUCOSE: 340 MG/DL (ref 70–110)
POCT GLUCOSE: 347 MG/DL (ref 70–110)
POCT GLUCOSE: 354 MG/DL (ref 70–110)
POCT GLUCOSE: 360 MG/DL (ref 70–110)
POCT GLUCOSE: 362 MG/DL (ref 70–110)
POCT GLUCOSE: 363 MG/DL (ref 70–110)
POCT GLUCOSE: 378 MG/DL (ref 70–110)
POCT GLUCOSE: 384 MG/DL (ref 70–110)
POCT GLUCOSE: 397 MG/DL (ref 70–110)
POCT GLUCOSE: 401 MG/DL (ref 70–110)
POCT GLUCOSE: 426 MG/DL (ref 70–110)
POCT GLUCOSE: 69 MG/DL (ref 70–110)
POCT GLUCOSE: 75 MG/DL (ref 70–110)
POIKILOCYTOSIS BLD QL SMEAR: SLIGHT
POLYCHROMASIA BLD QL SMEAR: ABNORMAL
POTASSIUM SERPL-SCNC: 3.2 MMOL/L (ref 3.5–5.1)
POTASSIUM SERPL-SCNC: 3.3 MMOL/L (ref 3.5–5.1)
POTASSIUM SERPL-SCNC: 3.5 MMOL/L (ref 3.5–5.1)
POTASSIUM SERPL-SCNC: 3.5 MMOL/L (ref 3.5–5.1)
POTASSIUM SERPL-SCNC: 3.6 MMOL/L (ref 3.5–5.1)
POTASSIUM SERPL-SCNC: 3.6 MMOL/L (ref 3.5–5.1)
POTASSIUM SERPL-SCNC: 3.7 MMOL/L (ref 3.5–5.1)
POTASSIUM SERPL-SCNC: 3.7 MMOL/L (ref 3.5–5.1)
POTASSIUM SERPL-SCNC: 3.8 MMOL/L (ref 3.5–5.1)
POTASSIUM SERPL-SCNC: 3.9 MMOL/L (ref 3.5–5.1)
POTASSIUM SERPL-SCNC: 4 MMOL/L (ref 3.5–5.1)
POTASSIUM SERPL-SCNC: 4.1 MMOL/L (ref 3.5–5.1)
POTASSIUM SERPL-SCNC: 4.2 MMOL/L (ref 3.5–5.1)
POTASSIUM SERPL-SCNC: 4.3 MMOL/L (ref 3.5–5.1)
POTASSIUM SERPL-SCNC: 4.4 MMOL/L (ref 3.5–5.1)
POTASSIUM SERPL-SCNC: 4.5 MMOL/L (ref 3.5–5.1)
POTASSIUM SERPL-SCNC: 4.6 MMOL/L (ref 3.5–5.1)
POTASSIUM SERPL-SCNC: 4.7 MMOL/L (ref 3.5–5.1)
POTASSIUM SERPL-SCNC: 4.9 MMOL/L (ref 3.5–5.1)
POTASSIUM SERPL-SCNC: 5.1 MMOL/L (ref 3.5–5.1)
POTASSIUM SERPL-SCNC: 5.3 MMOL/L (ref 3.5–5.1)
PROCALCITONIN SERPL IA-MCNC: 0.12 NG/ML
PROT SERPL-MCNC: 4.8 G/DL (ref 6–8.4)
PROT SERPL-MCNC: 5 G/DL (ref 6–8.4)
PROT SERPL-MCNC: 5.4 G/DL (ref 6–8.4)
PROT SERPL-MCNC: 5.5 G/DL (ref 6–8.4)
PROT SERPL-MCNC: 5.6 G/DL (ref 6–8.4)
PROT SERPL-MCNC: 5.8 G/DL (ref 6–8.4)
PROT SERPL-MCNC: 6 G/DL (ref 6–8.4)
PROT UR QL STRIP: ABNORMAL
PROT UR QL STRIP: ABNORMAL
PROT UR-MCNC: 83 MG/DL (ref 0–15)
PROT/CREAT UR: 0.9 MG/G{CREAT} (ref 0–0.2)
PROTHROMBIN TIME: 10.4 SEC (ref 9–12.5)
PS: 10
RA MAJOR: 3.21 CM
RA PRESSURE: 3 MMHG
RA PRESSURE: 8 MMHG
RA WIDTH: 1.75 CM
RBC # BLD AUTO: 2.35 M/UL (ref 4–5.4)
RBC # BLD AUTO: 2.39 M/UL (ref 4–5.4)
RBC # BLD AUTO: 2.39 M/UL (ref 4–5.4)
RBC # BLD AUTO: 2.43 M/UL (ref 4–5.4)
RBC # BLD AUTO: 2.43 M/UL (ref 4–5.4)
RBC # BLD AUTO: 2.46 M/UL (ref 4–5.4)
RBC # BLD AUTO: 2.46 M/UL (ref 4–5.4)
RBC # BLD AUTO: 2.49 M/UL (ref 4–5.4)
RBC # BLD AUTO: 2.49 M/UL (ref 4–5.4)
RBC # BLD AUTO: 2.51 M/UL (ref 4–5.4)
RBC # BLD AUTO: 2.61 M/UL (ref 4–5.4)
RBC # BLD AUTO: 2.66 M/UL (ref 4–5.4)
RBC # BLD AUTO: 2.66 M/UL (ref 4–5.4)
RBC # BLD AUTO: 2.7 M/UL (ref 4–5.4)
RBC # BLD AUTO: 2.72 M/UL (ref 4–5.4)
RBC # BLD AUTO: 2.74 M/UL (ref 4–5.4)
RBC # BLD AUTO: 2.75 M/UL (ref 4–5.4)
RBC # BLD AUTO: 2.75 M/UL (ref 4–5.4)
RBC # BLD AUTO: 2.81 M/UL (ref 4–5.4)
RBC # BLD AUTO: 2.82 M/UL (ref 4–5.4)
RBC # BLD AUTO: 2.85 M/UL (ref 4–5.4)
RBC # BLD AUTO: 2.87 M/UL (ref 4–5.4)
RBC # BLD AUTO: 2.91 M/UL (ref 4–5.4)
RBC # BLD AUTO: 2.91 M/UL (ref 4–5.4)
RBC # BLD AUTO: 2.98 M/UL (ref 4–5.4)
RBC # BLD AUTO: 3.16 M/UL (ref 4–5.4)
RBC # BLD AUTO: 3.18 M/UL (ref 4–5.4)
RBC #/AREA URNS AUTO: 0 /HPF (ref 0–4)
RBC #/AREA URNS AUTO: 1 /HPF (ref 0–4)
RETICS/RBC NFR AUTO: 7.9 % (ref 0.5–2.5)
RIGHT VENTRICULAR END-DIASTOLIC DIMENSION: 2.41 CM
RV TISSUE DOPPLER FREE WALL SYSTOLIC VELOCITY 1 (APICAL 4 CHAMBER VIEW): 6.85 CM/S
SAMPLE: ABNORMAL
SARS-COV-2 RDRP RESP QL NAA+PROBE: NEGATIVE
SATURATED IRON: 18 % (ref 20–50)
SCHISTOCYTES BLD QL SMEAR: ABNORMAL
SINUS: 3.27 CM
SITE: ABNORMAL
SODIUM SERPL-SCNC: 137 MMOL/L (ref 136–145)
SODIUM SERPL-SCNC: 138 MMOL/L (ref 136–145)
SODIUM SERPL-SCNC: 139 MMOL/L (ref 136–145)
SODIUM SERPL-SCNC: 140 MMOL/L (ref 136–145)
SODIUM SERPL-SCNC: 141 MMOL/L (ref 136–145)
SODIUM SERPL-SCNC: 141 MMOL/L (ref 136–145)
SODIUM SERPL-SCNC: 142 MMOL/L (ref 136–145)
SODIUM SERPL-SCNC: 143 MMOL/L (ref 136–145)
SODIUM SERPL-SCNC: 144 MMOL/L (ref 136–145)
SODIUM SERPL-SCNC: 146 MMOL/L (ref 136–145)
SODIUM SERPL-SCNC: 146 MMOL/L (ref 136–145)
SODIUM SERPL-SCNC: 147 MMOL/L (ref 136–145)
SODIUM UR-SCNC: 104 MMOL/L (ref 20–250)
SODIUM UR-SCNC: 104 MMOL/L (ref 20–250)
SODIUM UR-SCNC: 59 MMOL/L (ref 20–250)
SP GR UR STRIP: 1.01 (ref 1–1.03)
SP GR UR STRIP: 1.01 (ref 1–1.03)
SP02: 100
SP02: 100
SP02: 65
SP02: 87
SP02: 96
SPONT RATE: 14
SPONT RATE: 18
SPONT RATE: 18
SQUAMOUS #/AREA URNS AUTO: 1 /HPF
SQUAMOUS #/AREA URNS AUTO: 5 /HPF
T4 FREE SERPL-MCNC: 1.26 NG/DL (ref 0.71–1.51)
TDI LATERAL: 0.06 M/S
TDI SEPTAL: 0.03 M/S
TDI: 0.05 M/S
TOTAL IRON BINDING CAPACITY: 292 UG/DL (ref 250–450)
TR MAX PG: 36 MMHG
TRANS ERYTHROCYTES VOL PATIENT: NORMAL ML
TRANSFERRIN SERPL-MCNC: 197 MG/DL (ref 200–375)
TRICUSPID ANNULAR PLANE SYSTOLIC EXCURSION: 1.5 CM
TROPONIN I SERPL DL<=0.01 NG/ML-MCNC: 0.29 NG/ML (ref 0–0.03)
TROPONIN I SERPL DL<=0.01 NG/ML-MCNC: 0.53 NG/ML (ref 0–0.03)
TROPONIN I SERPL DL<=0.01 NG/ML-MCNC: 11.15 NG/ML (ref 0–0.03)
TROPONIN I SERPL DL<=0.01 NG/ML-MCNC: 15.17 NG/ML (ref 0–0.03)
TROPONIN I SERPL DL<=0.01 NG/ML-MCNC: 15.82 NG/ML (ref 0–0.03)
TSH SERPL DL<=0.005 MIU/L-ACNC: 1.38 UIU/ML (ref 0.4–4)
TV REST PULMONARY ARTERY PRESSURE: 44 MMHG
URN SPEC COLLECT METH UR: ABNORMAL
URN SPEC COLLECT METH UR: ABNORMAL
UUN UR-MCNC: 358 MG/DL (ref 140–1050)
VANCOMYCIN SERPL-MCNC: 13.6 UG/ML
VANCOMYCIN SERPL-MCNC: 17.7 UG/ML
VANCOMYCIN SERPL-MCNC: 19.6 UG/ML
VT: 360
VT: 360
VT: 500
WBC # BLD AUTO: 10.28 K/UL (ref 3.9–12.7)
WBC # BLD AUTO: 10.41 K/UL (ref 3.9–12.7)
WBC # BLD AUTO: 10.56 K/UL (ref 3.9–12.7)
WBC # BLD AUTO: 10.85 K/UL (ref 3.9–12.7)
WBC # BLD AUTO: 10.96 K/UL (ref 3.9–12.7)
WBC # BLD AUTO: 11.12 K/UL (ref 3.9–12.7)
WBC # BLD AUTO: 11.44 K/UL (ref 3.9–12.7)
WBC # BLD AUTO: 11.83 K/UL (ref 3.9–12.7)
WBC # BLD AUTO: 11.89 K/UL (ref 3.9–12.7)
WBC # BLD AUTO: 12.05 K/UL (ref 3.9–12.7)
WBC # BLD AUTO: 12.07 K/UL (ref 3.9–12.7)
WBC # BLD AUTO: 12.09 K/UL (ref 3.9–12.7)
WBC # BLD AUTO: 12.33 K/UL (ref 3.9–12.7)
WBC # BLD AUTO: 12.79 K/UL (ref 3.9–12.7)
WBC # BLD AUTO: 13.6 K/UL (ref 3.9–12.7)
WBC # BLD AUTO: 14.27 K/UL (ref 3.9–12.7)
WBC # BLD AUTO: 19.58 K/UL (ref 3.9–12.7)
WBC # BLD AUTO: 6.94 K/UL (ref 3.9–12.7)
WBC # BLD AUTO: 7.99 K/UL (ref 3.9–12.7)
WBC # BLD AUTO: 8.49 K/UL (ref 3.9–12.7)
WBC # BLD AUTO: 8.58 K/UL (ref 3.9–12.7)
WBC # BLD AUTO: 8.85 K/UL (ref 3.9–12.7)
WBC # BLD AUTO: 9.14 K/UL (ref 3.9–12.7)
WBC # BLD AUTO: 9.4 K/UL (ref 3.9–12.7)
WBC # BLD AUTO: 9.41 K/UL (ref 3.9–12.7)
WBC # BLD AUTO: 9.55 K/UL (ref 3.9–12.7)
WBC # BLD AUTO: 9.96 K/UL (ref 3.9–12.7)
WBC #/AREA URNS AUTO: 1 /HPF (ref 0–5)
WBC #/AREA URNS AUTO: 1 /HPF (ref 0–5)
WBC TOXIC VACUOLES BLD QL SMEAR: PRESENT

## 2021-01-01 PROCEDURE — 83735 ASSAY OF MAGNESIUM: CPT | Performed by: HOSPITALIST

## 2021-01-01 PROCEDURE — 80069 RENAL FUNCTION PANEL: CPT | Performed by: HOSPITALIST

## 2021-01-01 PROCEDURE — 25000003 PHARM REV CODE 250: Performed by: STUDENT IN AN ORGANIZED HEALTH CARE EDUCATION/TRAINING PROGRAM

## 2021-01-01 PROCEDURE — 97530 THERAPEUTIC ACTIVITIES: CPT

## 2021-01-01 PROCEDURE — 87502 INFLUENZA DNA AMP PROBE: CPT

## 2021-01-01 PROCEDURE — 85025 COMPLETE CBC W/AUTO DIFF WBC: CPT | Mod: 91 | Performed by: NURSE PRACTITIONER

## 2021-01-01 PROCEDURE — 93010 ELECTROCARDIOGRAM REPORT: CPT | Mod: ,,, | Performed by: INTERNAL MEDICINE

## 2021-01-01 PROCEDURE — 36415 COLL VENOUS BLD VENIPUNCTURE: CPT | Performed by: HOSPITALIST

## 2021-01-01 PROCEDURE — 83735 ASSAY OF MAGNESIUM: CPT | Performed by: STUDENT IN AN ORGANIZED HEALTH CARE EDUCATION/TRAINING PROGRAM

## 2021-01-01 PROCEDURE — 99999 PR PBB SHADOW E&M-EST. PATIENT-LVL IV: ICD-10-PCS | Mod: PBBFAC,,, | Performed by: FAMILY MEDICINE

## 2021-01-01 PROCEDURE — 85025 COMPLETE CBC W/AUTO DIFF WBC: CPT | Performed by: STUDENT IN AN ORGANIZED HEALTH CARE EDUCATION/TRAINING PROGRAM

## 2021-01-01 PROCEDURE — 25000003 PHARM REV CODE 250: Performed by: NURSE PRACTITIONER

## 2021-01-01 PROCEDURE — 84100 ASSAY OF PHOSPHORUS: CPT | Performed by: STUDENT IN AN ORGANIZED HEALTH CARE EDUCATION/TRAINING PROGRAM

## 2021-01-01 PROCEDURE — 27000221 HC OXYGEN, UP TO 24 HOURS

## 2021-01-01 PROCEDURE — 80053 COMPREHEN METABOLIC PANEL: CPT | Performed by: STUDENT IN AN ORGANIZED HEALTH CARE EDUCATION/TRAINING PROGRAM

## 2021-01-01 PROCEDURE — 97530 THERAPEUTIC ACTIVITIES: CPT | Mod: CQ

## 2021-01-01 PROCEDURE — 3077F PR MOST RECENT SYSTOLIC BLOOD PRESSURE >= 140 MM HG: ICD-10-PCS | Mod: CPTII,S$GLB,, | Performed by: NURSE PRACTITIONER

## 2021-01-01 PROCEDURE — 99232 PR SUBSEQUENT HOSPITAL CARE,LEVL II: ICD-10-PCS | Mod: ,,, | Performed by: INTERNAL MEDICINE

## 2021-01-01 PROCEDURE — 99233 PR SUBSEQUENT HOSPITAL CARE,LEVL III: ICD-10-PCS | Mod: ,,, | Performed by: HOSPITALIST

## 2021-01-01 PROCEDURE — 1157F ADVNC CARE PLAN IN RCRD: CPT | Mod: CPTII,S$GLB,, | Performed by: FAMILY MEDICINE

## 2021-01-01 PROCEDURE — 94761 N-INVAS EAR/PLS OXIMETRY MLT: CPT

## 2021-01-01 PROCEDURE — 97110 THERAPEUTIC EXERCISES: CPT | Mod: CQ

## 2021-01-01 PROCEDURE — 3008F PR BODY MASS INDEX (BMI) DOCUMENTED: ICD-10-PCS | Mod: CPTII,S$GLB,, | Performed by: INTERNAL MEDICINE

## 2021-01-01 PROCEDURE — 97116 GAIT TRAINING THERAPY: CPT

## 2021-01-01 PROCEDURE — 85025 COMPLETE CBC W/AUTO DIFF WBC: CPT | Performed by: NURSE PRACTITIONER

## 2021-01-01 PROCEDURE — 97116 GAIT TRAINING THERAPY: CPT | Mod: CQ

## 2021-01-01 PROCEDURE — 3077F SYST BP >= 140 MM HG: CPT | Mod: CPTII,S$GLB,, | Performed by: NURSE PRACTITIONER

## 2021-01-01 PROCEDURE — 63600175 PHARM REV CODE 636 W HCPCS: Performed by: STUDENT IN AN ORGANIZED HEALTH CARE EDUCATION/TRAINING PROGRAM

## 2021-01-01 PROCEDURE — 99900035 HC TECH TIME PER 15 MIN (STAT)

## 2021-01-01 PROCEDURE — 3288F PR FALLS RISK ASSESSMENT DOCUMENTED: ICD-10-PCS | Mod: CPTII,S$GLB,, | Performed by: FAMILY MEDICINE

## 2021-01-01 PROCEDURE — 99203 OFFICE O/P NEW LOW 30 MIN: CPT | Mod: S$GLB,,, | Performed by: INTERNAL MEDICINE

## 2021-01-01 PROCEDURE — 94660 CPAP INITIATION&MGMT: CPT

## 2021-01-01 PROCEDURE — 99233 SBSQ HOSP IP/OBS HIGH 50: CPT | Mod: ,,, | Performed by: HOSPITALIST

## 2021-01-01 PROCEDURE — 3078F PR MOST RECENT DIASTOLIC BLOOD PRESSURE < 80 MM HG: ICD-10-PCS | Mod: CPTII,S$GLB,, | Performed by: INTERNAL MEDICINE

## 2021-01-01 PROCEDURE — 99999 PR PBB SHADOW E&M-EST. PATIENT-LVL IV: CPT | Mod: PBBFAC,,, | Performed by: NURSE PRACTITIONER

## 2021-01-01 PROCEDURE — 86920 COMPATIBILITY TEST SPIN: CPT | Performed by: STUDENT IN AN ORGANIZED HEALTH CARE EDUCATION/TRAINING PROGRAM

## 2021-01-01 PROCEDURE — 27100171 HC OXYGEN HIGH FLOW UP TO 24 HOURS

## 2021-01-01 PROCEDURE — 25000003 PHARM REV CODE 250: Performed by: HOSPITALIST

## 2021-01-01 PROCEDURE — 1157F ADVNC CARE PLAN IN RCRD: CPT | Mod: 95,,, | Performed by: FAMILY MEDICINE

## 2021-01-01 PROCEDURE — 99232 SBSQ HOSP IP/OBS MODERATE 35: CPT | Mod: ,,, | Performed by: HOSPITALIST

## 2021-01-01 PROCEDURE — 82803 BLOOD GASES ANY COMBINATION: CPT

## 2021-01-01 PROCEDURE — 1157F PR ADVANCE CARE PLAN OR EQUIV PRESENT IN MEDICAL RECORD: ICD-10-PCS | Mod: S$GLB,,, | Performed by: INTERNAL MEDICINE

## 2021-01-01 PROCEDURE — 99239 PR HOSPITAL DISCHARGE DAY,>30 MIN: ICD-10-PCS | Mod: ,,, | Performed by: HOSPITALIST

## 2021-01-01 PROCEDURE — 63600175 PHARM REV CODE 636 W HCPCS: Performed by: HOSPITALIST

## 2021-01-01 PROCEDURE — 25000003 PHARM REV CODE 250

## 2021-01-01 PROCEDURE — 85610 PROTHROMBIN TIME: CPT | Performed by: STUDENT IN AN ORGANIZED HEALTH CARE EDUCATION/TRAINING PROGRAM

## 2021-01-01 PROCEDURE — 63600175 PHARM REV CODE 636 W HCPCS: Performed by: INTERNAL MEDICINE

## 2021-01-01 PROCEDURE — 36415 COLL VENOUS BLD VENIPUNCTURE: CPT | Performed by: STUDENT IN AN ORGANIZED HEALTH CARE EDUCATION/TRAINING PROGRAM

## 2021-01-01 PROCEDURE — 99233 PR SUBSEQUENT HOSPITAL CARE,LEVL III: ICD-10-PCS | Mod: ,,, | Performed by: INTERNAL MEDICINE

## 2021-01-01 PROCEDURE — 3074F PR MOST RECENT SYSTOLIC BLOOD PRESSURE < 130 MM HG: ICD-10-PCS | Mod: CPTII,S$GLB,, | Performed by: INTERNAL MEDICINE

## 2021-01-01 PROCEDURE — 84484 ASSAY OF TROPONIN QUANT: CPT | Performed by: INTERNAL MEDICINE

## 2021-01-01 PROCEDURE — 99223 PR INITIAL HOSPITAL CARE,LEVL III: ICD-10-PCS | Mod: ,,, | Performed by: INTERNAL MEDICINE

## 2021-01-01 PROCEDURE — 85025 COMPLETE CBC W/AUTO DIFF WBC: CPT | Performed by: HOSPITALIST

## 2021-01-01 PROCEDURE — 83605 ASSAY OF LACTIC ACID: CPT | Performed by: HOSPITALIST

## 2021-01-01 PROCEDURE — 20600001 HC STEP DOWN PRIVATE ROOM

## 2021-01-01 PROCEDURE — 3075F PR MOST RECENT SYSTOLIC BLOOD PRESS GE 130-139MM HG: ICD-10-PCS | Mod: CPTII,S$GLB,, | Performed by: FAMILY MEDICINE

## 2021-01-01 PROCEDURE — 1160F PR REVIEW ALL MEDS BY PRESCRIBER/CLIN PHARMACIST DOCUMENTED: ICD-10-PCS | Mod: CPTII,S$GLB,, | Performed by: FAMILY MEDICINE

## 2021-01-01 PROCEDURE — 94799 UNLISTED PULMONARY SVC/PX: CPT

## 2021-01-01 PROCEDURE — 97110 THERAPEUTIC EXERCISES: CPT

## 2021-01-01 PROCEDURE — 1160F RVW MEDS BY RX/DR IN RCRD: CPT | Mod: CPTII,S$GLB,, | Performed by: FAMILY MEDICINE

## 2021-01-01 PROCEDURE — G0180 MD CERTIFICATION HHA PATIENT: HCPCS | Mod: ,,, | Performed by: FAMILY MEDICINE

## 2021-01-01 PROCEDURE — C9399 UNCLASSIFIED DRUGS OR BIOLOG: HCPCS | Performed by: STUDENT IN AN ORGANIZED HEALTH CARE EDUCATION/TRAINING PROGRAM

## 2021-01-01 PROCEDURE — 96365 THER/PROPH/DIAG IV INF INIT: CPT

## 2021-01-01 PROCEDURE — 99291 CRITICAL CARE FIRST HOUR: CPT | Mod: GC,,, | Performed by: INTERNAL MEDICINE

## 2021-01-01 PROCEDURE — 97535 SELF CARE MNGMENT TRAINING: CPT | Mod: CO

## 2021-01-01 PROCEDURE — 20000000 HC ICU ROOM

## 2021-01-01 PROCEDURE — 3078F PR MOST RECENT DIASTOLIC BLOOD PRESSURE < 80 MM HG: ICD-10-PCS | Mod: CPTII,S$GLB,, | Performed by: FAMILY MEDICINE

## 2021-01-01 PROCEDURE — 83605 ASSAY OF LACTIC ACID: CPT | Performed by: STUDENT IN AN ORGANIZED HEALTH CARE EDUCATION/TRAINING PROGRAM

## 2021-01-01 PROCEDURE — 97162 PT EVAL MOD COMPLEX 30 MIN: CPT

## 2021-01-01 PROCEDURE — 99292 PR CRITICAL CARE, ADDL 30 MIN: ICD-10-PCS | Mod: GC,,, | Performed by: INTERNAL MEDICINE

## 2021-01-01 PROCEDURE — 92526 ORAL FUNCTION THERAPY: CPT

## 2021-01-01 PROCEDURE — 83735 ASSAY OF MAGNESIUM: CPT | Mod: 91 | Performed by: HOSPITALIST

## 2021-01-01 PROCEDURE — 25000003 PHARM REV CODE 250: Performed by: INTERNAL MEDICINE

## 2021-01-01 PROCEDURE — 3044F PR MOST RECENT HEMOGLOBIN A1C LEVEL <7.0%: ICD-10-PCS | Mod: CPTII,S$GLB,, | Performed by: FAMILY MEDICINE

## 2021-01-01 PROCEDURE — 1159F PR MEDICATION LIST DOCUMENTED IN MEDICAL RECORD: ICD-10-PCS | Mod: S$GLB,,, | Performed by: NURSE PRACTITIONER

## 2021-01-01 PROCEDURE — 36600 WITHDRAWAL OF ARTERIAL BLOOD: CPT

## 2021-01-01 PROCEDURE — 99233 SBSQ HOSP IP/OBS HIGH 50: CPT | Mod: ,,, | Performed by: INTERNAL MEDICINE

## 2021-01-01 PROCEDURE — 99900026 HC AIRWAY MAINTENANCE (STAT)

## 2021-01-01 PROCEDURE — 84484 ASSAY OF TROPONIN QUANT: CPT | Mod: 91 | Performed by: INTERNAL MEDICINE

## 2021-01-01 PROCEDURE — 80202 ASSAY OF VANCOMYCIN: CPT | Performed by: INTERNAL MEDICINE

## 2021-01-01 PROCEDURE — 3051F PR MOST RECENT HEMOGLOBIN A1C LEVEL 7.0 - < 8.0%: ICD-10-PCS | Mod: CPTII,S$GLB,, | Performed by: NURSE PRACTITIONER

## 2021-01-01 PROCEDURE — 83540 ASSAY OF IRON: CPT | Performed by: STUDENT IN AN ORGANIZED HEALTH CARE EDUCATION/TRAINING PROGRAM

## 2021-01-01 PROCEDURE — 85520 HEPARIN ASSAY: CPT | Performed by: STUDENT IN AN ORGANIZED HEALTH CARE EDUCATION/TRAINING PROGRAM

## 2021-01-01 PROCEDURE — 80048 BASIC METABOLIC PNL TOTAL CA: CPT | Performed by: STUDENT IN AN ORGANIZED HEALTH CARE EDUCATION/TRAINING PROGRAM

## 2021-01-01 PROCEDURE — 1111F DSCHRG MED/CURRENT MED MERGE: CPT | Mod: CPTII,S$GLB,, | Performed by: NURSE PRACTITIONER

## 2021-01-01 PROCEDURE — 99291 PR CRITICAL CARE, E/M 30-74 MINUTES: ICD-10-PCS | Mod: GC,,, | Performed by: INTERNAL MEDICINE

## 2021-01-01 PROCEDURE — P9021 RED BLOOD CELLS UNIT: HCPCS | Performed by: STUDENT IN AN ORGANIZED HEALTH CARE EDUCATION/TRAINING PROGRAM

## 2021-01-01 PROCEDURE — 94640 AIRWAY INHALATION TREATMENT: CPT

## 2021-01-01 PROCEDURE — 99292 CRITICAL CARE ADDL 30 MIN: CPT | Mod: GC,,, | Performed by: INTERNAL MEDICINE

## 2021-01-01 PROCEDURE — 82728 ASSAY OF FERRITIN: CPT | Performed by: STUDENT IN AN ORGANIZED HEALTH CARE EDUCATION/TRAINING PROGRAM

## 2021-01-01 PROCEDURE — 99215 PR OFFICE/OUTPT VISIT, EST, LEVL V, 40-54 MIN: ICD-10-PCS | Mod: S$GLB,,, | Performed by: NURSE PRACTITIONER

## 2021-01-01 PROCEDURE — 86803 HEPATITIS C AB TEST: CPT | Performed by: EMERGENCY MEDICINE

## 2021-01-01 PROCEDURE — 12000002 HC ACUTE/MED SURGE SEMI-PRIVATE ROOM

## 2021-01-01 PROCEDURE — 97535 SELF CARE MNGMENT TRAINING: CPT

## 2021-01-01 PROCEDURE — 99999 PR PBB SHADOW E&M-EST. PATIENT-LVL IV: ICD-10-PCS | Mod: PBBFAC,,, | Performed by: INTERNAL MEDICINE

## 2021-01-01 PROCEDURE — 99291 CRITICAL CARE FIRST HOUR: CPT | Mod: 25,CS,, | Performed by: EMERGENCY MEDICINE

## 2021-01-01 PROCEDURE — 1157F ADVNC CARE PLAN IN RCRD: CPT | Mod: S$GLB,,, | Performed by: INTERNAL MEDICINE

## 2021-01-01 PROCEDURE — 99291 PR CRITICAL CARE, E/M 30-74 MINUTES: ICD-10-PCS | Mod: 25,CS,, | Performed by: EMERGENCY MEDICINE

## 2021-01-01 PROCEDURE — 84484 ASSAY OF TROPONIN QUANT: CPT | Performed by: HOSPITALIST

## 2021-01-01 PROCEDURE — 99232 SBSQ HOSP IP/OBS MODERATE 35: CPT | Mod: ,,, | Performed by: INTERNAL MEDICINE

## 2021-01-01 PROCEDURE — 1126F PR PAIN SEVERITY QUANTIFIED, NO PAIN PRESENT: ICD-10-PCS | Mod: CPTII,S$GLB,, | Performed by: FAMILY MEDICINE

## 2021-01-01 PROCEDURE — 80076 HEPATIC FUNCTION PANEL: CPT | Performed by: HOSPITALIST

## 2021-01-01 PROCEDURE — 1159F PR MEDICATION LIST DOCUMENTED IN MEDICAL RECORD: ICD-10-PCS | Mod: S$GLB,,, | Performed by: INTERNAL MEDICINE

## 2021-01-01 PROCEDURE — 27000190 HC CPAP FULL FACE MASK W/VALVE

## 2021-01-01 PROCEDURE — 84439 ASSAY OF FREE THYROXINE: CPT | Performed by: STUDENT IN AN ORGANIZED HEALTH CARE EDUCATION/TRAINING PROGRAM

## 2021-01-01 PROCEDURE — 3051F PR MOST RECENT HEMOGLOBIN A1C LEVEL 7.0 - < 8.0%: ICD-10-PCS | Mod: CPTII,S$GLB,, | Performed by: INTERNAL MEDICINE

## 2021-01-01 PROCEDURE — 63600175 PHARM REV CODE 636 W HCPCS: Performed by: EMERGENCY MEDICINE

## 2021-01-01 PROCEDURE — 3074F SYST BP LT 130 MM HG: CPT | Mod: CPTII,S$GLB,, | Performed by: INTERNAL MEDICINE

## 2021-01-01 PROCEDURE — 87205 SMEAR GRAM STAIN: CPT | Performed by: INTERNAL MEDICINE

## 2021-01-01 PROCEDURE — 3288F PR FALLS RISK ASSESSMENT DOCUMENTED: ICD-10-PCS | Mod: CPTII,S$GLB,, | Performed by: INTERNAL MEDICINE

## 2021-01-01 PROCEDURE — 99499 RISK ADDL DX/OHS AUDIT: ICD-10-PCS | Mod: S$GLB,,, | Performed by: NURSE PRACTITIONER

## 2021-01-01 PROCEDURE — 1101F PT FALLS ASSESS-DOCD LE1/YR: CPT | Mod: CPTII,S$GLB,, | Performed by: INTERNAL MEDICINE

## 2021-01-01 PROCEDURE — 94003 VENT MGMT INPAT SUBQ DAY: CPT

## 2021-01-01 PROCEDURE — 93005 ELECTROCARDIOGRAM TRACING: CPT

## 2021-01-01 PROCEDURE — 99213 OFFICE O/P EST LOW 20 MIN: CPT | Mod: S$GLB,,, | Performed by: FAMILY MEDICINE

## 2021-01-01 PROCEDURE — 99214 OFFICE O/P EST MOD 30 MIN: CPT | Mod: 95,,, | Performed by: FAMILY MEDICINE

## 2021-01-01 PROCEDURE — 87040 BLOOD CULTURE FOR BACTERIA: CPT | Mod: 59 | Performed by: STUDENT IN AN ORGANIZED HEALTH CARE EDUCATION/TRAINING PROGRAM

## 2021-01-01 PROCEDURE — 1126F AMNT PAIN NOTED NONE PRSNT: CPT | Mod: S$GLB,,, | Performed by: INTERNAL MEDICINE

## 2021-01-01 PROCEDURE — 99497 PR ADVNCD CARE PLAN 30 MIN: ICD-10-PCS | Mod: ,,, | Performed by: INTERNAL MEDICINE

## 2021-01-01 PROCEDURE — 3075F SYST BP GE 130 - 139MM HG: CPT | Mod: CPTII,S$GLB,, | Performed by: FAMILY MEDICINE

## 2021-01-01 PROCEDURE — 97165 OT EVAL LOW COMPLEX 30 MIN: CPT

## 2021-01-01 PROCEDURE — 99232 PR SUBSEQUENT HOSPITAL CARE,LEVL II: ICD-10-PCS | Mod: ,,, | Performed by: HOSPITALIST

## 2021-01-01 PROCEDURE — 3051F HG A1C>EQUAL 7.0%<8.0%: CPT | Mod: CPTII,S$GLB,, | Performed by: NURSE PRACTITIONER

## 2021-01-01 PROCEDURE — 99233 PR SUBSEQUENT HOSPITAL CARE,LEVL III: ICD-10-PCS | Mod: ,,, | Performed by: STUDENT IN AN ORGANIZED HEALTH CARE EDUCATION/TRAINING PROGRAM

## 2021-01-01 PROCEDURE — 99999 PR PBB SHADOW E&M-EST. PATIENT-LVL IV: ICD-10-PCS | Mod: PBBFAC,,, | Performed by: NURSE PRACTITIONER

## 2021-01-01 PROCEDURE — 1159F MED LIST DOCD IN RCRD: CPT | Mod: S$GLB,,, | Performed by: INTERNAL MEDICINE

## 2021-01-01 PROCEDURE — 85025 COMPLETE CBC W/AUTO DIFF WBC: CPT | Mod: 91 | Performed by: STUDENT IN AN ORGANIZED HEALTH CARE EDUCATION/TRAINING PROGRAM

## 2021-01-01 PROCEDURE — 84145 PROCALCITONIN (PCT): CPT | Performed by: HOSPITALIST

## 2021-01-01 PROCEDURE — 99999 PR PBB SHADOW E&M-EST. PATIENT-LVL IV: CPT | Mod: PBBFAC,,, | Performed by: FAMILY MEDICINE

## 2021-01-01 PROCEDURE — 80069 RENAL FUNCTION PANEL: CPT | Mod: 91 | Performed by: HOSPITALIST

## 2021-01-01 PROCEDURE — 3078F PR MOST RECENT DIASTOLIC BLOOD PRESSURE < 80 MM HG: ICD-10-PCS | Mod: CPTII,S$GLB,, | Performed by: NURSE PRACTITIONER

## 2021-01-01 PROCEDURE — 96375 TX/PRO/DX INJ NEW DRUG ADDON: CPT

## 2021-01-01 PROCEDURE — 1101F PT FALLS ASSESS-DOCD LE1/YR: CPT | Mod: CPTII,S$GLB,, | Performed by: FAMILY MEDICINE

## 2021-01-01 PROCEDURE — 27000646 HC AEROBIKA DEVICE

## 2021-01-01 PROCEDURE — 1111F PR DISCHARGE MEDS RECONCILED W/ CURRENT OUTPATIENT MED LIST: ICD-10-PCS | Mod: CPTII,S$GLB,, | Performed by: NURSE PRACTITIONER

## 2021-01-01 PROCEDURE — 84132 ASSAY OF SERUM POTASSIUM: CPT | Performed by: STUDENT IN AN ORGANIZED HEALTH CARE EDUCATION/TRAINING PROGRAM

## 2021-01-01 PROCEDURE — 99499 RISK ADDL DX/OHS AUDIT: ICD-10-PCS | Mod: 95,,, | Performed by: FAMILY MEDICINE

## 2021-01-01 PROCEDURE — 99223 1ST HOSP IP/OBS HIGH 75: CPT | Mod: 25,GC,, | Performed by: INTERNAL MEDICINE

## 2021-01-01 PROCEDURE — 97530 THERAPEUTIC ACTIVITIES: CPT | Mod: CO

## 2021-01-01 PROCEDURE — 25000242 PHARM REV CODE 250 ALT 637 W/ HCPCS: Performed by: HOSPITALIST

## 2021-01-01 PROCEDURE — 83880 ASSAY OF NATRIURETIC PEPTIDE: CPT | Performed by: HOSPITALIST

## 2021-01-01 PROCEDURE — 1157F PR ADVANCE CARE PLAN OR EQUIV PRESENT IN MEDICAL RECORD: ICD-10-PCS | Mod: S$GLB,,, | Performed by: NURSE PRACTITIONER

## 2021-01-01 PROCEDURE — 1157F PR ADVANCE CARE PLAN OR EQUIV PRESENT IN MEDICAL RECORD: ICD-10-PCS | Mod: CPTII,S$GLB,, | Performed by: FAMILY MEDICINE

## 2021-01-01 PROCEDURE — 85520 HEPARIN ASSAY: CPT | Mod: 91 | Performed by: INTERNAL MEDICINE

## 2021-01-01 PROCEDURE — 4010F PR ACE/ARB THEARPY RXD/TAKEN: ICD-10-PCS | Mod: CPTII,S$GLB,, | Performed by: FAMILY MEDICINE

## 2021-01-01 PROCEDURE — 63600175 PHARM REV CODE 636 W HCPCS: Mod: JG | Performed by: STUDENT IN AN ORGANIZED HEALTH CARE EDUCATION/TRAINING PROGRAM

## 2021-01-01 PROCEDURE — 94664 DEMO&/EVAL PT USE INHALER: CPT

## 2021-01-01 PROCEDURE — 85379 FIBRIN DEGRADATION QUANT: CPT | Performed by: HOSPITALIST

## 2021-01-01 PROCEDURE — 83935 ASSAY OF URINE OSMOLALITY: CPT | Performed by: STUDENT IN AN ORGANIZED HEALTH CARE EDUCATION/TRAINING PROGRAM

## 2021-01-01 PROCEDURE — 1159F MED LIST DOCD IN RCRD: CPT | Mod: CPTII,S$GLB,, | Performed by: FAMILY MEDICINE

## 2021-01-01 PROCEDURE — 3008F BODY MASS INDEX DOCD: CPT | Mod: CPTII,S$GLB,, | Performed by: FAMILY MEDICINE

## 2021-01-01 PROCEDURE — 93451 RIGHT HEART CATH: CPT | Mod: 26,,, | Performed by: INTERNAL MEDICINE

## 2021-01-01 PROCEDURE — 31500 INSERT EMERGENCY AIRWAY: CPT | Mod: 59,,, | Performed by: EMERGENCY MEDICINE

## 2021-01-01 PROCEDURE — 99223 1ST HOSP IP/OBS HIGH 75: CPT | Mod: GC,,, | Performed by: INTERNAL MEDICINE

## 2021-01-01 PROCEDURE — 84443 ASSAY THYROID STIM HORMONE: CPT | Performed by: STUDENT IN AN ORGANIZED HEALTH CARE EDUCATION/TRAINING PROGRAM

## 2021-01-01 PROCEDURE — 80053 COMPREHEN METABOLIC PANEL: CPT | Mod: 91 | Performed by: STUDENT IN AN ORGANIZED HEALTH CARE EDUCATION/TRAINING PROGRAM

## 2021-01-01 PROCEDURE — 99285 EMERGENCY DEPT VISIT HI MDM: CPT | Mod: 25

## 2021-01-01 PROCEDURE — 3078F DIAST BP <80 MM HG: CPT | Mod: CPTII,S$GLB,, | Performed by: FAMILY MEDICINE

## 2021-01-01 PROCEDURE — 81001 URINALYSIS AUTO W/SCOPE: CPT | Performed by: STUDENT IN AN ORGANIZED HEALTH CARE EDUCATION/TRAINING PROGRAM

## 2021-01-01 PROCEDURE — 93010 EKG 12-LEAD: ICD-10-PCS | Mod: ,,, | Performed by: INTERNAL MEDICINE

## 2021-01-01 PROCEDURE — 84300 ASSAY OF URINE SODIUM: CPT | Performed by: STUDENT IN AN ORGANIZED HEALTH CARE EDUCATION/TRAINING PROGRAM

## 2021-01-01 PROCEDURE — 3066F PR DOCUMENTATION OF TREATMENT FOR NEPHROPATHY: ICD-10-PCS | Mod: CPTII,S$GLB,, | Performed by: FAMILY MEDICINE

## 2021-01-01 PROCEDURE — 82436 ASSAY OF URINE CHLORIDE: CPT | Performed by: STUDENT IN AN ORGANIZED HEALTH CARE EDUCATION/TRAINING PROGRAM

## 2021-01-01 PROCEDURE — 99223 1ST HOSP IP/OBS HIGH 75: CPT | Mod: ,,, | Performed by: INTERNAL MEDICINE

## 2021-01-01 PROCEDURE — 84484 ASSAY OF TROPONIN QUANT: CPT | Performed by: STUDENT IN AN ORGANIZED HEALTH CARE EDUCATION/TRAINING PROGRAM

## 2021-01-01 PROCEDURE — C1751 CATH, INF, PER/CENT/MIDLINE: HCPCS | Performed by: INTERNAL MEDICINE

## 2021-01-01 PROCEDURE — 80048 BASIC METABOLIC PNL TOTAL CA: CPT | Mod: 91 | Performed by: INTERNAL MEDICINE

## 2021-01-01 PROCEDURE — 80202 ASSAY OF VANCOMYCIN: CPT | Performed by: STUDENT IN AN ORGANIZED HEALTH CARE EDUCATION/TRAINING PROGRAM

## 2021-01-01 PROCEDURE — 25000003 PHARM REV CODE 250: Performed by: EMERGENCY MEDICINE

## 2021-01-01 PROCEDURE — 99497 ADVNCD CARE PLAN 30 MIN: CPT | Mod: 25,,, | Performed by: INTERNAL MEDICINE

## 2021-01-01 PROCEDURE — 84540 ASSAY OF URINE/UREA-N: CPT | Performed by: STUDENT IN AN ORGANIZED HEALTH CARE EDUCATION/TRAINING PROGRAM

## 2021-01-01 PROCEDURE — 31500 INSERT EMERGENCY AIRWAY: CPT

## 2021-01-01 PROCEDURE — 1159F MED LIST DOCD IN RCRD: CPT | Mod: S$GLB,,, | Performed by: NURSE PRACTITIONER

## 2021-01-01 PROCEDURE — 1126F PR PAIN SEVERITY QUANTIFIED, NO PAIN PRESENT: ICD-10-PCS | Mod: S$GLB,,, | Performed by: INTERNAL MEDICINE

## 2021-01-01 PROCEDURE — 3008F PR BODY MASS INDEX (BMI) DOCUMENTED: ICD-10-PCS | Mod: CPTII,S$GLB,, | Performed by: NURSE PRACTITIONER

## 2021-01-01 PROCEDURE — 94002 VENT MGMT INPAT INIT DAY: CPT

## 2021-01-01 PROCEDURE — 4010F ACE/ARB THERAPY RXD/TAKEN: CPT | Mod: CPTII,S$GLB,, | Performed by: FAMILY MEDICINE

## 2021-01-01 PROCEDURE — 87070 CULTURE OTHR SPECIMN AEROBIC: CPT | Performed by: INTERNAL MEDICINE

## 2021-01-01 PROCEDURE — 99497 ADVNCD CARE PLAN 30 MIN: CPT | Mod: ,,, | Performed by: INTERNAL MEDICINE

## 2021-01-01 PROCEDURE — 25500020 PHARM REV CODE 255: Performed by: INTERNAL MEDICINE

## 2021-01-01 PROCEDURE — 99497 PR ADVNCD CARE PLAN 30 MIN: ICD-10-PCS | Mod: 25,,, | Performed by: INTERNAL MEDICINE

## 2021-01-01 PROCEDURE — 1159F PR MEDICATION LIST DOCUMENTED IN MEDICAL RECORD: ICD-10-PCS | Mod: CPTII,S$GLB,, | Performed by: FAMILY MEDICINE

## 2021-01-01 PROCEDURE — 99499 UNLISTED E&M SERVICE: CPT | Mod: 95,,, | Performed by: FAMILY MEDICINE

## 2021-01-01 PROCEDURE — 86900 BLOOD TYPING SEROLOGIC ABO: CPT | Performed by: STUDENT IN AN ORGANIZED HEALTH CARE EDUCATION/TRAINING PROGRAM

## 2021-01-01 PROCEDURE — 1157F ADVNC CARE PLAN IN RCRD: CPT | Mod: S$GLB,,, | Performed by: NURSE PRACTITIONER

## 2021-01-01 PROCEDURE — 99233 PR SUBSEQUENT HOSPITAL CARE,LEVL III: ICD-10-PCS | Mod: GC,,, | Performed by: INTERNAL MEDICINE

## 2021-01-01 PROCEDURE — 99223 PR INITIAL HOSPITAL CARE,LEVL III: ICD-10-PCS | Mod: GC,,, | Performed by: INTERNAL MEDICINE

## 2021-01-01 PROCEDURE — 3051F HG A1C>EQUAL 7.0%<8.0%: CPT | Mod: CPTII,S$GLB,, | Performed by: INTERNAL MEDICINE

## 2021-01-01 PROCEDURE — 1157F PR ADVANCE CARE PLAN OR EQUIV PRESENT IN MEDICAL RECORD: ICD-10-PCS | Mod: 95,,, | Performed by: FAMILY MEDICINE

## 2021-01-01 PROCEDURE — 3062F PR POS MACROALBUMINURIA RESULT DOCUMENTED/REVIEW: ICD-10-PCS | Mod: CPTII,S$GLB,, | Performed by: FAMILY MEDICINE

## 2021-01-01 PROCEDURE — 93451 RIGHT HEART CATH: CPT | Performed by: INTERNAL MEDICINE

## 2021-01-01 PROCEDURE — 92610 EVALUATE SWALLOWING FUNCTION: CPT

## 2021-01-01 PROCEDURE — 27200966 HC CLOSED SUCTION SYSTEM

## 2021-01-01 PROCEDURE — 36415 COLL VENOUS BLD VENIPUNCTURE: CPT | Performed by: NURSE PRACTITIONER

## 2021-01-01 PROCEDURE — 1125F AMNT PAIN NOTED PAIN PRSNT: CPT | Mod: S$GLB,,, | Performed by: NURSE PRACTITIONER

## 2021-01-01 PROCEDURE — 86900 BLOOD TYPING SEROLOGIC ABO: CPT | Performed by: NURSE PRACTITIONER

## 2021-01-01 PROCEDURE — 99213 PR OFFICE/OUTPT VISIT, EST, LEVL III, 20-29 MIN: ICD-10-PCS | Mod: S$GLB,,, | Performed by: FAMILY MEDICINE

## 2021-01-01 PROCEDURE — C9399 UNCLASSIFIED DRUGS OR BIOLOG: HCPCS | Performed by: HOSPITALIST

## 2021-01-01 PROCEDURE — 82570 ASSAY OF URINE CREATININE: CPT | Performed by: STUDENT IN AN ORGANIZED HEALTH CARE EDUCATION/TRAINING PROGRAM

## 2021-01-01 PROCEDURE — 3288F FALL RISK ASSESSMENT DOCD: CPT | Mod: CPTII,S$GLB,, | Performed by: FAMILY MEDICINE

## 2021-01-01 PROCEDURE — 96366 THER/PROPH/DIAG IV INF ADDON: CPT

## 2021-01-01 PROCEDURE — 99214 PR OFFICE/OUTPT VISIT, EST, LEVL IV, 30-39 MIN: ICD-10-PCS | Mod: 95,,, | Performed by: FAMILY MEDICINE

## 2021-01-01 PROCEDURE — 3044F HG A1C LEVEL LT 7.0%: CPT | Mod: CPTII,S$GLB,, | Performed by: FAMILY MEDICINE

## 2021-01-01 PROCEDURE — 1101F PR PT FALLS ASSESS DOC 0-1 FALLS W/OUT INJ PAST YR: ICD-10-PCS | Mod: CPTII,S$GLB,, | Performed by: FAMILY MEDICINE

## 2021-01-01 PROCEDURE — 80048 BASIC METABOLIC PNL TOTAL CA: CPT | Performed by: HOSPITALIST

## 2021-01-01 PROCEDURE — 85045 AUTOMATED RETICULOCYTE COUNT: CPT | Performed by: STUDENT IN AN ORGANIZED HEALTH CARE EDUCATION/TRAINING PROGRAM

## 2021-01-01 PROCEDURE — 99499 UNLISTED E&M SERVICE: CPT | Mod: S$GLB,,, | Performed by: NURSE PRACTITIONER

## 2021-01-01 PROCEDURE — 99999 PR PBB SHADOW E&M-EST. PATIENT-LVL IV: CPT | Mod: PBBFAC,,, | Performed by: INTERNAL MEDICINE

## 2021-01-01 PROCEDURE — 84156 ASSAY OF PROTEIN URINE: CPT | Performed by: STUDENT IN AN ORGANIZED HEALTH CARE EDUCATION/TRAINING PROGRAM

## 2021-01-01 PROCEDURE — 3288F FALL RISK ASSESSMENT DOCD: CPT | Mod: CPTII,S$GLB,, | Performed by: INTERNAL MEDICINE

## 2021-01-01 PROCEDURE — 1101F PR PT FALLS ASSESS DOC 0-1 FALLS W/OUT INJ PAST YR: ICD-10-PCS | Mod: CPTII,S$GLB,, | Performed by: INTERNAL MEDICINE

## 2021-01-01 PROCEDURE — 96368 THER/DIAG CONCURRENT INF: CPT

## 2021-01-01 PROCEDURE — 3008F BODY MASS INDEX DOCD: CPT | Mod: CPTII,S$GLB,, | Performed by: NURSE PRACTITIONER

## 2021-01-01 PROCEDURE — 99223 PR INITIAL HOSPITAL CARE,LEVL III: ICD-10-PCS | Mod: 25,GC,, | Performed by: INTERNAL MEDICINE

## 2021-01-01 PROCEDURE — G0180 PR HOME HEALTH MD CERTIFICATION: ICD-10-PCS | Mod: ,,, | Performed by: FAMILY MEDICINE

## 2021-01-01 PROCEDURE — 99239 HOSP IP/OBS DSCHRG MGMT >30: CPT | Mod: ,,, | Performed by: HOSPITALIST

## 2021-01-01 PROCEDURE — 3062F POS MACROALBUMINURIA REV: CPT | Mod: CPTII,S$GLB,, | Performed by: FAMILY MEDICINE

## 2021-01-01 PROCEDURE — 3008F BODY MASS INDEX DOCD: CPT | Mod: CPTII,S$GLB,, | Performed by: INTERNAL MEDICINE

## 2021-01-01 PROCEDURE — 82550 ASSAY OF CK (CPK): CPT | Performed by: HOSPITALIST

## 2021-01-01 PROCEDURE — 99203 PR OFFICE/OUTPT VISIT, NEW, LEVL III, 30-44 MIN: ICD-10-PCS | Mod: S$GLB,,, | Performed by: INTERNAL MEDICINE

## 2021-01-01 PROCEDURE — 1126F AMNT PAIN NOTED NONE PRSNT: CPT | Mod: CPTII,S$GLB,, | Performed by: FAMILY MEDICINE

## 2021-01-01 PROCEDURE — 1125F PR PAIN SEVERITY QUANTIFIED, PAIN PRESENT: ICD-10-PCS | Mod: S$GLB,,, | Performed by: NURSE PRACTITIONER

## 2021-01-01 PROCEDURE — 3066F NEPHROPATHY DOC TX: CPT | Mod: CPTII,S$GLB,, | Performed by: FAMILY MEDICINE

## 2021-01-01 PROCEDURE — 93451 PR RIGHT HEART CATH O2 SATURATION & CARDIAC OUTPUT: ICD-10-PCS | Mod: 26,,, | Performed by: INTERNAL MEDICINE

## 2021-01-01 PROCEDURE — 31500 PR INSERT, EMERGENCY ENDOTRACH AIRWAY: ICD-10-PCS | Mod: 59,,, | Performed by: EMERGENCY MEDICINE

## 2021-01-01 PROCEDURE — C1894 INTRO/SHEATH, NON-LASER: HCPCS | Performed by: INTERNAL MEDICINE

## 2021-01-01 PROCEDURE — 81001 URINALYSIS AUTO W/SCOPE: CPT | Performed by: INTERNAL MEDICINE

## 2021-01-01 PROCEDURE — 99233 SBSQ HOSP IP/OBS HIGH 50: CPT | Mod: GC,,, | Performed by: INTERNAL MEDICINE

## 2021-01-01 PROCEDURE — 25000242 PHARM REV CODE 250 ALT 637 W/ HCPCS: Performed by: STUDENT IN AN ORGANIZED HEALTH CARE EDUCATION/TRAINING PROGRAM

## 2021-01-01 PROCEDURE — 82553 CREATINE MB FRACTION: CPT | Performed by: HOSPITALIST

## 2021-01-01 PROCEDURE — 36430 TRANSFUSION BLD/BLD COMPNT: CPT

## 2021-01-01 PROCEDURE — 3008F PR BODY MASS INDEX (BMI) DOCUMENTED: ICD-10-PCS | Mod: CPTII,S$GLB,, | Performed by: FAMILY MEDICINE

## 2021-01-01 PROCEDURE — U0002 COVID-19 LAB TEST NON-CDC: HCPCS | Performed by: STUDENT IN AN ORGANIZED HEALTH CARE EDUCATION/TRAINING PROGRAM

## 2021-01-01 PROCEDURE — 99233 SBSQ HOSP IP/OBS HIGH 50: CPT | Mod: ,,, | Performed by: STUDENT IN AN ORGANIZED HEALTH CARE EDUCATION/TRAINING PROGRAM

## 2021-01-01 PROCEDURE — C1751 CATH, INF, PER/CENT/MIDLINE: HCPCS

## 2021-01-01 PROCEDURE — 3078F DIAST BP <80 MM HG: CPT | Mod: CPTII,S$GLB,, | Performed by: NURSE PRACTITIONER

## 2021-01-01 PROCEDURE — 83880 ASSAY OF NATRIURETIC PEPTIDE: CPT | Performed by: STUDENT IN AN ORGANIZED HEALTH CARE EDUCATION/TRAINING PROGRAM

## 2021-01-01 PROCEDURE — 3078F DIAST BP <80 MM HG: CPT | Mod: CPTII,S$GLB,, | Performed by: INTERNAL MEDICINE

## 2021-01-01 PROCEDURE — 99215 OFFICE O/P EST HI 40 MIN: CPT | Mod: S$GLB,,, | Performed by: NURSE PRACTITIONER

## 2021-01-01 RX ORDER — FUROSEMIDE 10 MG/ML
80 INJECTION INTRAMUSCULAR; INTRAVENOUS 2 TIMES DAILY
Status: DISCONTINUED | OUTPATIENT
Start: 2021-01-01 | End: 2021-01-01

## 2021-01-01 RX ORDER — INSULIN ASPART 100 [IU]/ML
0-5 INJECTION, SOLUTION INTRAVENOUS; SUBCUTANEOUS
Status: DISCONTINUED | OUTPATIENT
Start: 2021-01-01 | End: 2021-01-01

## 2021-01-01 RX ORDER — INSULIN ASPART 100 [IU]/ML
0-5 INJECTION, SOLUTION INTRAVENOUS; SUBCUTANEOUS EVERY 6 HOURS PRN
Status: DISCONTINUED | OUTPATIENT
Start: 2021-01-01 | End: 2021-01-01

## 2021-01-01 RX ORDER — MUPIROCIN 20 MG/G
OINTMENT TOPICAL 2 TIMES DAILY
Status: COMPLETED | OUTPATIENT
Start: 2021-01-01 | End: 2021-01-01

## 2021-01-01 RX ORDER — SENNOSIDES 8.6 MG/1
1 TABLET ORAL DAILY
COMMUNITY

## 2021-01-01 RX ORDER — CLOPIDOGREL BISULFATE 75 MG/1
75 TABLET ORAL DAILY
Status: CANCELLED | OUTPATIENT
Start: 2021-01-01

## 2021-01-01 RX ORDER — AMLODIPINE BESYLATE 2.5 MG/1
2.5 TABLET ORAL DAILY
Status: DISCONTINUED | OUTPATIENT
Start: 2021-01-01 | End: 2021-01-01

## 2021-01-01 RX ORDER — NAPROXEN SODIUM 220 MG/1
81 TABLET, FILM COATED ORAL DAILY
Status: DISCONTINUED | OUTPATIENT
Start: 2021-01-01 | End: 2021-01-01

## 2021-01-01 RX ORDER — INSULIN ASPART 100 [IU]/ML
0-5 INJECTION, SOLUTION INTRAVENOUS; SUBCUTANEOUS
Status: DISCONTINUED | OUTPATIENT
Start: 2021-01-01 | End: 2021-01-01 | Stop reason: HOSPADM

## 2021-01-01 RX ORDER — AMOXICILLIN 250 MG
1 CAPSULE ORAL 2 TIMES DAILY
Status: CANCELLED | OUTPATIENT
Start: 2021-01-01

## 2021-01-01 RX ORDER — MULTIVITAMIN
1 TABLET ORAL DAILY
COMMUNITY

## 2021-01-01 RX ORDER — ACETAZOLAMIDE 250 MG/1
250 TABLET ORAL 3 TIMES DAILY
Status: DISCONTINUED | OUTPATIENT
Start: 2021-01-01 | End: 2021-01-01

## 2021-01-01 RX ORDER — INSULIN ASPART 100 [IU]/ML
5 INJECTION, SOLUTION INTRAVENOUS; SUBCUTANEOUS
Status: DISCONTINUED | OUTPATIENT
Start: 2021-01-01 | End: 2021-01-01

## 2021-01-01 RX ORDER — CLOPIDOGREL BISULFATE 75 MG/1
75 TABLET ORAL DAILY
Status: DISCONTINUED | OUTPATIENT
Start: 2021-01-01 | End: 2021-01-01 | Stop reason: HOSPADM

## 2021-01-01 RX ORDER — ETOMIDATE 2 MG/ML
INJECTION INTRAVENOUS
Status: COMPLETED
Start: 2021-01-01 | End: 2021-01-01

## 2021-01-01 RX ORDER — ACETAZOLAMIDE 500 MG/5ML
500 INJECTION, POWDER, LYOPHILIZED, FOR SOLUTION INTRAVENOUS ONCE
Status: COMPLETED | OUTPATIENT
Start: 2021-01-01 | End: 2021-01-01

## 2021-01-01 RX ORDER — INSULIN ASPART 100 [IU]/ML
4 INJECTION, SOLUTION INTRAVENOUS; SUBCUTANEOUS
Status: DISCONTINUED | OUTPATIENT
Start: 2021-01-01 | End: 2021-01-01

## 2021-01-01 RX ORDER — ONDANSETRON 4 MG/1
TABLET, ORALLY DISINTEGRATING ORAL
Qty: 30 TABLET | Refills: 1 | Status: SHIPPED | OUTPATIENT
Start: 2021-01-01 | End: 2021-01-01 | Stop reason: SDUPTHER

## 2021-01-01 RX ORDER — FUROSEMIDE 10 MG/ML
120 INJECTION INTRAMUSCULAR; INTRAVENOUS ONCE
Status: COMPLETED | OUTPATIENT
Start: 2021-01-01 | End: 2021-01-01

## 2021-01-01 RX ORDER — FUROSEMIDE 10 MG/ML
40 INJECTION INTRAMUSCULAR; INTRAVENOUS 2 TIMES DAILY
Status: DISCONTINUED | OUTPATIENT
Start: 2021-01-01 | End: 2021-01-01

## 2021-01-01 RX ORDER — IBUPROFEN 200 MG
24 TABLET ORAL
Status: DISCONTINUED | OUTPATIENT
Start: 2021-01-01 | End: 2021-01-01

## 2021-01-01 RX ORDER — INSULIN ASPART 100 [IU]/ML
6 INJECTION, SOLUTION INTRAVENOUS; SUBCUTANEOUS
Status: DISCONTINUED | OUTPATIENT
Start: 2021-01-01 | End: 2021-01-01 | Stop reason: HOSPADM

## 2021-01-01 RX ORDER — OSELTAMIVIR PHOSPHATE 6 MG/ML
30 FOR SUSPENSION ORAL DAILY
Status: DISCONTINUED | OUTPATIENT
Start: 2021-01-01 | End: 2021-01-01

## 2021-01-01 RX ORDER — DICYCLOMINE HYDROCHLORIDE 20 MG/1
20 TABLET ORAL 3 TIMES DAILY PRN
Qty: 90 TABLET | Refills: 3 | Status: SHIPPED | OUTPATIENT
Start: 2021-01-01 | End: 2021-01-01 | Stop reason: SDUPTHER

## 2021-01-01 RX ORDER — OSELTAMIVIR PHOSPHATE 6 MG/ML
30 FOR SUSPENSION ORAL 2 TIMES DAILY
Status: COMPLETED | OUTPATIENT
Start: 2021-01-01 | End: 2021-01-01

## 2021-01-01 RX ORDER — ATORVASTATIN CALCIUM 20 MG/1
80 TABLET, FILM COATED ORAL DAILY
Status: DISCONTINUED | OUTPATIENT
Start: 2021-01-01 | End: 2021-01-01

## 2021-01-01 RX ORDER — POLYETHYLENE GLYCOL 3350 17 G/17G
17 POWDER, FOR SOLUTION ORAL DAILY
Status: DISCONTINUED | OUTPATIENT
Start: 2021-01-01 | End: 2021-01-01

## 2021-01-01 RX ORDER — FERROUS GLUCONATE 324(37.5)
324 TABLET ORAL
Status: CANCELLED | OUTPATIENT
Start: 2021-01-01

## 2021-01-01 RX ORDER — PANTOPRAZOLE SODIUM 40 MG/1
40 TABLET, DELAYED RELEASE ORAL DAILY
Status: DISCONTINUED | OUTPATIENT
Start: 2021-01-01 | End: 2021-01-01 | Stop reason: HOSPADM

## 2021-01-01 RX ORDER — ACETAZOLAMIDE 500 MG/1
500 CAPSULE, EXTENDED RELEASE ORAL 2 TIMES DAILY
Status: CANCELLED | OUTPATIENT
Start: 2021-01-01

## 2021-01-01 RX ORDER — METOLAZONE 2.5 MG/1
5 TABLET ORAL ONCE
Status: COMPLETED | OUTPATIENT
Start: 2021-01-01 | End: 2021-01-01

## 2021-01-01 RX ORDER — FUROSEMIDE 10 MG/ML
80 INJECTION INTRAMUSCULAR; INTRAVENOUS ONCE
Status: COMPLETED | OUTPATIENT
Start: 2021-01-01 | End: 2021-01-01

## 2021-01-01 RX ORDER — ETOMIDATE 2 MG/ML
40 INJECTION INTRAVENOUS
Status: COMPLETED | OUTPATIENT
Start: 2021-01-01 | End: 2021-01-01

## 2021-01-01 RX ORDER — CLOPIDOGREL 300 MG/1
600 TABLET, FILM COATED ORAL ONCE
Status: COMPLETED | OUTPATIENT
Start: 2021-01-01 | End: 2021-01-01

## 2021-01-01 RX ORDER — IBUPROFEN 200 MG
16 TABLET ORAL
Status: DISCONTINUED | OUTPATIENT
Start: 2021-01-01 | End: 2021-01-01 | Stop reason: HOSPADM

## 2021-01-01 RX ORDER — SODIUM,POTASSIUM PHOSPHATES 280-250MG
2 POWDER IN PACKET (EA) ORAL
Status: COMPLETED | OUTPATIENT
Start: 2021-01-01 | End: 2021-01-01

## 2021-01-01 RX ORDER — INSULIN ASPART 100 [IU]/ML
7 INJECTION, SOLUTION INTRAVENOUS; SUBCUTANEOUS
Status: DISCONTINUED | OUTPATIENT
Start: 2021-01-01 | End: 2021-01-01

## 2021-01-01 RX ORDER — GLUCAGON 1 MG
1 KIT INJECTION
Status: DISCONTINUED | OUTPATIENT
Start: 2021-01-01 | End: 2021-01-01

## 2021-01-01 RX ORDER — CARVEDILOL 6.25 MG/1
6.25 TABLET ORAL 2 TIMES DAILY
Status: DISCONTINUED | OUTPATIENT
Start: 2021-01-01 | End: 2021-01-01

## 2021-01-01 RX ORDER — AMOXICILLIN 250 MG
1 CAPSULE ORAL 2 TIMES DAILY
Status: DISCONTINUED | OUTPATIENT
Start: 2021-01-01 | End: 2021-01-01

## 2021-01-01 RX ORDER — FERROUS GLUCONATE 324(37.5)
324 TABLET ORAL
Status: DISCONTINUED | OUTPATIENT
Start: 2021-01-01 | End: 2021-01-01

## 2021-01-01 RX ORDER — TALC
6 POWDER (GRAM) TOPICAL NIGHTLY PRN
Status: DISCONTINUED | OUTPATIENT
Start: 2021-01-01 | End: 2021-01-01 | Stop reason: HOSPADM

## 2021-01-01 RX ORDER — PANTOPRAZOLE SODIUM 40 MG/1
40 FOR SUSPENSION ORAL DAILY
Status: DISCONTINUED | OUTPATIENT
Start: 2021-01-01 | End: 2021-01-01

## 2021-01-01 RX ORDER — FUROSEMIDE 10 MG/ML
80 INJECTION INTRAMUSCULAR; INTRAVENOUS
Status: COMPLETED | OUTPATIENT
Start: 2021-01-01 | End: 2021-01-01

## 2021-01-01 RX ORDER — INSULIN ASPART 100 [IU]/ML
0-5 INJECTION, SOLUTION INTRAVENOUS; SUBCUTANEOUS
Status: CANCELLED | OUTPATIENT
Start: 2021-01-01

## 2021-01-01 RX ORDER — ISOSORBIDE DINITRATE 10 MG/1
30 TABLET ORAL 3 TIMES DAILY
Status: CANCELLED | OUTPATIENT
Start: 2021-01-01

## 2021-01-01 RX ORDER — ISOSORBIDE DINITRATE 10 MG/1
30 TABLET ORAL 3 TIMES DAILY
Status: DISCONTINUED | OUTPATIENT
Start: 2021-01-01 | End: 2021-01-01 | Stop reason: HOSPADM

## 2021-01-01 RX ORDER — CITALOPRAM 20 MG/1
20 TABLET, FILM COATED ORAL DAILY
Status: CANCELLED | OUTPATIENT
Start: 2021-01-01

## 2021-01-01 RX ORDER — MICONAZOLE NITRATE 2 %
POWDER (GRAM) TOPICAL 2 TIMES DAILY
Status: DISCONTINUED | OUTPATIENT
Start: 2021-01-01 | End: 2021-01-01 | Stop reason: HOSPADM

## 2021-01-01 RX ORDER — DEXTROSE MONOHYDRATE 100 MG/ML
INJECTION, SOLUTION INTRAVENOUS CONTINUOUS PRN
Status: DISCONTINUED | OUTPATIENT
Start: 2021-01-01 | End: 2021-01-01

## 2021-01-01 RX ORDER — IPRATROPIUM BROMIDE AND ALBUTEROL SULFATE 2.5; .5 MG/3ML; MG/3ML
3 SOLUTION RESPIRATORY (INHALATION)
Status: DISPENSED | OUTPATIENT
Start: 2021-01-01 | End: 2021-01-01

## 2021-01-01 RX ORDER — ROCURONIUM BROMIDE 10 MG/ML
120 INJECTION, SOLUTION INTRAVENOUS
Status: COMPLETED | OUTPATIENT
Start: 2021-01-01 | End: 2021-01-01

## 2021-01-01 RX ORDER — IBUPROFEN 200 MG
24 TABLET ORAL
Status: DISCONTINUED | OUTPATIENT
Start: 2021-01-01 | End: 2021-01-01 | Stop reason: HOSPADM

## 2021-01-01 RX ORDER — OXYMETAZOLINE HCL 0.05 %
2 SPRAY, NON-AEROSOL (ML) NASAL 2 TIMES DAILY
Status: COMPLETED | OUTPATIENT
Start: 2021-01-01 | End: 2021-01-01

## 2021-01-01 RX ORDER — HYDROCODONE BITARTRATE AND ACETAMINOPHEN 500; 5 MG/1; MG/1
TABLET ORAL
Status: DISCONTINUED | OUTPATIENT
Start: 2021-01-01 | End: 2021-01-01 | Stop reason: HOSPADM

## 2021-01-01 RX ORDER — ROCURONIUM BROMIDE 10 MG/ML
INJECTION, SOLUTION INTRAVENOUS
Status: COMPLETED
Start: 2021-01-01 | End: 2021-01-01

## 2021-01-01 RX ORDER — HYDRALAZINE HYDROCHLORIDE 50 MG/1
50 TABLET, FILM COATED ORAL EVERY 12 HOURS
Status: DISCONTINUED | OUTPATIENT
Start: 2021-01-01 | End: 2021-01-01

## 2021-01-01 RX ORDER — HEPARIN SOD,PORCINE/0.9 % NACL 1000/500ML
INTRAVENOUS SOLUTION INTRAVENOUS
Status: DISCONTINUED | OUTPATIENT
Start: 2021-01-01 | End: 2021-01-01 | Stop reason: HOSPADM

## 2021-01-01 RX ORDER — CARVEDILOL 25 MG/1
25 TABLET ORAL 2 TIMES DAILY
Status: CANCELLED | OUTPATIENT
Start: 2021-01-01

## 2021-01-01 RX ORDER — LANOLIN ALCOHOL/MO/W.PET/CERES
400 CREAM (GRAM) TOPICAL ONCE
Status: COMPLETED | OUTPATIENT
Start: 2021-01-01 | End: 2021-01-01

## 2021-01-01 RX ORDER — CLOPIDOGREL BISULFATE 75 MG/1
75 TABLET ORAL DAILY
Status: DISCONTINUED | OUTPATIENT
Start: 2021-01-01 | End: 2021-01-01

## 2021-01-01 RX ORDER — LIDOCAINE HYDROCHLORIDE 20 MG/ML
INJECTION, SOLUTION INFILTRATION; PERINEURAL
Status: DISCONTINUED | OUTPATIENT
Start: 2021-01-01 | End: 2021-01-01 | Stop reason: HOSPADM

## 2021-01-01 RX ORDER — MICONAZOLE NITRATE 2 %
POWDER (GRAM) TOPICAL 2 TIMES DAILY
Status: CANCELLED | OUTPATIENT
Start: 2021-01-01

## 2021-01-01 RX ORDER — POTASSIUM CHLORIDE 750 MG/1
30 CAPSULE, EXTENDED RELEASE ORAL 2 TIMES DAILY
Status: COMPLETED | OUTPATIENT
Start: 2021-01-01 | End: 2021-01-01

## 2021-01-01 RX ORDER — ATORVASTATIN CALCIUM 20 MG/1
80 TABLET, FILM COATED ORAL DAILY
Status: DISCONTINUED | OUTPATIENT
Start: 2021-01-01 | End: 2021-01-01 | Stop reason: HOSPADM

## 2021-01-01 RX ORDER — LIDOCAINE 50 MG/G
1 PATCH TOPICAL DAILY PRN
Status: DISCONTINUED | OUTPATIENT
Start: 2021-01-01 | End: 2021-01-01 | Stop reason: HOSPADM

## 2021-01-01 RX ORDER — BISACODYL 10 MG
10 SUPPOSITORY, RECTAL RECTAL DAILY PRN
Status: CANCELLED | OUTPATIENT
Start: 2021-01-01

## 2021-01-01 RX ORDER — PEN NEEDLE, DIABETIC 30 GX 1/3"
NEEDLE, DISPOSABLE MISCELLANEOUS
Qty: 200 EACH | Refills: 3 | Status: SHIPPED | OUTPATIENT
Start: 2021-01-01

## 2021-01-01 RX ORDER — ISOSORBIDE MONONITRATE 30 MG/1
30 TABLET, EXTENDED RELEASE ORAL NIGHTLY
Status: DISCONTINUED | OUTPATIENT
Start: 2021-01-01 | End: 2021-01-01

## 2021-01-01 RX ORDER — CARVEDILOL 12.5 MG/1
12.5 TABLET ORAL ONCE
Status: COMPLETED | OUTPATIENT
Start: 2021-01-01 | End: 2021-01-01

## 2021-01-01 RX ORDER — LEVOTHYROXINE SODIUM 75 UG/1
75 TABLET ORAL
Status: DISCONTINUED | OUTPATIENT
Start: 2021-01-01 | End: 2021-01-01 | Stop reason: HOSPADM

## 2021-01-01 RX ORDER — ATORVASTATIN CALCIUM 20 MG/1
40 TABLET, FILM COATED ORAL DAILY
Status: DISCONTINUED | OUTPATIENT
Start: 2021-01-01 | End: 2021-01-01

## 2021-01-01 RX ORDER — POTASSIUM CHLORIDE 7.45 MG/ML
10 INJECTION INTRAVENOUS ONCE
Status: COMPLETED | OUTPATIENT
Start: 2021-01-01 | End: 2021-01-01

## 2021-01-01 RX ORDER — LIDOCAINE 50 MG/G
1 PATCH TOPICAL DAILY PRN
Status: CANCELLED | OUTPATIENT
Start: 2021-01-01

## 2021-01-01 RX ORDER — CEFTRIAXONE 1 G/1
1 INJECTION, POWDER, FOR SOLUTION INTRAMUSCULAR; INTRAVENOUS
Status: DISCONTINUED | OUTPATIENT
Start: 2021-01-01 | End: 2021-01-01

## 2021-01-01 RX ORDER — ASPIRIN 325 MG
325 TABLET ORAL ONCE
Status: COMPLETED | OUTPATIENT
Start: 2021-01-01 | End: 2021-01-01

## 2021-01-01 RX ORDER — LEVOTHYROXINE SODIUM 75 UG/1
75 TABLET ORAL
Status: CANCELLED | OUTPATIENT
Start: 2021-01-01

## 2021-01-01 RX ORDER — DICYCLOMINE HYDROCHLORIDE 10 MG/1
1 CAPSULE ORAL 4 TIMES DAILY
Status: ON HOLD | COMMUNITY
Start: 2020-12-01 | End: 2022-01-01 | Stop reason: CLARIF

## 2021-01-01 RX ORDER — GLUCAGON 1 MG
1 KIT INJECTION
Status: CANCELLED | OUTPATIENT
Start: 2021-01-01

## 2021-01-01 RX ORDER — HEPARIN SODIUM 5000 [USP'U]/ML
5000 INJECTION, SOLUTION INTRAVENOUS; SUBCUTANEOUS EVERY 8 HOURS
Status: DISCONTINUED | OUTPATIENT
Start: 2021-01-01 | End: 2021-01-01 | Stop reason: HOSPADM

## 2021-01-01 RX ORDER — ONDANSETRON 2 MG/ML
4 INJECTION INTRAMUSCULAR; INTRAVENOUS EVERY 8 HOURS PRN
Status: CANCELLED | OUTPATIENT
Start: 2021-01-01

## 2021-01-01 RX ORDER — PROPOFOL 10 MG/ML
INJECTION, EMULSION INTRAVENOUS
Status: DISPENSED
Start: 2021-01-01 | End: 2021-01-01

## 2021-01-01 RX ORDER — LEVOTHYROXINE SODIUM 75 UG/1
75 TABLET ORAL
Status: DISCONTINUED | OUTPATIENT
Start: 2021-01-01 | End: 2021-01-01

## 2021-01-01 RX ORDER — IPRATROPIUM BROMIDE AND ALBUTEROL SULFATE 2.5; .5 MG/3ML; MG/3ML
3 SOLUTION RESPIRATORY (INHALATION) ONCE
Status: CANCELLED | OUTPATIENT
Start: 2021-01-01

## 2021-01-01 RX ORDER — FENTANYL CITRATE 50 UG/ML
25 INJECTION, SOLUTION INTRAMUSCULAR; INTRAVENOUS
Status: DISCONTINUED | OUTPATIENT
Start: 2021-01-01 | End: 2021-01-01

## 2021-01-01 RX ORDER — AMLODIPINE BESYLATE 5 MG/1
5 TABLET ORAL DAILY
Status: DISCONTINUED | OUTPATIENT
Start: 2021-01-01 | End: 2021-01-01 | Stop reason: HOSPADM

## 2021-01-01 RX ORDER — NAPROXEN SODIUM 220 MG/1
81 TABLET, FILM COATED ORAL DAILY
Status: DISCONTINUED | OUTPATIENT
Start: 2021-01-01 | End: 2021-01-01 | Stop reason: HOSPADM

## 2021-01-01 RX ORDER — FUROSEMIDE 10 MG/ML
40 INJECTION INTRAMUSCULAR; INTRAVENOUS ONCE
Status: COMPLETED | OUTPATIENT
Start: 2021-01-01 | End: 2021-01-01

## 2021-01-01 RX ORDER — CITALOPRAM 20 MG/1
20 TABLET, FILM COATED ORAL DAILY
Status: DISCONTINUED | OUTPATIENT
Start: 2021-01-01 | End: 2021-01-01 | Stop reason: HOSPADM

## 2021-01-01 RX ORDER — AMLODIPINE BESYLATE 5 MG/1
5 TABLET ORAL DAILY
Status: CANCELLED | OUTPATIENT
Start: 2021-01-01

## 2021-01-01 RX ORDER — NAPROXEN SODIUM 220 MG/1
81 TABLET, FILM COATED ORAL DAILY
Status: CANCELLED | OUTPATIENT
Start: 2021-01-01

## 2021-01-01 RX ORDER — GLUCAGON 1 MG
1 KIT INJECTION
Status: DISCONTINUED | OUTPATIENT
Start: 2021-01-01 | End: 2021-01-01 | Stop reason: HOSPADM

## 2021-01-01 RX ORDER — OSELTAMIVIR PHOSPHATE 6 MG/ML
30 FOR SUSPENSION ORAL 2 TIMES DAILY
Status: DISCONTINUED | OUTPATIENT
Start: 2021-01-01 | End: 2021-01-01

## 2021-01-01 RX ORDER — MAGNESIUM SULFATE 1 G/100ML
1 INJECTION INTRAVENOUS ONCE
Status: COMPLETED | OUTPATIENT
Start: 2021-01-01 | End: 2021-01-01

## 2021-01-01 RX ORDER — TALC
6 POWDER (GRAM) TOPICAL NIGHTLY PRN
Status: CANCELLED | OUTPATIENT
Start: 2021-01-01

## 2021-01-01 RX ORDER — PANTOPRAZOLE SODIUM 40 MG/1
40 TABLET, DELAYED RELEASE ORAL DAILY
Status: CANCELLED | OUTPATIENT
Start: 2021-01-01

## 2021-01-01 RX ORDER — CITALOPRAM 20 MG/1
TABLET, FILM COATED ORAL
Qty: 90 TABLET | Refills: 1 | Status: SHIPPED | OUTPATIENT
Start: 2021-01-01

## 2021-01-01 RX ORDER — ASPIRIN 325 MG
325 TABLET ORAL DAILY
Status: DISCONTINUED | OUTPATIENT
Start: 2021-01-01 | End: 2021-01-01

## 2021-01-01 RX ORDER — IBUPROFEN 200 MG
16 TABLET ORAL
Status: DISCONTINUED | OUTPATIENT
Start: 2021-01-01 | End: 2021-01-01

## 2021-01-01 RX ORDER — INSULIN ASPART 100 [IU]/ML
6 INJECTION, SOLUTION INTRAVENOUS; SUBCUTANEOUS
Status: CANCELLED | OUTPATIENT
Start: 2021-01-01

## 2021-01-01 RX ORDER — SODIUM CHLORIDE 0.9 % (FLUSH) 0.9 %
10 SYRINGE (ML) INJECTION
Status: CANCELLED | OUTPATIENT
Start: 2021-01-01

## 2021-01-01 RX ORDER — ROCURONIUM BROMIDE 10 MG/ML
INJECTION, SOLUTION INTRAVENOUS
Status: DISPENSED
Start: 2021-01-01 | End: 2021-01-01

## 2021-01-01 RX ORDER — HYDROCODONE BITARTRATE AND ACETAMINOPHEN 7.5; 325 MG/1; MG/1
1 TABLET ORAL 2 TIMES DAILY PRN
Status: CANCELLED | OUTPATIENT
Start: 2021-01-01

## 2021-01-01 RX ORDER — ACETAMINOPHEN 500 MG
1000 TABLET ORAL 3 TIMES DAILY
Refills: 0 | Status: ON HOLD | COMMUNITY
Start: 2021-01-01 | End: 2021-01-01 | Stop reason: HOSPADM

## 2021-01-01 RX ORDER — ACETAZOLAMIDE 500 MG/5ML
500 INJECTION, POWDER, LYOPHILIZED, FOR SOLUTION INTRAVENOUS 2 TIMES DAILY
Status: DISCONTINUED | OUTPATIENT
Start: 2021-01-01 | End: 2021-01-01

## 2021-01-01 RX ORDER — IBUPROFEN 200 MG
16 TABLET ORAL
Status: CANCELLED | OUTPATIENT
Start: 2021-01-01

## 2021-01-01 RX ORDER — FUROSEMIDE 10 MG/ML
100 INJECTION INTRAMUSCULAR; INTRAVENOUS ONCE
Status: COMPLETED | OUTPATIENT
Start: 2021-01-01 | End: 2021-01-01

## 2021-01-01 RX ORDER — AMOXICILLIN 250 MG
1 CAPSULE ORAL 2 TIMES DAILY
Status: DISCONTINUED | OUTPATIENT
Start: 2021-01-01 | End: 2021-01-01 | Stop reason: HOSPADM

## 2021-01-01 RX ORDER — ONDANSETRON 2 MG/ML
4 INJECTION INTRAMUSCULAR; INTRAVENOUS EVERY 8 HOURS PRN
Status: DISCONTINUED | OUTPATIENT
Start: 2021-01-01 | End: 2021-01-01 | Stop reason: HOSPADM

## 2021-01-01 RX ORDER — PROPOFOL 10 MG/ML
0-50 INJECTION, EMULSION INTRAVENOUS CONTINUOUS
Status: DISCONTINUED | OUTPATIENT
Start: 2021-01-01 | End: 2021-01-01

## 2021-01-01 RX ORDER — FUROSEMIDE 10 MG/ML
80 INJECTION INTRAMUSCULAR; INTRAVENOUS
Status: DISCONTINUED | OUTPATIENT
Start: 2021-01-01 | End: 2021-01-01

## 2021-01-01 RX ORDER — POTASSIUM CHLORIDE 20 MEQ/1
40 TABLET, EXTENDED RELEASE ORAL
Status: COMPLETED | OUTPATIENT
Start: 2021-01-01 | End: 2021-01-01

## 2021-01-01 RX ORDER — ALPRAZOLAM 0.25 MG/1
0.25 TABLET ORAL ONCE
Status: COMPLETED | OUTPATIENT
Start: 2021-01-01 | End: 2021-01-01

## 2021-01-01 RX ORDER — BENZONATATE 100 MG/1
100 CAPSULE ORAL ONCE
Status: COMPLETED | OUTPATIENT
Start: 2021-01-01 | End: 2021-01-01

## 2021-01-01 RX ORDER — FUROSEMIDE 10 MG/ML
80 INJECTION INTRAMUSCULAR; INTRAVENOUS 2 TIMES DAILY
Status: COMPLETED | OUTPATIENT
Start: 2021-01-01 | End: 2021-01-01

## 2021-01-01 RX ORDER — HEPARIN SODIUM 5000 [USP'U]/ML
5000 INJECTION, SOLUTION INTRAVENOUS; SUBCUTANEOUS EVERY 8 HOURS
Status: CANCELLED | OUTPATIENT
Start: 2021-01-01

## 2021-01-01 RX ORDER — CITALOPRAM 20 MG/1
20 TABLET, FILM COATED ORAL DAILY
Status: DISCONTINUED | OUTPATIENT
Start: 2021-01-01 | End: 2021-01-01

## 2021-01-01 RX ORDER — HYDROCODONE BITARTRATE AND ACETAMINOPHEN 7.5; 325 MG/1; MG/1
1 TABLET ORAL EVERY 6 HOURS PRN
Status: DISCONTINUED | OUTPATIENT
Start: 2021-01-01 | End: 2021-01-01

## 2021-01-01 RX ORDER — POTASSIUM CHLORIDE 7.45 MG/ML
10 INJECTION INTRAVENOUS
Status: COMPLETED | OUTPATIENT
Start: 2021-01-01 | End: 2021-01-01

## 2021-01-01 RX ORDER — ATORVASTATIN CALCIUM 20 MG/1
80 TABLET, FILM COATED ORAL DAILY
Status: CANCELLED | OUTPATIENT
Start: 2021-01-01

## 2021-01-01 RX ORDER — FERROUS GLUCONATE 324(37.5)
324 TABLET ORAL
Status: DISCONTINUED | OUTPATIENT
Start: 2021-01-01 | End: 2021-01-01 | Stop reason: HOSPADM

## 2021-01-01 RX ORDER — POTASSIUM CHLORIDE 20 MEQ/1
20 TABLET, EXTENDED RELEASE ORAL ONCE
Status: COMPLETED | OUTPATIENT
Start: 2021-01-01 | End: 2021-01-01

## 2021-01-01 RX ORDER — ISOSORBIDE DINITRATE 10 MG/1
20 TABLET ORAL 3 TIMES DAILY
Status: DISCONTINUED | OUTPATIENT
Start: 2021-01-01 | End: 2021-01-01

## 2021-01-01 RX ORDER — BISACODYL 10 MG
10 SUPPOSITORY, RECTAL RECTAL DAILY PRN
Status: DISCONTINUED | OUTPATIENT
Start: 2021-01-01 | End: 2021-01-01 | Stop reason: HOSPADM

## 2021-01-01 RX ORDER — CARVEDILOL 25 MG/1
25 TABLET ORAL 2 TIMES DAILY
Status: DISCONTINUED | OUTPATIENT
Start: 2021-01-01 | End: 2021-01-01 | Stop reason: HOSPADM

## 2021-01-01 RX ORDER — HEPARIN SODIUM,PORCINE/D5W 25000/250
0-40 INTRAVENOUS SOLUTION INTRAVENOUS CONTINUOUS
Status: DISCONTINUED | OUTPATIENT
Start: 2021-01-01 | End: 2021-01-01

## 2021-01-01 RX ORDER — IPRATROPIUM BROMIDE AND ALBUTEROL SULFATE 2.5; .5 MG/3ML; MG/3ML
3 SOLUTION RESPIRATORY (INHALATION) ONCE
Status: COMPLETED | OUTPATIENT
Start: 2021-01-01 | End: 2021-01-01

## 2021-01-01 RX ORDER — HYDROCODONE BITARTRATE AND ACETAMINOPHEN 7.5; 325 MG/1; MG/1
1 TABLET ORAL 2 TIMES DAILY PRN
Status: DISCONTINUED | OUTPATIENT
Start: 2021-01-01 | End: 2021-01-01 | Stop reason: HOSPADM

## 2021-01-01 RX ORDER — AMLODIPINE BESYLATE 5 MG/1
10 TABLET ORAL DAILY
Status: DISCONTINUED | OUTPATIENT
Start: 2021-01-01 | End: 2021-01-01

## 2021-01-01 RX ORDER — NOREPINEPHRINE BITARTRATE/D5W 4MG/250ML
0-3 PLASTIC BAG, INJECTION (ML) INTRAVENOUS CONTINUOUS
Status: DISCONTINUED | OUTPATIENT
Start: 2021-01-01 | End: 2021-01-01

## 2021-01-01 RX ORDER — SODIUM CHLORIDE 0.9 % (FLUSH) 0.9 %
10 SYRINGE (ML) INJECTION
Status: DISCONTINUED | OUTPATIENT
Start: 2021-01-01 | End: 2021-01-01 | Stop reason: HOSPADM

## 2021-01-01 RX ORDER — INSULIN ASPART 100 [IU]/ML
0-5 INJECTION, SOLUTION INTRAVENOUS; SUBCUTANEOUS EVERY 4 HOURS PRN
Status: DISCONTINUED | OUTPATIENT
Start: 2021-01-01 | End: 2021-01-01

## 2021-01-01 RX ORDER — GABAPENTIN 100 MG/1
100 CAPSULE ORAL 2 TIMES DAILY
Qty: 60 CAPSULE | Refills: 2 | Status: ON HOLD | OUTPATIENT
Start: 2021-01-01 | End: 2021-01-01 | Stop reason: HOSPADM

## 2021-01-01 RX ORDER — IBUPROFEN 200 MG
24 TABLET ORAL
Status: CANCELLED | OUTPATIENT
Start: 2021-01-01

## 2021-01-01 RX ORDER — HEPARIN SODIUM 5000 [USP'U]/ML
7500 INJECTION, SOLUTION INTRAVENOUS; SUBCUTANEOUS EVERY 8 HOURS
Status: DISCONTINUED | OUTPATIENT
Start: 2021-01-01 | End: 2021-01-01

## 2021-01-01 RX ORDER — CARVEDILOL 3.12 MG/1
3.12 TABLET ORAL 2 TIMES DAILY
Status: DISCONTINUED | OUTPATIENT
Start: 2021-01-01 | End: 2021-01-01

## 2021-01-01 RX ORDER — CARVEDILOL 3.12 MG/1
3.12 TABLET ORAL 2 TIMES DAILY
Qty: 180 TABLET | Refills: 3 | Status: ON HOLD | OUTPATIENT
Start: 2021-01-01 | End: 2021-01-01 | Stop reason: HOSPADM

## 2021-01-01 RX ORDER — POTASSIUM CHLORIDE 20 MEQ/1
40 TABLET, EXTENDED RELEASE ORAL ONCE
Status: DISCONTINUED | OUTPATIENT
Start: 2021-01-01 | End: 2021-01-01

## 2021-01-01 RX ADMIN — INSULIN ASPART 6 UNITS: 100 INJECTION, SOLUTION INTRAVENOUS; SUBCUTANEOUS at 04:04

## 2021-01-01 RX ADMIN — OSELTAMIVIR PHOSPHATE 30 MG: 6 POWDER, FOR SUSPENSION ORAL at 08:03

## 2021-01-01 RX ADMIN — INSULIN DETEMIR 8 UNITS: 100 INJECTION, SOLUTION SUBCUTANEOUS at 09:03

## 2021-01-01 RX ADMIN — ISOSORBIDE DINITRATE 30 MG: 10 TABLET ORAL at 03:04

## 2021-01-01 RX ADMIN — LEVOTHYROXINE SODIUM 75 MCG: 75 TABLET ORAL at 06:04

## 2021-01-01 RX ADMIN — INSULIN ASPART 3 UNITS: 100 INJECTION, SOLUTION INTRAVENOUS; SUBCUTANEOUS at 06:04

## 2021-01-01 RX ADMIN — AZITHROMYCIN MONOHYDRATE 500 MG: 500 INJECTION, POWDER, LYOPHILIZED, FOR SOLUTION INTRAVENOUS at 09:03

## 2021-01-01 RX ADMIN — IPRATROPIUM BROMIDE AND ALBUTEROL SULFATE 3 ML: .5; 2.5 SOLUTION RESPIRATORY (INHALATION) at 12:04

## 2021-01-01 RX ADMIN — HEPARIN SODIUM 5000 UNITS: 5000 INJECTION INTRAVENOUS; SUBCUTANEOUS at 06:04

## 2021-01-01 RX ADMIN — LEVOTHYROXINE SODIUM 75 MCG: 25 TABLET ORAL at 03:03

## 2021-01-01 RX ADMIN — FUROSEMIDE 120 MG: 10 INJECTION, SOLUTION INTRAMUSCULAR; INTRAVENOUS at 03:03

## 2021-01-01 RX ADMIN — CITALOPRAM HYDROBROMIDE 20 MG: 20 TABLET ORAL at 08:04

## 2021-01-01 RX ADMIN — ISOSORBIDE DINITRATE 30 MG: 10 TABLET ORAL at 08:04

## 2021-01-01 RX ADMIN — INSULIN ASPART 4 UNITS: 100 INJECTION, SOLUTION INTRAVENOUS; SUBCUTANEOUS at 04:04

## 2021-01-01 RX ADMIN — ASPIRIN 81 MG CHEWABLE TABLET 81 MG: 81 TABLET CHEWABLE at 08:04

## 2021-01-01 RX ADMIN — HEPARIN SODIUM 5000 UNITS: 5000 INJECTION INTRAVENOUS; SUBCUTANEOUS at 08:04

## 2021-01-01 RX ADMIN — PANTOPRAZOLE SODIUM 40 MG: 40 TABLET, DELAYED RELEASE ORAL at 08:03

## 2021-01-01 RX ADMIN — INSULIN DETEMIR 30 UNITS: 100 INJECTION, SOLUTION SUBCUTANEOUS at 08:04

## 2021-01-01 RX ADMIN — Medication 2 SPRAY: at 08:03

## 2021-01-01 RX ADMIN — INSULIN ASPART 4 UNITS: 100 INJECTION, SOLUTION INTRAVENOUS; SUBCUTANEOUS at 01:04

## 2021-01-01 RX ADMIN — INSULIN ASPART 6 UNITS: 100 INJECTION, SOLUTION INTRAVENOUS; SUBCUTANEOUS at 07:04

## 2021-01-01 RX ADMIN — POTASSIUM CHLORIDE 30 MEQ: 10 CAPSULE, COATED, EXTENDED RELEASE ORAL at 03:04

## 2021-01-01 RX ADMIN — HEPARIN SODIUM 5000 UNITS: 5000 INJECTION INTRAVENOUS; SUBCUTANEOUS at 01:04

## 2021-01-01 RX ADMIN — CITALOPRAM HYDROBROMIDE 20 MG: 20 TABLET ORAL at 10:03

## 2021-01-01 RX ADMIN — INSULIN ASPART 2 UNITS: 100 INJECTION, SOLUTION INTRAVENOUS; SUBCUTANEOUS at 06:03

## 2021-01-01 RX ADMIN — AZITHROMYCIN MONOHYDRATE 500 MG: 500 INJECTION, POWDER, LYOPHILIZED, FOR SOLUTION INTRAVENOUS at 08:03

## 2021-01-01 RX ADMIN — FERROUS GLUCONATE TAB 324 MG (37.5 MG ELEMENTAL IRON) 324 MG: 324 (37.5 FE) TAB at 08:04

## 2021-01-01 RX ADMIN — HYDROCODONE BITARTRATE AND ACETAMINOPHEN 1 TABLET: 7.5; 325 TABLET ORAL at 11:04

## 2021-01-01 RX ADMIN — CEFTRIAXONE 2 G: 2 INJECTION, SOLUTION INTRAVENOUS at 04:03

## 2021-01-01 RX ADMIN — CARVEDILOL 12.5 MG: 12.5 TABLET, FILM COATED ORAL at 12:03

## 2021-01-01 RX ADMIN — HEPARIN SODIUM 5000 UNITS: 5000 INJECTION INTRAVENOUS; SUBCUTANEOUS at 10:04

## 2021-01-01 RX ADMIN — MUPIROCIN: 20 OINTMENT TOPICAL at 08:03

## 2021-01-01 RX ADMIN — ONDANSETRON 4 MG: 2 INJECTION INTRAMUSCULAR; INTRAVENOUS at 01:03

## 2021-01-01 RX ADMIN — CEFTRIAXONE SODIUM 1 G: 1 INJECTION, POWDER, FOR SOLUTION INTRAMUSCULAR; INTRAVENOUS at 08:03

## 2021-01-01 RX ADMIN — CLOPIDOGREL 75 MG: 75 TABLET, FILM COATED ORAL at 09:04

## 2021-01-01 RX ADMIN — PIPERACILLIN AND TAZOBACTAM 4.5 G: 4; .5 INJECTION, POWDER, LYOPHILIZED, FOR SOLUTION INTRAVENOUS; PARENTERAL at 05:03

## 2021-01-01 RX ADMIN — INSULIN DETEMIR 30 UNITS: 100 INJECTION, SOLUTION SUBCUTANEOUS at 10:04

## 2021-01-01 RX ADMIN — HEPARIN SODIUM 5000 UNITS: 5000 INJECTION INTRAVENOUS; SUBCUTANEOUS at 09:03

## 2021-01-01 RX ADMIN — POTASSIUM CHLORIDE 10 MEQ: 7.46 INJECTION, SOLUTION INTRAVENOUS at 12:03

## 2021-01-01 RX ADMIN — CITALOPRAM HYDROBROMIDE 20 MG: 20 TABLET ORAL at 08:03

## 2021-01-01 RX ADMIN — ISOSORBIDE DINITRATE 20 MG: 10 TABLET ORAL at 09:04

## 2021-01-01 RX ADMIN — HEPARIN SODIUM 5000 UNITS: 5000 INJECTION INTRAVENOUS; SUBCUTANEOUS at 05:04

## 2021-01-01 RX ADMIN — INSULIN ASPART 5 UNITS: 100 INJECTION, SOLUTION INTRAVENOUS; SUBCUTANEOUS at 08:03

## 2021-01-01 RX ADMIN — FUROSEMIDE 10 MG/HR: 10 INJECTION, SOLUTION INTRAMUSCULAR; INTRAVENOUS at 01:04

## 2021-01-01 RX ADMIN — FUROSEMIDE 80 MG: 10 INJECTION, SOLUTION INTRAMUSCULAR; INTRAVENOUS at 10:03

## 2021-01-01 RX ADMIN — MICONAZOLE NITRATE: 20 POWDER TOPICAL at 09:04

## 2021-01-01 RX ADMIN — DOCUSATE SODIUM AND SENNOSIDES 1 TABLET: 8.6; 5 TABLET, FILM COATED ORAL at 09:04

## 2021-01-01 RX ADMIN — ATORVASTATIN CALCIUM 80 MG: 20 TABLET, FILM COATED ORAL at 08:04

## 2021-01-01 RX ADMIN — FUROSEMIDE 10 MG/HR: 10 INJECTION, SOLUTION INTRAMUSCULAR; INTRAVENOUS at 09:04

## 2021-01-01 RX ADMIN — ACETAZOLAMIDE 500 MG: 500 INJECTION, POWDER, LYOPHILIZED, FOR SOLUTION INTRAVENOUS at 11:04

## 2021-01-01 RX ADMIN — ISOSORBIDE DINITRATE 30 MG: 10 TABLET ORAL at 10:04

## 2021-01-01 RX ADMIN — SODIUM CHLORIDE 4 UNITS/HR: 9 INJECTION, SOLUTION INTRAVENOUS at 01:03

## 2021-01-01 RX ADMIN — FERUMOXYTOL 510 MG: 510 INJECTION INTRAVENOUS at 10:04

## 2021-01-01 RX ADMIN — CITALOPRAM HYDROBROMIDE 20 MG: 20 TABLET ORAL at 09:04

## 2021-01-01 RX ADMIN — LEVOTHYROXINE SODIUM 75 MCG: 75 TABLET ORAL at 05:04

## 2021-01-01 RX ADMIN — INSULIN ASPART 2 UNITS: 100 INJECTION, SOLUTION INTRAVENOUS; SUBCUTANEOUS at 05:04

## 2021-01-01 RX ADMIN — AMLODIPINE BESYLATE 5 MG: 5 TABLET ORAL at 08:04

## 2021-01-01 RX ADMIN — INSULIN ASPART 5 UNITS: 100 INJECTION, SOLUTION INTRAVENOUS; SUBCUTANEOUS at 11:03

## 2021-01-01 RX ADMIN — ONDANSETRON 4 MG: 2 INJECTION INTRAMUSCULAR; INTRAVENOUS at 11:03

## 2021-01-01 RX ADMIN — INSULIN ASPART 6 UNITS: 100 INJECTION, SOLUTION INTRAVENOUS; SUBCUTANEOUS at 11:04

## 2021-01-01 RX ADMIN — FERROUS GLUCONATE TAB 324 MG (37.5 MG ELEMENTAL IRON) 324 MG: 324 (37.5 FE) TAB at 08:03

## 2021-01-01 RX ADMIN — PANTOPRAZOLE SODIUM 40 MG: 40 TABLET, DELAYED RELEASE ORAL at 08:04

## 2021-01-01 RX ADMIN — INSULIN ASPART 5 UNITS: 100 INJECTION, SOLUTION INTRAVENOUS; SUBCUTANEOUS at 05:03

## 2021-01-01 RX ADMIN — PANTOPRAZOLE SODIUM 40 MG: 40 TABLET, DELAYED RELEASE ORAL at 09:04

## 2021-01-01 RX ADMIN — PANTOPRAZOLE SODIUM 40 MG: 40 GRANULE, DELAYED RELEASE ORAL at 08:03

## 2021-01-01 RX ADMIN — ISOSORBIDE DINITRATE 20 MG: 10 TABLET ORAL at 08:04

## 2021-01-01 RX ADMIN — INSULIN ASPART 3 UNITS: 100 INJECTION, SOLUTION INTRAVENOUS; SUBCUTANEOUS at 12:03

## 2021-01-01 RX ADMIN — FUROSEMIDE 80 MG: 10 INJECTION, SOLUTION INTRAMUSCULAR; INTRAVENOUS at 08:04

## 2021-01-01 RX ADMIN — CARVEDILOL 25 MG: 25 TABLET, FILM COATED ORAL at 09:04

## 2021-01-01 RX ADMIN — ACETAZOLAMIDE SODIUM 500 MG: 500 INJECTION, POWDER, LYOPHILIZED, FOR SOLUTION INTRAVENOUS at 09:04

## 2021-01-01 RX ADMIN — MELATONIN TAB 3 MG 6 MG: 3 TAB at 09:04

## 2021-01-01 RX ADMIN — CLOPIDOGREL BISULFATE 600 MG: 300 TABLET, FILM COATED ORAL at 05:03

## 2021-01-01 RX ADMIN — ISOSORBIDE DINITRATE 30 MG: 10 TABLET ORAL at 04:04

## 2021-01-01 RX ADMIN — PIPERACILLIN AND TAZOBACTAM 4.5 G: 4; .5 INJECTION, POWDER, LYOPHILIZED, FOR SOLUTION INTRAVENOUS; PARENTERAL at 10:03

## 2021-01-01 RX ADMIN — DOCUSATE SODIUM AND SENNOSIDES 1 TABLET: 8.6; 5 TABLET, FILM COATED ORAL at 08:04

## 2021-01-01 RX ADMIN — MUPIROCIN: 20 OINTMENT TOPICAL at 09:03

## 2021-01-01 RX ADMIN — INSULIN ASPART 6 UNITS: 100 INJECTION, SOLUTION INTRAVENOUS; SUBCUTANEOUS at 12:04

## 2021-01-01 RX ADMIN — ACETAZOLAMIDE SODIUM 500 MG: 500 INJECTION, POWDER, LYOPHILIZED, FOR SOLUTION INTRAVENOUS at 10:04

## 2021-01-01 RX ADMIN — CARVEDILOL 6.25 MG: 12.5 TABLET, FILM COATED ORAL at 09:03

## 2021-01-01 RX ADMIN — IPRATROPIUM BROMIDE AND ALBUTEROL SULFATE 3 ML: .5; 2.5 SOLUTION RESPIRATORY (INHALATION) at 01:03

## 2021-01-01 RX ADMIN — MICONAZOLE NITRATE: 20 POWDER TOPICAL at 08:04

## 2021-01-01 RX ADMIN — ACETAZOLAMIDE 500 MG: 500 INJECTION, POWDER, LYOPHILIZED, FOR SOLUTION INTRAVENOUS at 08:04

## 2021-01-01 RX ADMIN — CLOPIDOGREL 75 MG: 75 TABLET, FILM COATED ORAL at 09:03

## 2021-01-01 RX ADMIN — ACETAZOLAMIDE SODIUM 500 MG: 500 INJECTION, POWDER, LYOPHILIZED, FOR SOLUTION INTRAVENOUS at 08:04

## 2021-01-01 RX ADMIN — INSULIN ASPART 6 UNITS: 100 INJECTION, SOLUTION INTRAVENOUS; SUBCUTANEOUS at 06:04

## 2021-01-01 RX ADMIN — CHLOROTHIAZIDE SODIUM 250 MG: 500 INJECTION, POWDER, LYOPHILIZED, FOR SOLUTION INTRAVENOUS at 10:04

## 2021-01-01 RX ADMIN — ISOSORBIDE DINITRATE 30 MG: 10 TABLET ORAL at 02:04

## 2021-01-01 RX ADMIN — ASPIRIN 81 MG CHEWABLE TABLET 81 MG: 81 TABLET CHEWABLE at 09:04

## 2021-01-01 RX ADMIN — FUROSEMIDE 80 MG: 10 INJECTION, SOLUTION INTRAMUSCULAR; INTRAVENOUS at 09:03

## 2021-01-01 RX ADMIN — DOCUSATE SODIUM 50MG AND SENNOSIDES 8.6MG 1 TABLET: 8.6; 5 TABLET, FILM COATED ORAL at 09:03

## 2021-01-01 RX ADMIN — OSELTAMIVIR PHOSPHATE 30 MG: 6 POWDER, FOR SUSPENSION ORAL at 04:03

## 2021-01-01 RX ADMIN — INSULIN ASPART 4 UNITS: 100 INJECTION, SOLUTION INTRAVENOUS; SUBCUTANEOUS at 06:04

## 2021-01-01 RX ADMIN — FUROSEMIDE 40 MG: 10 INJECTION, SOLUTION INTRAMUSCULAR; INTRAVENOUS at 11:04

## 2021-01-01 RX ADMIN — HYDROCODONE BITARTRATE AND ACETAMINOPHEN 1 TABLET: 7.5; 325 TABLET ORAL at 05:03

## 2021-01-01 RX ADMIN — INSULIN DETEMIR 8 UNITS: 100 INJECTION, SOLUTION SUBCUTANEOUS at 08:04

## 2021-01-01 RX ADMIN — DOCUSATE SODIUM AND SENNOSIDES 1 TABLET: 8.6; 5 TABLET, FILM COATED ORAL at 09:03

## 2021-01-01 RX ADMIN — FUROSEMIDE 80 MG: 10 INJECTION, SOLUTION INTRAMUSCULAR; INTRAVENOUS at 05:03

## 2021-01-01 RX ADMIN — ASPIRIN 325 MG ORAL TABLET 325 MG: 325 PILL ORAL at 05:03

## 2021-01-01 RX ADMIN — FUROSEMIDE 80 MG: 10 INJECTION, SOLUTION INTRAMUSCULAR; INTRAVENOUS at 12:03

## 2021-01-01 RX ADMIN — ATORVASTATIN CALCIUM 80 MG: 20 TABLET, FILM COATED ORAL at 09:04

## 2021-01-01 RX ADMIN — HEPARIN SODIUM AND DEXTROSE 12 UNITS/KG/HR: 10000; 5 INJECTION INTRAVENOUS at 05:03

## 2021-01-01 RX ADMIN — INSULIN ASPART 2 UNITS: 100 INJECTION, SOLUTION INTRAVENOUS; SUBCUTANEOUS at 11:04

## 2021-01-01 RX ADMIN — INSULIN ASPART 4 UNITS: 100 INJECTION, SOLUTION INTRAVENOUS; SUBCUTANEOUS at 08:04

## 2021-01-01 RX ADMIN — HEPARIN SODIUM 5000 UNITS: 5000 INJECTION INTRAVENOUS; SUBCUTANEOUS at 02:04

## 2021-01-01 RX ADMIN — Medication 2 SPRAY: at 09:03

## 2021-01-01 RX ADMIN — CARVEDILOL 25 MG: 25 TABLET, FILM COATED ORAL at 08:04

## 2021-01-01 RX ADMIN — CLOPIDOGREL 75 MG: 75 TABLET, FILM COATED ORAL at 08:04

## 2021-01-01 RX ADMIN — POTASSIUM CHLORIDE 10 MEQ: 7.46 INJECTION, SOLUTION INTRAVENOUS at 08:03

## 2021-01-01 RX ADMIN — INSULIN ASPART 5 UNITS: 100 INJECTION, SOLUTION INTRAVENOUS; SUBCUTANEOUS at 12:03

## 2021-01-01 RX ADMIN — MELATONIN TAB 3 MG 6 MG: 3 TAB at 03:03

## 2021-01-01 RX ADMIN — INSULIN ASPART 4 UNITS: 100 INJECTION, SOLUTION INTRAVENOUS; SUBCUTANEOUS at 10:04

## 2021-01-01 RX ADMIN — ISOSORBIDE DINITRATE 30 MG: 10 TABLET ORAL at 09:04

## 2021-01-01 RX ADMIN — INSULIN ASPART 5 UNITS: 100 INJECTION, SOLUTION INTRAVENOUS; SUBCUTANEOUS at 10:03

## 2021-01-01 RX ADMIN — INSULIN ASPART 5 UNITS: 100 INJECTION, SOLUTION INTRAVENOUS; SUBCUTANEOUS at 11:04

## 2021-01-01 RX ADMIN — HEPARIN SODIUM 5000 UNITS: 5000 INJECTION INTRAVENOUS; SUBCUTANEOUS at 09:04

## 2021-01-01 RX ADMIN — FUROSEMIDE 100 MG: 10 INJECTION, SOLUTION INTRAMUSCULAR; INTRAVENOUS at 01:04

## 2021-01-01 RX ADMIN — CARVEDILOL 3.12 MG: 3.12 TABLET, FILM COATED ORAL at 10:03

## 2021-01-01 RX ADMIN — INSULIN ASPART 3 UNITS: 100 INJECTION, SOLUTION INTRAVENOUS; SUBCUTANEOUS at 05:03

## 2021-01-01 RX ADMIN — INSULIN ASPART 2 UNITS: 100 INJECTION, SOLUTION INTRAVENOUS; SUBCUTANEOUS at 07:04

## 2021-01-01 RX ADMIN — MICONAZOLE NITRATE: 20 POWDER TOPICAL at 11:04

## 2021-01-01 RX ADMIN — CEFTRIAXONE 2 G: 2 INJECTION, SOLUTION INTRAVENOUS at 03:03

## 2021-01-01 RX ADMIN — HUMAN ALBUMIN MICROSPHERES AND PERFLUTREN 0.66 MG: 10; .22 INJECTION, SOLUTION INTRAVENOUS at 08:03

## 2021-01-01 RX ADMIN — PROPOFOL 15 MCG/KG/MIN: 10 INJECTION, EMULSION INTRAVENOUS at 10:03

## 2021-01-01 RX ADMIN — INSULIN ASPART 3 UNITS: 100 INJECTION, SOLUTION INTRAVENOUS; SUBCUTANEOUS at 12:04

## 2021-01-01 RX ADMIN — INSULIN ASPART 6 UNITS: 100 INJECTION, SOLUTION INTRAVENOUS; SUBCUTANEOUS at 08:04

## 2021-01-01 RX ADMIN — AMLODIPINE BESYLATE 5 MG: 5 TABLET ORAL at 09:04

## 2021-01-01 RX ADMIN — HEPARIN SODIUM AND DEXTROSE 15 UNITS/KG/HR: 10000; 5 INJECTION INTRAVENOUS at 05:03

## 2021-01-01 RX ADMIN — ISOSORBIDE DINITRATE 20 MG: 10 TABLET ORAL at 03:04

## 2021-01-01 RX ADMIN — MICONAZOLE NITRATE: 20 POWDER TOPICAL at 10:04

## 2021-01-01 RX ADMIN — INSULIN DETEMIR 8 UNITS: 100 INJECTION, SOLUTION SUBCUTANEOUS at 10:03

## 2021-01-01 RX ADMIN — INSULIN ASPART 2 UNITS: 100 INJECTION, SOLUTION INTRAVENOUS; SUBCUTANEOUS at 12:03

## 2021-01-01 RX ADMIN — INSULIN ASPART 4 UNITS: 100 INJECTION, SOLUTION INTRAVENOUS; SUBCUTANEOUS at 09:04

## 2021-01-01 RX ADMIN — CARVEDILOL 25 MG: 25 TABLET, FILM COATED ORAL at 10:04

## 2021-01-01 RX ADMIN — VANCOMYCIN HYDROCHLORIDE 2000 MG: 10 INJECTION, POWDER, LYOPHILIZED, FOR SOLUTION INTRAVENOUS at 12:03

## 2021-01-01 RX ADMIN — NOREPINEPHRINE BITARTRATE 0.04 MCG/KG/MIN: 1 INJECTION, SOLUTION, CONCENTRATE INTRAVENOUS at 10:03

## 2021-01-01 RX ADMIN — FERROUS GLUCONATE TAB 324 MG (37.5 MG ELEMENTAL IRON) 324 MG: 324 (37.5 FE) TAB at 09:04

## 2021-01-01 RX ADMIN — IPRATROPIUM BROMIDE AND ALBUTEROL SULFATE 3 ML: .5; 2.5 SOLUTION RESPIRATORY (INHALATION) at 09:04

## 2021-01-01 RX ADMIN — POTASSIUM CHLORIDE 10 MEQ: 7.46 INJECTION, SOLUTION INTRAVENOUS at 09:03

## 2021-01-01 RX ADMIN — AMLODIPINE BESYLATE 5 MG: 5 TABLET ORAL at 11:04

## 2021-01-01 RX ADMIN — INSULIN ASPART 2 UNITS: 100 INJECTION, SOLUTION INTRAVENOUS; SUBCUTANEOUS at 05:03

## 2021-01-01 RX ADMIN — INSULIN ASPART 6 UNITS: 100 INJECTION, SOLUTION INTRAVENOUS; SUBCUTANEOUS at 01:04

## 2021-01-01 RX ADMIN — PIPERACILLIN AND TAZOBACTAM 4.5 G: 4; .5 INJECTION, POWDER, LYOPHILIZED, FOR SOLUTION INTRAVENOUS; PARENTERAL at 11:03

## 2021-01-01 RX ADMIN — HEPARIN SODIUM 5000 UNITS: 5000 INJECTION INTRAVENOUS; SUBCUTANEOUS at 03:04

## 2021-01-01 RX ADMIN — INSULIN ASPART 1 UNITS: 100 INJECTION, SOLUTION INTRAVENOUS; SUBCUTANEOUS at 09:04

## 2021-01-01 RX ADMIN — INSULIN ASPART 6 UNITS: 100 INJECTION, SOLUTION INTRAVENOUS; SUBCUTANEOUS at 09:04

## 2021-01-01 RX ADMIN — CLOPIDOGREL 75 MG: 75 TABLET, FILM COATED ORAL at 08:03

## 2021-01-01 RX ADMIN — INSULIN ASPART 6 UNITS: 100 INJECTION, SOLUTION INTRAVENOUS; SUBCUTANEOUS at 05:04

## 2021-01-01 RX ADMIN — INSULIN DETEMIR 30 UNITS: 100 INJECTION, SOLUTION SUBCUTANEOUS at 09:04

## 2021-01-01 RX ADMIN — ACETAZOLAMIDE 500 MG: 500 INJECTION, POWDER, LYOPHILIZED, FOR SOLUTION INTRAVENOUS at 09:04

## 2021-01-01 RX ADMIN — ASPIRIN 81 MG CHEWABLE TABLET 81 MG: 81 TABLET CHEWABLE at 10:03

## 2021-01-01 RX ADMIN — OSELTAMIVIR PHOSPHATE 30 MG: 6 POWDER, FOR SUSPENSION ORAL at 10:03

## 2021-01-01 RX ADMIN — DOCUSATE SODIUM 50MG AND SENNOSIDES 8.6MG 1 TABLET: 8.6; 5 TABLET, FILM COATED ORAL at 08:03

## 2021-01-01 RX ADMIN — INSULIN ASPART 1 UNITS: 100 INJECTION, SOLUTION INTRAVENOUS; SUBCUTANEOUS at 12:04

## 2021-01-01 RX ADMIN — CARVEDILOL 25 MG: 25 TABLET, FILM COATED ORAL at 08:03

## 2021-01-01 RX ADMIN — OSELTAMIVIR PHOSPHATE 30 MG: 6 POWDER, FOR SUSPENSION ORAL at 09:03

## 2021-01-01 RX ADMIN — INSULIN ASPART 2 UNITS: 100 INJECTION, SOLUTION INTRAVENOUS; SUBCUTANEOUS at 10:04

## 2021-01-01 RX ADMIN — INSULIN ASPART 3 UNITS: 100 INJECTION, SOLUTION INTRAVENOUS; SUBCUTANEOUS at 04:04

## 2021-01-01 RX ADMIN — INSULIN ASPART 5 UNITS: 100 INJECTION, SOLUTION INTRAVENOUS; SUBCUTANEOUS at 08:04

## 2021-01-01 RX ADMIN — ASPIRIN 81 MG CHEWABLE TABLET 81 MG: 81 TABLET CHEWABLE at 08:03

## 2021-01-01 RX ADMIN — CITALOPRAM HYDROBROMIDE 20 MG: 20 TABLET ORAL at 09:03

## 2021-01-01 RX ADMIN — MELATONIN TAB 3 MG 6 MG: 3 TAB at 10:04

## 2021-01-01 RX ADMIN — ATORVASTATIN CALCIUM 80 MG: 20 TABLET, FILM COATED ORAL at 08:03

## 2021-01-01 RX ADMIN — INSULIN ASPART 2 UNITS: 100 INJECTION, SOLUTION INTRAVENOUS; SUBCUTANEOUS at 04:04

## 2021-01-01 RX ADMIN — LEVOTHYROXINE SODIUM 75 MCG: 25 TABLET ORAL at 06:03

## 2021-01-01 RX ADMIN — INSULIN ASPART 2 UNITS: 100 INJECTION, SOLUTION INTRAVENOUS; SUBCUTANEOUS at 09:04

## 2021-01-01 RX ADMIN — MELATONIN TAB 3 MG 6 MG: 3 TAB at 10:03

## 2021-01-01 RX ADMIN — HYDROCODONE BITARTRATE AND ACETAMINOPHEN 1 TABLET: 7.5; 325 TABLET ORAL at 09:04

## 2021-01-01 RX ADMIN — MELATONIN TAB 3 MG 6 MG: 3 TAB at 08:04

## 2021-01-01 RX ADMIN — INSULIN ASPART 3 UNITS: 100 INJECTION, SOLUTION INTRAVENOUS; SUBCUTANEOUS at 05:04

## 2021-01-01 RX ADMIN — POTASSIUM CHLORIDE 10 MEQ: 7.46 INJECTION, SOLUTION INTRAVENOUS at 11:03

## 2021-01-01 RX ADMIN — INSULIN DETEMIR 8 UNITS: 100 INJECTION, SOLUTION SUBCUTANEOUS at 09:04

## 2021-01-01 RX ADMIN — FUROSEMIDE 80 MG: 10 INJECTION, SOLUTION INTRAMUSCULAR; INTRAVENOUS at 10:04

## 2021-01-01 RX ADMIN — INSULIN ASPART 6 UNITS: 100 INJECTION, SOLUTION INTRAVENOUS; SUBCUTANEOUS at 10:04

## 2021-01-01 RX ADMIN — CLOPIDOGREL 75 MG: 75 TABLET, FILM COATED ORAL at 10:03

## 2021-01-01 RX ADMIN — FUROSEMIDE 20 MG/HR: 10 INJECTION, SOLUTION INTRAMUSCULAR; INTRAVENOUS at 06:04

## 2021-01-01 RX ADMIN — INSULIN DETEMIR 8 UNITS: 100 INJECTION, SOLUTION SUBCUTANEOUS at 08:03

## 2021-01-01 RX ADMIN — INSULIN ASPART 1 UNITS: 100 INJECTION, SOLUTION INTRAVENOUS; SUBCUTANEOUS at 11:03

## 2021-01-01 RX ADMIN — BENZONATATE 100 MG: 100 CAPSULE ORAL at 03:03

## 2021-01-01 RX ADMIN — POTASSIUM CHLORIDE 40 MEQ: 1500 TABLET, EXTENDED RELEASE ORAL at 10:03

## 2021-01-01 RX ADMIN — FERROUS GLUCONATE TAB 324 MG (37.5 MG ELEMENTAL IRON) 324 MG: 324 (37.5 FE) TAB at 07:04

## 2021-01-01 RX ADMIN — FENTANYL CITRATE 25 MCG: 50 INJECTION INTRAMUSCULAR; INTRAVENOUS at 08:03

## 2021-01-01 RX ADMIN — HEPARIN SODIUM 5000 UNITS: 5000 INJECTION INTRAVENOUS; SUBCUTANEOUS at 05:03

## 2021-01-01 RX ADMIN — ATORVASTATIN CALCIUM 40 MG: 20 TABLET, FILM COATED ORAL at 08:03

## 2021-01-01 RX ADMIN — INSULIN ASPART 1 UNITS: 100 INJECTION, SOLUTION INTRAVENOUS; SUBCUTANEOUS at 08:04

## 2021-01-01 RX ADMIN — PIPERACILLIN AND TAZOBACTAM 4.5 G: 4; .5 INJECTION, POWDER, LYOPHILIZED, FOR SOLUTION INTRAVENOUS; PARENTERAL at 02:03

## 2021-01-01 RX ADMIN — FERROUS GLUCONATE TAB 324 MG (37.5 MG ELEMENTAL IRON) 324 MG: 324 (37.5 FE) TAB at 09:03

## 2021-01-01 RX ADMIN — POTASSIUM CHLORIDE 30 MEQ: 10 CAPSULE, COATED, EXTENDED RELEASE ORAL at 08:04

## 2021-01-01 RX ADMIN — CARVEDILOL 25 MG: 25 TABLET, FILM COATED ORAL at 07:04

## 2021-01-01 RX ADMIN — CARVEDILOL 6.25 MG: 12.5 TABLET, FILM COATED ORAL at 10:03

## 2021-01-01 RX ADMIN — INSULIN ASPART 1 UNITS: 100 INJECTION, SOLUTION INTRAVENOUS; SUBCUTANEOUS at 10:04

## 2021-01-01 RX ADMIN — INSULIN ASPART 5 UNITS: 100 INJECTION, SOLUTION INTRAVENOUS; SUBCUTANEOUS at 09:03

## 2021-01-01 RX ADMIN — PROPOFOL 35 MCG/KG/MIN: 10 INJECTION, EMULSION INTRAVENOUS at 02:03

## 2021-01-01 RX ADMIN — FERROUS GLUCONATE TAB 324 MG (37.5 MG ELEMENTAL IRON) 324 MG: 324 (37.5 FE) TAB at 10:03

## 2021-01-01 RX ADMIN — HYDROCODONE BITARTRATE AND ACETAMINOPHEN 1 TABLET: 7.5; 325 TABLET ORAL at 06:03

## 2021-01-01 RX ADMIN — LEVOTHYROXINE SODIUM 75 MCG: 25 TABLET ORAL at 05:03

## 2021-01-01 RX ADMIN — INSULIN ASPART 2 UNITS: 100 INJECTION, SOLUTION INTRAVENOUS; SUBCUTANEOUS at 01:04

## 2021-01-01 RX ADMIN — INSULIN ASPART 7 UNITS: 100 INJECTION, SOLUTION INTRAVENOUS; SUBCUTANEOUS at 08:03

## 2021-01-01 RX ADMIN — MELATONIN TAB 3 MG 6 MG: 3 TAB at 11:03

## 2021-01-01 RX ADMIN — HYDROCODONE BITARTRATE AND ACETAMINOPHEN 1 TABLET: 7.5; 325 TABLET ORAL at 09:03

## 2021-01-01 RX ADMIN — IPRATROPIUM BROMIDE AND ALBUTEROL SULFATE 3 ML: .5; 2.5 SOLUTION RESPIRATORY (INHALATION) at 03:04

## 2021-01-01 RX ADMIN — INSULIN ASPART 7 UNITS: 100 INJECTION, SOLUTION INTRAVENOUS; SUBCUTANEOUS at 04:03

## 2021-01-01 RX ADMIN — MAGNESIUM SULFATE HEPTAHYDRATE 1 G: 500 INJECTION, SOLUTION INTRAMUSCULAR; INTRAVENOUS at 06:03

## 2021-01-01 RX ADMIN — HEPARIN SODIUM 5000 UNITS: 5000 INJECTION INTRAVENOUS; SUBCUTANEOUS at 04:03

## 2021-01-01 RX ADMIN — POTASSIUM CHLORIDE 20 MEQ: 1500 TABLET, EXTENDED RELEASE ORAL at 09:03

## 2021-01-01 RX ADMIN — LEVOTHYROXINE SODIUM 75 MCG: 25 TABLET ORAL at 08:03

## 2021-01-01 RX ADMIN — POTASSIUM BICARBONATE 40 MEQ: 391 TABLET, EFFERVESCENT ORAL at 11:03

## 2021-01-01 RX ADMIN — PANTOPRAZOLE SODIUM 40 MG: 40 GRANULE, DELAYED RELEASE ORAL at 09:03

## 2021-01-01 RX ADMIN — ATORVASTATIN CALCIUM 80 MG: 20 TABLET, FILM COATED ORAL at 09:03

## 2021-01-01 RX ADMIN — ROCURONIUM BROMIDE 120 MG: 10 INJECTION, SOLUTION INTRAVENOUS at 10:03

## 2021-01-01 RX ADMIN — INSULIN ASPART 4 UNITS: 100 INJECTION, SOLUTION INTRAVENOUS; SUBCUTANEOUS at 12:04

## 2021-01-01 RX ADMIN — ETOMIDATE 40 MG: 2 INJECTION INTRAVENOUS at 10:03

## 2021-01-01 RX ADMIN — METOLAZONE 5 MG: 2.5 TABLET ORAL at 08:04

## 2021-01-01 RX ADMIN — INSULIN ASPART 4 UNITS: 100 INJECTION, SOLUTION INTRAVENOUS; SUBCUTANEOUS at 12:03

## 2021-01-01 RX ADMIN — LEVOTHYROXINE SODIUM 75 MCG: 75 TABLET ORAL at 05:03

## 2021-01-01 RX ADMIN — POTASSIUM & SODIUM PHOSPHATES POWDER PACK 280-160-250 MG 2 PACKET: 280-160-250 PACK at 12:03

## 2021-01-01 RX ADMIN — IPRATROPIUM BROMIDE AND ALBUTEROL SULFATE 3 ML: .5; 2.5 SOLUTION RESPIRATORY (INHALATION) at 07:04

## 2021-01-01 RX ADMIN — MAGNESIUM OXIDE 400 MG (241.3 MG MAGNESIUM) TABLET 400 MG: TABLET at 10:03

## 2021-01-01 RX ADMIN — FUROSEMIDE 80 MG: 10 INJECTION, SOLUTION INTRAMUSCULAR; INTRAVENOUS at 11:03

## 2021-01-01 RX ADMIN — INSULIN ASPART 4 UNITS: 100 INJECTION, SOLUTION INTRAVENOUS; SUBCUTANEOUS at 10:03

## 2021-01-01 RX ADMIN — FUROSEMIDE 80 MG: 10 INJECTION, SOLUTION INTRAMUSCULAR; INTRAVENOUS at 07:03

## 2021-01-01 RX ADMIN — MELATONIN TAB 3 MG 6 MG: 3 TAB at 09:03

## 2021-01-01 RX ADMIN — POTASSIUM CHLORIDE 10 MEQ: 7.46 INJECTION, SOLUTION INTRAVENOUS at 10:03

## 2021-01-01 RX ADMIN — DOCUSATE SODIUM AND SENNOSIDES 1 TABLET: 8.6; 5 TABLET, FILM COATED ORAL at 10:04

## 2021-01-01 RX ADMIN — ISOSORBIDE MONONITRATE 30 MG: 30 TABLET, EXTENDED RELEASE ORAL at 08:03

## 2021-01-01 RX ADMIN — ATORVASTATIN CALCIUM 80 MG: 20 TABLET, FILM COATED ORAL at 10:03

## 2021-01-01 RX ADMIN — INSULIN ASPART 7 UNITS: 100 INJECTION, SOLUTION INTRAVENOUS; SUBCUTANEOUS at 12:03

## 2021-01-01 RX ADMIN — FUROSEMIDE 10 MG/HR: 10 INJECTION, SOLUTION INTRAMUSCULAR; INTRAVENOUS at 10:04

## 2021-01-01 RX ADMIN — ASPIRIN 81 MG CHEWABLE TABLET 81 MG: 81 TABLET CHEWABLE at 09:03

## 2021-01-01 RX ADMIN — FUROSEMIDE 80 MG: 10 INJECTION, SOLUTION INTRAMUSCULAR; INTRAVENOUS at 05:04

## 2021-01-01 RX ADMIN — INSULIN ASPART 3 UNITS: 100 INJECTION, SOLUTION INTRAVENOUS; SUBCUTANEOUS at 07:04

## 2021-01-01 RX ADMIN — ONDANSETRON 4 MG: 2 INJECTION INTRAMUSCULAR; INTRAVENOUS at 07:03

## 2021-01-01 RX ADMIN — INSULIN ASPART 7 UNITS: 100 INJECTION, SOLUTION INTRAVENOUS; SUBCUTANEOUS at 05:03

## 2021-01-01 RX ADMIN — ALPRAZOLAM 0.25 MG: 0.25 TABLET ORAL at 08:03

## 2021-01-01 RX ADMIN — CARVEDILOL 6.25 MG: 12.5 TABLET, FILM COATED ORAL at 08:03

## 2021-01-01 RX ADMIN — ONDANSETRON 4 MG: 2 INJECTION INTRAMUSCULAR; INTRAVENOUS at 09:04

## 2021-01-01 RX ADMIN — PIPERACILLIN AND TAZOBACTAM 4.5 G: 4; .5 INJECTION, POWDER, LYOPHILIZED, FOR SOLUTION INTRAVENOUS; PARENTERAL at 12:03

## 2021-01-01 RX ADMIN — Medication 2 SPRAY: at 10:03

## 2021-01-01 RX ADMIN — MUPIROCIN: 20 OINTMENT TOPICAL at 10:03

## 2021-01-01 RX ADMIN — PANTOPRAZOLE SODIUM 40 MG: 40 TABLET, DELAYED RELEASE ORAL at 10:03

## 2021-01-01 RX ADMIN — POTASSIUM & SODIUM PHOSPHATES POWDER PACK 280-160-250 MG 2 PACKET: 280-160-250 PACK at 07:03

## 2021-01-01 RX ADMIN — INSULIN ASPART 4 UNITS: 100 INJECTION, SOLUTION INTRAVENOUS; SUBCUTANEOUS at 05:04

## 2021-01-01 RX ADMIN — HEPARIN SODIUM AND DEXTROSE 15 UNITS/KG/HR: 10000; 5 INJECTION INTRAVENOUS at 02:03

## 2021-01-01 RX ADMIN — PIPERACILLIN AND TAZOBACTAM 4.5 G: 4; .5 INJECTION, POWDER, LYOPHILIZED, FOR SOLUTION INTRAVENOUS; PARENTERAL at 06:03

## 2021-01-01 RX ADMIN — ACETAZOLAMIDE 500 MG: 500 INJECTION, POWDER, LYOPHILIZED, FOR SOLUTION INTRAVENOUS at 05:04

## 2021-01-01 RX ADMIN — ONDANSETRON 4 MG: 2 INJECTION INTRAMUSCULAR; INTRAVENOUS at 09:03

## 2021-01-01 RX ADMIN — POTASSIUM CHLORIDE 40 MEQ: 1500 TABLET, EXTENDED RELEASE ORAL at 12:03

## 2021-01-01 RX ADMIN — CEFTRIAXONE 2 G: 2 INJECTION, SOLUTION INTRAVENOUS at 05:03

## 2021-01-01 RX ADMIN — ONDANSETRON 4 MG: 2 INJECTION INTRAMUSCULAR; INTRAVENOUS at 06:03

## 2021-01-01 RX ADMIN — DOCUSATE SODIUM 50MG AND SENNOSIDES 8.6MG 1 TABLET: 8.6; 5 TABLET, FILM COATED ORAL at 10:03

## 2021-01-01 RX ADMIN — DOCUSATE SODIUM AND SENNOSIDES 1 TABLET: 8.6; 5 TABLET, FILM COATED ORAL at 08:03

## 2021-01-01 RX ADMIN — ONDANSETRON 4 MG: 2 INJECTION INTRAMUSCULAR; INTRAVENOUS at 10:03

## 2021-01-01 RX ADMIN — HEPARIN SODIUM AND DEXTROSE 10 UNITS/KG/HR: 10000; 5 INJECTION INTRAVENOUS at 03:03

## 2021-01-01 RX ADMIN — FUROSEMIDE 80 MG: 10 INJECTION, SOLUTION INTRAMUSCULAR; INTRAVENOUS at 04:04

## 2021-01-01 RX ADMIN — INSULIN ASPART 3 UNITS: 100 INJECTION, SOLUTION INTRAVENOUS; SUBCUTANEOUS at 02:04

## 2021-01-04 ENCOUNTER — PATIENT MESSAGE (OUTPATIENT)
Dept: ADMINISTRATIVE | Facility: HOSPITAL | Age: 76
End: 2021-01-04

## 2021-01-20 ENCOUNTER — IMMUNIZATION (OUTPATIENT)
Dept: PRIMARY CARE CLINIC | Facility: CLINIC | Age: 76
End: 2021-01-20
Payer: MEDICARE

## 2021-01-20 DIAGNOSIS — Z23 NEED FOR VACCINATION: Primary | ICD-10-CM

## 2021-01-20 PROCEDURE — 91300 COVID-19, MRNA, LNP-S, PF, 30 MCG/0.3 ML DOSE VACCINE: CPT | Mod: PBBFAC | Performed by: EMERGENCY MEDICINE

## 2021-01-21 ENCOUNTER — PATIENT OUTREACH (OUTPATIENT)
Dept: ADMINISTRATIVE | Facility: OTHER | Age: 76
End: 2021-01-21

## 2021-01-25 ENCOUNTER — PATIENT MESSAGE (OUTPATIENT)
Dept: PRIMARY CARE CLINIC | Facility: CLINIC | Age: 76
End: 2021-01-25

## 2021-02-02 RX ORDER — PANTOPRAZOLE SODIUM 40 MG/1
40 TABLET, DELAYED RELEASE ORAL DAILY
Qty: 90 TABLET | Refills: 1 | Status: SHIPPED | OUTPATIENT
Start: 2021-02-02 | End: 2021-01-01

## 2021-02-10 ENCOUNTER — OFFICE VISIT (OUTPATIENT)
Dept: CARDIOLOGY | Facility: CLINIC | Age: 76
End: 2021-02-10
Payer: MEDICARE

## 2021-02-10 ENCOUNTER — IMMUNIZATION (OUTPATIENT)
Dept: PRIMARY CARE CLINIC | Facility: CLINIC | Age: 76
End: 2021-02-10
Payer: MEDICARE

## 2021-02-10 VITALS
SYSTOLIC BLOOD PRESSURE: 160 MMHG | OXYGEN SATURATION: 91 % | HEART RATE: 78 BPM | HEIGHT: 65 IN | DIASTOLIC BLOOD PRESSURE: 68 MMHG | WEIGHT: 226.75 LBS | BODY MASS INDEX: 37.78 KG/M2

## 2021-02-10 DIAGNOSIS — E11.42 TYPE 2 DIABETES MELLITUS WITH DIABETIC POLYNEUROPATHY, WITH LONG-TERM CURRENT USE OF INSULIN: Chronic | ICD-10-CM

## 2021-02-10 DIAGNOSIS — I51.89 DIASTOLIC DYSFUNCTION: ICD-10-CM

## 2021-02-10 DIAGNOSIS — Z79.4 TYPE 2 DIABETES MELLITUS WITH DIABETIC POLYNEUROPATHY, WITH LONG-TERM CURRENT USE OF INSULIN: Chronic | ICD-10-CM

## 2021-02-10 DIAGNOSIS — N18.4 CKD (CHRONIC KIDNEY DISEASE), STAGE IV: ICD-10-CM

## 2021-02-10 DIAGNOSIS — I35.0 AORTIC VALVE STENOSIS, ETIOLOGY OF CARDIAC VALVE DISEASE UNSPECIFIED: ICD-10-CM

## 2021-02-10 DIAGNOSIS — E03.9 HYPOTHYROIDISM (ACQUIRED): Chronic | ICD-10-CM

## 2021-02-10 DIAGNOSIS — I70.0 ATHEROSCLEROSIS OF ABDOMINAL AORTA: ICD-10-CM

## 2021-02-10 DIAGNOSIS — R60.0 LOCALIZED EDEMA: ICD-10-CM

## 2021-02-10 DIAGNOSIS — I35.0 TRACE AORTIC STENOSIS BY PRIOR ECHOCARDIOGRAM: ICD-10-CM

## 2021-02-10 DIAGNOSIS — E78.5 HYPERLIPIDEMIA, UNSPECIFIED HYPERLIPIDEMIA TYPE: ICD-10-CM

## 2021-02-10 DIAGNOSIS — I10 ESSENTIAL HYPERTENSION: Primary | ICD-10-CM

## 2021-02-10 DIAGNOSIS — I65.22 ASYMPTOMATIC STENOSIS OF LEFT CAROTID ARTERY: ICD-10-CM

## 2021-02-10 DIAGNOSIS — Z23 NEED FOR VACCINATION: Primary | ICD-10-CM

## 2021-02-10 PROCEDURE — 1157F ADVNC CARE PLAN IN RCRD: CPT | Mod: S$GLB,,, | Performed by: INTERNAL MEDICINE

## 2021-02-10 PROCEDURE — 99999 PR PBB SHADOW E&M-EST. PATIENT-LVL V: ICD-10-PCS | Mod: PBBFAC,,, | Performed by: INTERNAL MEDICINE

## 2021-02-10 PROCEDURE — 93000 ELECTROCARDIOGRAM COMPLETE: CPT | Mod: S$GLB,,, | Performed by: INTERNAL MEDICINE

## 2021-02-10 PROCEDURE — 99499 UNLISTED E&M SERVICE: CPT | Mod: S$GLB,,, | Performed by: INTERNAL MEDICINE

## 2021-02-10 PROCEDURE — 1101F PR PT FALLS ASSESS DOC 0-1 FALLS W/OUT INJ PAST YR: ICD-10-PCS | Mod: CPTII,S$GLB,, | Performed by: INTERNAL MEDICINE

## 2021-02-10 PROCEDURE — 3288F PR FALLS RISK ASSESSMENT DOCUMENTED: ICD-10-PCS | Mod: CPTII,S$GLB,, | Performed by: INTERNAL MEDICINE

## 2021-02-10 PROCEDURE — 3008F PR BODY MASS INDEX (BMI) DOCUMENTED: ICD-10-PCS | Mod: CPTII,S$GLB,, | Performed by: INTERNAL MEDICINE

## 2021-02-10 PROCEDURE — 99499 RISK ADDL DX/OHS AUDIT: ICD-10-PCS | Mod: S$GLB,,, | Performed by: INTERNAL MEDICINE

## 2021-02-10 PROCEDURE — 1126F PR PAIN SEVERITY QUANTIFIED, NO PAIN PRESENT: ICD-10-PCS | Mod: S$GLB,,, | Performed by: INTERNAL MEDICINE

## 2021-02-10 PROCEDURE — 1159F MED LIST DOCD IN RCRD: CPT | Mod: S$GLB,,, | Performed by: INTERNAL MEDICINE

## 2021-02-10 PROCEDURE — 3051F PR MOST RECENT HEMOGLOBIN A1C LEVEL 7.0 - < 8.0%: ICD-10-PCS | Mod: CPTII,S$GLB,, | Performed by: INTERNAL MEDICINE

## 2021-02-10 PROCEDURE — 3078F DIAST BP <80 MM HG: CPT | Mod: CPTII,S$GLB,, | Performed by: INTERNAL MEDICINE

## 2021-02-10 PROCEDURE — 99999 PR PBB SHADOW E&M-EST. PATIENT-LVL V: CPT | Mod: PBBFAC,,, | Performed by: INTERNAL MEDICINE

## 2021-02-10 PROCEDURE — 93000 EKG 12-LEAD: ICD-10-PCS | Mod: S$GLB,,, | Performed by: INTERNAL MEDICINE

## 2021-02-10 PROCEDURE — 99205 OFFICE O/P NEW HI 60 MIN: CPT | Mod: 25,S$GLB,, | Performed by: INTERNAL MEDICINE

## 2021-02-10 PROCEDURE — 3051F HG A1C>EQUAL 7.0%<8.0%: CPT | Mod: CPTII,S$GLB,, | Performed by: INTERNAL MEDICINE

## 2021-02-10 PROCEDURE — 3077F SYST BP >= 140 MM HG: CPT | Mod: CPTII,S$GLB,, | Performed by: INTERNAL MEDICINE

## 2021-02-10 PROCEDURE — 3288F FALL RISK ASSESSMENT DOCD: CPT | Mod: CPTII,S$GLB,, | Performed by: INTERNAL MEDICINE

## 2021-02-10 PROCEDURE — 3008F BODY MASS INDEX DOCD: CPT | Mod: CPTII,S$GLB,, | Performed by: INTERNAL MEDICINE

## 2021-02-10 PROCEDURE — 1159F PR MEDICATION LIST DOCUMENTED IN MEDICAL RECORD: ICD-10-PCS | Mod: S$GLB,,, | Performed by: INTERNAL MEDICINE

## 2021-02-10 PROCEDURE — 1101F PT FALLS ASSESS-DOCD LE1/YR: CPT | Mod: CPTII,S$GLB,, | Performed by: INTERNAL MEDICINE

## 2021-02-10 PROCEDURE — 1126F AMNT PAIN NOTED NONE PRSNT: CPT | Mod: S$GLB,,, | Performed by: INTERNAL MEDICINE

## 2021-02-10 PROCEDURE — 99205 PR OFFICE/OUTPT VISIT, NEW, LEVL V, 60-74 MIN: ICD-10-PCS | Mod: 25,S$GLB,, | Performed by: INTERNAL MEDICINE

## 2021-02-10 PROCEDURE — 1157F PR ADVANCE CARE PLAN OR EQUIV PRESENT IN MEDICAL RECORD: ICD-10-PCS | Mod: S$GLB,,, | Performed by: INTERNAL MEDICINE

## 2021-02-10 PROCEDURE — 3077F PR MOST RECENT SYSTOLIC BLOOD PRESSURE >= 140 MM HG: ICD-10-PCS | Mod: CPTII,S$GLB,, | Performed by: INTERNAL MEDICINE

## 2021-02-10 PROCEDURE — 3078F PR MOST RECENT DIASTOLIC BLOOD PRESSURE < 80 MM HG: ICD-10-PCS | Mod: CPTII,S$GLB,, | Performed by: INTERNAL MEDICINE

## 2021-02-10 RX ORDER — LOSARTAN POTASSIUM AND HYDROCHLOROTHIAZIDE 25; 100 MG/1; MG/1
1 TABLET ORAL DAILY
COMMUNITY
End: 2021-02-10

## 2021-02-10 RX ORDER — HYDRALAZINE HYDROCHLORIDE 25 MG/1
25 TABLET, FILM COATED ORAL 3 TIMES DAILY
Qty: 270 TABLET | Refills: 3 | Status: SHIPPED | OUTPATIENT
Start: 2021-02-10 | End: 2021-03-01

## 2021-02-24 ENCOUNTER — OFFICE VISIT (OUTPATIENT)
Dept: PRIMARY CARE CLINIC | Facility: CLINIC | Age: 76
End: 2021-02-24
Payer: MEDICARE

## 2021-02-24 ENCOUNTER — PATIENT MESSAGE (OUTPATIENT)
Dept: PRIMARY CARE CLINIC | Facility: CLINIC | Age: 76
End: 2021-02-24

## 2021-02-24 DIAGNOSIS — Z79.4 TYPE 2 DIABETES MELLITUS WITH DIABETIC POLYNEUROPATHY, WITH LONG-TERM CURRENT USE OF INSULIN: Chronic | ICD-10-CM

## 2021-02-24 DIAGNOSIS — E11.22 TYPE 2 DIABETES MELLITUS WITH STAGE 3B CHRONIC KIDNEY DISEASE, WITH LONG-TERM CURRENT USE OF INSULIN: ICD-10-CM

## 2021-02-24 DIAGNOSIS — R80.9 MICROALBUMINURIA DUE TO TYPE 2 DIABETES MELLITUS: ICD-10-CM

## 2021-02-24 DIAGNOSIS — Z79.4 TYPE 2 DIABETES MELLITUS WITH STAGE 3B CHRONIC KIDNEY DISEASE, WITH LONG-TERM CURRENT USE OF INSULIN: ICD-10-CM

## 2021-02-24 DIAGNOSIS — Z74.09 LIMITED MOBILITY: ICD-10-CM

## 2021-02-24 DIAGNOSIS — N18.32 TYPE 2 DIABETES MELLITUS WITH STAGE 3B CHRONIC KIDNEY DISEASE, WITH LONG-TERM CURRENT USE OF INSULIN: ICD-10-CM

## 2021-02-24 DIAGNOSIS — I10 ESSENTIAL HYPERTENSION: ICD-10-CM

## 2021-02-24 DIAGNOSIS — E11.42 TYPE 2 DIABETES MELLITUS WITH DIABETIC POLYNEUROPATHY, WITH LONG-TERM CURRENT USE OF INSULIN: Chronic | ICD-10-CM

## 2021-02-24 DIAGNOSIS — M15.9 POLYARTICULAR OSTEOARTHRITIS: Chronic | ICD-10-CM

## 2021-02-24 DIAGNOSIS — N18.32 STAGE 3B CHRONIC KIDNEY DISEASE: Primary | ICD-10-CM

## 2021-02-24 DIAGNOSIS — E11.29 MICROALBUMINURIA DUE TO TYPE 2 DIABETES MELLITUS: ICD-10-CM

## 2021-02-24 PROCEDURE — 99499 RISK ADDL DX/OHS AUDIT: ICD-10-PCS | Mod: 95,,, | Performed by: FAMILY MEDICINE

## 2021-02-24 PROCEDURE — 1159F MED LIST DOCD IN RCRD: CPT | Mod: 95,,, | Performed by: FAMILY MEDICINE

## 2021-02-24 PROCEDURE — 99499 UNLISTED E&M SERVICE: CPT | Mod: 95,,, | Performed by: FAMILY MEDICINE

## 2021-02-24 PROCEDURE — 3051F PR MOST RECENT HEMOGLOBIN A1C LEVEL 7.0 - < 8.0%: ICD-10-PCS | Mod: CPTII,95,, | Performed by: FAMILY MEDICINE

## 2021-02-24 PROCEDURE — 99214 PR OFFICE/OUTPT VISIT, EST, LEVL IV, 30-39 MIN: ICD-10-PCS | Mod: 95,,, | Performed by: FAMILY MEDICINE

## 2021-02-24 PROCEDURE — 3051F HG A1C>EQUAL 7.0%<8.0%: CPT | Mod: CPTII,95,, | Performed by: FAMILY MEDICINE

## 2021-02-24 PROCEDURE — 99214 OFFICE O/P EST MOD 30 MIN: CPT | Mod: 95,,, | Performed by: FAMILY MEDICINE

## 2021-02-24 PROCEDURE — 1157F ADVNC CARE PLAN IN RCRD: CPT | Mod: 95,,, | Performed by: FAMILY MEDICINE

## 2021-02-24 PROCEDURE — 1159F PR MEDICATION LIST DOCUMENTED IN MEDICAL RECORD: ICD-10-PCS | Mod: 95,,, | Performed by: FAMILY MEDICINE

## 2021-02-24 PROCEDURE — 1157F PR ADVANCE CARE PLAN OR EQUIV PRESENT IN MEDICAL RECORD: ICD-10-PCS | Mod: 95,,, | Performed by: FAMILY MEDICINE

## 2021-02-24 RX ORDER — HYDROCODONE BITARTRATE AND ACETAMINOPHEN 7.5; 325 MG/1; MG/1
1 TABLET ORAL EVERY 12 HOURS PRN
Qty: 60 TABLET | Refills: 0 | Status: ON HOLD | OUTPATIENT
Start: 2021-01-01 | End: 2021-01-01 | Stop reason: HOSPADM

## 2021-02-24 RX ORDER — LOSARTAN POTASSIUM 100 MG/1
100 TABLET ORAL DAILY
Qty: 90 TABLET | Refills: 1 | Status: ON HOLD | OUTPATIENT
Start: 2021-02-24 | End: 2021-03-11 | Stop reason: HOSPADM

## 2021-02-24 RX ORDER — PEN NEEDLE, DIABETIC 30 GX3/16"
NEEDLE, DISPOSABLE MISCELLANEOUS DAILY
COMMUNITY
End: 2021-02-24 | Stop reason: SDUPTHER

## 2021-02-24 RX ORDER — IBUPROFEN 200 MG
1 CAPSULE ORAL 2 TIMES DAILY
Qty: 200 STRIP | Refills: 5 | Status: SHIPPED | OUTPATIENT
Start: 2021-02-24 | End: 2021-03-01 | Stop reason: SDUPTHER

## 2021-02-24 RX ORDER — HYDROCODONE BITARTRATE AND ACETAMINOPHEN 7.5; 325 MG/1; MG/1
1 TABLET ORAL EVERY 12 HOURS PRN
Qty: 60 TABLET | Refills: 0 | Status: ON HOLD | OUTPATIENT
Start: 2021-02-24 | End: 2021-01-01 | Stop reason: HOSPADM

## 2021-02-24 RX ORDER — PEN NEEDLE, DIABETIC 30 GX3/16"
NEEDLE, DISPOSABLE MISCELLANEOUS
Qty: 100 EACH | Refills: 3 | Status: ON HOLD | OUTPATIENT
Start: 2021-02-24 | End: 2022-01-01 | Stop reason: CLARIF

## 2021-02-24 RX ORDER — IBUPROFEN 200 MG
1 CAPSULE ORAL 2 TIMES DAILY
Qty: 200 STRIP | Refills: 5 | Status: SHIPPED | OUTPATIENT
Start: 2021-02-24 | End: 2021-02-24 | Stop reason: SDUPTHER

## 2021-02-25 ENCOUNTER — TELEPHONE (OUTPATIENT)
Dept: PRIMARY CARE CLINIC | Facility: CLINIC | Age: 76
End: 2021-02-25

## 2021-02-26 ENCOUNTER — PATIENT OUTREACH (OUTPATIENT)
Dept: ADMINISTRATIVE | Facility: OTHER | Age: 76
End: 2021-02-26

## 2021-03-01 ENCOUNTER — TELEPHONE (OUTPATIENT)
Dept: PRIMARY CARE CLINIC | Facility: CLINIC | Age: 76
End: 2021-03-01

## 2021-03-01 DIAGNOSIS — Z79.4 TYPE 2 DIABETES MELLITUS WITH STAGE 3B CHRONIC KIDNEY DISEASE, WITH LONG-TERM CURRENT USE OF INSULIN: ICD-10-CM

## 2021-03-01 DIAGNOSIS — N18.32 TYPE 2 DIABETES MELLITUS WITH STAGE 3B CHRONIC KIDNEY DISEASE, WITH LONG-TERM CURRENT USE OF INSULIN: ICD-10-CM

## 2021-03-01 DIAGNOSIS — E78.5 HYPERLIPIDEMIA, UNSPECIFIED HYPERLIPIDEMIA TYPE: Primary | ICD-10-CM

## 2021-03-01 DIAGNOSIS — E11.22 TYPE 2 DIABETES MELLITUS WITH STAGE 3B CHRONIC KIDNEY DISEASE, WITH LONG-TERM CURRENT USE OF INSULIN: ICD-10-CM

## 2021-03-01 DIAGNOSIS — E11.42 TYPE 2 DIABETES MELLITUS WITH DIABETIC POLYNEUROPATHY, WITH LONG-TERM CURRENT USE OF INSULIN: Chronic | ICD-10-CM

## 2021-03-01 DIAGNOSIS — Z79.4 TYPE 2 DIABETES MELLITUS WITH DIABETIC POLYNEUROPATHY, WITH LONG-TERM CURRENT USE OF INSULIN: Chronic | ICD-10-CM

## 2021-03-01 RX ORDER — HYDRALAZINE HYDROCHLORIDE 50 MG/1
50 TABLET, FILM COATED ORAL EVERY 12 HOURS
Qty: 180 TABLET | Refills: 1 | Status: ON HOLD | OUTPATIENT
Start: 2021-03-01 | End: 2021-01-01 | Stop reason: HOSPADM

## 2021-03-01 RX ORDER — IBUPROFEN 200 MG
1 CAPSULE ORAL 2 TIMES DAILY
Qty: 200 STRIP | Refills: 5 | Status: SHIPPED | OUTPATIENT
Start: 2021-03-01

## 2021-03-02 RX ORDER — ATORVASTATIN CALCIUM 40 MG/1
40 TABLET, FILM COATED ORAL DAILY
Qty: 90 TABLET | Refills: 1 | Status: ON HOLD | OUTPATIENT
Start: 2021-03-02 | End: 2021-01-01 | Stop reason: HOSPADM

## 2021-03-06 PROBLEM — J96.22 ACUTE ON CHRONIC RESPIRATORY FAILURE WITH HYPOXIA AND HYPERCAPNIA: Status: ACTIVE | Noted: 2021-03-06

## 2021-03-06 PROBLEM — J96.21 ACUTE ON CHRONIC RESPIRATORY FAILURE WITH HYPOXIA AND HYPERCAPNIA: Status: ACTIVE | Noted: 2021-03-06

## 2021-03-06 PROBLEM — N18.9 ACUTE ON CHRONIC RENAL FAILURE: Status: ACTIVE | Noted: 2020-11-06

## 2021-03-06 PROBLEM — J96.20 ACUTE AND CHR RESP FAILURE, UNSP W HYPOXIA OR HYPERCAPNIA: Status: ACTIVE | Noted: 2021-03-06

## 2021-03-06 PROBLEM — J90 BILATERAL PLEURAL EFFUSION: Status: ACTIVE | Noted: 2021-03-06

## 2021-03-06 PROBLEM — I21.4 NSTEMI (NON-ST ELEVATED MYOCARDIAL INFARCTION): Status: ACTIVE | Noted: 2021-03-06

## 2021-03-09 ENCOUNTER — TELEPHONE (OUTPATIENT)
Dept: PRIMARY CARE CLINIC | Facility: CLINIC | Age: 76
End: 2021-03-09

## 2021-03-09 ENCOUNTER — PATIENT MESSAGE (OUTPATIENT)
Dept: PRIMARY CARE CLINIC | Facility: CLINIC | Age: 76
End: 2021-03-09

## 2021-03-11 ENCOUNTER — TELEPHONE (OUTPATIENT)
Dept: PRIMARY CARE CLINIC | Facility: CLINIC | Age: 76
End: 2021-03-11

## 2021-03-11 ENCOUNTER — PATIENT MESSAGE (OUTPATIENT)
Dept: PRIMARY CARE CLINIC | Facility: CLINIC | Age: 76
End: 2021-03-11

## 2021-03-12 PROBLEM — J96.01 ACUTE RESPIRATORY FAILURE WITH HYPOXIA: Status: ACTIVE | Noted: 2021-03-12

## 2021-03-25 PROBLEM — J81.0 FLASH PULMONARY EDEMA: Status: ACTIVE | Noted: 2021-01-01

## 2021-03-25 PROBLEM — J96.02 ACUTE RESPIRATORY FAILURE WITH HYPOXIA AND HYPERCAPNIA: Status: ACTIVE | Noted: 2021-01-01

## 2021-03-25 PROBLEM — J96.02 ACUTE RESPIRATORY FAILURE WITH HYPOXIA AND HYPERCAPNIA: Status: RESOLVED | Noted: 2021-01-01 | Resolved: 2021-01-01

## 2021-03-25 PROBLEM — J10.1 INFLUENZA B: Status: ACTIVE | Noted: 2021-01-01

## 2021-03-25 PROBLEM — J96.01 ACUTE RESPIRATORY FAILURE WITH HYPOXIA AND HYPERCAPNIA: Status: ACTIVE | Noted: 2021-01-01

## 2021-03-25 PROBLEM — J96.01 ACUTE RESPIRATORY FAILURE WITH HYPOXIA AND HYPERCAPNIA: Status: RESOLVED | Noted: 2021-01-01 | Resolved: 2021-01-01

## 2021-03-26 PROBLEM — J96.02 ACUTE RESPIRATORY FAILURE WITH HYPOXIA AND HYPERCAPNIA: Status: ACTIVE | Noted: 2021-01-01

## 2021-03-26 PROBLEM — J96.01 ACUTE RESPIRATORY FAILURE WITH HYPOXIA AND HYPERCAPNIA: Status: ACTIVE | Noted: 2021-01-01

## 2021-03-27 PROBLEM — I50.9 ACUTE DECOMPENSATED HEART FAILURE: Status: ACTIVE | Noted: 2021-01-01

## 2021-03-27 PROBLEM — R04.0 EPISTAXIS: Status: ACTIVE | Noted: 2021-01-01

## 2021-03-29 PROBLEM — R06.02 SHORTNESS OF BREATH: Status: RESOLVED | Noted: 2021-01-01 | Resolved: 2021-01-01

## 2021-03-29 PROBLEM — R06.02 SHORTNESS OF BREATH: Status: ACTIVE | Noted: 2021-01-01

## 2021-03-29 PROBLEM — J81.0 FLASH PULMONARY EDEMA: Status: RESOLVED | Noted: 2021-01-01 | Resolved: 2021-01-01

## 2021-03-30 PROBLEM — R06.02 SHORTNESS OF BREATH: Status: ACTIVE | Noted: 2021-01-01

## 2021-03-31 PROBLEM — N18.4 ACUTE RENAL FAILURE SUPERIMPOSED ON STAGE 4 CHRONIC KIDNEY DISEASE: Status: ACTIVE | Noted: 2020-11-06

## 2021-04-03 PROBLEM — Z51.5 PALLIATIVE CARE ENCOUNTER: Status: ACTIVE | Noted: 2021-01-01

## 2021-04-05 PROBLEM — D64.9 ANEMIA: Status: ACTIVE | Noted: 2021-01-01

## 2021-04-14 PROBLEM — I50.33 ACUTE ON CHRONIC DIASTOLIC CONGESTIVE HEART FAILURE: Status: ACTIVE | Noted: 2021-01-01

## 2021-04-14 PROBLEM — I27.20 PULMONARY HYPERTENSION: Status: ACTIVE | Noted: 2021-01-01

## 2021-04-14 PROBLEM — D63.8 ANEMIA OF CHRONIC DISEASE: Status: ACTIVE | Noted: 2021-01-01

## 2021-05-04 PROBLEM — J96.11 CHRONIC RESPIRATORY FAILURE WITH HYPOXIA AND HYPERCAPNIA: Status: ACTIVE | Noted: 2021-01-01

## 2021-05-04 PROBLEM — J96.12 CHRONIC RESPIRATORY FAILURE WITH HYPOXIA AND HYPERCAPNIA: Status: ACTIVE | Noted: 2021-01-01

## 2021-05-06 PROBLEM — N17.9 ACUTE RENAL FAILURE SUPERIMPOSED ON STAGE 4 CHRONIC KIDNEY DISEASE: Status: RESOLVED | Noted: 2020-11-06 | Resolved: 2021-01-01

## 2021-05-06 PROBLEM — I50.33 ACUTE ON CHRONIC DIASTOLIC CONGESTIVE HEART FAILURE: Status: RESOLVED | Noted: 2021-01-01 | Resolved: 2021-01-01

## 2021-05-06 PROBLEM — J96.21 ACUTE ON CHRONIC RESPIRATORY FAILURE WITH HYPOXIA AND HYPERCAPNIA: Status: RESOLVED | Noted: 2021-03-06 | Resolved: 2021-01-01

## 2021-05-06 PROBLEM — I50.9 ACUTE DECOMPENSATED HEART FAILURE: Status: RESOLVED | Noted: 2021-01-01 | Resolved: 2021-01-01

## 2021-05-06 PROBLEM — N18.4 ACUTE RENAL FAILURE SUPERIMPOSED ON STAGE 4 CHRONIC KIDNEY DISEASE: Status: RESOLVED | Noted: 2020-11-06 | Resolved: 2021-01-01

## 2021-05-06 PROBLEM — J96.22 ACUTE ON CHRONIC RESPIRATORY FAILURE WITH HYPOXIA AND HYPERCAPNIA: Status: RESOLVED | Noted: 2021-03-06 | Resolved: 2021-01-01

## 2021-05-19 PROBLEM — J96.02 ACUTE RESPIRATORY FAILURE WITH HYPOXIA AND HYPERCAPNIA: Status: RESOLVED | Noted: 2021-01-01 | Resolved: 2021-01-01

## 2021-05-19 PROBLEM — J96.01 ACUTE RESPIRATORY FAILURE WITH HYPOXIA: Status: RESOLVED | Noted: 2021-03-12 | Resolved: 2021-01-01

## 2021-05-19 PROBLEM — J96.01 ACUTE RESPIRATORY FAILURE WITH HYPOXIA AND HYPERCAPNIA: Status: RESOLVED | Noted: 2021-01-01 | Resolved: 2021-01-01

## 2021-05-19 PROBLEM — R04.0 EPISTAXIS: Status: RESOLVED | Noted: 2021-01-01 | Resolved: 2021-01-01

## 2021-05-19 PROBLEM — Z99.11 DEPENDENCE ON NON-INVASIVE VENTILATION: Status: ACTIVE | Noted: 2021-01-01

## 2021-05-19 PROBLEM — J10.1 INFLUENZA B: Status: RESOLVED | Noted: 2021-01-01 | Resolved: 2021-01-01

## 2021-05-19 PROBLEM — Z74.2 NEED FOR HOME HEALTH CARE: Status: ACTIVE | Noted: 2021-01-01

## 2021-06-02 PROBLEM — J96.22 ACUTE ON CHRONIC RESPIRATORY FAILURE WITH HYPOXIA AND HYPERCAPNIA: Status: ACTIVE | Noted: 2021-01-01

## 2021-06-02 PROBLEM — J96.21 ACUTE ON CHRONIC RESPIRATORY FAILURE WITH HYPOXIA AND HYPERCAPNIA: Status: ACTIVE | Noted: 2021-01-01

## 2021-06-30 PROBLEM — N18.6 ESRD (END STAGE RENAL DISEASE): Status: ACTIVE | Noted: 2021-01-01

## 2021-06-30 PROBLEM — N18.6 ESRD (END STAGE RENAL DISEASE): Status: RESOLVED | Noted: 2021-01-01 | Resolved: 2021-01-01

## 2021-07-01 PROBLEM — E87.70 FLUID OVERLOAD: Status: ACTIVE | Noted: 2021-01-01

## 2021-07-01 PROBLEM — N18.6 ESRD (END STAGE RENAL DISEASE) ON DIALYSIS: Status: ACTIVE | Noted: 2021-01-01

## 2021-07-01 PROBLEM — Z99.2 ESRD (END STAGE RENAL DISEASE) ON DIALYSIS: Status: ACTIVE | Noted: 2021-01-01

## 2021-07-09 PROBLEM — J90 BILATERAL PLEURAL EFFUSION: Status: RESOLVED | Noted: 2021-03-06 | Resolved: 2021-01-01

## 2021-07-09 PROBLEM — J96.21 ACUTE ON CHRONIC RESPIRATORY FAILURE WITH HYPOXIA AND HYPERCAPNIA: Status: RESOLVED | Noted: 2021-01-01 | Resolved: 2021-01-01

## 2021-07-09 PROBLEM — E87.70 FLUID OVERLOAD: Status: RESOLVED | Noted: 2021-01-01 | Resolved: 2021-01-01

## 2021-07-09 PROBLEM — K29.70 GASTRITIS WITHOUT BLEEDING: Status: RESOLVED | Noted: 2018-10-03 | Resolved: 2021-01-01

## 2021-07-09 PROBLEM — Z99.11 DEPENDENCE ON NON-INVASIVE VENTILATION: Status: RESOLVED | Noted: 2021-01-01 | Resolved: 2021-01-01

## 2021-07-09 PROBLEM — I21.4 NSTEMI (NON-ST ELEVATED MYOCARDIAL INFARCTION): Status: RESOLVED | Noted: 2021-03-06 | Resolved: 2021-01-01

## 2021-07-09 PROBLEM — Z51.5 PALLIATIVE CARE ENCOUNTER: Status: RESOLVED | Noted: 2021-01-01 | Resolved: 2021-01-01

## 2021-07-09 PROBLEM — J96.22 ACUTE ON CHRONIC RESPIRATORY FAILURE WITH HYPOXIA AND HYPERCAPNIA: Status: RESOLVED | Noted: 2021-01-01 | Resolved: 2021-01-01

## 2021-07-09 PROBLEM — R06.02 SHORTNESS OF BREATH: Status: RESOLVED | Noted: 2021-01-01 | Resolved: 2021-01-01

## 2021-09-28 PROBLEM — Z78.9 PROBLEM WITH VASCULAR ACCESS: Status: ACTIVE | Noted: 2021-01-01

## 2021-09-28 PROBLEM — I16.0 HYPERTENSIVE URGENCY: Status: ACTIVE | Noted: 2021-01-01

## 2021-09-28 PROBLEM — R79.89 ELEVATED TROPONIN: Status: ACTIVE | Noted: 2021-01-01

## 2021-09-30 PROBLEM — Z78.9 PROBLEM WITH VASCULAR ACCESS: Status: RESOLVED | Noted: 2021-01-01 | Resolved: 2021-01-01

## 2021-09-30 PROBLEM — I16.0 HYPERTENSIVE URGENCY: Status: RESOLVED | Noted: 2021-01-01 | Resolved: 2021-01-01

## 2021-10-19 PROBLEM — R32 URINARY INCONTINENCE: Status: ACTIVE | Noted: 2021-01-01

## 2022-01-01 ENCOUNTER — PATIENT MESSAGE (OUTPATIENT)
Dept: PRIMARY CARE CLINIC | Facility: CLINIC | Age: 77
End: 2022-01-01
Payer: MEDICARE

## 2022-01-01 ENCOUNTER — NURSE TRIAGE (OUTPATIENT)
Dept: ADMINISTRATIVE | Facility: CLINIC | Age: 77
End: 2022-01-01
Payer: MEDICARE

## 2022-01-01 ENCOUNTER — ANESTHESIA (OUTPATIENT)
Dept: CARDIOLOGY | Facility: HOSPITAL | Age: 77
DRG: 280 | End: 2022-01-01
Payer: MEDICARE

## 2022-01-01 ENCOUNTER — PATIENT OUTREACH (OUTPATIENT)
Dept: ADMINISTRATIVE | Facility: CLINIC | Age: 77
End: 2022-01-01
Payer: MEDICARE

## 2022-01-01 ENCOUNTER — HOSPITAL ENCOUNTER (INPATIENT)
Facility: HOSPITAL | Age: 77
LOS: 1 days | DRG: 283 | End: 2022-03-20
Attending: HOSPITALIST | Admitting: STUDENT IN AN ORGANIZED HEALTH CARE EDUCATION/TRAINING PROGRAM
Payer: MEDICARE

## 2022-01-01 ENCOUNTER — TELEPHONE (OUTPATIENT)
Dept: PRIMARY CARE CLINIC | Facility: CLINIC | Age: 77
End: 2022-01-01
Payer: MEDICARE

## 2022-01-01 ENCOUNTER — HOSPITAL ENCOUNTER (INPATIENT)
Facility: OTHER | Age: 77
LOS: 13 days | Discharge: HOME HEALTH | DRG: 280 | End: 2022-03-16
Attending: INTERNAL MEDICINE | Admitting: INTERNAL MEDICINE
Payer: MEDICARE

## 2022-01-01 ENCOUNTER — ANESTHESIA EVENT (OUTPATIENT)
Dept: CARDIOLOGY | Facility: HOSPITAL | Age: 77
DRG: 280 | End: 2022-01-01
Payer: MEDICARE

## 2022-01-01 VITALS
HEART RATE: 98 BPM | OXYGEN SATURATION: 98 % | RESPIRATION RATE: 16 BRPM | BODY MASS INDEX: 37.47 KG/M2 | TEMPERATURE: 98 F | DIASTOLIC BLOOD PRESSURE: 59 MMHG | HEIGHT: 61 IN | SYSTOLIC BLOOD PRESSURE: 143 MMHG | WEIGHT: 198.44 LBS

## 2022-01-01 VITALS
TEMPERATURE: 100 F | WEIGHT: 197.56 LBS | DIASTOLIC BLOOD PRESSURE: 71 MMHG | HEART RATE: 36 BPM | SYSTOLIC BLOOD PRESSURE: 126 MMHG | OXYGEN SATURATION: 99 % | BODY MASS INDEX: 37.3 KG/M2 | HEIGHT: 61 IN

## 2022-01-01 DIAGNOSIS — N18.6 ESRD (END STAGE RENAL DISEASE) ON DIALYSIS: Primary | ICD-10-CM

## 2022-01-01 DIAGNOSIS — D63.8 ANEMIA OF CHRONIC DISEASE: Chronic | ICD-10-CM

## 2022-01-01 DIAGNOSIS — E87.1 HYPONATREMIA: ICD-10-CM

## 2022-01-01 DIAGNOSIS — R07.9 CHEST PAIN: ICD-10-CM

## 2022-01-01 DIAGNOSIS — R00.1 BRADYCARDIA: ICD-10-CM

## 2022-01-01 DIAGNOSIS — Z99.2 ESRD (END STAGE RENAL DISEASE) ON DIALYSIS: Primary | ICD-10-CM

## 2022-01-01 DIAGNOSIS — I46.9 CARDIAC ARREST: ICD-10-CM

## 2022-01-01 DIAGNOSIS — R78.81 BACTEREMIA: ICD-10-CM

## 2022-01-01 DIAGNOSIS — I21.4 NSTEMI (NON-ST ELEVATED MYOCARDIAL INFARCTION): ICD-10-CM

## 2022-01-01 LAB
ALBUMIN SERPL BCP-MCNC: 2.2 G/DL (ref 3.5–5.2)
ALBUMIN SERPL BCP-MCNC: 2.2 G/DL (ref 3.5–5.2)
ALBUMIN SERPL BCP-MCNC: 2.3 G/DL (ref 3.5–5.2)
ALBUMIN SERPL BCP-MCNC: 2.3 G/DL (ref 3.5–5.2)
ALBUMIN SERPL BCP-MCNC: 2.4 G/DL (ref 3.5–5.2)
ALBUMIN SERPL BCP-MCNC: 2.5 G/DL (ref 3.5–5.2)
ALBUMIN SERPL BCP-MCNC: 2.6 G/DL (ref 3.5–5.2)
ALBUMIN SERPL BCP-MCNC: 2.7 G/DL (ref 3.5–5.2)
ANION GAP SERPL CALC-SCNC: 10 MMOL/L (ref 8–16)
ANION GAP SERPL CALC-SCNC: 11 MMOL/L (ref 8–16)
ANION GAP SERPL CALC-SCNC: 12 MMOL/L (ref 8–16)
ANION GAP SERPL CALC-SCNC: 12 MMOL/L (ref 8–16)
ANION GAP SERPL CALC-SCNC: 13 MMOL/L (ref 8–16)
ANION GAP SERPL CALC-SCNC: 14 MMOL/L (ref 8–16)
ANION GAP SERPL CALC-SCNC: 15 MMOL/L (ref 8–16)
ANION GAP SERPL CALC-SCNC: 8 MMOL/L (ref 8–16)
ANION GAP SERPL CALC-SCNC: 8 MMOL/L (ref 8–16)
APTT BLDCRRT: 150 SEC (ref 21–32)
APTT BLDCRRT: 27.1 SEC (ref 21–32)
APTT BLDCRRT: 27.3 SEC (ref 21–32)
APTT BLDCRRT: 30.6 SEC (ref 21–32)
APTT BLDCRRT: 35.3 SEC (ref 21–32)
APTT BLDCRRT: 36 SEC (ref 21–32)
APTT BLDCRRT: 40.2 SEC (ref 21–32)
APTT BLDCRRT: 42.2 SEC (ref 21–32)
APTT BLDCRRT: 43.6 SEC (ref 21–32)
APTT BLDCRRT: 45.5 SEC (ref 21–32)
APTT BLDCRRT: 49 SEC (ref 21–32)
APTT BLDCRRT: 56.6 SEC (ref 21–32)
APTT BLDCRRT: 62.5 SEC (ref 21–32)
AV INDEX (PROSTH): 0.25
AV MEAN GRADIENT: 11 MMHG
AV PEAK GRADIENT: 16 MMHG
AV VALVE AREA: 0.63 CM2
AV VELOCITY RATIO: 0.31
BACTERIA BLD CULT: NORMAL
BASOPHILS # BLD AUTO: 0.04 K/UL (ref 0–0.2)
BASOPHILS # BLD AUTO: 0.05 K/UL (ref 0–0.2)
BASOPHILS # BLD AUTO: 0.07 K/UL (ref 0–0.2)
BASOPHILS # BLD AUTO: 0.08 K/UL (ref 0–0.2)
BASOPHILS # BLD AUTO: 0.09 K/UL (ref 0–0.2)
BASOPHILS # BLD AUTO: 0.1 K/UL (ref 0–0.2)
BASOPHILS # BLD AUTO: 0.11 K/UL (ref 0–0.2)
BASOPHILS # BLD AUTO: 0.12 K/UL (ref 0–0.2)
BASOPHILS # BLD AUTO: 0.13 K/UL (ref 0–0.2)
BASOPHILS # BLD AUTO: 0.14 K/UL (ref 0–0.2)
BASOPHILS NFR BLD: 0.4 % (ref 0–1.9)
BASOPHILS NFR BLD: 0.6 % (ref 0–1.9)
BASOPHILS NFR BLD: 0.6 % (ref 0–1.9)
BASOPHILS NFR BLD: 0.8 % (ref 0–1.9)
BASOPHILS NFR BLD: 0.8 % (ref 0–1.9)
BASOPHILS NFR BLD: 0.9 % (ref 0–1.9)
BASOPHILS NFR BLD: 0.9 % (ref 0–1.9)
BASOPHILS NFR BLD: 1.2 % (ref 0–1.9)
BASOPHILS NFR BLD: 1.2 % (ref 0–1.9)
BASOPHILS NFR BLD: 1.3 % (ref 0–1.9)
BASOPHILS NFR BLD: 1.3 % (ref 0–1.9)
BASOPHILS NFR BLD: 1.4 % (ref 0–1.9)
BSA FOR ECHO PROCEDURE: 1.99 M2
BUN SERPL-MCNC: 22 MG/DL (ref 8–23)
BUN SERPL-MCNC: 30 MG/DL (ref 8–23)
BUN SERPL-MCNC: 32 MG/DL (ref 8–23)
BUN SERPL-MCNC: 34 MG/DL (ref 8–23)
BUN SERPL-MCNC: 43 MG/DL (ref 8–23)
BUN SERPL-MCNC: 43 MG/DL (ref 8–23)
BUN SERPL-MCNC: 44 MG/DL (ref 8–23)
BUN SERPL-MCNC: 45 MG/DL (ref 8–23)
BUN SERPL-MCNC: 45 MG/DL (ref 8–23)
BUN SERPL-MCNC: 48 MG/DL (ref 8–23)
BUN SERPL-MCNC: 50 MG/DL (ref 8–23)
BUN SERPL-MCNC: 51 MG/DL (ref 8–23)
BUN SERPL-MCNC: 58 MG/DL (ref 8–23)
BUN SERPL-MCNC: 68 MG/DL (ref 8–23)
CALCIUM SERPL-MCNC: 10.3 MG/DL (ref 8.7–10.5)
CALCIUM SERPL-MCNC: 8.2 MG/DL (ref 8.7–10.5)
CALCIUM SERPL-MCNC: 8.8 MG/DL (ref 8.7–10.5)
CALCIUM SERPL-MCNC: 9.1 MG/DL (ref 8.7–10.5)
CALCIUM SERPL-MCNC: 9.1 MG/DL (ref 8.7–10.5)
CALCIUM SERPL-MCNC: 9.2 MG/DL (ref 8.7–10.5)
CALCIUM SERPL-MCNC: 9.5 MG/DL (ref 8.7–10.5)
CALCIUM SERPL-MCNC: 9.6 MG/DL (ref 8.7–10.5)
CALCIUM SERPL-MCNC: 9.6 MG/DL (ref 8.7–10.5)
CALCIUM SERPL-MCNC: 9.7 MG/DL (ref 8.7–10.5)
CALCIUM SERPL-MCNC: 9.8 MG/DL (ref 8.7–10.5)
CALCIUM SERPL-MCNC: 9.9 MG/DL (ref 8.7–10.5)
CHLORIDE SERPL-SCNC: 102 MMOL/L (ref 95–110)
CHLORIDE SERPL-SCNC: 104 MMOL/L (ref 95–110)
CHLORIDE SERPL-SCNC: 104 MMOL/L (ref 95–110)
CHLORIDE SERPL-SCNC: 106 MMOL/L (ref 95–110)
CHLORIDE SERPL-SCNC: 107 MMOL/L (ref 95–110)
CHLORIDE SERPL-SCNC: 91 MMOL/L (ref 95–110)
CHLORIDE SERPL-SCNC: 92 MMOL/L (ref 95–110)
CHLORIDE SERPL-SCNC: 93 MMOL/L (ref 95–110)
CHLORIDE SERPL-SCNC: 93 MMOL/L (ref 95–110)
CHLORIDE SERPL-SCNC: 94 MMOL/L (ref 95–110)
CHLORIDE SERPL-SCNC: 94 MMOL/L (ref 95–110)
CHLORIDE SERPL-SCNC: 95 MMOL/L (ref 95–110)
CHLORIDE SERPL-SCNC: 97 MMOL/L (ref 95–110)
CHLORIDE SERPL-SCNC: 98 MMOL/L (ref 95–110)
CO2 SERPL-SCNC: 22 MMOL/L (ref 23–29)
CO2 SERPL-SCNC: 22 MMOL/L (ref 23–29)
CO2 SERPL-SCNC: 23 MMOL/L (ref 23–29)
CO2 SERPL-SCNC: 23 MMOL/L (ref 23–29)
CO2 SERPL-SCNC: 24 MMOL/L (ref 23–29)
CO2 SERPL-SCNC: 25 MMOL/L (ref 23–29)
CO2 SERPL-SCNC: 26 MMOL/L (ref 23–29)
CO2 SERPL-SCNC: 28 MMOL/L (ref 23–29)
CO2 SERPL-SCNC: 32 MMOL/L (ref 23–29)
CREAT SERPL-MCNC: 3.1 MG/DL (ref 0.5–1.4)
CREAT SERPL-MCNC: 3.2 MG/DL (ref 0.5–1.4)
CREAT SERPL-MCNC: 3.4 MG/DL (ref 0.5–1.4)
CREAT SERPL-MCNC: 3.6 MG/DL (ref 0.5–1.4)
CREAT SERPL-MCNC: 3.6 MG/DL (ref 0.5–1.4)
CREAT SERPL-MCNC: 3.7 MG/DL (ref 0.5–1.4)
CREAT SERPL-MCNC: 3.8 MG/DL (ref 0.5–1.4)
CREAT SERPL-MCNC: 3.9 MG/DL (ref 0.5–1.4)
CREAT SERPL-MCNC: 4.3 MG/DL (ref 0.5–1.4)
CREAT SERPL-MCNC: 4.5 MG/DL (ref 0.5–1.4)
CREAT SERPL-MCNC: 4.9 MG/DL (ref 0.5–1.4)
CREAT SERPL-MCNC: 5.4 MG/DL (ref 0.5–1.4)
CV ECHO LV RWT: 0.29 CM
DIFFERENTIAL METHOD: ABNORMAL
DOP CALC AO PEAK VEL: 2.02 M/S
DOP CALC AO VTI: 47.59 CM
DOP CALC LVOT AREA: 2.5 CM2
DOP CALC LVOT DIAMETER: 1.8 CM
DOP CALC LVOT PEAK VEL: 0.63 M/S
DOP CALC LVOT STROKE VOLUME: 29.99 CM3
DOP CALCLVOT PEAK VEL VTI: 11.79 CM
E WAVE DECELERATION TIME: 193.23 MSEC
E/A RATIO: 0.96
E/E' RATIO: 27.5 M/S
ECHO LV POSTERIOR WALL: 0.75 CM (ref 0.6–1.1)
EJECTION FRACTION: 48 %
EOSINOPHIL # BLD AUTO: 0 K/UL (ref 0–0.5)
EOSINOPHIL # BLD AUTO: 0.1 K/UL (ref 0–0.5)
EOSINOPHIL # BLD AUTO: 0.3 K/UL (ref 0–0.5)
EOSINOPHIL # BLD AUTO: 0.4 K/UL (ref 0–0.5)
EOSINOPHIL # BLD AUTO: 0.5 K/UL (ref 0–0.5)
EOSINOPHIL # BLD AUTO: 0.5 K/UL (ref 0–0.5)
EOSINOPHIL NFR BLD: 0 % (ref 0–8)
EOSINOPHIL NFR BLD: 0.6 % (ref 0–8)
EOSINOPHIL NFR BLD: 2.8 % (ref 0–8)
EOSINOPHIL NFR BLD: 3.1 % (ref 0–8)
EOSINOPHIL NFR BLD: 3.8 % (ref 0–8)
EOSINOPHIL NFR BLD: 3.9 % (ref 0–8)
EOSINOPHIL NFR BLD: 4.1 % (ref 0–8)
EOSINOPHIL NFR BLD: 4.1 % (ref 0–8)
EOSINOPHIL NFR BLD: 4.3 % (ref 0–8)
EOSINOPHIL NFR BLD: 5.1 % (ref 0–8)
EOSINOPHIL NFR BLD: 5.1 % (ref 0–8)
EOSINOPHIL NFR BLD: 6.4 % (ref 0–8)
ERYTHROCYTE [DISTWIDTH] IN BLOOD BY AUTOMATED COUNT: 11.7 % (ref 11.5–14.5)
ERYTHROCYTE [DISTWIDTH] IN BLOOD BY AUTOMATED COUNT: 11.9 % (ref 11.5–14.5)
ERYTHROCYTE [DISTWIDTH] IN BLOOD BY AUTOMATED COUNT: 12 % (ref 11.5–14.5)
ERYTHROCYTE [DISTWIDTH] IN BLOOD BY AUTOMATED COUNT: 12.3 % (ref 11.5–14.5)
ERYTHROCYTE [DISTWIDTH] IN BLOOD BY AUTOMATED COUNT: 13.1 % (ref 11.5–14.5)
EST. GFR  (AFRICAN AMERICAN): 10.4 ML/MIN/1.73 M^2
EST. GFR  (AFRICAN AMERICAN): 11 ML/MIN/1.73 M^2
EST. GFR  (AFRICAN AMERICAN): 12.4 ML/MIN/1.73 M^2
EST. GFR  (AFRICAN AMERICAN): 13 ML/MIN/1.73 M^2
EST. GFR  (AFRICAN AMERICAN): 13.2 ML/MIN/1.73 M^2
EST. GFR  (AFRICAN AMERICAN): 13.6 ML/MIN/1.73 M^2
EST. GFR  (AFRICAN AMERICAN): 13.6 ML/MIN/1.73 M^2
EST. GFR  (AFRICAN AMERICAN): 14.6 ML/MIN/1.73 M^2
EST. GFR  (AFRICAN AMERICAN): 15.7 ML/MIN/1.73 M^2
EST. GFR  (AFRICAN AMERICAN): 16.3 ML/MIN/1.73 M^2
EST. GFR  (AFRICAN AMERICAN): 8.3 ML/MIN/1.73 M^2
EST. GFR  (AFRICAN AMERICAN): 9.4 ML/MIN/1.73 M^2
EST. GFR  (NON AFRICAN AMERICAN): 10 ML/MIN/1.73 M^2
EST. GFR  (NON AFRICAN AMERICAN): 10.7 ML/MIN/1.73 M^2
EST. GFR  (NON AFRICAN AMERICAN): 11 ML/MIN/1.73 M^2
EST. GFR  (NON AFRICAN AMERICAN): 11.4 ML/MIN/1.73 M^2
EST. GFR  (NON AFRICAN AMERICAN): 11.8 ML/MIN/1.73 M^2
EST. GFR  (NON AFRICAN AMERICAN): 11.8 ML/MIN/1.73 M^2
EST. GFR  (NON AFRICAN AMERICAN): 12.7 ML/MIN/1.73 M^2
EST. GFR  (NON AFRICAN AMERICAN): 13.6 ML/MIN/1.73 M^2
EST. GFR  (NON AFRICAN AMERICAN): 14.2 ML/MIN/1.73 M^2
EST. GFR  (NON AFRICAN AMERICAN): 7.2 ML/MIN/1.73 M^2
EST. GFR  (NON AFRICAN AMERICAN): 8.1 ML/MIN/1.73 M^2
EST. GFR  (NON AFRICAN AMERICAN): 9 ML/MIN/1.73 M^2
EST. GFR  (NON AFRICAN AMERICAN): 9.5 ML/MIN/1.73 M^2
EST. GFR  (NON AFRICAN AMERICAN): 9.5 ML/MIN/1.73 M^2
ESTIMATED AVG GLUCOSE: 111 MG/DL (ref 68–131)
FERRITIN SERPL-MCNC: 2680 NG/ML (ref 20–300)
FOLATE SERPL-MCNC: 8.4 NG/ML (ref 4–24)
FRACTIONAL SHORTENING: 24 % (ref 28–44)
GLUCOSE SERPL-MCNC: 106 MG/DL (ref 70–110)
GLUCOSE SERPL-MCNC: 119 MG/DL (ref 70–110)
GLUCOSE SERPL-MCNC: 121 MG/DL (ref 70–110)
GLUCOSE SERPL-MCNC: 135 MG/DL (ref 70–110)
GLUCOSE SERPL-MCNC: 140 MG/DL (ref 70–110)
GLUCOSE SERPL-MCNC: 168 MG/DL (ref 70–110)
GLUCOSE SERPL-MCNC: 185 MG/DL (ref 70–110)
GLUCOSE SERPL-MCNC: 189 MG/DL (ref 70–110)
GLUCOSE SERPL-MCNC: 213 MG/DL (ref 70–110)
GLUCOSE SERPL-MCNC: 219 MG/DL (ref 70–110)
GLUCOSE SERPL-MCNC: 248 MG/DL (ref 70–110)
GLUCOSE SERPL-MCNC: 47 MG/DL (ref 70–110)
GLUCOSE SERPL-MCNC: 76 MG/DL (ref 70–110)
GLUCOSE SERPL-MCNC: 86 MG/DL (ref 70–110)
GLUCOSE SERPL-MCNC: 89 MG/DL (ref 70–110)
HBA1C MFR BLD: 5.5 % (ref 4–5.6)
HBV SURFACE AB SER QL IA: POSITIVE
HBV SURFACE AB SERPL IA-ACNC: 285 MIU/ML
HBV SURFACE AG SERPL QL IA: NEGATIVE
HCT VFR BLD AUTO: 27.4 % (ref 37–48.5)
HCT VFR BLD AUTO: 28.2 % (ref 37–48.5)
HCT VFR BLD AUTO: 30.6 % (ref 37–48.5)
HCT VFR BLD AUTO: 30.7 % (ref 37–48.5)
HCT VFR BLD AUTO: 30.9 % (ref 37–48.5)
HCT VFR BLD AUTO: 31.2 % (ref 37–48.5)
HCT VFR BLD AUTO: 32.1 % (ref 37–48.5)
HCT VFR BLD AUTO: 32.3 % (ref 37–48.5)
HCT VFR BLD AUTO: 33 % (ref 37–48.5)
HCT VFR BLD AUTO: 33 % (ref 37–48.5)
HCT VFR BLD AUTO: 33.3 % (ref 37–48.5)
HCT VFR BLD AUTO: 33.4 % (ref 37–48.5)
HGB BLD-MCNC: 10 G/DL (ref 12–16)
HGB BLD-MCNC: 10 G/DL (ref 12–16)
HGB BLD-MCNC: 10.2 G/DL (ref 12–16)
HGB BLD-MCNC: 10.3 G/DL (ref 12–16)
HGB BLD-MCNC: 10.4 G/DL (ref 12–16)
HGB BLD-MCNC: 8.2 G/DL (ref 12–16)
HGB BLD-MCNC: 8.8 G/DL (ref 12–16)
HGB BLD-MCNC: 9.5 G/DL (ref 12–16)
HGB BLD-MCNC: 9.6 G/DL (ref 12–16)
HGB BLD-MCNC: 9.9 G/DL (ref 12–16)
IMM GRANULOCYTES # BLD AUTO: 0.03 K/UL (ref 0–0.04)
IMM GRANULOCYTES # BLD AUTO: 0.03 K/UL (ref 0–0.04)
IMM GRANULOCYTES # BLD AUTO: 0.06 K/UL (ref 0–0.04)
IMM GRANULOCYTES # BLD AUTO: 0.06 K/UL (ref 0–0.04)
IMM GRANULOCYTES # BLD AUTO: 0.08 K/UL (ref 0–0.04)
IMM GRANULOCYTES # BLD AUTO: 0.11 K/UL (ref 0–0.04)
IMM GRANULOCYTES # BLD AUTO: 0.14 K/UL (ref 0–0.04)
IMM GRANULOCYTES # BLD AUTO: 0.17 K/UL (ref 0–0.04)
IMM GRANULOCYTES # BLD AUTO: 0.22 K/UL (ref 0–0.04)
IMM GRANULOCYTES # BLD AUTO: 0.32 K/UL (ref 0–0.04)
IMM GRANULOCYTES # BLD AUTO: 0.38 K/UL (ref 0–0.04)
IMM GRANULOCYTES # BLD AUTO: 0.44 K/UL (ref 0–0.04)
IMM GRANULOCYTES NFR BLD AUTO: 0.3 % (ref 0–0.5)
IMM GRANULOCYTES NFR BLD AUTO: 0.4 % (ref 0–0.5)
IMM GRANULOCYTES NFR BLD AUTO: 0.5 % (ref 0–0.5)
IMM GRANULOCYTES NFR BLD AUTO: 0.5 % (ref 0–0.5)
IMM GRANULOCYTES NFR BLD AUTO: 0.6 % (ref 0–0.5)
IMM GRANULOCYTES NFR BLD AUTO: 1.3 % (ref 0–0.5)
IMM GRANULOCYTES NFR BLD AUTO: 1.4 % (ref 0–0.5)
IMM GRANULOCYTES NFR BLD AUTO: 2.1 % (ref 0–0.5)
IMM GRANULOCYTES NFR BLD AUTO: 2.3 % (ref 0–0.5)
IMM GRANULOCYTES NFR BLD AUTO: 3.2 % (ref 0–0.5)
IMM GRANULOCYTES NFR BLD AUTO: 3.8 % (ref 0–0.5)
IMM GRANULOCYTES NFR BLD AUTO: 4.9 % (ref 0–0.5)
INR PPP: 1 (ref 0.8–1.2)
INR PPP: 1.1 (ref 0.8–1.2)
INR PPP: 1.2 (ref 0.8–1.2)
INTERVENTRICULAR SEPTUM: 0.75 CM (ref 0.6–1.1)
IRON SERPL-MCNC: 130 UG/DL (ref 30–160)
IVRT: 48.53 MSEC
LA MAJOR: 4.68 CM
LA MINOR: 4.86 CM
LA WIDTH: 3.76 CM
LEFT ATRIUM SIZE: 3.55 CM
LEFT ATRIUM VOLUME INDEX MOD: 28.4 ML/M2
LEFT ATRIUM VOLUME INDEX: 28.5 ML/M2
LEFT ATRIUM VOLUME MOD: 54 CM3
LEFT ATRIUM VOLUME: 54.1 CM3
LEFT INTERNAL DIMENSION IN SYSTOLE: 3.89 CM (ref 2.1–4)
LEFT VENTRICLE DIASTOLIC VOLUME INDEX: 64.73 ML/M2
LEFT VENTRICLE DIASTOLIC VOLUME: 122.98 ML
LEFT VENTRICLE MASS INDEX: 68 G/M2
LEFT VENTRICLE SYSTOLIC VOLUME INDEX: 34.6 ML/M2
LEFT VENTRICLE SYSTOLIC VOLUME: 65.7 ML
LEFT VENTRICULAR INTERNAL DIMENSION IN DIASTOLE: 5.09 CM (ref 3.5–6)
LEFT VENTRICULAR MASS: 128.99 G
LV LATERAL E/E' RATIO: 18.33 M/S
LV SEPTAL E/E' RATIO: 55 M/S
LYMPHOCYTES # BLD AUTO: 0.8 K/UL (ref 1–4.8)
LYMPHOCYTES # BLD AUTO: 1.1 K/UL (ref 1–4.8)
LYMPHOCYTES # BLD AUTO: 1.1 K/UL (ref 1–4.8)
LYMPHOCYTES # BLD AUTO: 1.2 K/UL (ref 1–4.8)
LYMPHOCYTES # BLD AUTO: 1.3 K/UL (ref 1–4.8)
LYMPHOCYTES # BLD AUTO: 1.3 K/UL (ref 1–4.8)
LYMPHOCYTES # BLD AUTO: 1.5 K/UL (ref 1–4.8)
LYMPHOCYTES # BLD AUTO: 1.5 K/UL (ref 1–4.8)
LYMPHOCYTES # BLD AUTO: 1.6 K/UL (ref 1–4.8)
LYMPHOCYTES # BLD AUTO: 1.6 K/UL (ref 1–4.8)
LYMPHOCYTES # BLD AUTO: 1.8 K/UL (ref 1–4.8)
LYMPHOCYTES # BLD AUTO: 2 K/UL (ref 1–4.8)
LYMPHOCYTES NFR BLD: 12.6 % (ref 18–48)
LYMPHOCYTES NFR BLD: 12.7 % (ref 18–48)
LYMPHOCYTES NFR BLD: 13.3 % (ref 18–48)
LYMPHOCYTES NFR BLD: 14.1 % (ref 18–48)
LYMPHOCYTES NFR BLD: 15.1 % (ref 18–48)
LYMPHOCYTES NFR BLD: 17.4 % (ref 18–48)
LYMPHOCYTES NFR BLD: 17.6 % (ref 18–48)
LYMPHOCYTES NFR BLD: 19 % (ref 18–48)
LYMPHOCYTES NFR BLD: 19.4 % (ref 18–48)
LYMPHOCYTES NFR BLD: 20.4 % (ref 18–48)
LYMPHOCYTES NFR BLD: 6.9 % (ref 18–48)
LYMPHOCYTES NFR BLD: 8.7 % (ref 18–48)
MAGNESIUM SERPL-MCNC: 1.7 MG/DL (ref 1.6–2.6)
MAGNESIUM SERPL-MCNC: 1.8 MG/DL (ref 1.6–2.6)
MAGNESIUM SERPL-MCNC: 1.9 MG/DL (ref 1.6–2.6)
MAGNESIUM SERPL-MCNC: 2.2 MG/DL (ref 1.6–2.6)
MCH RBC QN AUTO: 32 PG (ref 27–31)
MCH RBC QN AUTO: 32.2 PG (ref 27–31)
MCH RBC QN AUTO: 32.2 PG (ref 27–31)
MCH RBC QN AUTO: 32.5 PG (ref 27–31)
MCH RBC QN AUTO: 32.7 PG (ref 27–31)
MCH RBC QN AUTO: 32.8 PG (ref 27–31)
MCH RBC QN AUTO: 33.1 PG (ref 27–31)
MCH RBC QN AUTO: 33.2 PG (ref 27–31)
MCH RBC QN AUTO: 33.3 PG (ref 27–31)
MCH RBC QN AUTO: 33.4 PG (ref 27–31)
MCHC RBC AUTO-ENTMCNC: 29.9 G/DL (ref 32–36)
MCHC RBC AUTO-ENTMCNC: 29.9 G/DL (ref 32–36)
MCHC RBC AUTO-ENTMCNC: 30 G/DL (ref 32–36)
MCHC RBC AUTO-ENTMCNC: 30.9 G/DL (ref 32–36)
MCHC RBC AUTO-ENTMCNC: 31 G/DL (ref 32–36)
MCHC RBC AUTO-ENTMCNC: 31 G/DL (ref 32–36)
MCHC RBC AUTO-ENTMCNC: 31.1 G/DL (ref 32–36)
MCHC RBC AUTO-ENTMCNC: 31.1 G/DL (ref 32–36)
MCHC RBC AUTO-ENTMCNC: 31.2 G/DL (ref 32–36)
MCHC RBC AUTO-ENTMCNC: 31.2 G/DL (ref 32–36)
MCHC RBC AUTO-ENTMCNC: 31.3 G/DL (ref 32–36)
MCHC RBC AUTO-ENTMCNC: 31.7 G/DL (ref 32–36)
MCV RBC AUTO: 102 FL (ref 82–98)
MCV RBC AUTO: 103 FL (ref 82–98)
MCV RBC AUTO: 104 FL (ref 82–98)
MCV RBC AUTO: 105 FL (ref 82–98)
MCV RBC AUTO: 106 FL (ref 82–98)
MCV RBC AUTO: 106 FL (ref 82–98)
MCV RBC AUTO: 107 FL (ref 82–98)
MCV RBC AUTO: 111 FL (ref 82–98)
MONOCYTES # BLD AUTO: 0.8 K/UL (ref 0.3–1)
MONOCYTES # BLD AUTO: 0.9 K/UL (ref 0.3–1)
MONOCYTES # BLD AUTO: 1 K/UL (ref 0.3–1)
MONOCYTES # BLD AUTO: 1.2 K/UL (ref 0.3–1)
MONOCYTES # BLD AUTO: 1.3 K/UL (ref 0.3–1)
MONOCYTES NFR BLD: 10.3 % (ref 4–15)
MONOCYTES NFR BLD: 10.6 % (ref 4–15)
MONOCYTES NFR BLD: 12.2 % (ref 4–15)
MONOCYTES NFR BLD: 12.4 % (ref 4–15)
MONOCYTES NFR BLD: 12.5 % (ref 4–15)
MONOCYTES NFR BLD: 14.3 % (ref 4–15)
MONOCYTES NFR BLD: 6.8 % (ref 4–15)
MONOCYTES NFR BLD: 7.1 % (ref 4–15)
MONOCYTES NFR BLD: 8.3 % (ref 4–15)
MONOCYTES NFR BLD: 9.1 % (ref 4–15)
MONOCYTES NFR BLD: 9.3 % (ref 4–15)
MONOCYTES NFR BLD: 9.4 % (ref 4–15)
MV PEAK A VEL: 1.15 M/S
MV PEAK E VEL: 1.1 M/S
MV STENOSIS PRESSURE HALF TIME: 56.04 MS
MV VALVE AREA P 1/2 METHOD: 3.93 CM2
NEUTROPHILS # BLD AUTO: 12.3 K/UL (ref 1.8–7.7)
NEUTROPHILS # BLD AUTO: 4.5 K/UL (ref 1.8–7.7)
NEUTROPHILS # BLD AUTO: 5 K/UL (ref 1.8–7.7)
NEUTROPHILS # BLD AUTO: 5.4 K/UL (ref 1.8–7.7)
NEUTROPHILS # BLD AUTO: 5.6 K/UL (ref 1.8–7.7)
NEUTROPHILS # BLD AUTO: 6.2 K/UL (ref 1.8–7.7)
NEUTROPHILS # BLD AUTO: 6.5 K/UL (ref 1.8–7.7)
NEUTROPHILS # BLD AUTO: 6.6 K/UL (ref 1.8–7.7)
NEUTROPHILS # BLD AUTO: 6.7 K/UL (ref 1.8–7.7)
NEUTROPHILS # BLD AUTO: 9.4 K/UL (ref 1.8–7.7)
NEUTROPHILS NFR BLD: 59.8 % (ref 38–73)
NEUTROPHILS NFR BLD: 60.4 % (ref 38–73)
NEUTROPHILS NFR BLD: 60.8 % (ref 38–73)
NEUTROPHILS NFR BLD: 64.8 % (ref 38–73)
NEUTROPHILS NFR BLD: 65.1 % (ref 38–73)
NEUTROPHILS NFR BLD: 67 % (ref 38–73)
NEUTROPHILS NFR BLD: 69.1 % (ref 38–73)
NEUTROPHILS NFR BLD: 69.1 % (ref 38–73)
NEUTROPHILS NFR BLD: 70.2 % (ref 38–73)
NEUTROPHILS NFR BLD: 70.3 % (ref 38–73)
NEUTROPHILS NFR BLD: 82.8 % (ref 38–73)
NEUTROPHILS NFR BLD: 85.1 % (ref 38–73)
NRBC BLD-RTO: 0 /100 WBC
PATH REV BLD -IMP: NORMAL
PATH REV BLD -IMP: NORMAL
PHOSPHATE SERPL-MCNC: 2.8 MG/DL (ref 2.7–4.5)
PHOSPHATE SERPL-MCNC: 2.9 MG/DL (ref 2.7–4.5)
PHOSPHATE SERPL-MCNC: 3.2 MG/DL (ref 2.7–4.5)
PHOSPHATE SERPL-MCNC: 3.6 MG/DL (ref 2.7–4.5)
PHOSPHATE SERPL-MCNC: 3.7 MG/DL (ref 2.7–4.5)
PHOSPHATE SERPL-MCNC: 3.9 MG/DL (ref 2.7–4.5)
PHOSPHATE SERPL-MCNC: 3.9 MG/DL (ref 2.7–4.5)
PHOSPHATE SERPL-MCNC: 4 MG/DL (ref 2.7–4.5)
PHOSPHATE SERPL-MCNC: 4.3 MG/DL (ref 2.7–4.5)
PHOSPHATE SERPL-MCNC: 4.6 MG/DL (ref 2.7–4.5)
PHOSPHATE SERPL-MCNC: 4.9 MG/DL (ref 2.7–4.5)
PHOSPHATE SERPL-MCNC: 5.2 MG/DL (ref 2.7–4.5)
PISA MRMAX VEL: 0.04 M/S
PISA TR MAX VEL: 2.04 M/S
PLATELET # BLD AUTO: 207 K/UL (ref 150–450)
PLATELET # BLD AUTO: 213 K/UL (ref 150–450)
PLATELET # BLD AUTO: 223 K/UL (ref 150–450)
PLATELET # BLD AUTO: 231 K/UL (ref 150–450)
PLATELET # BLD AUTO: 242 K/UL (ref 150–450)
PLATELET # BLD AUTO: 256 K/UL (ref 150–450)
PLATELET # BLD AUTO: 299 K/UL (ref 150–450)
PLATELET # BLD AUTO: 331 K/UL (ref 150–450)
PLATELET # BLD AUTO: 355 K/UL (ref 150–450)
PLATELET # BLD AUTO: 390 K/UL (ref 150–450)
PLATELET # BLD AUTO: 396 K/UL (ref 150–450)
PLATELET # BLD AUTO: 401 K/UL (ref 150–450)
PMV BLD AUTO: 10.3 FL (ref 9.2–12.9)
PMV BLD AUTO: 10.5 FL (ref 9.2–12.9)
PMV BLD AUTO: 10.6 FL (ref 9.2–12.9)
PMV BLD AUTO: 10.7 FL (ref 9.2–12.9)
PMV BLD AUTO: 10.9 FL (ref 9.2–12.9)
PMV BLD AUTO: 10.9 FL (ref 9.2–12.9)
PMV BLD AUTO: 11.2 FL (ref 9.2–12.9)
PMV BLD AUTO: 11.2 FL (ref 9.2–12.9)
PMV BLD AUTO: 11.5 FL (ref 9.2–12.9)
PMV BLD AUTO: 11.6 FL (ref 9.2–12.9)
POCT GLUCOSE: 115 MG/DL (ref 70–110)
POCT GLUCOSE: 138 MG/DL (ref 70–110)
POCT GLUCOSE: 139 MG/DL (ref 70–110)
POCT GLUCOSE: 140 MG/DL (ref 70–110)
POCT GLUCOSE: 149 MG/DL (ref 70–110)
POCT GLUCOSE: 153 MG/DL (ref 70–110)
POCT GLUCOSE: 155 MG/DL (ref 70–110)
POCT GLUCOSE: 156 MG/DL (ref 70–110)
POCT GLUCOSE: 156 MG/DL (ref 70–110)
POCT GLUCOSE: 162 MG/DL (ref 70–110)
POCT GLUCOSE: 162 MG/DL (ref 70–110)
POCT GLUCOSE: 166 MG/DL (ref 70–110)
POCT GLUCOSE: 167 MG/DL (ref 70–110)
POCT GLUCOSE: 168 MG/DL (ref 70–110)
POCT GLUCOSE: 172 MG/DL (ref 70–110)
POCT GLUCOSE: 173 MG/DL (ref 70–110)
POCT GLUCOSE: 175 MG/DL (ref 70–110)
POCT GLUCOSE: 176 MG/DL (ref 70–110)
POCT GLUCOSE: 180 MG/DL (ref 70–110)
POCT GLUCOSE: 188 MG/DL (ref 70–110)
POCT GLUCOSE: 189 MG/DL (ref 70–110)
POCT GLUCOSE: 204 MG/DL (ref 70–110)
POCT GLUCOSE: 206 MG/DL (ref 70–110)
POCT GLUCOSE: 208 MG/DL (ref 70–110)
POCT GLUCOSE: 218 MG/DL (ref 70–110)
POCT GLUCOSE: 220 MG/DL (ref 70–110)
POCT GLUCOSE: 222 MG/DL (ref 70–110)
POCT GLUCOSE: 223 MG/DL (ref 70–110)
POCT GLUCOSE: 236 MG/DL (ref 70–110)
POCT GLUCOSE: 239 MG/DL (ref 70–110)
POCT GLUCOSE: 248 MG/DL (ref 70–110)
POCT GLUCOSE: 249 MG/DL (ref 70–110)
POCT GLUCOSE: 251 MG/DL (ref 70–110)
POCT GLUCOSE: 260 MG/DL (ref 70–110)
POCT GLUCOSE: 261 MG/DL (ref 70–110)
POCT GLUCOSE: 262 MG/DL (ref 70–110)
POCT GLUCOSE: 270 MG/DL (ref 70–110)
POCT GLUCOSE: 276 MG/DL (ref 70–110)
POCT GLUCOSE: 290 MG/DL (ref 70–110)
POCT GLUCOSE: 315 MG/DL (ref 70–110)
POCT GLUCOSE: 327 MG/DL (ref 70–110)
POCT GLUCOSE: 330 MG/DL (ref 70–110)
POCT GLUCOSE: 75 MG/DL (ref 70–110)
POCT GLUCOSE: 78 MG/DL (ref 70–110)
POCT GLUCOSE: 80 MG/DL (ref 70–110)
POCT GLUCOSE: 94 MG/DL (ref 70–110)
POTASSIUM SERPL-SCNC: 3.9 MMOL/L (ref 3.5–5.1)
POTASSIUM SERPL-SCNC: 4.2 MMOL/L (ref 3.5–5.1)
POTASSIUM SERPL-SCNC: 4.2 MMOL/L (ref 3.5–5.1)
POTASSIUM SERPL-SCNC: 4.5 MMOL/L (ref 3.5–5.1)
POTASSIUM SERPL-SCNC: 4.5 MMOL/L (ref 3.5–5.1)
POTASSIUM SERPL-SCNC: 4.6 MMOL/L (ref 3.5–5.1)
POTASSIUM SERPL-SCNC: 4.6 MMOL/L (ref 3.5–5.1)
POTASSIUM SERPL-SCNC: 4.8 MMOL/L (ref 3.5–5.1)
POTASSIUM SERPL-SCNC: 4.9 MMOL/L (ref 3.5–5.1)
POTASSIUM SERPL-SCNC: 4.9 MMOL/L (ref 3.5–5.1)
POTASSIUM SERPL-SCNC: 5 MMOL/L (ref 3.5–5.1)
POTASSIUM SERPL-SCNC: 5.1 MMOL/L (ref 3.5–5.1)
POTASSIUM SERPL-SCNC: 5.2 MMOL/L (ref 3.5–5.1)
POTASSIUM SERPL-SCNC: 5.3 MMOL/L (ref 3.5–5.1)
PROTHROMBIN TIME: 10.9 SEC (ref 9–12.5)
PROTHROMBIN TIME: 11.3 SEC (ref 9–12.5)
PROTHROMBIN TIME: 12.4 SEC (ref 9–12.5)
PTH-INTACT SERPL-MCNC: 207.4 PG/ML (ref 9–77)
PULM VEIN S/D RATIO: 2.14
PV PEAK D VEL: 0.28 M/S
PV PEAK S VEL: 0.6 M/S
PV PEAK VELOCITY: 0.86 CM/S
RA MAJOR: 3.99 CM
RA WIDTH: 3.06 CM
RBC # BLD AUTO: 2.46 M/UL (ref 4–5.4)
RBC # BLD AUTO: 2.71 M/UL (ref 4–5.4)
RBC # BLD AUTO: 2.9 M/UL (ref 4–5.4)
RBC # BLD AUTO: 2.9 M/UL (ref 4–5.4)
RBC # BLD AUTO: 2.98 M/UL (ref 4–5.4)
RBC # BLD AUTO: 3 M/UL (ref 4–5.4)
RBC # BLD AUTO: 3.05 M/UL (ref 4–5.4)
RBC # BLD AUTO: 3.06 M/UL (ref 4–5.4)
RBC # BLD AUTO: 3.08 M/UL (ref 4–5.4)
RBC # BLD AUTO: 3.11 M/UL (ref 4–5.4)
RBC # BLD AUTO: 3.13 M/UL (ref 4–5.4)
RBC # BLD AUTO: 3.14 M/UL (ref 4–5.4)
RETICS/RBC NFR AUTO: 2.6 % (ref 0.5–2.5)
SARS-COV-2 RDRP RESP QL NAA+PROBE: NEGATIVE
SATURATED IRON: 66 % (ref 20–50)
SINUS: 2.35 CM
SODIUM SERPL-SCNC: 128 MMOL/L (ref 136–145)
SODIUM SERPL-SCNC: 129 MMOL/L (ref 136–145)
SODIUM SERPL-SCNC: 130 MMOL/L (ref 136–145)
SODIUM SERPL-SCNC: 131 MMOL/L (ref 136–145)
SODIUM SERPL-SCNC: 132 MMOL/L (ref 136–145)
SODIUM SERPL-SCNC: 134 MMOL/L (ref 136–145)
SODIUM SERPL-SCNC: 134 MMOL/L (ref 136–145)
SODIUM SERPL-SCNC: 135 MMOL/L (ref 136–145)
SODIUM SERPL-SCNC: 138 MMOL/L (ref 136–145)
SODIUM SERPL-SCNC: 138 MMOL/L (ref 136–145)
SODIUM SERPL-SCNC: 139 MMOL/L (ref 136–145)
SODIUM SERPL-SCNC: 142 MMOL/L (ref 136–145)
T4 FREE SERPL-MCNC: 0.95 NG/DL (ref 0.71–1.51)
TDI LATERAL: 0.06 M/S
TDI SEPTAL: 0.02 M/S
TDI: 0.04 M/S
TOTAL IRON BINDING CAPACITY: 198 UG/DL (ref 250–450)
TR MAX PG: 17 MMHG
TRANSFERRIN SERPL-MCNC: 134 MG/DL (ref 200–375)
TRICUSPID ANNULAR PLANE SYSTOLIC EXCURSION: 1.57 CM
TROPONIN I SERPL DL<=0.01 NG/ML-MCNC: 30.29 NG/ML (ref 0–0.03)
TROPONIN I SERPL DL<=0.01 NG/ML-MCNC: 30.32 NG/ML (ref 0–0.03)
TROPONIN I SERPL DL<=0.01 NG/ML-MCNC: 41.95 NG/ML (ref 0–0.03)
TSH SERPL DL<=0.005 MIU/L-ACNC: 2.66 UIU/ML (ref 0.4–4)
VANCOMYCIN SERPL-MCNC: 13 UG/ML
VANCOMYCIN SERPL-MCNC: 14.1 UG/ML
VANCOMYCIN SERPL-MCNC: 14.6 UG/ML
VANCOMYCIN SERPL-MCNC: 15.4 UG/ML
VANCOMYCIN SERPL-MCNC: 17.4 UG/ML
VANCOMYCIN SERPL-MCNC: 18.5 UG/ML
VANCOMYCIN SERPL-MCNC: 20.7 UG/ML
VANCOMYCIN SERPL-MCNC: 33.5 UG/ML
VIT B12 SERPL-MCNC: 1666 PG/ML (ref 210–950)
WBC # BLD AUTO: 10.02 K/UL (ref 3.9–12.7)
WBC # BLD AUTO: 10.36 K/UL (ref 3.9–12.7)
WBC # BLD AUTO: 11.01 K/UL (ref 3.9–12.7)
WBC # BLD AUTO: 14.85 K/UL (ref 3.9–12.7)
WBC # BLD AUTO: 7.42 K/UL (ref 3.9–12.7)
WBC # BLD AUTO: 8.08 K/UL (ref 3.9–12.7)
WBC # BLD AUTO: 8.44 K/UL (ref 3.9–12.7)
WBC # BLD AUTO: 8.93 K/UL (ref 3.9–12.7)
WBC # BLD AUTO: 8.94 K/UL (ref 3.9–12.7)
WBC # BLD AUTO: 9.34 K/UL (ref 3.9–12.7)
WBC # BLD AUTO: 9.59 K/UL (ref 3.9–12.7)
WBC # BLD AUTO: 9.94 K/UL (ref 3.9–12.7)

## 2022-01-01 PROCEDURE — 99232 SBSQ HOSP IP/OBS MODERATE 35: CPT | Mod: ,,, | Performed by: INTERNAL MEDICINE

## 2022-01-01 PROCEDURE — 27000646 HC AEROBIKA DEVICE

## 2022-01-01 PROCEDURE — 85610 PROTHROMBIN TIME: CPT | Performed by: INTERNAL MEDICINE

## 2022-01-01 PROCEDURE — 94761 N-INVAS EAR/PLS OXIMETRY MLT: CPT

## 2022-01-01 PROCEDURE — 90935 PR HEMODIALYSIS, ONE EVALUATION: ICD-10-PCS | Mod: ,,, | Performed by: INTERNAL MEDICINE

## 2022-01-01 PROCEDURE — 25000003 PHARM REV CODE 250

## 2022-01-01 PROCEDURE — 25000003 PHARM REV CODE 250: Performed by: INTERNAL MEDICINE

## 2022-01-01 PROCEDURE — 99232 PR SUBSEQUENT HOSPITAL CARE,LEVL II: ICD-10-PCS | Mod: GC,,, | Performed by: STUDENT IN AN ORGANIZED HEALTH CARE EDUCATION/TRAINING PROGRAM

## 2022-01-01 PROCEDURE — 25000242 PHARM REV CODE 250 ALT 637 W/ HCPCS

## 2022-01-01 PROCEDURE — 99900035 HC TECH TIME PER 15 MIN (STAT)

## 2022-01-01 PROCEDURE — C1769 GUIDE WIRE: HCPCS | Performed by: INTERNAL MEDICINE

## 2022-01-01 PROCEDURE — 94660 CPAP INITIATION&MGMT: CPT

## 2022-01-01 PROCEDURE — 85025 COMPLETE CBC W/AUTO DIFF WBC: CPT | Performed by: INTERNAL MEDICINE

## 2022-01-01 PROCEDURE — 99497 PR ADVNCD CARE PLAN 30 MIN: ICD-10-PCS | Mod: ,,, | Performed by: INTERNAL MEDICINE

## 2022-01-01 PROCEDURE — 92950 HEART/LUNG RESUSCITATION CPR: CPT

## 2022-01-01 PROCEDURE — 36415 COLL VENOUS BLD VENIPUNCTURE: CPT | Performed by: STUDENT IN AN ORGANIZED HEALTH CARE EDUCATION/TRAINING PROGRAM

## 2022-01-01 PROCEDURE — 99223 1ST HOSP IP/OBS HIGH 75: CPT | Mod: ,,, | Performed by: NURSE PRACTITIONER

## 2022-01-01 PROCEDURE — 94664 DEMO&/EVAL PT USE INHALER: CPT

## 2022-01-01 PROCEDURE — 63600175 PHARM REV CODE 636 W HCPCS: Performed by: INTERNAL MEDICINE

## 2022-01-01 PROCEDURE — 85730 THROMBOPLASTIN TIME PARTIAL: CPT | Mod: 91 | Performed by: INTERNAL MEDICINE

## 2022-01-01 PROCEDURE — 27000221 HC OXYGEN, UP TO 24 HOURS

## 2022-01-01 PROCEDURE — 80100016 HC MAINTENANCE HEMODIALYSIS

## 2022-01-01 PROCEDURE — 94760 N-INVAS EAR/PLS OXIMETRY 1: CPT

## 2022-01-01 PROCEDURE — 27000207 HC ISOLATION

## 2022-01-01 PROCEDURE — 99233 PR SUBSEQUENT HOSPITAL CARE,LEVL III: ICD-10-PCS | Mod: ,,, | Performed by: STUDENT IN AN ORGANIZED HEALTH CARE EDUCATION/TRAINING PROGRAM

## 2022-01-01 PROCEDURE — 87040 BLOOD CULTURE FOR BACTERIA: CPT | Performed by: INTERNAL MEDICINE

## 2022-01-01 PROCEDURE — 25000003 PHARM REV CODE 250: Performed by: STUDENT IN AN ORGANIZED HEALTH CARE EDUCATION/TRAINING PROGRAM

## 2022-01-01 PROCEDURE — 94640 AIRWAY INHALATION TREATMENT: CPT

## 2022-01-01 PROCEDURE — 20600001 HC STEP DOWN PRIVATE ROOM

## 2022-01-01 PROCEDURE — 85730 THROMBOPLASTIN TIME PARTIAL: CPT | Performed by: INTERNAL MEDICINE

## 2022-01-01 PROCEDURE — 80100014 HC HEMODIALYSIS 1:1

## 2022-01-01 PROCEDURE — 80069 RENAL FUNCTION PANEL: CPT

## 2022-01-01 PROCEDURE — 63600175 PHARM REV CODE 636 W HCPCS: Performed by: STUDENT IN AN ORGANIZED HEALTH CARE EDUCATION/TRAINING PROGRAM

## 2022-01-01 PROCEDURE — 99223 PR INITIAL HOSPITAL CARE,LEVL III: ICD-10-PCS | Mod: AI,,, | Performed by: INTERNAL MEDICINE

## 2022-01-01 PROCEDURE — 85730 THROMBOPLASTIN TIME PARTIAL: CPT | Performed by: STUDENT IN AN ORGANIZED HEALTH CARE EDUCATION/TRAINING PROGRAM

## 2022-01-01 PROCEDURE — 85060 PATHOLOGIST REVIEW: ICD-10-PCS | Mod: ,,, | Performed by: PATHOLOGY

## 2022-01-01 PROCEDURE — 93010 ELECTROCARDIOGRAM REPORT: CPT | Mod: ,,, | Performed by: INTERNAL MEDICINE

## 2022-01-01 PROCEDURE — 63600175 PHARM REV CODE 636 W HCPCS

## 2022-01-01 PROCEDURE — 99233 SBSQ HOSP IP/OBS HIGH 50: CPT | Mod: GC,,, | Performed by: STUDENT IN AN ORGANIZED HEALTH CARE EDUCATION/TRAINING PROGRAM

## 2022-01-01 PROCEDURE — 83735 ASSAY OF MAGNESIUM: CPT

## 2022-01-01 PROCEDURE — 36556 CENTRAL LINE: ICD-10-PCS | Mod: ,,, | Performed by: ANESTHESIOLOGY

## 2022-01-01 PROCEDURE — 99239 HOSP IP/OBS DSCHRG MGMT >30: CPT | Mod: GC,,, | Performed by: STUDENT IN AN ORGANIZED HEALTH CARE EDUCATION/TRAINING PROGRAM

## 2022-01-01 PROCEDURE — 36415 COLL VENOUS BLD VENIPUNCTURE: CPT | Performed by: INTERNAL MEDICINE

## 2022-01-01 PROCEDURE — 99223 1ST HOSP IP/OBS HIGH 75: CPT | Mod: ,,, | Performed by: STUDENT IN AN ORGANIZED HEALTH CARE EDUCATION/TRAINING PROGRAM

## 2022-01-01 PROCEDURE — 99152 MOD SED SAME PHYS/QHP 5/>YRS: CPT | Mod: ,,, | Performed by: INTERNAL MEDICINE

## 2022-01-01 PROCEDURE — 94799 UNLISTED PULMONARY SVC/PX: CPT

## 2022-01-01 PROCEDURE — 36589 PR REMOVAL TUNNELED CV CATH W/O SUBQ PORT OR PUMP: ICD-10-PCS | Mod: ,,, | Performed by: STUDENT IN AN ORGANIZED HEALTH CARE EDUCATION/TRAINING PROGRAM

## 2022-01-01 PROCEDURE — 80069 RENAL FUNCTION PANEL: CPT | Performed by: INTERNAL MEDICINE

## 2022-01-01 PROCEDURE — 97165 OT EVAL LOW COMPLEX 30 MIN: CPT

## 2022-01-01 PROCEDURE — 93010 EKG 12-LEAD: ICD-10-PCS | Mod: ,,, | Performed by: INTERNAL MEDICINE

## 2022-01-01 PROCEDURE — 80202 ASSAY OF VANCOMYCIN: CPT | Performed by: STUDENT IN AN ORGANIZED HEALTH CARE EDUCATION/TRAINING PROGRAM

## 2022-01-01 PROCEDURE — 99497 ADVNCD CARE PLAN 30 MIN: CPT | Mod: ,,, | Performed by: INTERNAL MEDICINE

## 2022-01-01 PROCEDURE — 99223 1ST HOSP IP/OBS HIGH 75: CPT | Mod: 25,,, | Performed by: INTERNAL MEDICINE

## 2022-01-01 PROCEDURE — 97535 SELF CARE MNGMENT TRAINING: CPT

## 2022-01-01 PROCEDURE — 36558 INSERT TUNNELED CV CATH: CPT | Mod: RT | Performed by: INTERNAL MEDICINE

## 2022-01-01 PROCEDURE — 84466 ASSAY OF TRANSFERRIN: CPT

## 2022-01-01 PROCEDURE — 99152 MOD SED SAME PHYS/QHP 5/>YRS: CPT | Performed by: INTERNAL MEDICINE

## 2022-01-01 PROCEDURE — 99233 SBSQ HOSP IP/OBS HIGH 50: CPT | Mod: ,,, | Performed by: STUDENT IN AN ORGANIZED HEALTH CARE EDUCATION/TRAINING PROGRAM

## 2022-01-01 PROCEDURE — 99239 PR HOSPITAL DISCHARGE DAY,>30 MIN: ICD-10-PCS | Mod: GC,,, | Performed by: STUDENT IN AN ORGANIZED HEALTH CARE EDUCATION/TRAINING PROGRAM

## 2022-01-01 PROCEDURE — 93454 CORONARY ARTERY ANGIO S&I: CPT | Performed by: INTERNAL MEDICINE

## 2022-01-01 PROCEDURE — 90935 HEMODIALYSIS ONE EVALUATION: CPT | Mod: ,,, | Performed by: NURSE PRACTITIONER

## 2022-01-01 PROCEDURE — 25000003 PHARM REV CODE 250: Performed by: NURSE PRACTITIONER

## 2022-01-01 PROCEDURE — 99223 PR INITIAL HOSPITAL CARE,LEVL III: ICD-10-PCS | Mod: 25,,, | Performed by: INTERNAL MEDICINE

## 2022-01-01 PROCEDURE — 36415 COLL VENOUS BLD VENIPUNCTURE: CPT

## 2022-01-01 PROCEDURE — 93005 ELECTROCARDIOGRAM TRACING: CPT

## 2022-01-01 PROCEDURE — 87340 HEPATITIS B SURFACE AG IA: CPT | Performed by: INTERNAL MEDICINE

## 2022-01-01 PROCEDURE — 99223 1ST HOSP IP/OBS HIGH 75: CPT | Mod: AI,,, | Performed by: INTERNAL MEDICINE

## 2022-01-01 PROCEDURE — 99233 PR SUBSEQUENT HOSPITAL CARE,LEVL III: ICD-10-PCS | Mod: GC,,, | Performed by: STUDENT IN AN ORGANIZED HEALTH CARE EDUCATION/TRAINING PROGRAM

## 2022-01-01 PROCEDURE — 99232 SBSQ HOSP IP/OBS MODERATE 35: CPT | Mod: GC,,, | Performed by: STUDENT IN AN ORGANIZED HEALTH CARE EDUCATION/TRAINING PROGRAM

## 2022-01-01 PROCEDURE — 90935 HEMODIALYSIS ONE EVALUATION: CPT | Mod: ,,, | Performed by: INTERNAL MEDICINE

## 2022-01-01 PROCEDURE — 86706 HEP B SURFACE ANTIBODY: CPT | Performed by: INTERNAL MEDICINE

## 2022-01-01 PROCEDURE — 77001 FLUOROGUIDE FOR VEIN DEVICE: CPT | Performed by: INTERNAL MEDICINE

## 2022-01-01 PROCEDURE — 93454 CORONARY ARTERY ANGIO S&I: CPT | Mod: 26,,, | Performed by: INTERNAL MEDICINE

## 2022-01-01 PROCEDURE — 85610 PROTHROMBIN TIME: CPT | Performed by: STUDENT IN AN ORGANIZED HEALTH CARE EDUCATION/TRAINING PROGRAM

## 2022-01-01 PROCEDURE — 85025 COMPLETE CBC W/AUTO DIFF WBC: CPT | Performed by: STUDENT IN AN ORGANIZED HEALTH CARE EDUCATION/TRAINING PROGRAM

## 2022-01-01 PROCEDURE — 85045 AUTOMATED RETICULOCYTE COUNT: CPT

## 2022-01-01 PROCEDURE — 99223 PR INITIAL HOSPITAL CARE,LEVL III: ICD-10-PCS | Mod: ,,, | Performed by: STUDENT IN AN ORGANIZED HEALTH CARE EDUCATION/TRAINING PROGRAM

## 2022-01-01 PROCEDURE — 27000190 HC CPAP FULL FACE MASK W/VALVE

## 2022-01-01 PROCEDURE — 87040 BLOOD CULTURE FOR BACTERIA: CPT | Performed by: STUDENT IN AN ORGANIZED HEALTH CARE EDUCATION/TRAINING PROGRAM

## 2022-01-01 PROCEDURE — 92950 PR HEART/LUNG RESUSCITATION (CPR): ICD-10-PCS | Mod: ,,, | Performed by: NURSE PRACTITIONER

## 2022-01-01 PROCEDURE — 97161 PT EVAL LOW COMPLEX 20 MIN: CPT

## 2022-01-01 PROCEDURE — 80202 ASSAY OF VANCOMYCIN: CPT

## 2022-01-01 PROCEDURE — 99223 PR INITIAL HOSPITAL CARE,LEVL III: ICD-10-PCS | Mod: ,,, | Performed by: INTERNAL MEDICINE

## 2022-01-01 PROCEDURE — 20000000 HC ICU ROOM

## 2022-01-01 PROCEDURE — 99233 SBSQ HOSP IP/OBS HIGH 50: CPT | Mod: ,,, | Performed by: INTERNAL MEDICINE

## 2022-01-01 PROCEDURE — 25500020 PHARM REV CODE 255: Performed by: INTERNAL MEDICINE

## 2022-01-01 PROCEDURE — 99223 PR INITIAL HOSPITAL CARE,LEVL III: ICD-10-PCS | Mod: ,,, | Performed by: NURSE PRACTITIONER

## 2022-01-01 PROCEDURE — 25000003 PHARM REV CODE 250: Performed by: PHYSICIAN ASSISTANT

## 2022-01-01 PROCEDURE — 63600175 PHARM REV CODE 636 W HCPCS: Performed by: NURSE PRACTITIONER

## 2022-01-01 PROCEDURE — 97110 THERAPEUTIC EXERCISES: CPT

## 2022-01-01 PROCEDURE — 36558 PR INSERT TUNNELED CV CATH W/O PORT OR PUMP: ICD-10-PCS | Mod: RT,,, | Performed by: INTERNAL MEDICINE

## 2022-01-01 PROCEDURE — C1750 CATH, HEMODIALYSIS,LONG-TERM: HCPCS | Performed by: INTERNAL MEDICINE

## 2022-01-01 PROCEDURE — 93454 PR CATH PLACE/CORONARY ANGIO, IMG SUPER/INTERP: ICD-10-PCS | Mod: 26,,, | Performed by: INTERNAL MEDICINE

## 2022-01-01 PROCEDURE — 36556 INSERT NON-TUNNEL CV CATH: CPT | Mod: ,,, | Performed by: ANESTHESIOLOGY

## 2022-01-01 PROCEDURE — 85025 COMPLETE CBC W/AUTO DIFF WBC: CPT

## 2022-01-01 PROCEDURE — C9399 UNCLASSIFIED DRUGS OR BIOLOG: HCPCS | Performed by: INTERNAL MEDICINE

## 2022-01-01 PROCEDURE — 99233 PR SUBSEQUENT HOSPITAL CARE,LEVL III: ICD-10-PCS | Mod: ,,, | Performed by: INTERNAL MEDICINE

## 2022-01-01 PROCEDURE — 99232 PR SUBSEQUENT HOSPITAL CARE,LEVL II: ICD-10-PCS | Mod: ,,, | Performed by: INTERNAL MEDICINE

## 2022-01-01 PROCEDURE — C9399 UNCLASSIFIED DRUGS OR BIOLOG: HCPCS

## 2022-01-01 PROCEDURE — 99239 HOSP IP/OBS DSCHRG MGMT >30: CPT | Mod: ,,, | Performed by: STUDENT IN AN ORGANIZED HEALTH CARE EDUCATION/TRAINING PROGRAM

## 2022-01-01 PROCEDURE — 77001 CHG FLUOROGUIDE CNTRL VEN ACCESS,PLACE,REPLACE,REMOVE: ICD-10-PCS | Mod: 26,,, | Performed by: INTERNAL MEDICINE

## 2022-01-01 PROCEDURE — 82728 ASSAY OF FERRITIN: CPT

## 2022-01-01 PROCEDURE — C1894 INTRO/SHEATH, NON-LASER: HCPCS | Performed by: INTERNAL MEDICINE

## 2022-01-01 PROCEDURE — 85025 COMPLETE CBC W/AUTO DIFF WBC: CPT | Mod: 91

## 2022-01-01 PROCEDURE — 83970 ASSAY OF PARATHORMONE: CPT | Performed by: INTERNAL MEDICINE

## 2022-01-01 PROCEDURE — 99153 MOD SED SAME PHYS/QHP EA: CPT | Performed by: INTERNAL MEDICINE

## 2022-01-01 PROCEDURE — 99239 PR HOSPITAL DISCHARGE DAY,>30 MIN: ICD-10-PCS | Mod: ,,, | Performed by: STUDENT IN AN ORGANIZED HEALTH CARE EDUCATION/TRAINING PROGRAM

## 2022-01-01 PROCEDURE — 84443 ASSAY THYROID STIM HORMONE: CPT | Performed by: INTERNAL MEDICINE

## 2022-01-01 PROCEDURE — 36589 REMOVAL TUNNELED CV CATH: CPT | Mod: ,,, | Performed by: STUDENT IN AN ORGANIZED HEALTH CARE EDUCATION/TRAINING PROGRAM

## 2022-01-01 PROCEDURE — 83036 HEMOGLOBIN GLYCOSYLATED A1C: CPT | Performed by: INTERNAL MEDICINE

## 2022-01-01 PROCEDURE — 92950 HEART/LUNG RESUSCITATION CPR: CPT | Mod: ,,, | Performed by: NURSE PRACTITIONER

## 2022-01-01 PROCEDURE — 99223 1ST HOSP IP/OBS HIGH 75: CPT | Mod: ,,, | Performed by: INTERNAL MEDICINE

## 2022-01-01 PROCEDURE — 99152 PR MOD CONSCIOUS SEDATION, SAME PHYS, 5+ YRS, FIRST 15 MIN: ICD-10-PCS | Mod: ,,, | Performed by: INTERNAL MEDICINE

## 2022-01-01 PROCEDURE — 85060 BLOOD SMEAR INTERPRETATION: CPT | Mod: ,,, | Performed by: PATHOLOGY

## 2022-01-01 PROCEDURE — U0002 COVID-19 LAB TEST NON-CDC: HCPCS | Performed by: STUDENT IN AN ORGANIZED HEALTH CARE EDUCATION/TRAINING PROGRAM

## 2022-01-01 PROCEDURE — C1887 CATHETER, GUIDING: HCPCS | Performed by: INTERNAL MEDICINE

## 2022-01-01 PROCEDURE — 84484 ASSAY OF TROPONIN QUANT: CPT | Performed by: STUDENT IN AN ORGANIZED HEALTH CARE EDUCATION/TRAINING PROGRAM

## 2022-01-01 PROCEDURE — 82607 VITAMIN B-12: CPT

## 2022-01-01 PROCEDURE — 82746 ASSAY OF FOLIC ACID SERUM: CPT

## 2022-01-01 PROCEDURE — 36558 INSERT TUNNELED CV CATH: CPT | Mod: RT,,, | Performed by: INTERNAL MEDICINE

## 2022-01-01 PROCEDURE — 84439 ASSAY OF FREE THYROXINE: CPT | Performed by: INTERNAL MEDICINE

## 2022-01-01 PROCEDURE — 84484 ASSAY OF TROPONIN QUANT: CPT | Mod: 91 | Performed by: INTERNAL MEDICINE

## 2022-01-01 PROCEDURE — 90935 PR HEMODIALYSIS, ONE EVALUATION: ICD-10-PCS | Mod: ,,, | Performed by: NURSE PRACTITIONER

## 2022-01-01 PROCEDURE — 97116 GAIT TRAINING THERAPY: CPT

## 2022-01-01 PROCEDURE — 77001 FLUOROGUIDE FOR VEIN DEVICE: CPT | Mod: 26,,, | Performed by: INTERNAL MEDICINE

## 2022-01-01 DEVICE — 15.5F X 32CM TITAN15.5F X 32CM T HD CATHETERHD CATHETER (CUFF 27CM FROM TIP)(CUFF 27CM F
Type: IMPLANTABLE DEVICE | Site: HEART | Status: FUNCTIONAL
Brand: MEDCOMP HEMO-FLOW  DOUBLE LUMEN CATHETERMEDCOMP HEMO-FLOW  DOUBLE LUMEN CATHETER

## 2022-01-01 RX ORDER — NALOXONE HCL 0.4 MG/ML
0.02 VIAL (ML) INJECTION
Status: DISCONTINUED | OUTPATIENT
Start: 2022-01-01 | End: 2022-01-01 | Stop reason: HOSPADM

## 2022-01-01 RX ORDER — SODIUM CHLORIDE 9 MG/ML
INJECTION, SOLUTION INTRAVENOUS CONTINUOUS
Status: CANCELLED | OUTPATIENT
Start: 2022-01-01 | End: 2022-01-01

## 2022-01-01 RX ORDER — IBUPROFEN 200 MG
16 TABLET ORAL
Status: DISCONTINUED | OUTPATIENT
Start: 2022-01-01 | End: 2022-01-01 | Stop reason: HOSPADM

## 2022-01-01 RX ORDER — BENZONATATE 100 MG/1
200 CAPSULE ORAL 3 TIMES DAILY PRN
Status: DISCONTINUED | OUTPATIENT
Start: 2022-01-01 | End: 2022-01-01 | Stop reason: HOSPADM

## 2022-01-01 RX ORDER — SODIUM CHLORIDE 0.9 % (FLUSH) 0.9 %
10 SYRINGE (ML) INJECTION
Status: DISCONTINUED | OUTPATIENT
Start: 2022-01-01 | End: 2022-01-01 | Stop reason: HOSPADM

## 2022-01-01 RX ORDER — INSULIN ASPART 100 [IU]/ML
5 INJECTION, SOLUTION INTRAVENOUS; SUBCUTANEOUS
Status: DISCONTINUED | OUTPATIENT
Start: 2022-01-01 | End: 2022-01-01

## 2022-01-01 RX ORDER — SODIUM CHLORIDE 9 MG/ML
INJECTION, SOLUTION INTRAVENOUS ONCE
Status: DISCONTINUED | OUTPATIENT
Start: 2022-01-01 | End: 2022-01-01 | Stop reason: HOSPADM

## 2022-01-01 RX ORDER — SODIUM CHLORIDE 9 MG/ML
INJECTION, SOLUTION INTRAVENOUS ONCE
Status: COMPLETED | OUTPATIENT
Start: 2022-01-01 | End: 2022-01-01

## 2022-01-01 RX ORDER — GLUCAGON 1 MG
1 KIT INJECTION
Status: DISCONTINUED | OUTPATIENT
Start: 2022-01-01 | End: 2022-01-01 | Stop reason: HOSPADM

## 2022-01-01 RX ORDER — HEPARIN SODIUM 1000 [USP'U]/ML
1000 INJECTION, SOLUTION INTRAVENOUS; SUBCUTANEOUS
Status: DISCONTINUED | OUTPATIENT
Start: 2022-01-01 | End: 2022-01-01

## 2022-01-01 RX ORDER — SODIUM CHLORIDE 9 MG/ML
INJECTION, SOLUTION INTRAVENOUS
Status: DISCONTINUED | OUTPATIENT
Start: 2022-01-01 | End: 2022-01-01

## 2022-01-01 RX ORDER — NAPROXEN SODIUM 220 MG/1
81 TABLET, FILM COATED ORAL DAILY
Status: DISCONTINUED | OUTPATIENT
Start: 2022-01-01 | End: 2022-01-01

## 2022-01-01 RX ORDER — NITROGLYCERIN 0.3 MG/1
0.3 TABLET SUBLINGUAL EVERY 5 MIN PRN
Qty: 30 TABLET | Refills: 2 | Status: SHIPPED | OUTPATIENT
Start: 2022-01-01 | End: 2023-03-09

## 2022-01-01 RX ORDER — LEVOTHYROXINE SODIUM 75 UG/1
75 TABLET ORAL
Status: DISCONTINUED | OUTPATIENT
Start: 2022-01-01 | End: 2022-01-01 | Stop reason: HOSPADM

## 2022-01-01 RX ORDER — CEFAZOLIN SODIUM 2 G/50ML
2 SOLUTION INTRAVENOUS
Status: DISCONTINUED | OUTPATIENT
Start: 2022-01-01 | End: 2022-01-01

## 2022-01-01 RX ORDER — ONDANSETRON 2 MG/ML
4 INJECTION INTRAMUSCULAR; INTRAVENOUS EVERY 8 HOURS PRN
Status: DISCONTINUED | OUTPATIENT
Start: 2022-01-01 | End: 2022-01-01 | Stop reason: HOSPADM

## 2022-01-01 RX ORDER — CLOPIDOGREL BISULFATE 75 MG/1
75 TABLET ORAL DAILY
Status: DISCONTINUED | OUTPATIENT
Start: 2022-01-01 | End: 2022-01-01

## 2022-01-01 RX ORDER — INSULIN ASPART 100 [IU]/ML
1-10 INJECTION, SOLUTION INTRAVENOUS; SUBCUTANEOUS
Status: DISCONTINUED | OUTPATIENT
Start: 2022-01-01 | End: 2022-01-01 | Stop reason: HOSPADM

## 2022-01-01 RX ORDER — DEXTROSE MONOHYDRATE 100 MG/ML
INJECTION, SOLUTION INTRAVENOUS CONTINUOUS
Status: DISCONTINUED | OUTPATIENT
Start: 2022-01-01 | End: 2022-01-01

## 2022-01-01 RX ORDER — CARVEDILOL 3.12 MG/1
3.12 TABLET ORAL 2 TIMES DAILY
Qty: 30 TABLET | Refills: 1 | Status: SHIPPED | OUTPATIENT
Start: 2022-01-01

## 2022-01-01 RX ORDER — MORPHINE SULFATE 2 MG/ML
4 INJECTION, SOLUTION INTRAMUSCULAR; INTRAVENOUS ONCE
Status: COMPLETED | OUTPATIENT
Start: 2022-01-01 | End: 2022-01-01

## 2022-01-01 RX ORDER — CEFAZOLIN SODIUM/D5W 2 G/100 ML
2 PLASTIC BAG, INJECTION (ML) INTRAVENOUS
Status: DISCONTINUED | OUTPATIENT
Start: 2022-01-01 | End: 2022-01-01

## 2022-01-01 RX ORDER — MAGNESIUM SULFATE HEPTAHYDRATE 40 MG/ML
2 INJECTION, SOLUTION INTRAVENOUS ONCE
Status: COMPLETED | OUTPATIENT
Start: 2022-01-01 | End: 2022-01-01

## 2022-01-01 RX ORDER — HEPARIN SOD,PORCINE/0.9 % NACL 1000/500ML
INTRAVENOUS SOLUTION INTRAVENOUS
Status: DISCONTINUED | OUTPATIENT
Start: 2022-01-01 | End: 2022-01-01 | Stop reason: HOSPADM

## 2022-01-01 RX ORDER — CITALOPRAM 20 MG/1
20 TABLET, FILM COATED ORAL DAILY
Status: DISCONTINUED | OUTPATIENT
Start: 2022-01-01 | End: 2022-01-01

## 2022-01-01 RX ORDER — MUPIROCIN 20 MG/G
OINTMENT TOPICAL 2 TIMES DAILY
Status: DISCONTINUED | OUTPATIENT
Start: 2022-01-01 | End: 2022-01-01

## 2022-01-01 RX ORDER — POLYETHYLENE GLYCOL 3350 17 G/17G
17 POWDER, FOR SOLUTION ORAL DAILY
Status: DISCONTINUED | OUTPATIENT
Start: 2022-01-01 | End: 2022-01-01

## 2022-01-01 RX ORDER — MORPHINE SULFATE 2 MG/ML
1 INJECTION, SOLUTION INTRAMUSCULAR; INTRAVENOUS
Status: DISCONTINUED | OUTPATIENT
Start: 2022-01-01 | End: 2022-01-01 | Stop reason: HOSPADM

## 2022-01-01 RX ORDER — NAPROXEN SODIUM 220 MG/1
81 TABLET, FILM COATED ORAL DAILY
Status: DISCONTINUED | OUTPATIENT
Start: 2022-01-01 | End: 2022-01-01 | Stop reason: HOSPADM

## 2022-01-01 RX ORDER — IBUPROFEN 200 MG
24 TABLET ORAL
Status: DISCONTINUED | OUTPATIENT
Start: 2022-01-01 | End: 2022-01-01 | Stop reason: HOSPADM

## 2022-01-01 RX ORDER — LORAZEPAM 2 MG/ML
1 INJECTION INTRAMUSCULAR EVERY 30 MIN PRN
Status: DISCONTINUED | OUTPATIENT
Start: 2022-01-01 | End: 2022-01-01 | Stop reason: HOSPADM

## 2022-01-01 RX ORDER — ACETAMINOPHEN 325 MG/1
650 TABLET ORAL EVERY 4 HOURS PRN
Status: DISCONTINUED | OUTPATIENT
Start: 2022-01-01 | End: 2022-01-01

## 2022-01-01 RX ORDER — NAPROXEN SODIUM 220 MG/1
81 TABLET, FILM COATED ORAL DAILY
Qty: 30 TABLET | Refills: 0 | Status: SHIPPED | OUTPATIENT
Start: 2022-01-01

## 2022-01-01 RX ORDER — SEVELAMER CARBONATE 800 MG/1
800 TABLET, FILM COATED ORAL
Status: DISCONTINUED | OUTPATIENT
Start: 2022-01-01 | End: 2022-01-01 | Stop reason: HOSPADM

## 2022-01-01 RX ORDER — SODIUM CHLORIDE 9 MG/ML
INJECTION, SOLUTION INTRAVENOUS ONCE
Status: DISCONTINUED | OUTPATIENT
Start: 2022-01-01 | End: 2022-01-01

## 2022-01-01 RX ORDER — MORPHINE SULFATE 2 MG/ML
2 INJECTION, SOLUTION INTRAMUSCULAR; INTRAVENOUS EVERY 30 MIN PRN
Status: DISCONTINUED | OUTPATIENT
Start: 2022-01-01 | End: 2022-01-01 | Stop reason: HOSPADM

## 2022-01-01 RX ORDER — IPRATROPIUM BROMIDE AND ALBUTEROL SULFATE 2.5; .5 MG/3ML; MG/3ML
3 SOLUTION RESPIRATORY (INHALATION)
Status: DISCONTINUED | OUTPATIENT
Start: 2022-01-01 | End: 2022-01-01 | Stop reason: HOSPADM

## 2022-01-01 RX ORDER — LIDOCAINE HYDROCHLORIDE 10 MG/ML
1 INJECTION INFILTRATION; PERINEURAL ONCE
Status: COMPLETED | OUTPATIENT
Start: 2022-01-01 | End: 2022-01-01

## 2022-01-01 RX ORDER — HEPARIN SODIUM 5000 [USP'U]/ML
5000 INJECTION, SOLUTION INTRAVENOUS; SUBCUTANEOUS
Status: DISPENSED | OUTPATIENT
Start: 2022-01-01 | End: 2022-01-01

## 2022-01-01 RX ORDER — PANTOPRAZOLE SODIUM 40 MG/1
40 TABLET, DELAYED RELEASE ORAL DAILY
Status: DISCONTINUED | OUTPATIENT
Start: 2022-01-01 | End: 2022-01-01 | Stop reason: HOSPADM

## 2022-01-01 RX ORDER — PANTOPRAZOLE SODIUM 40 MG/1
40 TABLET, DELAYED RELEASE ORAL DAILY
Status: DISCONTINUED | OUTPATIENT
Start: 2022-01-01 | End: 2022-01-01

## 2022-01-01 RX ORDER — CITALOPRAM 20 MG/1
20 TABLET, FILM COATED ORAL DAILY
Status: DISCONTINUED | OUTPATIENT
Start: 2022-01-01 | End: 2022-01-01 | Stop reason: HOSPADM

## 2022-01-01 RX ORDER — SEVELAMER CARBONATE 800 MG/1
800 TABLET, FILM COATED ORAL
Status: DISCONTINUED | OUTPATIENT
Start: 2022-01-01 | End: 2022-01-01

## 2022-01-01 RX ORDER — HYDROXYZINE HYDROCHLORIDE 25 MG/1
50 TABLET, FILM COATED ORAL EVERY 4 HOURS PRN
Status: DISCONTINUED | OUTPATIENT
Start: 2022-01-01 | End: 2022-01-01 | Stop reason: HOSPADM

## 2022-01-01 RX ORDER — INSULIN ASPART 100 [IU]/ML
0-5 INJECTION, SOLUTION INTRAVENOUS; SUBCUTANEOUS
Status: DISCONTINUED | OUTPATIENT
Start: 2022-01-01 | End: 2022-01-01

## 2022-01-01 RX ORDER — CARVEDILOL 25 MG/1
25 TABLET ORAL 2 TIMES DAILY
Status: DISCONTINUED | OUTPATIENT
Start: 2022-01-01 | End: 2022-01-01

## 2022-01-01 RX ORDER — CARVEDILOL 12.5 MG/1
12.5 TABLET ORAL 2 TIMES DAILY
Status: DISCONTINUED | OUTPATIENT
Start: 2022-01-01 | End: 2022-01-01

## 2022-01-01 RX ORDER — NITROGLYCERIN 400 UG/1
SPRAY ORAL
Status: DISCONTINUED | OUTPATIENT
Start: 2022-01-01 | End: 2022-01-01

## 2022-01-01 RX ORDER — HEPARIN SODIUM 1000 [USP'U]/ML
1000 INJECTION, SOLUTION INTRAVENOUS; SUBCUTANEOUS
Status: DISCONTINUED | OUTPATIENT
Start: 2022-01-01 | End: 2022-01-01 | Stop reason: HOSPADM

## 2022-01-01 RX ORDER — ATORVASTATIN CALCIUM 20 MG/1
80 TABLET, FILM COATED ORAL DAILY
Status: DISCONTINUED | OUTPATIENT
Start: 2022-01-01 | End: 2022-01-01 | Stop reason: HOSPADM

## 2022-01-01 RX ORDER — MIDAZOLAM HYDROCHLORIDE 1 MG/ML
INJECTION, SOLUTION INTRAMUSCULAR; INTRAVENOUS
Status: DISCONTINUED | OUTPATIENT
Start: 2022-01-01 | End: 2022-01-01 | Stop reason: HOSPADM

## 2022-01-01 RX ORDER — CARVEDILOL 12.5 MG/1
12.5 TABLET ORAL 2 TIMES DAILY
Status: COMPLETED | OUTPATIENT
Start: 2022-01-01 | End: 2022-01-01

## 2022-01-01 RX ORDER — LOSARTAN POTASSIUM 25 MG/1
25 TABLET ORAL ONCE
Status: COMPLETED | OUTPATIENT
Start: 2022-01-01 | End: 2022-01-01

## 2022-01-01 RX ORDER — CLINDAMYCIN PHOSPHATE 600 MG/50ML
600 INJECTION, SOLUTION INTRAVENOUS
Status: DISCONTINUED | OUTPATIENT
Start: 2022-01-01 | End: 2022-01-01

## 2022-01-01 RX ORDER — INSULIN ASPART 100 [IU]/ML
3 INJECTION, SOLUTION INTRAVENOUS; SUBCUTANEOUS
Status: DISCONTINUED | OUTPATIENT
Start: 2022-01-01 | End: 2022-01-01

## 2022-01-01 RX ORDER — ACETAMINOPHEN 325 MG/1
650 TABLET ORAL EVERY 6 HOURS PRN
Status: DISCONTINUED | OUTPATIENT
Start: 2022-01-01 | End: 2022-01-01 | Stop reason: HOSPADM

## 2022-01-01 RX ORDER — HEPARIN SODIUM,PORCINE/D5W 25000/250
0-40 INTRAVENOUS SOLUTION INTRAVENOUS CONTINUOUS
Status: DISCONTINUED | OUTPATIENT
Start: 2022-01-01 | End: 2022-01-01

## 2022-01-01 RX ORDER — MORPHINE SULFATE IN 0.9 % NACL 30 MG/30ML
2 PATIENT CONTROLLED ANALGESIA SYRINGE INTRAVENOUS CONTINUOUS
Status: DISCONTINUED | OUTPATIENT
Start: 2022-01-01 | End: 2022-01-01 | Stop reason: HOSPADM

## 2022-01-01 RX ORDER — ATORVASTATIN CALCIUM 20 MG/1
80 TABLET, FILM COATED ORAL DAILY
Status: DISCONTINUED | OUTPATIENT
Start: 2022-01-01 | End: 2022-01-01

## 2022-01-01 RX ORDER — LABETALOL HCL 20 MG/4 ML
10 SYRINGE (ML) INTRAVENOUS EVERY 4 HOURS PRN
Status: DISCONTINUED | OUTPATIENT
Start: 2022-01-01 | End: 2022-01-01 | Stop reason: HOSPADM

## 2022-01-01 RX ORDER — LABETALOL HCL 20 MG/4 ML
10 SYRINGE (ML) INTRAVENOUS EVERY 4 HOURS PRN
Status: DISCONTINUED | OUTPATIENT
Start: 2022-01-01 | End: 2022-01-01

## 2022-01-01 RX ORDER — ONDANSETRON 4 MG/1
4 TABLET, ORALLY DISINTEGRATING ORAL EVERY 8 HOURS PRN
Status: DISCONTINUED | OUTPATIENT
Start: 2022-01-01 | End: 2022-01-01 | Stop reason: HOSPADM

## 2022-01-01 RX ORDER — INSULIN ASPART 100 [IU]/ML
8 INJECTION, SOLUTION INTRAVENOUS; SUBCUTANEOUS
Status: DISCONTINUED | OUTPATIENT
Start: 2022-01-01 | End: 2022-01-01 | Stop reason: HOSPADM

## 2022-01-01 RX ORDER — LEVOTHYROXINE SODIUM 75 UG/1
75 TABLET ORAL
Status: DISCONTINUED | OUTPATIENT
Start: 2022-01-01 | End: 2022-01-01

## 2022-01-01 RX ORDER — MIDAZOLAM HYDROCHLORIDE 1 MG/ML
INJECTION, SOLUTION INTRAMUSCULAR; INTRAVENOUS
Status: DISCONTINUED | OUTPATIENT
Start: 2022-01-01 | End: 2022-01-01

## 2022-01-01 RX ORDER — FUROSEMIDE 10 MG/ML
40 INJECTION INTRAMUSCULAR; INTRAVENOUS ONCE
Status: COMPLETED | OUTPATIENT
Start: 2022-01-01 | End: 2022-01-01

## 2022-01-01 RX ORDER — LOSARTAN POTASSIUM 25 MG/1
25 TABLET ORAL DAILY
Status: DISCONTINUED | OUTPATIENT
Start: 2022-01-01 | End: 2022-01-01

## 2022-01-01 RX ORDER — SEVELAMER CARBONATE 800 MG/1
800 TABLET, FILM COATED ORAL
Qty: 90 TABLET | Refills: 11 | Status: SHIPPED | OUTPATIENT
Start: 2022-01-01 | End: 2023-03-11

## 2022-01-01 RX ORDER — ONDANSETRON 2 MG/ML
INJECTION INTRAMUSCULAR; INTRAVENOUS
Status: DISCONTINUED | OUTPATIENT
Start: 2022-01-01 | End: 2022-01-01

## 2022-01-01 RX ORDER — MORPHINE SULFATE 2 MG/ML
INJECTION, SOLUTION INTRAMUSCULAR; INTRAVENOUS
Status: COMPLETED
Start: 2022-01-01 | End: 2022-01-01

## 2022-01-01 RX ORDER — MUPIROCIN 20 MG/G
OINTMENT TOPICAL 2 TIMES DAILY
Status: COMPLETED | OUTPATIENT
Start: 2022-01-01 | End: 2022-01-01

## 2022-01-01 RX ORDER — LIDOCAINE HYDROCHLORIDE AND EPINEPHRINE 10; 10 MG/ML; UG/ML
INJECTION, SOLUTION INFILTRATION; PERINEURAL
Status: DISCONTINUED | OUTPATIENT
Start: 2022-01-01 | End: 2022-01-01

## 2022-01-01 RX ORDER — ACETAMINOPHEN 10 MG/ML
INJECTION, SOLUTION INTRAVENOUS
Status: DISCONTINUED | OUTPATIENT
Start: 2022-01-01 | End: 2022-01-01

## 2022-01-01 RX ORDER — DIAZEPAM 5 MG/1
5 TABLET ORAL
Status: DISCONTINUED | OUTPATIENT
Start: 2022-01-01 | End: 2022-01-01

## 2022-01-01 RX ORDER — TALC
6 POWDER (GRAM) TOPICAL NIGHTLY PRN
Status: DISCONTINUED | OUTPATIENT
Start: 2022-01-01 | End: 2022-01-01 | Stop reason: HOSPADM

## 2022-01-01 RX ORDER — CLOPIDOGREL BISULFATE 75 MG/1
75 TABLET ORAL DAILY
Status: DISCONTINUED | OUTPATIENT
Start: 2022-01-01 | End: 2022-01-01 | Stop reason: HOSPADM

## 2022-01-01 RX ADMIN — DEXTROSE: 10 SOLUTION INTRAVENOUS at 09:03

## 2022-01-01 RX ADMIN — CARVEDILOL 25 MG: 25 TABLET, FILM COATED ORAL at 08:03

## 2022-01-01 RX ADMIN — LEVOTHYROXINE SODIUM 75 MCG: 75 TABLET ORAL at 06:03

## 2022-01-01 RX ADMIN — CARVEDILOL 12.5 MG: 12.5 TABLET, FILM COATED ORAL at 08:03

## 2022-01-01 RX ADMIN — INSULIN ASPART 2 UNITS: 100 INJECTION, SOLUTION INTRAVENOUS; SUBCUTANEOUS at 05:03

## 2022-01-01 RX ADMIN — CITALOPRAM HYDROBROMIDE 20 MG: 20 TABLET ORAL at 08:03

## 2022-01-01 RX ADMIN — LEVOTHYROXINE SODIUM 75 MCG: 75 TABLET ORAL at 05:03

## 2022-01-01 RX ADMIN — NEPHROCAP 1 CAPSULE: 1 CAP ORAL at 07:03

## 2022-01-01 RX ADMIN — LORAZEPAM 1 MG: 2 INJECTION INTRAMUSCULAR; INTRAVENOUS at 03:03

## 2022-01-01 RX ADMIN — IPRATROPIUM BROMIDE AND ALBUTEROL SULFATE 3 ML: 2.5; .5 SOLUTION RESPIRATORY (INHALATION) at 01:03

## 2022-01-01 RX ADMIN — INSULIN ASPART 8 UNITS: 100 INJECTION, SOLUTION INTRAVENOUS; SUBCUTANEOUS at 04:03

## 2022-01-01 RX ADMIN — IPRATROPIUM BROMIDE AND ALBUTEROL SULFATE 3 ML: 2.5; .5 SOLUTION RESPIRATORY (INHALATION) at 08:03

## 2022-01-01 RX ADMIN — ASPIRIN 81 MG CHEWABLE TABLET 81 MG: 81 TABLET CHEWABLE at 08:03

## 2022-01-01 RX ADMIN — VANCOMYCIN HYDROCHLORIDE 500 MG: 500 INJECTION, POWDER, LYOPHILIZED, FOR SOLUTION INTRAVENOUS at 03:03

## 2022-01-01 RX ADMIN — Medication 6 MG: at 08:03

## 2022-01-01 RX ADMIN — HYDROXYZINE HYDROCHLORIDE 50 MG: 25 TABLET ORAL at 12:03

## 2022-01-01 RX ADMIN — Medication 6 MG: at 09:03

## 2022-01-01 RX ADMIN — HEPARIN SODIUM 1000 UNITS: 1000 INJECTION, SOLUTION INTRAVENOUS; SUBCUTANEOUS at 10:03

## 2022-01-01 RX ADMIN — INSULIN ASPART 5 UNITS: 100 INJECTION, SOLUTION INTRAVENOUS; SUBCUTANEOUS at 12:03

## 2022-01-01 RX ADMIN — ONDANSETRON 4 MG: 4 TABLET, ORALLY DISINTEGRATING ORAL at 07:03

## 2022-01-01 RX ADMIN — NEPHROCAP 1 CAPSULE: 1 CAP ORAL at 08:03

## 2022-01-01 RX ADMIN — INSULIN ASPART 5 UNITS: 100 INJECTION, SOLUTION INTRAVENOUS; SUBCUTANEOUS at 05:03

## 2022-01-01 RX ADMIN — INSULIN ASPART 2 UNITS: 100 INJECTION, SOLUTION INTRAVENOUS; SUBCUTANEOUS at 09:03

## 2022-01-01 RX ADMIN — DEXTROSE 250 ML: 10 SOLUTION INTRAVENOUS at 08:03

## 2022-01-01 RX ADMIN — HEPARIN SODIUM 14 UNITS/KG/HR: 5000 INJECTION INTRAVENOUS; SUBCUTANEOUS at 11:03

## 2022-01-01 RX ADMIN — INSULIN ASPART 2 UNITS: 100 INJECTION, SOLUTION INTRAVENOUS; SUBCUTANEOUS at 06:03

## 2022-01-01 RX ADMIN — PANTOPRAZOLE SODIUM 40 MG: 40 TABLET, DELAYED RELEASE ORAL at 08:03

## 2022-01-01 RX ADMIN — INSULIN ASPART 8 UNITS: 100 INJECTION, SOLUTION INTRAVENOUS; SUBCUTANEOUS at 07:03

## 2022-01-01 RX ADMIN — INSULIN DETEMIR 20 UNITS: 100 INJECTION, SOLUTION SUBCUTANEOUS at 08:03

## 2022-01-01 RX ADMIN — Medication 2 G: at 02:03

## 2022-01-01 RX ADMIN — INSULIN ASPART 1 UNITS: 100 INJECTION, SOLUTION INTRAVENOUS; SUBCUTANEOUS at 08:03

## 2022-01-01 RX ADMIN — INSULIN ASPART 5 UNITS: 100 INJECTION, SOLUTION INTRAVENOUS; SUBCUTANEOUS at 11:03

## 2022-01-01 RX ADMIN — ACETAMINOPHEN 650 MG: 325 TABLET ORAL at 08:03

## 2022-01-01 RX ADMIN — SEVELAMER CARBONATE 800 MG: 800 TABLET, FILM COATED ORAL at 08:03

## 2022-01-01 RX ADMIN — NEPHROCAP 1 CAPSULE: 1 CAP ORAL at 12:03

## 2022-01-01 RX ADMIN — INSULIN ASPART 8 UNITS: 100 INJECTION, SOLUTION INTRAVENOUS; SUBCUTANEOUS at 12:03

## 2022-01-01 RX ADMIN — ATORVASTATIN CALCIUM 80 MG: 20 TABLET, FILM COATED ORAL at 08:03

## 2022-01-01 RX ADMIN — HYDROXYZINE HYDROCHLORIDE 50 MG: 25 TABLET ORAL at 08:03

## 2022-01-01 RX ADMIN — SODIUM CHLORIDE: 0.9 INJECTION, SOLUTION INTRAVENOUS at 03:03

## 2022-01-01 RX ADMIN — SEVELAMER CARBONATE 800 MG: 800 TABLET, FILM COATED ORAL at 05:03

## 2022-01-01 RX ADMIN — IPRATROPIUM BROMIDE AND ALBUTEROL SULFATE 3 ML: 2.5; .5 SOLUTION RESPIRATORY (INHALATION) at 07:03

## 2022-01-01 RX ADMIN — LOSARTAN POTASSIUM 25 MG: 25 TABLET, FILM COATED ORAL at 07:03

## 2022-01-01 RX ADMIN — BENZONATATE 200 MG: 100 CAPSULE ORAL at 12:03

## 2022-01-01 RX ADMIN — SEVELAMER CARBONATE 800 MG: 800 TABLET, FILM COATED ORAL at 12:03

## 2022-01-01 RX ADMIN — SEVELAMER CARBONATE 800 MG: 800 TABLET, FILM COATED ORAL at 07:03

## 2022-01-01 RX ADMIN — MUPIROCIN: 20 OINTMENT TOPICAL at 08:03

## 2022-01-01 RX ADMIN — CLOPIDOGREL 75 MG: 75 TABLET, FILM COATED ORAL at 08:03

## 2022-01-01 RX ADMIN — VANCOMYCIN HYDROCHLORIDE 2000 MG: 10 INJECTION, POWDER, LYOPHILIZED, FOR SOLUTION INTRAVENOUS at 08:03

## 2022-01-01 RX ADMIN — HEPARIN SODIUM 12 UNITS/KG/HR: 5000 INJECTION INTRAVENOUS; SUBCUTANEOUS at 08:03

## 2022-01-01 RX ADMIN — ONDANSETRON 4 MG: 2 INJECTION INTRAMUSCULAR; INTRAVENOUS at 08:03

## 2022-01-01 RX ADMIN — APIXABAN 10 MG: 5 TABLET, FILM COATED ORAL at 12:03

## 2022-01-01 RX ADMIN — ACETAMINOPHEN 650 MG: 325 TABLET, FILM COATED ORAL at 07:03

## 2022-01-01 RX ADMIN — INSULIN DETEMIR 23 UNITS: 100 INJECTION, SOLUTION SUBCUTANEOUS at 08:03

## 2022-01-01 RX ADMIN — APIXABAN 10 MG: 5 TABLET, FILM COATED ORAL at 09:03

## 2022-01-01 RX ADMIN — HEPARIN SODIUM 12 UNITS/KG/HR: 5000 INJECTION INTRAVENOUS; SUBCUTANEOUS at 04:03

## 2022-01-01 RX ADMIN — SEVELAMER CARBONATE 800 MG: 800 TABLET, FILM COATED ORAL at 04:03

## 2022-01-01 RX ADMIN — INSULIN ASPART 5 UNITS: 100 INJECTION, SOLUTION INTRAVENOUS; SUBCUTANEOUS at 01:03

## 2022-01-01 RX ADMIN — PANTOPRAZOLE SODIUM 40 MG: 40 TABLET, DELAYED RELEASE ORAL at 09:03

## 2022-01-01 RX ADMIN — ACETAMINOPHEN 650 MG: 325 TABLET, FILM COATED ORAL at 12:03

## 2022-01-01 RX ADMIN — ONDANSETRON 4 MG: 2 INJECTION INTRAMUSCULAR; INTRAVENOUS at 07:03

## 2022-01-01 RX ADMIN — NEPHROCAP 1 CAPSULE: 1 CAP ORAL at 09:03

## 2022-01-01 RX ADMIN — MAGNESIUM SULFATE IN WATER 2 G: 40 INJECTION, SOLUTION INTRAVENOUS at 08:03

## 2022-01-01 RX ADMIN — INSULIN ASPART 5 UNITS: 100 INJECTION, SOLUTION INTRAVENOUS; SUBCUTANEOUS at 08:03

## 2022-01-01 RX ADMIN — IPRATROPIUM BROMIDE AND ALBUTEROL SULFATE 3 ML: 2.5; .5 SOLUTION RESPIRATORY (INHALATION) at 02:03

## 2022-01-01 RX ADMIN — INSULIN DETEMIR 20 UNITS: 100 INJECTION, SOLUTION SUBCUTANEOUS at 09:03

## 2022-01-01 RX ADMIN — INSULIN ASPART 2 UNITS: 100 INJECTION, SOLUTION INTRAVENOUS; SUBCUTANEOUS at 12:03

## 2022-01-01 RX ADMIN — INSULIN ASPART 3 UNITS: 100 INJECTION, SOLUTION INTRAVENOUS; SUBCUTANEOUS at 07:03

## 2022-01-01 RX ADMIN — CARVEDILOL 25 MG: 25 TABLET, FILM COATED ORAL at 09:03

## 2022-01-01 RX ADMIN — ASPIRIN 81 MG CHEWABLE TABLET 81 MG: 81 TABLET CHEWABLE at 09:03

## 2022-01-01 RX ADMIN — CARVEDILOL 12.5 MG: 12.5 TABLET, FILM COATED ORAL at 09:03

## 2022-01-01 RX ADMIN — VANCOMYCIN HYDROCHLORIDE 500 MG: 500 INJECTION, POWDER, LYOPHILIZED, FOR SOLUTION INTRAVENOUS at 05:03

## 2022-01-01 RX ADMIN — LOSARTAN POTASSIUM 25 MG: 25 TABLET, FILM COATED ORAL at 01:03

## 2022-01-01 RX ADMIN — MUPIROCIN: 20 OINTMENT TOPICAL at 09:03

## 2022-01-01 RX ADMIN — INSULIN ASPART 1 UNITS: 100 INJECTION, SOLUTION INTRAVENOUS; SUBCUTANEOUS at 09:03

## 2022-01-01 RX ADMIN — INSULIN ASPART 4 UNITS: 100 INJECTION, SOLUTION INTRAVENOUS; SUBCUTANEOUS at 05:03

## 2022-01-01 RX ADMIN — CLOPIDOGREL BISULFATE 75 MG: 75 TABLET, FILM COATED ORAL at 08:03

## 2022-01-01 RX ADMIN — INSULIN ASPART 3 UNITS: 100 INJECTION, SOLUTION INTRAVENOUS; SUBCUTANEOUS at 04:03

## 2022-01-01 RX ADMIN — ONDANSETRON 4 MG: 4 TABLET, ORALLY DISINTEGRATING ORAL at 02:03

## 2022-01-01 RX ADMIN — INSULIN ASPART 5 UNITS: 100 INJECTION, SOLUTION INTRAVENOUS; SUBCUTANEOUS at 04:03

## 2022-01-01 RX ADMIN — ONDANSETRON 4 MG: 2 INJECTION INTRAMUSCULAR; INTRAVENOUS at 02:03

## 2022-01-01 RX ADMIN — LOSARTAN POTASSIUM 25 MG: 25 TABLET, FILM COATED ORAL at 08:03

## 2022-01-01 RX ADMIN — SODIUM ZIRCONIUM CYCLOSILICATE 10 G: 10 POWDER, FOR SUSPENSION ORAL at 03:03

## 2022-01-01 RX ADMIN — CARVEDILOL 25 MG: 25 TABLET, FILM COATED ORAL at 07:03

## 2022-01-01 RX ADMIN — ACETAMINOPHEN 650 MG: 325 TABLET, FILM COATED ORAL at 08:03

## 2022-01-01 RX ADMIN — INSULIN ASPART 5 UNITS: 100 INJECTION, SOLUTION INTRAVENOUS; SUBCUTANEOUS at 07:03

## 2022-01-01 RX ADMIN — INSULIN ASPART 5 UNITS: 100 INJECTION, SOLUTION INTRAVENOUS; SUBCUTANEOUS at 03:03

## 2022-01-01 RX ADMIN — INSULIN DETEMIR 23 UNITS: 100 INJECTION, SOLUTION SUBCUTANEOUS at 09:03

## 2022-01-01 RX ADMIN — DIAZEPAM 5 MG: 5 TABLET ORAL at 08:03

## 2022-01-01 RX ADMIN — INSULIN ASPART 3 UNITS: 100 INJECTION, SOLUTION INTRAVENOUS; SUBCUTANEOUS at 08:03

## 2022-01-01 RX ADMIN — NEPHROCAP 1 CAPSULE: 1 CAP ORAL at 04:03

## 2022-01-01 RX ADMIN — INSULIN ASPART 2 UNITS: 100 INJECTION, SOLUTION INTRAVENOUS; SUBCUTANEOUS at 03:03

## 2022-01-01 RX ADMIN — CLOPIDOGREL BISULFATE 75 MG: 75 TABLET, FILM COATED ORAL at 12:03

## 2022-01-01 RX ADMIN — INSULIN ASPART 4 UNITS: 100 INJECTION, SOLUTION INTRAVENOUS; SUBCUTANEOUS at 07:03

## 2022-01-01 RX ADMIN — Medication 6 MG: at 07:03

## 2022-01-01 RX ADMIN — CLOPIDOGREL BISULFATE 75 MG: 75 TABLET, FILM COATED ORAL at 09:03

## 2022-01-01 RX ADMIN — CITALOPRAM HYDROBROMIDE 20 MG: 20 TABLET ORAL at 09:03

## 2022-01-01 RX ADMIN — ATORVASTATIN CALCIUM 80 MG: 20 TABLET, FILM COATED ORAL at 12:03

## 2022-01-01 RX ADMIN — LABETALOL HYDROCHLORIDE 10 MG: 5 INJECTION, SOLUTION INTRAVENOUS at 05:03

## 2022-01-01 RX ADMIN — INSULIN ASPART 8 UNITS: 100 INJECTION, SOLUTION INTRAVENOUS; SUBCUTANEOUS at 05:03

## 2022-01-01 RX ADMIN — INSULIN ASPART 2 UNITS: 100 INJECTION, SOLUTION INTRAVENOUS; SUBCUTANEOUS at 11:03

## 2022-01-01 RX ADMIN — ONDANSETRON 4 MG: 4 TABLET, ORALLY DISINTEGRATING ORAL at 01:03

## 2022-01-01 RX ADMIN — ATORVASTATIN CALCIUM 80 MG: 20 TABLET, FILM COATED ORAL at 09:03

## 2022-01-01 RX ADMIN — INSULIN ASPART 2 UNITS: 100 INJECTION, SOLUTION INTRAVENOUS; SUBCUTANEOUS at 08:03

## 2022-01-01 RX ADMIN — FUROSEMIDE 40 MG: 10 INJECTION, SOLUTION INTRAVENOUS at 01:03

## 2022-01-01 RX ADMIN — INSULIN ASPART 6 UNITS: 100 INJECTION, SOLUTION INTRAVENOUS; SUBCUTANEOUS at 08:03

## 2022-01-01 RX ADMIN — HEPARIN SODIUM 1000 UNITS: 1000 INJECTION, SOLUTION INTRAVENOUS; SUBCUTANEOUS at 11:03

## 2022-01-01 RX ADMIN — ATORVASTATIN CALCIUM 80 MG: 20 TABLET, FILM COATED ORAL at 07:03

## 2022-01-01 RX ADMIN — PANTOPRAZOLE SODIUM 40 MG: 40 TABLET, DELAYED RELEASE ORAL at 07:03

## 2022-01-01 RX ADMIN — ASPIRIN 81 MG CHEWABLE TABLET 81 MG: 81 TABLET CHEWABLE at 07:03

## 2022-01-01 RX ADMIN — MUPIROCIN: 20 OINTMENT TOPICAL at 10:03

## 2022-01-01 RX ADMIN — VANCOMYCIN HYDROCHLORIDE 500 MG: 500 INJECTION, POWDER, LYOPHILIZED, FOR SOLUTION INTRAVENOUS at 11:03

## 2022-01-01 RX ADMIN — INSULIN DETEMIR 23 UNITS: 100 INJECTION, SOLUTION SUBCUTANEOUS at 05:03

## 2022-01-01 RX ADMIN — SODIUM ZIRCONIUM CYCLOSILICATE 10 G: 10 POWDER, FOR SUSPENSION ORAL at 09:03

## 2022-01-01 RX ADMIN — INSULIN ASPART 2 UNITS: 100 INJECTION, SOLUTION INTRAVENOUS; SUBCUTANEOUS at 04:03

## 2022-01-01 RX ADMIN — CLINDAMYCIN PHOSPHATE 600 MG: 600 INJECTION, SOLUTION INTRAVENOUS at 02:03

## 2022-01-01 RX ADMIN — LIDOCAINE HYDROCHLORIDE 1 ML: 10 INJECTION, SOLUTION INFILTRATION; PERINEURAL at 12:03

## 2022-01-01 RX ADMIN — CARVEDILOL 25 MG: 25 TABLET, FILM COATED ORAL at 03:03

## 2022-01-01 RX ADMIN — ASPIRIN 81 MG CHEWABLE TABLET 81 MG: 81 TABLET CHEWABLE at 12:03

## 2022-01-01 RX ADMIN — MORPHINE SULFATE 2 MG: 2 INJECTION, SOLUTION INTRAMUSCULAR; INTRAVENOUS at 03:03

## 2022-01-01 RX ADMIN — CLOPIDOGREL BISULFATE 75 MG: 75 TABLET, FILM COATED ORAL at 07:03

## 2022-01-01 RX ADMIN — LABETALOL HYDROCHLORIDE 10 MG: 5 INJECTION, SOLUTION INTRAVENOUS at 03:03

## 2022-01-01 RX ADMIN — INSULIN ASPART 2 UNITS: 100 INJECTION, SOLUTION INTRAVENOUS; SUBCUTANEOUS at 07:03

## 2022-01-01 RX ADMIN — SEVELAMER CARBONATE 800 MG: 800 TABLET, FILM COATED ORAL at 01:03

## 2022-01-01 RX ADMIN — HEPARIN SODIUM 14 UNITS/KG/HR: 5000 INJECTION INTRAVENOUS; SUBCUTANEOUS at 05:03

## 2022-01-01 RX ADMIN — CITALOPRAM HYDROBROMIDE 20 MG: 20 TABLET ORAL at 12:03

## 2022-01-01 RX ADMIN — INSULIN ASPART 4 UNITS: 100 INJECTION, SOLUTION INTRAVENOUS; SUBCUTANEOUS at 09:03

## 2022-01-01 RX ADMIN — MORPHINE SULFATE 4 MG: 2 INJECTION, SOLUTION INTRAMUSCULAR; INTRAVENOUS at 03:03

## 2022-01-01 RX ADMIN — HEPARIN SODIUM 19 UNITS/KG/HR: 5000 INJECTION INTRAVENOUS; SUBCUTANEOUS at 02:03

## 2022-01-01 RX ADMIN — INSULIN DETEMIR 20 UNITS: 100 INJECTION, SOLUTION SUBCUTANEOUS at 10:03

## 2022-01-01 RX ADMIN — IPRATROPIUM BROMIDE AND ALBUTEROL SULFATE 3 ML: 2.5; .5 SOLUTION RESPIRATORY (INHALATION) at 09:03

## 2022-01-01 RX ADMIN — VANCOMYCIN HYDROCHLORIDE 750 MG: 750 INJECTION, POWDER, LYOPHILIZED, FOR SOLUTION INTRAVENOUS at 01:03

## 2022-01-01 RX ADMIN — HEPARIN SODIUM 1000 UNITS: 1000 INJECTION, SOLUTION INTRAVENOUS; SUBCUTANEOUS at 12:03

## 2022-01-01 RX ADMIN — VANCOMYCIN HYDROCHLORIDE 500 MG: 500 INJECTION, POWDER, LYOPHILIZED, FOR SOLUTION INTRAVENOUS at 06:03

## 2022-01-01 RX ADMIN — HEPARIN SODIUM 19.05 UNITS/KG/HR: 5000 INJECTION INTRAVENOUS; SUBCUTANEOUS at 12:03

## 2022-01-01 RX ADMIN — HYDROXYZINE HYDROCHLORIDE 50 MG: 25 TABLET ORAL at 09:03

## 2022-01-01 RX ADMIN — CLOPIDOGREL BISULFATE 75 MG: 75 TABLET, FILM COATED ORAL at 01:03

## 2022-01-01 RX ADMIN — CITALOPRAM HYDROBROMIDE 20 MG: 20 TABLET ORAL at 07:03

## 2022-01-01 RX ADMIN — ASPIRIN 81 MG CHEWABLE TABLET 81 MG: 81 TABLET CHEWABLE at 04:03

## 2022-01-01 RX ADMIN — PANTOPRAZOLE SODIUM 40 MG: 40 TABLET, DELAYED RELEASE ORAL at 12:03

## 2022-03-03 PROBLEM — N18.6 ESRD (END STAGE RENAL DISEASE): Status: RESOLVED | Noted: 2021-01-01 | Resolved: 2022-01-01

## 2022-03-03 PROBLEM — I35.0 AORTIC VALVE STENOSIS: Chronic | Status: ACTIVE | Noted: 2021-02-10

## 2022-03-03 PROBLEM — R53.81 DEBILITY: Status: ACTIVE | Noted: 2022-01-01

## 2022-03-03 PROBLEM — J96.21 ACUTE ON CHRONIC RESPIRATORY FAILURE WITH HYPOXEMIA: Status: ACTIVE | Noted: 2022-01-01

## 2022-03-03 PROBLEM — I27.20 PULMONARY HYPERTENSION: Chronic | Status: ACTIVE | Noted: 2021-01-01

## 2022-03-03 PROBLEM — Z71.89 ADVANCE CARE PLANNING: Status: ACTIVE | Noted: 2022-01-01

## 2022-03-03 PROBLEM — J96.11 CHRONIC RESPIRATORY FAILURE WITH HYPOXIA AND HYPERCAPNIA: Chronic | Status: ACTIVE | Noted: 2021-01-01

## 2022-03-03 PROBLEM — Z74.09 LIMITED MOBILITY: Status: RESOLVED | Noted: 2021-02-24 | Resolved: 2022-01-01

## 2022-03-03 PROBLEM — J98.4 RESTRICTIVE LUNG DISEASE: Chronic | Status: ACTIVE | Noted: 2019-02-14

## 2022-03-03 PROBLEM — R53.1 WEAKNESS: Status: RESOLVED | Noted: 2018-09-10 | Resolved: 2022-01-01

## 2022-03-03 PROBLEM — J96.12 CHRONIC RESPIRATORY FAILURE WITH HYPOXIA AND HYPERCAPNIA: Chronic | Status: ACTIVE | Noted: 2021-01-01

## 2022-03-03 PROBLEM — Z99.2 ESRD (END STAGE RENAL DISEASE) ON DIALYSIS: Chronic | Status: ACTIVE | Noted: 2021-01-01

## 2022-03-03 PROBLEM — N18.6 ESRD (END STAGE RENAL DISEASE) ON DIALYSIS: Chronic | Status: ACTIVE | Noted: 2021-01-01

## 2022-03-03 PROBLEM — D63.8 ANEMIA OF CHRONIC DISEASE: Chronic | Status: ACTIVE | Noted: 2021-01-01

## 2022-03-03 PROBLEM — Z74.2 NEED FOR HOME HEALTH CARE: Status: RESOLVED | Noted: 2021-01-01 | Resolved: 2022-01-01

## 2022-03-03 PROBLEM — R74.8 CARDIAC ENZYMES ELEVATED: Status: RESOLVED | Noted: 2018-12-23 | Resolved: 2022-01-01

## 2022-03-03 NOTE — HPI
"Melinda Knight is a 76 y.o. F with history of HTN, HLD, CAD, DMII, hypothyroidism, ESRD on HD MWF, GERD, restrictive lung disease with chronic hypoxemic resp failure (on 2-3L O2) who transfers to [facility] for CTS evaluation for CABG +/- AVR. Initially presented to [facility] ED w/ 2 day history of "heartburn pain," N/V, and SOB. Workup notable for troponin 13 (peaked at 42 on 3/3), elevated BNP 2268 (baseline 400-600s), and radiographic evidence of pulmonary edema. EKG SR with RBBB. She was hypoxic and placed on BiPAP. Transferred to [facility] w/ NSTEMI. Cardiology and nephrology following. Volume removal with HD and weaned to baseline supplemental O2. BCx 3/2 resulted GPC resembling Staph and vanc initiated. Repeat BCx NGTD. LHC on 3/3 revealed proximal RCA 60% stenosed, mid LAD 70% stenosed, and mid Cx 70% stenosed. TTE revealed EF 48% with PORTILLO 0.63cm2, LFLG AS. Cardiology recommended CTS evaluation for CABG +/- AVR. Last dose of Plavix 3/3. She denies CP, SOB, N/V.  "

## 2022-03-03 NOTE — H&P (VIEW-ONLY)
"        Cardiology Consult  3/3/2022  8:39 AM    Attending Cardiologist: Tex Alicea M.D.  Primary Care Provider: Duke Cole MD  Chief Complaint/Reason For Consultation:  NSTEMI      Problem list  Patient Active Problem List   Diagnosis    Bilateral carotid artery stenosis    Essential hypertension    Mixed hyperlipidemia    Cardiac murmur    GERD (gastroesophageal reflux disease)    Serum albumin decreased    Hypothyroidism (acquired)    Type 2 diabetes mellitus with diabetic retinopathy    Type 2 diabetes mellitus with diabetic polyneuropathy    Asymptomatic stenosis of left carotid artery    Polyarticular osteoarthritis    Age-related osteoporosis without current pathological fracture    Atherosclerosis of abdominal aorta    Depression    Abnormal ventricular wall motion    Restrictive lung disease    Aortic valve stenosis    Microalbuminuria due to type 2 diabetes mellitus    NSTEMI (non-ST elevated myocardial infarction)    Anemia of chronic disease    Pulmonary hypertension    Chronic respiratory failure with hypoxia and hypercapnia    ESRD (end stage renal disease) on dialysis    Elevated troponin    Urinary incontinence    Acute on chronic respiratory failure with hypoxemia    Debility       CC:  "Feeling sick"    HPI:  Melinda Knight is a 76 y.o.year-old female with PMH HTN, ESRD on HD (right chest tunneled cath), CAD, HLP, diastolic HF, moderate aortic stenosis who presented to [facility] yesterday with respiratory distress.  She was transferred to [facility] earlier this AM due to troponin level of 13.  Yesterday, she reported feeling indigestion in the epigastric area for which she took indigestion medication and felt better.  She also had nausea and vomiting.  She denies any diarrhea.  She denies any fever chills.  Patient missed her dialysis yesterday.  It was reported by EMS that her initial oxygen saturation was 75%.  This morning, she feels better.  She states that she was " seen by cardiologists in the past and had refused coronary angiogram.  This morning, she specifically refuses an angiogram.  She was seen by Dr. Isaac in February 2021 and did not follow up after that.    Medications  Current Facility-Administered Medications   Medication Dose Route Frequency Provider Last Rate Last Admin    0.9%  NaCl infusion   Intravenous Once Higinio España NP        acetaminophen tablet 650 mg  650 mg Oral Q6H PRN DEBORA Gonzalez MD        aspirin chewable tablet 81 mg  81 mg Oral Daily DEBORA Gonzalez MD        atorvastatin tablet 80 mg  80 mg Oral Daily DEBORA Gonzalez MD        carvediloL tablet 12.5 mg  12.5 mg Oral BID DEBORA Gonzalez MD        citalopram tablet 20 mg  20 mg Oral Daily DEBORA Gonzalez MD        clopidogreL tablet 75 mg  75 mg Oral Daily DEBORA Gonzalez MD        heparin (porcine) injection 5,000 Units  5,000 Units Intra-Catheter PRN Higinio España NP        heparin 25,000 units in dextrose 5% (100 units/ml) IV bolus from bag - ADDITIONAL PRN BOLUS - 30 units/kg (max bolus 4000 units)  30 Units/kg (Adjusted) Intravenous PRN DEBORA Gonzalez MD        heparin 25,000 units in dextrose 5% (100 units/ml) IV bolus from bag - ADDITIONAL PRN BOLUS - 60 units/kg (max bolus 4000 units)  60 Units/kg (Adjusted) Intravenous PRN DEBORA Gonzalez MD        heparin 25,000 units in dextrose 5% 250 mL (100 units/mL) infusion LOW INTENSITY nomogram - [facility]  0-40 Units/kg/hr (Adjusted) Intravenous Continuous DEBORA Gonzalez MD 7.9 mL/hr at 03/03/22 0834 12 Units/kg/hr at 03/03/22 0834    hydrOXYzine HCL tablet 50 mg  50 mg Oral Q4H PRN DEBORA Gonzalez MD        [START ON 3/4/2022] levothyroxine tablet 75 mcg  75 mcg Oral Before breakfast DEBORA Gonzalez MD        melatonin tablet 6 mg  6 mg Oral Nightly PRN DEBORA Gonzalez MD        mupirocin 2 % ointment   Nasal BID Higinio España NP        ondansetron disintegrating tablet 4 mg  4 mg Oral Q8H PRN DEBORA Hamilton  MD Carlos        ondansetron injection 4 mg  4 mg Intravenous Q8H PRN DEBORA Gonzalez MD        pantoprazole EC tablet 40 mg  40 mg Oral Daily DEBROA Gonzalez MD        sodium chloride 0.9% bolus 250 mL  250 mL Intravenous PRN Higinio España NP        sodium chloride 0.9% flush 10 mL  10 mL Intravenous PRN DEBORA Gonzalez MD        vitamin renal formula (B-complex-vitamin c-folic acid) 1 mg per capsule 1 capsule  1 capsule Oral Daily Higinio España NP          Prior to Admission medications    Medication Sig Start Date End Date Taking? Authorizing Provider   albuterol (ACCUNEB) 0.63 mg/3 mL Nebu Take 3 mLs (0.63 mg total) by nebulization 3 (three) times daily as needed. Rescue 6/25/21 6/25/22  Cornelia Bowen MD   aspirin 81 MG Chew Take 1 tablet (81 mg total) by mouth once daily. 3/12/21 3/12/22  Cornelia Bowen MD   atorvastatin (LIPITOR) 80 MG tablet Take 1 tablet (80 mg total) by mouth once daily. 5/7/21 5/7/22  Yaz Miranda NP   blood sugar diagnostic (BLOOD GLUCOSE TEST) Strp 1 strip by Misc.(Non-Drug; Combo Route) route 2 (two) times daily. Prodigy 3/1/21   Duke Cole MD   carvediloL (COREG) 12.5 MG tablet Take 1 tablet by mouth twice daily 11/29/21   Duke Cole MD   citalopram (CELEXA) 20 MG tablet Take 1 tablet by mouth once daily 10/4/21   Duke Cole MD   clopidogreL (PLAVIX) 75 mg tablet Take 1 tablet (75 mg total) by mouth once daily. 3/12/21 3/12/22  Cornelia Bowen MD   dicyclomine (BENTYL) 10 MG capsule Take 1 capsule by mouth 4 (four) times daily. 12/1/20   Historical Provider   insulin detemir U-100 (LEVEMIR FLEXTOUCH) 100 unit/mL (3 mL) SubQ InPn pen Inject 30 Units into the skin 2 (two) times daily. 5/6/21 5/6/22  Yaz Miranda NP   levothyroxine (SYNTHROID) 75 MCG tablet Take 1 tablet (75 mcg total) by mouth before breakfast. 5/7/21 5/7/22  Yaz Miranda NP   linaCLOtide (LINZESS) 72 mcg Cap capsule Take 1 capsule (72 mcg total) by mouth before  "breakfast. 5/19/21   Duke Cole MD   melatonin (MELATIN) 3 mg tablet Take 2 tablets (6 mg total) by mouth nightly as needed for Insomnia. 5/6/21   Yaz Miranda NP   melatonin 10 mg Tab Take by mouth.    Historical Provider   multivitamin (ONE DAILY MULTIVITAMIN) per tablet Take 1 tablet by mouth once daily.    Historical Provider   ondansetron (ZOFRAN-ODT) 4 MG TbDL Take 1 tablet (4 mg total) by mouth every 6 (six) hours as needed (nausea). 11/16/21   Duke Cole MD   pantoprazole (PROTONIX) 40 MG tablet Take 1 tablet by mouth once daily 12/6/21   Duke Cole MD   pen needle, diabetic (NOVOTWIST) 32 gauge x 1/5" Ndle USE AS DIRECTED WITH  INSULIN  PEN 5/10/21   Duke Cole MD   pen needle, diabetic 32 gauge x 5/32" Ndle Use daily with Humalog or as directed 2/24/21   Duke Cole MD   senna (SENOKOT) 8.6 mg tablet Take 1 tablet by mouth once daily.    Historical Provider         History  Past Medical History:   Diagnosis Date    Acute on chronic diastolic congestive heart failure 4/14/2021    Carotid artery occlusion     Coronary artery disease     Hyperlipidemia     Hypertension     Skin cancer     cyst on the face , was removed 20years +    Stroke     Thyroid disease     Type 2 diabetes mellitus      Past Surgical History:   Procedure Laterality Date    CAROTID ENDARTERECTOMY Left 03/24/2016    CHOLECYSTECTOMY      ESOPHAGOGASTRODUODENOSCOPY Left 9/11/2018    Procedure: EGD (ESOPHAGOGASTRODUODENOSCOPY);  Surgeon: Stevenson Mckee MD;  Location: [facility] ENDO;  Service: Endoscopy;  Laterality: Left;    GALLBLADDER SURGERY      1996    RIGHT HEART CATHETERIZATION Right 3/26/2021    Procedure: INSERTION, CATHETER, RIGHT HEART;  Surgeon: Gennaro Sampson MD;  Location[facility] CATH LAB;  Service: Cardiology;  Laterality: Right;    SKIN BIOPSY      TONSILLECTOMY      TUBAL LIGATION Bilateral 3/18/2016     Social History     Socioeconomic History    Marital status:  "   Tobacco Use    Smoking status: Never Smoker    Smokeless tobacco: Never Used   Substance and Sexual Activity    Alcohol use: Not Currently     Alcohol/week: 0.0 standard drinks     Comment: occasions    Drug use: No    Sexual activity: Not Currently         Allergies  Review of patient's allergies indicates:   Allergen Reactions    Sulfa (sulfonamide antibiotics) Nausea And Vomiting    Menthol contain prod          Review of Systems   Review of Systems   Constitutional: Negative for fever.   Cardiovascular: Positive for chest pain. Negative for claudication, cyanosis, dyspnea on exertion, irregular heartbeat, leg swelling, near-syncope, orthopnea, palpitations, paroxysmal nocturnal dyspnea and syncope.   Respiratory: Positive for shortness of breath. Negative for cough, hemoptysis, sleep disturbances due to breathing, snoring, sputum production and wheezing.    Gastrointestinal: Positive for heartburn, nausea and vomiting. Negative for melena.         Physical Exam  Wt Readings from Last 1 Encounters:   03/03/22 92.3 kg (203 lb 7.8 oz)     BP Readings from Last 3 Encounters:   03/03/22 (!) 111/53   03/03/22 132/60   10/19/21 132/78     Pulse Readings from Last 1 Encounters:   03/03/22 75     Body mass index is 38.45 kg/m².    Physical Exam  Constitutional:       General: She is not in acute distress.  Neck:      Vascular: Carotid bruit present. No JVD.   Cardiovascular:      Rate and Rhythm: Normal rate and regular rhythm.      Pulses:           Carotid pulses are 2+ on the right side and 2+ on the left side.       Radial pulses are 2+ on the right side and 2+ on the left side.        Dorsalis pedis pulses are 1+ on the right side and 1+ on the left side.      Heart sounds: S1 normal and S2 normal. Murmur heard.    Harsh midsystolic murmur is present with a grade of 2/6 at the upper right sternal border radiating to the neck.     Comments: R chest tunneled catheter  Pulmonary:      Breath sounds: No  wheezing, rhonchi or rales.   Musculoskeletal:      Right lower leg: No edema.      Left lower leg: No edema.   Neurological:      Mental Status: She is alert.                   Assessment and Plan:  NSTEMI with troponin still trending up to 42.  EKG showed sinus with RBBB.  Denies any CP at present.  -discussed proceeding with coronary angiogram with her and Dr. Gonzalez.  The risks, benefits, indications and alternatives of the planned procedure were discussed in details.  All questions were answered.  The patient agreed to proceed.  Witnessed informed consent was signed.    -continue medical treatment with ASA 81mg qd, carvedilol and high-dose atorvastatin 80 mg.  Will start clopidogrel 75 mg daily and IV heparin.    Moderate AS  -f/u echo results    HTN  -continue home meds and monitor    ESRD on HD  -as per renal team.  Discussed with NP Taco    DNR    Discussed with Dr. Gonzalez.    Thank you for allowing me to participate in the care of Melinda Knight.      Tex Alciea MD, F.A.C.C, F.S.C.A.I.

## 2022-03-03 NOTE — Clinical Note
A percutaneous stick to the left femoral vein was performed. Ultrasound guidance was used to obtain access.

## 2022-03-03 NOTE — PROGRESS NOTES
03/03/22 0705   Yovani Risk Assessment   Sensory Perception 4-->no impairment   Moisture 3-->occasionally moist   Activity 1-->bedfast   Mobility 2-->very limited   Nutrition 3-->adequate   Friction and Shear 2-->potential problem   Yovani Score 15   [facility]  Wound Care  Progress Note    Patient Name: Melinda Knight  MRN: [id number]  Admission Date: 3/3/2022  Hospital Length of Stay: 0 days  Attending Physician: DEBORA Gonzalez MD  Primary Care Provider: Duke Cole MD   No new subjective & objective note has been filed under this hospital service since the last note was generated.    Assessment/Plan:         HAPI Prevention , Yovani <18 PIP orders placed     Ana Maria Diego LPN  Wound Care  [facility]

## 2022-03-03 NOTE — SUBJECTIVE & OBJECTIVE
Past Medical History:   Diagnosis Date    Acute on chronic diastolic congestive heart failure 4/14/2021    Carotid artery occlusion     Coronary artery disease     Hyperlipidemia     Hypertension     Skin cancer     cyst on the face , was removed 20years +    Stroke     Thyroid disease     Type 2 diabetes mellitus        Past Surgical History:   Procedure Laterality Date    CAROTID ENDARTERECTOMY Left 03/24/2016    CHOLECYSTECTOMY      ESOPHAGOGASTRODUODENOSCOPY Left 9/11/2018    Procedure: EGD (ESOPHAGOGASTRODUODENOSCOPY);  Surgeon: Stevenson Mckee MD;  Location: [facility] ENDO;  Service: Endoscopy;  Laterality: Left;    GALLBLADDER SURGERY      1996    RIGHT HEART CATHETERIZATION Right 3/26/2021    Procedure: INSERTION, CATHETER, RIGHT HEART;  Surgeon: Gennaro Sampson MD;  Location: [facility] CATH LAB;  Service: Cardiology;  Laterality: Right;    SKIN BIOPSY      TONSILLECTOMY      TUBAL LIGATION Bilateral 3/18/2016       Review of patient's allergies indicates:   Allergen Reactions    Sulfa (sulfonamide antibiotics) Nausea And Vomiting    Menthol contain prod        Current Facility-Administered Medications on File Prior to Encounter   Medication    [COMPLETED] aspirin chewable tablet 324 mg    [COMPLETED] furosemide injection 40 mg    [COMPLETED] ondansetron injection 4 mg     Current Outpatient Medications on File Prior to Encounter   Medication Sig    albuterol (ACCUNEB) 0.63 mg/3 mL Nebu Take 3 mLs (0.63 mg total) by nebulization 3 (three) times daily as needed. Rescue    aspirin 81 MG Chew Take 1 tablet (81 mg total) by mouth once daily.    atorvastatin (LIPITOR) 80 MG tablet Take 1 tablet (80 mg total) by mouth once daily.    blood sugar diagnostic (BLOOD GLUCOSE TEST) Strp 1 strip by Misc.(Non-Drug; Combo Route) route 2 (two) times daily. Prodigy    carvediloL (COREG) 12.5 MG tablet Take 1 tablet by mouth twice daily    citalopram (CELEXA) 20 MG tablet Take 1 tablet by mouth once daily    clopidogreL (PLAVIX) 75 mg  "tablet Take 1 tablet (75 mg total) by mouth once daily.    dicyclomine (BENTYL) 10 MG capsule Take 1 capsule by mouth 4 (four) times daily.    insulin detemir U-100 (LEVEMIR FLEXTOUCH) 100 unit/mL (3 mL) SubQ InPn pen Inject 30 Units into the skin 2 (two) times daily.    levothyroxine (SYNTHROID) 75 MCG tablet Take 1 tablet (75 mcg total) by mouth before breakfast.    linaCLOtide (LINZESS) 72 mcg Cap capsule Take 1 capsule (72 mcg total) by mouth before breakfast.    melatonin (MELATIN) 3 mg tablet Take 2 tablets (6 mg total) by mouth nightly as needed for Insomnia.    melatonin 10 mg Tab Take by mouth.    multivitamin (ONE DAILY MULTIVITAMIN) per tablet Take 1 tablet by mouth once daily.    ondansetron (ZOFRAN-ODT) 4 MG TbDL Take 1 tablet (4 mg total) by mouth every 6 (six) hours as needed (nausea).    pantoprazole (PROTONIX) 40 MG tablet Take 1 tablet by mouth once daily    pen needle, diabetic (NOVOTWIST) 32 gauge x 1/5" Ndle USE AS DIRECTED WITH  INSULIN  PEN    pen needle, diabetic 32 gauge x 5/32" Ndle Use daily with Humalog or as directed    senna (SENOKOT) 8.6 mg tablet Take 1 tablet by mouth once daily.     Family History       Problem Relation (Age of Onset)    Diabetes Mother, Sister, Brother    Heart failure Mother    Hypertension Mother          Tobacco Use    Smoking status: Never Smoker    Smokeless tobacco: Never Used   Substance and Sexual Activity    Alcohol use: Not Currently     Alcohol/week: 0.0 standard drinks     Comment: occasions    Drug use: No    Sexual activity: Not Currently     Review of Systems   Constitutional:  Negative for chills and fever.   HENT:  Negative for sore throat and trouble swallowing.    Eyes:  Negative for pain and visual disturbance.   Respiratory:  Positive for shortness of breath. Negative for cough.    Cardiovascular:  Positive for chest pain. Negative for palpitations.   Gastrointestinal:  Positive for nausea and vomiting. Negative for abdominal pain. "   Genitourinary:  Negative for difficulty urinating and dysuria.   Musculoskeletal:  Negative for arthralgias and myalgias.   Skin:  Negative for rash and wound.   Neurological:  Negative for weakness and numbness.   Objective:     Vital Signs (Most Recent):  Temp: 98.8 °F (37.1 °C) (03/03/22 0700)  Pulse: 75 (03/03/22 0705)  Resp: 12 (03/03/22 0705)  BP: (!) 111/53 (03/03/22 0705)  SpO2: 100 % (03/03/22 0705)   Vital Signs (24h Range):  Temp:  [98.8 °F (37.1 °C)-98.9 °F (37.2 °C)] 98.8 °F (37.1 °C)  Pulse:  [] 75  Resp:  [12-42] 12  SpO2:  [66 %-100 %] 100 %  BP: (107-194)/(53-77) 111/53     Weight: 92.3 kg (203 lb 7.8 oz)  Body mass index is 38.45 kg/m².    Physical Exam  Vitals and nursing note reviewed.   Constitutional:       General: She is not in acute distress.     Appearance: She is well-developed. She is obese.   HENT:      Head: Normocephalic and atraumatic.   Eyes:      General:         Right eye: No discharge.         Left eye: No discharge.      Conjunctiva/sclera: Conjunctivae normal.   Cardiovascular:      Rate and Rhythm: Normal rate.      Pulses: Normal pulses.   Pulmonary:      Effort: Pulmonary effort is normal. No respiratory distress.      Comments: Crackles at bilateral bases.  Abdominal:      Palpations: Abdomen is soft.      Tenderness: There is no abdominal tenderness.   Musculoskeletal:         General: Normal range of motion.      Right lower leg: No edema.      Left lower leg: No edema.   Skin:     General: Skin is warm and dry.   Neurological:      Mental Status: She is alert and oriented to person, place, and time.     Significant Labs:   CBC:  Recent Labs   Lab 03/02/22 2315 03/02/22  2322 03/03/22  0745   WBC 15.69*  --  11.01   HGB 12.8  --  10.4*   HCT 41.7 38 33.4*     --  213   GRAN 89.4*  14.0*  --  85.1*  9.4*   LYMPH 5.0*  0.8*  --  6.9*  0.8*   MONO 4.5  0.7  --  7.1  0.8   EOS 0.0  --  0.0   BASO 0.07  --  0.04   CMP:  Recent Labs   Lab 03/02/22  3518       K 4.0   CL 94*   CO2 27   BUN 64*   CREATININE 3.9*   *   CALCIUM 10.2   ALKPHOS 87   AST 70*   ALT 17   BILITOT 0.5   PROT 7.1   ALBUMIN 2.6*   ANIONGAP 17*     Recent Labs   Lab 03/02/22  2342 03/03/22  0400   TROPONINI 13.739* 26.590*     Significant Imaging:   EXAMINATION:  XR CHEST AP PORTABLE     CLINICAL HISTORY:  Sepsis;     TECHNIQUE:  Single frontal view of the chest was performed.     COMPARISON:  09/28/2021, 06/30/2021, 06/15/2021 chest x-ray     FINDINGS:  Cardiomediastinal silhouette is stable and nonenlarged.  Bilateral congestive changes are noted.  Acute bilateral reticular opacities are noted more so in the perihilar regions and left lower lung.  There is no significant pleural effusion or pneumothorax.     Double-lumen catheter via the right jugular approach is stable with the tips over the distal SVC.  Osseous structures grossly appear intact     Impression:     Acute bilateral lung opacities and vascular congestive changes raises question of CHF.  Infectious or inflammatory pneumonitis is considered.     No significant cardiomegaly.     This report was flagged in Epic as abnormal.        Electronically signed by: Elsa Meza  Date:                                            03/03/2022  Time:                                           01:03

## 2022-03-03 NOTE — Clinical Note
The left groin and left neck was prepped. The site was prepped with ChloraPrep and Betadine. The site was clipped. The patient was draped. The patient was positioned supine.

## 2022-03-03 NOTE — PT/OT/SLP PROGRESS
Physical Therapy      Patient Name:  Melinda Knight   MRN:  [id number]    Patient not seen at this time secondary to Patient ill (Comment) (upward trending troponin). Pt to have downward trending troponin values prior to mobility.   Will follow-up as schedule allows and as pt is appropriate / available.      Zuri Neal, PT, DPT

## 2022-03-03 NOTE — PT/OT/SLP PROGRESS
Occupational Therapy      Patient Name:  Melinda Knight   MRN:  [id number]    Patient not seen today secondary to  (upward trending Troponin). Will follow-up 3/3/22.    3/3/2022

## 2022-03-03 NOTE — Clinical Note
The groin and right radial was prepped. The site was prepped with ChloraPrep. The site was clipped. The patient was draped. The patient was positioned supine. The patient was secured using safety straps.

## 2022-03-03 NOTE — INTERVAL H&P NOTE
The patient has been examined and the H&P has been reviewed:    I concur with the findings and no changes have occurred since H&P was written.    Anesthesia/Surgery risks, benefits and alternative options discussed and understood by patient/family.          Active Hospital Problems    Diagnosis  POA    *NSTEMI (non-ST elevated myocardial infarction) [I21.4]  Yes    Acute on chronic respiratory failure with hypoxemia [J96.21]  Yes    Debility [R53.81]  Yes    Advance care planning [Z71.89]  Not Applicable    Elevated troponin [R77.8]  Yes    ESRD (end stage renal disease) on dialysis [N18.6, Z99.2]  Not Applicable     Chronic    Pulmonary hypertension [I27.20]  Yes     Chronic    Anemia of chronic disease [D63.8]  Yes     Chronic    Aortic valve stenosis [I35.0]  Yes     Chronic    Hypothyroidism (acquired) [E03.9]  Yes     Chronic    Type 2 diabetes mellitus with diabetic polyneuropathy [E11.42]  Yes     Chronic    GERD (gastroesophageal reflux disease) [K21.9]  Yes     Chronic    Essential hypertension [I10]  Yes     Chronic    Mixed hyperlipidemia [E78.2]  Yes     Chronic      Resolved Hospital Problems   No resolved problems to display.

## 2022-03-03 NOTE — ASSESSMENT & PLAN NOTE
- Goals discussion held at bedside. Patient reports she would want her daughters (reports daughter Sherry will be coming to hospital later) to be her proxies in the event she was unable to discuss with medical team.  - Confirmed DNR status; current LaPOST reflects her goals of care.

## 2022-03-03 NOTE — H&P
"[facility] - Intensive Care [facility] Medicine  History & Physical    Patient Name: Melinda Knight  MRN: [id number]  Patient Class: IP- Inpatient  Admission Date: 3/3/2022  Attending Physician: NAVARRO Gonzalez MD  Primary Care Provider: Duke Cole MD         Patient information was obtained from patient, past medical records and ER records.     Subjective:     Principal Problem:NSTEMI (non-ST elevated myocardial infarction)    Chief Complaint: No chief complaint on file.       HPI: Ms. Knight is a 76/F with PMH HTN, HLD, CAD, DMII, hypothyroidism, ESRD on HD, GERD, restrictive lung disease with chronic hypoxemic resp failure (on 2-3L O2) who presented to [facility] 03/03 as a transfer from [place] ED with a two day history of "heartburn pain," nausea, vomiting and SOB. She reports she was in her usual state of health prior when she began having the above symptoms. Difficult to determine which symptom began first, but they gradually worsened. She describes her pain as being burning in character without significant radiation and 4-5/10 in severity. She reports her symptoms were mildly improved with her PPI and nausea medication. With persistent discomfort she presented to ED for further evaluation. ED workup was notable for troponin 13, elevated BNP (baseline 400-600s), and CXR with bilateral opacities concerning for volume overload. She was reported to have been hypoxic prior to ED presentation with sats down to 75. She was placed on BiPAP. Transfer center contacted and she was transferred to [facility] for further management.      Past Medical History:   Diagnosis Date    Acute on chronic diastolic congestive heart failure 4/14/2021    Carotid artery occlusion     Coronary artery disease     Hyperlipidemia     Hypertension     Skin cancer     cyst on the face , was removed 20years +    Stroke     Thyroid disease     Type 2 diabetes mellitus        Past Surgical History:   Procedure " Laterality Date    CAROTID ENDARTERECTOMY Left 03/24/2016    CHOLECYSTECTOMY      ESOPHAGOGASTRODUODENOSCOPY Left 9/11/2018    Procedure: EGD (ESOPHAGOGASTRODUODENOSCOPY);  Surgeon: Stevenson Mckee MD;  Location: [facility] ENDO;  Service: Endoscopy;  Laterality: Left;    GALLBLADDER SURGERY      1996    RIGHT HEART CATHETERIZATION Right 3/26/2021    Procedure: INSERTION, CATHETER, RIGHT HEART;  Surgeon: Gennaro Sampson MD;  Location: [facility] CATH LAB;  Service: Cardiology;  Laterality: Right;    SKIN BIOPSY      TONSILLECTOMY      TUBAL LIGATION Bilateral 3/18/2016       Review of patient's allergies indicates:   Allergen Reactions    Sulfa (sulfonamide antibiotics) Nausea And Vomiting    Menthol contain prod        Current Facility-Administered Medications on File Prior to Encounter   Medication    [COMPLETED] aspirin chewable tablet 324 mg    [COMPLETED] furosemide injection 40 mg    [COMPLETED] ondansetron injection 4 mg     Current Outpatient Medications on File Prior to Encounter   Medication Sig    albuterol (ACCUNEB) 0.63 mg/3 mL Nebu Take 3 mLs (0.63 mg total) by nebulization 3 (three) times daily as needed. Rescue    aspirin 81 MG Chew Take 1 tablet (81 mg total) by mouth once daily.    atorvastatin (LIPITOR) 80 MG tablet Take 1 tablet (80 mg total) by mouth once daily.    blood sugar diagnostic (BLOOD GLUCOSE TEST) Strp 1 strip by Misc.(Non-Drug; Combo Route) route 2 (two) times daily. Prodigy    carvediloL (COREG) 12.5 MG tablet Take 1 tablet by mouth twice daily    citalopram (CELEXA) 20 MG tablet Take 1 tablet by mouth once daily    clopidogreL (PLAVIX) 75 mg tablet Take 1 tablet (75 mg total) by mouth once daily.    dicyclomine (BENTYL) 10 MG capsule Take 1 capsule by mouth 4 (four) times daily.    insulin detemir U-100 (LEVEMIR FLEXTOUCH) 100 unit/mL (3 mL) SubQ InPn pen Inject 30 Units into the skin 2 (two) times daily.    levothyroxine (SYNTHROID) 75 MCG tablet Take 1 tablet (75 mcg  "total) by mouth before breakfast.    linaCLOtide (LINZESS) 72 mcg Cap capsule Take 1 capsule (72 mcg total) by mouth before breakfast.    melatonin (MELATIN) 3 mg tablet Take 2 tablets (6 mg total) by mouth nightly as needed for Insomnia.    melatonin 10 mg Tab Take by mouth.    multivitamin (ONE DAILY MULTIVITAMIN) per tablet Take 1 tablet by mouth once daily.    ondansetron (ZOFRAN-ODT) 4 MG TbDL Take 1 tablet (4 mg total) by mouth every 6 (six) hours as needed (nausea).    pantoprazole (PROTONIX) 40 MG tablet Take 1 tablet by mouth once daily    pen needle, diabetic (NOVOTWIST) 32 gauge x 1/5" Ndle USE AS DIRECTED WITH  INSULIN  PEN    pen needle, diabetic 32 gauge x 5/32" Ndle Use daily with Humalog or as directed    senna (SENOKOT) 8.6 mg tablet Take 1 tablet by mouth once daily.     Family History       Problem Relation (Age of Onset)    Diabetes Mother, Sister, Brother    Heart failure Mother    Hypertension Mother          Tobacco Use    Smoking status: Never Smoker    Smokeless tobacco: Never Used   Substance and Sexual Activity    Alcohol use: Not Currently     Alcohol/week: 0.0 standard drinks     Comment: occasions    Drug use: No    Sexual activity: Not Currently     Review of Systems   Constitutional:  Negative for chills and fever.   HENT:  Negative for sore throat and trouble swallowing.    Eyes:  Negative for pain and visual disturbance.   Respiratory:  Positive for shortness of breath. Negative for cough.    Cardiovascular:  Positive for chest pain. Negative for palpitations.   Gastrointestinal:  Positive for nausea and vomiting. Negative for abdominal pain.   Genitourinary:  Negative for difficulty urinating and dysuria.   Musculoskeletal:  Negative for arthralgias and myalgias.   Skin:  Negative for rash and wound.   Neurological:  Negative for weakness and numbness.   Objective:     Vital Signs (Most Recent):  Temp: 98.8 °F (37.1 °C) (03/03/22 0700)  Pulse: 75 (03/03/22 " 0705)  Resp: 12 (03/03/22 0705)  BP: (!) 111/53 (03/03/22 0705)  SpO2: 100 % (03/03/22 0705)   Vital Signs (24h Range):  Temp:  [98.8 °F (37.1 °C)-98.9 °F (37.2 °C)] 98.8 °F (37.1 °C)  Pulse:  [] 75  Resp:  [12-42] 12  SpO2:  [66 %-100 %] 100 %  BP: (107-194)/(53-77) 111/53     Weight: 92.3 kg (203 lb 7.8 oz)  Body mass index is 38.45 kg/m².    Physical Exam  Vitals and nursing note reviewed.   Constitutional:       General: She is not in acute distress.     Appearance: She is well-developed. She is obese.   HENT:      Head: Normocephalic and atraumatic.   Eyes:      General:         Right eye: No discharge.         Left eye: No discharge.      Conjunctiva/sclera: Conjunctivae normal.   Cardiovascular:      Rate and Rhythm: Normal rate.      Pulses: Normal pulses.   Pulmonary:      Effort: Pulmonary effort is normal. No respiratory distress.      Comments: Crackles at bilateral bases.  Abdominal:      Palpations: Abdomen is soft.      Tenderness: There is no abdominal tenderness.   Musculoskeletal:         General: Normal range of motion.      Right lower leg: No edema.      Left lower leg: No edema.   Skin:     General: Skin is warm and dry.   Neurological:      Mental Status: She is alert and oriented to person, place, and time.     Significant Labs:   CBC:  Recent Labs   Lab 03/02/22  2315 03/02/22  2322 03/03/22  0745   WBC 15.69*  --  11.01   HGB 12.8  --  10.4*   HCT 41.7 38 33.4*     --  213   GRAN 89.4*  14.0*  --  85.1*  9.4*   LYMPH 5.0*  0.8*  --  6.9*  0.8*   MONO 4.5  0.7  --  7.1  0.8   EOS 0.0  --  0.0   BASO 0.07  --  0.04   CMP:  Recent Labs   Lab 03/02/22  2342      K 4.0   CL 94*   CO2 27   BUN 64*   CREATININE 3.9*   *   CALCIUM 10.2   ALKPHOS 87   AST 70*   ALT 17   BILITOT 0.5   PROT 7.1   ALBUMIN 2.6*   ANIONGAP 17*     Recent Labs   Lab 03/02/22  2342 03/03/22  0400   TROPONINI 13.739* 26.590*     Significant Imaging:   EXAMINATION:  XR CHEST AP PORTABLE      CLINICAL HISTORY:  Sepsis;     TECHNIQUE:  Single frontal view of the chest was performed.     COMPARISON:  09/28/2021, 06/30/2021, 06/15/2021 chest x-ray     FINDINGS:  Cardiomediastinal silhouette is stable and nonenlarged.  Bilateral congestive changes are noted.  Acute bilateral reticular opacities are noted more so in the perihilar regions and left lower lung.  There is no significant pleural effusion or pneumothorax.     Double-lumen catheter via the right jugular approach is stable with the tips over the distal SVC.  Osseous structures grossly appear intact     Impression:     Acute bilateral lung opacities and vascular congestive changes raises question of CHF.  Infectious or inflammatory pneumonitis is considered.     No significant cardiomegaly.     This report was flagged in Epic as abnormal.        Electronically signed by: Elsa Meza  Date:                                            03/03/2022  Time:                                           01:03    Assessment/Plan:     * NSTEMI (non-ST elevated myocardial infarction)  HTN, HLD, CAD, acute on chronic resp failure with hypoxia, pHTN, AS, elevated troponin  - EKG with no ST elevations but some depressions.  - Troponin significantly elevated and presentation concerning for NSTEMI.  - Check echo.  - Continue aspirin 81mg PO daily, clopidogrel 75mg PO daily, atorvastatin 80mg PO daily, carvedilol 12.5mg PO BID.  - Wean from supplemental O2 as tolerated. Volume removal with HD.  - Trend troponin.  - Start heparin gtt.  - Cardiology consult.    Advance care planning  - Goals discussion held at bedside. Patient reports she would want her daughters (reports daughter Sherry will be coming to hospital later) to be her proxies in the event she was unable to discuss with medical team.  - Confirmed DNR status; current LaPOST reflects her goals of care.    Debility  - PT/OT.    Type 2 diabetes mellitus with diabetic polyneuropathy  - On detemir 30U subQ BID at  home.  - Continue with insulin detemir at 20U subQ BID, low-dose sliding scale insulin aspart 0-5U subQ TIDWM PRN while inpatient.    Hypothyroidism (acquired)  - Continue levothyroxine 75mcg PO daily.    GERD (gastroesophageal reflux disease)  - Continue pantoprazole 40mg PO daily.    VTE Risk Mitigation (From admission, onward)         Ordered     heparin 25,000 units in dextrose 5% (100 units/ml) IV bolus from bag - ADDITIONAL PRN BOLUS - 60 units/kg (max bolus 4000 units)  As needed (PRN)        Question:  Heparin Infusion Adjustment (DO NOT MODIFY ANSWER)  Answer:  [email or web address]\epic\Images\Pharmacy\HeparinInfusions\heparin LOW INTENSITY nomogram for [facility] [id number]    03/03/22 0810     heparin 25,000 units in dextrose 5% (100 units/ml) IV bolus from bag - ADDITIONAL PRN BOLUS - 30 units/kg (max bolus 4000 units)  As needed (PRN)        Question:  Heparin Infusion Adjustment (DO NOT MODIFY ANSWER)  Answer:  [email or web address]\epic\Images\Pharmacy\HeparinInfusions\heparin LOW INTENSITY nomogram for [facility] [id number].pdf    03/03/22 0810     heparin (porcine) injection 5,000 Units  As needed (PRN)         03/03/22 0755     heparin 25,000 units in dextrose 5% 250 mL (100 units/mL) infusion LOW INTENSITY nomogram - [facility]  Continuous        Question Answer Comment   Heparin Infusion Adjustment (DO NOT MODIFY ANSWER) [email or web address]\epic\Images\Pharmacy\HeparinInfusions\heparin LOW INTENSITY nomogram for [facility] [id number].pdf    Begin at (in units/kg/hr) 12        03/03/22 0810     IP VTE HIGH RISK PATIENT  Once         03/03/22 0650     Place sequential compression device  Until discontinued         03/03/22 0650                   NAVARRO Gonzalez MD  Department of Hospital Medicine   [facility] - Intensive Care ([place])

## 2022-03-03 NOTE — ASSESSMENT & PLAN NOTE
- On detemir 30U subQ BID at home.  - Continue with insulin detemir at 20U subQ BID, low-dose sliding scale insulin aspart 0-5U subQ TIDWM PRN while inpatient.

## 2022-03-03 NOTE — Clinical Note
25 ml of contrast were injected throughout the case. 25 mL of contrast was the total wasted during the case. 50 mL was the total amount used during the case.

## 2022-03-03 NOTE — ASSESSMENT & PLAN NOTE
HTN, HLD, CAD, acute on chronic resp failure with hypoxia, pHTN, AS, elevated troponin  - EKG with no ST elevations but some depressions.  - Troponin significantly elevated and presentation concerning for NSTEMI.  - Check echo.  - Continue aspirin 81mg PO daily, clopidogrel 75mg PO daily, atorvastatin 80mg PO daily, carvedilol 12.5mg PO BID.  - Wean from supplemental O2 as tolerated. Volume removal with HD.  - Trend troponin.  - Start heparin gtt.  - Cardiology consult.

## 2022-03-03 NOTE — CONSULTS
"        Cardiology Consult  3/3/2022  8:39 AM    Attending Cardiologist: Tex Alicea M.D.  Primary Care Provider: Duke Cole MD  Chief Complaint/Reason For Consultation:  NSTEMI      Problem list  Patient Active Problem List   Diagnosis    Bilateral carotid artery stenosis    Essential hypertension    Mixed hyperlipidemia    Cardiac murmur    GERD (gastroesophageal reflux disease)    Serum albumin decreased    Hypothyroidism (acquired)    Type 2 diabetes mellitus with diabetic retinopathy    Type 2 diabetes mellitus with diabetic polyneuropathy    Asymptomatic stenosis of left carotid artery    Polyarticular osteoarthritis    Age-related osteoporosis without current pathological fracture    Atherosclerosis of abdominal aorta    Depression    Abnormal ventricular wall motion    Restrictive lung disease    Aortic valve stenosis    Microalbuminuria due to type 2 diabetes mellitus    NSTEMI (non-ST elevated myocardial infarction)    Anemia of chronic disease    Pulmonary hypertension    Chronic respiratory failure with hypoxia and hypercapnia    ESRD (end stage renal disease) on dialysis    Elevated troponin    Urinary incontinence    Acute on chronic respiratory failure with hypoxemia    Debility       CC:  "Feeling sick"    HPI:  Melinda Knight is a 76 y.o.year-old female with PMH HTN, ESRD on HD (right chest tunneled cath), CAD, HLP, diastolic HF, moderate aortic stenosis who presented to [facility] yesterday with respiratory distress.  She was transferred to [facility] earlier this AM due to troponin level of 13.  Yesterday, she reported feeling indigestion in the epigastric area for which she took indigestion medication and felt better.  She also had nausea and vomiting.  She denies any diarrhea.  She denies any fever chills.  Patient missed her dialysis yesterday.  It was reported by EMS that her initial oxygen saturation was 75%.  This morning, she feels better.  She states that she was " seen by cardiologists in the past and had refused coronary angiogram.  This morning, she specifically refuses an angiogram.  She was seen by Dr. Isaac in February 2021 and did not follow up after that.    Medications  Current Facility-Administered Medications   Medication Dose Route Frequency Provider Last Rate Last Admin    0.9%  NaCl infusion   Intravenous Once Higinio España NP        acetaminophen tablet 650 mg  650 mg Oral Q6H PRN DEBORA Gonzalez MD        aspirin chewable tablet 81 mg  81 mg Oral Daily DEBORA Gonzalez MD        atorvastatin tablet 80 mg  80 mg Oral Daily DEBORA Gonzalez MD        carvediloL tablet 12.5 mg  12.5 mg Oral BID DEBORA Gonzalez MD        citalopram tablet 20 mg  20 mg Oral Daily DEBORA Gonzalez MD        clopidogreL tablet 75 mg  75 mg Oral Daily DEBORA Gonzalez MD        heparin (porcine) injection 5,000 Units  5,000 Units Intra-Catheter PRN Higinio España NP        heparin 25,000 units in dextrose 5% (100 units/ml) IV bolus from bag - ADDITIONAL PRN BOLUS - 30 units/kg (max bolus 4000 units)  30 Units/kg (Adjusted) Intravenous PRN DEBORA Gonzalez MD        heparin 25,000 units in dextrose 5% (100 units/ml) IV bolus from bag - ADDITIONAL PRN BOLUS - 60 units/kg (max bolus 4000 units)  60 Units/kg (Adjusted) Intravenous PRN DEBORA Gonzalez MD        heparin 25,000 units in dextrose 5% 250 mL (100 units/mL) infusion LOW INTENSITY nomogram - [facility]  0-40 Units/kg/hr (Adjusted) Intravenous Continuous DEBORA Gonzalez MD 7.9 mL/hr at 03/03/22 0834 12 Units/kg/hr at 03/03/22 0834    hydrOXYzine HCL tablet 50 mg  50 mg Oral Q4H PRN DEBORA Gonzalez MD        [START ON 3/4/2022] levothyroxine tablet 75 mcg  75 mcg Oral Before breakfast DEBORA Gonzalez MD        melatonin tablet 6 mg  6 mg Oral Nightly PRN DEBORA Gonzalez MD        mupirocin 2 % ointment   Nasal BID Higinio España NP        ondansetron disintegrating tablet 4 mg  4 mg Oral Q8H PRN DEBORA Hamilton  MD Carlos        ondansetron injection 4 mg  4 mg Intravenous Q8H PRN DEBORA Gonzalez MD        pantoprazole EC tablet 40 mg  40 mg Oral Daily DEBORA Gonzalez MD        sodium chloride 0.9% bolus 250 mL  250 mL Intravenous PRN Higinio España NP        sodium chloride 0.9% flush 10 mL  10 mL Intravenous PRN DEBORA Gonzalez MD        vitamin renal formula (B-complex-vitamin c-folic acid) 1 mg per capsule 1 capsule  1 capsule Oral Daily Higinio España NP          Prior to Admission medications    Medication Sig Start Date End Date Taking? Authorizing Provider   albuterol (ACCUNEB) 0.63 mg/3 mL Nebu Take 3 mLs (0.63 mg total) by nebulization 3 (three) times daily as needed. Rescue 6/25/21 6/25/22  Cornelia Bowen MD   aspirin 81 MG Chew Take 1 tablet (81 mg total) by mouth once daily. 3/12/21 3/12/22  Cornelia Bowen MD   atorvastatin (LIPITOR) 80 MG tablet Take 1 tablet (80 mg total) by mouth once daily. 5/7/21 5/7/22  Yaz Miranda NP   blood sugar diagnostic (BLOOD GLUCOSE TEST) Strp 1 strip by Misc.(Non-Drug; Combo Route) route 2 (two) times daily. Prodigy 3/1/21   Duke Cole MD   carvediloL (COREG) 12.5 MG tablet Take 1 tablet by mouth twice daily 11/29/21   Duke Cole MD   citalopram (CELEXA) 20 MG tablet Take 1 tablet by mouth once daily 10/4/21   Duke Cole MD   clopidogreL (PLAVIX) 75 mg tablet Take 1 tablet (75 mg total) by mouth once daily. 3/12/21 3/12/22  Cornelia Bowen MD   dicyclomine (BENTYL) 10 MG capsule Take 1 capsule by mouth 4 (four) times daily. 12/1/20   Historical Provider   insulin detemir U-100 (LEVEMIR FLEXTOUCH) 100 unit/mL (3 mL) SubQ InPn pen Inject 30 Units into the skin 2 (two) times daily. 5/6/21 5/6/22  Yaz Miranda NP   levothyroxine (SYNTHROID) 75 MCG tablet Take 1 tablet (75 mcg total) by mouth before breakfast. 5/7/21 5/7/22  Yaz Miranda NP   linaCLOtide (LINZESS) 72 mcg Cap capsule Take 1 capsule (72 mcg total) by mouth before  "breakfast. 5/19/21   Duke Cole MD   melatonin (MELATIN) 3 mg tablet Take 2 tablets (6 mg total) by mouth nightly as needed for Insomnia. 5/6/21   Yaz Miranda NP   melatonin 10 mg Tab Take by mouth.    Historical Provider   multivitamin (ONE DAILY MULTIVITAMIN) per tablet Take 1 tablet by mouth once daily.    Historical Provider   ondansetron (ZOFRAN-ODT) 4 MG TbDL Take 1 tablet (4 mg total) by mouth every 6 (six) hours as needed (nausea). 11/16/21   Duke Cole MD   pantoprazole (PROTONIX) 40 MG tablet Take 1 tablet by mouth once daily 12/6/21   Duke Cole MD   pen needle, diabetic (NOVOTWIST) 32 gauge x 1/5" Ndle USE AS DIRECTED WITH  INSULIN  PEN 5/10/21   Duke Cole MD   pen needle, diabetic 32 gauge x 5/32" Ndle Use daily with Humalog or as directed 2/24/21   Duke Cole MD   senna (SENOKOT) 8.6 mg tablet Take 1 tablet by mouth once daily.    Historical Provider         History  Past Medical History:   Diagnosis Date    Acute on chronic diastolic congestive heart failure 4/14/2021    Carotid artery occlusion     Coronary artery disease     Hyperlipidemia     Hypertension     Skin cancer     cyst on the face , was removed 20years +    Stroke     Thyroid disease     Type 2 diabetes mellitus      Past Surgical History:   Procedure Laterality Date    CAROTID ENDARTERECTOMY Left 03/24/2016    CHOLECYSTECTOMY      ESOPHAGOGASTRODUODENOSCOPY Left 9/11/2018    Procedure: EGD (ESOPHAGOGASTRODUODENOSCOPY);  Surgeon: Stevenson Mckee MD;  Location: [facility] ENDO;  Service: Endoscopy;  Laterality: Left;    GALLBLADDER SURGERY      1996    RIGHT HEART CATHETERIZATION Right 3/26/2021    Procedure: INSERTION, CATHETER, RIGHT HEART;  Surgeon: Gennaro Sampson MD;  Location: [facility] CATH LAB;  Service: Cardiology;  Laterality: Right;    SKIN BIOPSY      TONSILLECTOMY      TUBAL LIGATION Bilateral 3/18/2016     Social History     Socioeconomic History    Marital status:  "   Tobacco Use    Smoking status: Never Smoker    Smokeless tobacco: Never Used   Substance and Sexual Activity    Alcohol use: Not Currently     Alcohol/week: 0.0 standard drinks     Comment: occasions    Drug use: No    Sexual activity: Not Currently         Allergies  Review of patient's allergies indicates:   Allergen Reactions    Sulfa (sulfonamide antibiotics) Nausea And Vomiting    Menthol contain prod          Review of Systems   Review of Systems   Constitutional: Negative for fever.   Cardiovascular: Positive for chest pain. Negative for claudication, cyanosis, dyspnea on exertion, irregular heartbeat, leg swelling, near-syncope, orthopnea, palpitations, paroxysmal nocturnal dyspnea and syncope.   Respiratory: Positive for shortness of breath. Negative for cough, hemoptysis, sleep disturbances due to breathing, snoring, sputum production and wheezing.    Gastrointestinal: Positive for heartburn, nausea and vomiting. Negative for melena.         Physical Exam  Wt Readings from Last 1 Encounters:   03/03/22 92.3 kg (203 lb 7.8 oz)     BP Readings from Last 3 Encounters:   03/03/22 (!) 111/53   03/03/22 132/60   10/19/21 132/78     Pulse Readings from Last 1 Encounters:   03/03/22 75     Body mass index is 38.45 kg/m².    Physical Exam  Constitutional:       General: She is not in acute distress.  Neck:      Vascular: Carotid bruit present. No JVD.   Cardiovascular:      Rate and Rhythm: Normal rate and regular rhythm.      Pulses:           Carotid pulses are 2+ on the right side and 2+ on the left side.       Radial pulses are 2+ on the right side and 2+ on the left side.        Dorsalis pedis pulses are 1+ on the right side and 1+ on the left side.      Heart sounds: S1 normal and S2 normal. Murmur heard.    Harsh midsystolic murmur is present with a grade of 2/6 at the upper right sternal border radiating to the neck.     Comments: R chest tunneled catheter  Pulmonary:      Breath sounds: No  wheezing, rhonchi or rales.   Musculoskeletal:      Right lower leg: No edema.      Left lower leg: No edema.   Neurological:      Mental Status: She is alert.                   Assessment and Plan:  NSTEMI with troponin still trending up to 42.  EKG showed sinus with RBBB.  Denies any CP at present.  -discussed proceeding with coronary angiogram with her and Dr. Gonzalez.  The risks, benefits, indications and alternatives of the planned procedure were discussed in details.  All questions were answered.  The patient agreed to proceed.  Witnessed informed consent was signed.    -continue medical treatment with ASA 81mg qd, carvedilol and high-dose atorvastatin 80 mg.  Will start clopidogrel 75 mg daily and IV heparin.    Moderate AS  -f/u echo results    HTN  -continue home meds and monitor    ESRD on HD  -as per renal team.  Discussed with NP Taco    DNR    Discussed with Dr. Gonzalez.    Thank you for allowing me to participate in the care of Melinda Knight.      Tex Alicea MD, F.A.C.C, F.S.C.A.I.

## 2022-03-03 NOTE — CONSULTS
Consult Note  Nephrology    Consult Requested By: DEBORA Gonzalez MD  Reason for Consult: ESRD    SUBJECTIVE:     History of Present Illness:  Patient is a 76 y.o. female presents with transfer from [facility] for NSTEMI.  Pt presented to outlying facility with SOB, heartburn, and found to have hypoxia and >trop.  Transferred for cards eval.  Extensive medical hx as outlined below including ESRD on HD MWF at [facility] with Dr. Tammi Zhang.  Consulted for evaluation.  Pt seen and examined.  Discussed with team at bedside.  Missed HD yesterday due to feeling unwell.  Labs reviewed.  Pt consented for HD.  Of note pt was scheduled to have Left arm AVF placed today.     Epic reviewed.    No CP/SOB/N/V/D/F/C at present and feels better overall.  Just taken off Bipap and on nasal cannula.     Past Medical History:   Diagnosis Date    Acute on chronic diastolic congestive heart failure 4/14/2021    Carotid artery occlusion     Coronary artery disease     Hyperlipidemia     Hypertension     Skin cancer     cyst on the face , was removed 20years +    Stroke     Thyroid disease     Type 2 diabetes mellitus      Past Surgical History:   Procedure Laterality Date    CAROTID ENDARTERECTOMY Left 03/24/2016    CHOLECYSTECTOMY      ESOPHAGOGASTRODUODENOSCOPY Left 9/11/2018    Procedure: EGD (ESOPHAGOGASTRODUODENOSCOPY);  Surgeon: Stevenson Mckee MD;  Location: [facility] ENDO;  Service: Endoscopy;  Laterality: Left;    GALLBLADDER SURGERY      1996    RIGHT HEART CATHETERIZATION Right 3/26/2021    Procedure: INSERTION, CATHETER, RIGHT HEART;  Surgeon: Gennaro Sampson MD;  Location: [facility] CATH LAB;  Service: Cardiology;  Laterality: Right;    SKIN BIOPSY      TONSILLECTOMY      TUBAL LIGATION Bilateral 3/18/2016     Family History   Problem Relation Age of Onset    Hypertension Mother     Heart failure Mother     Diabetes Mother     Diabetes Sister     Diabetes Brother      Social History      Tobacco Use    Smoking status: Never Smoker    Smokeless tobacco: Never Used   Substance Use Topics    Alcohol use: Not Currently     Alcohol/week: 0.0 standard drinks     Comment: occasions    Drug use: No       Review of patient's allergies indicates:   Allergen Reactions    Sulfa (sulfonamide antibiotics) Nausea And Vomiting    Menthol contain prod         Review of Systems:  Constitutional: No fever or chills  Respiratory: No cough or shortness of breath  Cardiovascular: No chest pain or palpitations  Gastrointestinal: No nausea or vomiting  Neurological: No confusion or weakness    OBJECTIVE:     Vital Signs (Most Recent)  Temp: 98.8 °F (37.1 °C) (03/03/22 0700)  Pulse: 75 (03/03/22 0705)  Resp: 12 (03/03/22 0705)  BP: (!) 111/53 (03/03/22 0705)  SpO2: 100 % (03/03/22 0705)    Vital Signs Range (Last 24H):  Temp:  [98.8 °F (37.1 °C)-98.9 °F (37.2 °C)]   Pulse:  []   Resp:  [12-42]   BP: (107-194)/(53-77)   SpO2:  [66 %-100 %]     No intake or output data in the 24 hours ending 03/03/22 0807    Physical Exam:  General appearance: Well developed, well nourished and NAD  Eyes:  Conjunctivae/corneas clear. PERRL.  Lungs: Normal respiratory effort,  Diminished.     Heart: Regular rate and rhythm, S1, S2 normal, no murmur, rub or henri.  Abdomen: Soft, non-tender non-distended; bowel sounds normal; no masses,  no organomegaly  Extremities: No cyanosis or clubbing. No edema.    Skin: Skin color, texture, turgor normal. No rashes or lesions  Neurologic: Normal strength and tone. No focal numbness or weakness   Right IJ TDC  desir      Laboratory:  Recent Labs   Lab 03/03/22  0745   WBC 11.01   RBC 3.13*   HGB 10.4*   HCT 33.4*      *   MCH 33.2*   MCHC 31.1*     BMP:   Recent Labs   Lab 03/02/22  2342   *      K 4.0   CL 94*   CO2 27   BUN 64*   CREATININE 3.9*   CALCIUM 10.2     Lab Results   Component Value Date    CALCIUM 10.2 03/02/2022    PHOS 4.4 09/30/2021      BNP  Recent Labs   Lab 03/02/22  2315   BNP 2,268*   No results found for: URICACID  Lab Results   Component Value Date    IRON 37 07/03/2021    TIBC 256 07/03/2021    FERRITIN 121 07/03/2021     Lab Results   Component Value Date    .0 (H) 07/01/2021    CALCIUM 10.2 03/02/2022    PHOS 4.4 09/30/2021       Diagnostic Results:  No orders to display       ASSESSMENT/PLAN:     ESRD on HD MWF at [facility] with Dr. Tammi Zhang with volume overload:  -missed HD Wednesday.   -consented for HD today.  -see orders.  -salvage left arm for AVF  -updated primary nephrologist.   -no need for desir.  -ok for CV angio.  -Renally dose meds, avoid nephrotoxins, and monitor I/O's closely.      NSTEMI:  -Trop rising.  -discussed with Dr. Alicea.  -ok for angio if needed.      Anemia of CKD:  -no need for FATOUMATA      MBD:  -nephrocaps.  -check PTH, phos.      Thanks for consult  See above  Will follow along.

## 2022-03-04 PROBLEM — R78.81 POSITIVE BLOOD CULTURE: Status: ACTIVE | Noted: 2022-01-01

## 2022-03-04 NOTE — PROGRESS NOTES
Pharmacokinetic Initial Assessment: IV Vancomycin    Assessment/Plan:    Initiate intravenous vancomycin with loading dose of 2000 mg once with subsequent doses when random concentrations are less than 25 mcg/mL  Desired empiric serum trough concentration is 10 to 20 mcg/mL  Draw vancomycin random level on 3/7 at 430.  Pharmacy will continue to follow and monitor vancomycin.      Please contact pharmacy at extension [phone] with any questions regarding this assessment.     Thank you for the consult,   Shannon Pompa       Patient brief summary:  Melinda Knight is a 76 y.o. female initiated on antimicrobial therapy with IV Vancomycin for treatment of suspected bacteremia    Drug Allergies:   Review of patient's allergies indicates:   Allergen Reactions    Sulfa (sulfonamide antibiotics) Nausea And Vomiting    Menthol contain prod        Actual Body Weight:   90kg    Renal Function:   Estimated Creatinine Clearance: 13.3 mL/min (A) (based on SCr of 3.8 mg/dL (H)).,     Dialysis Method (if applicable):  intermittent HD - MWF    CBC (last 72 hours):  Recent Labs   Lab Result Units 03/02/22 2315 03/03/22  0745 03/04/22  0359   WBC K/uL 15.69* 11.01 8.93   Hemoglobin g/dL 12.8 10.4* 9.6*   Hemoglobin A1C %  --  5.5  --    Hematocrit % 41.7 33.4* 30.9*   Platelets K/uL 326 213 207   Gran % % 89.4* 85.1* 69.1   Lymph % % 5.0* 6.9* 12.7*   Mono % % 4.5 7.1 14.3   Eosinophil % % 0.3 0.0 2.8   Basophil % % 0.4 0.4 0.8   Differential Method  Automated Automated Automated       Metabolic Panel (last 72 hours):  Recent Labs   Lab Result Units 03/02/22  2335 03/02/22  2342 03/03/22  0745 03/04/22  0359   Sodium mmol/L  --  138 139 135*   Potassium mmol/L  --  4.0 4.8 4.2   Chloride mmol/L  --  94* 95 97   CO2 mmol/L  --  27 32* 28   Glucose mg/dL  --  162* 76 135*   Glucose, UA  Negative  --   --   --    BUN mg/dL  --  64* 68* 43*   Creatinine mg/dL  --  3.9* 4.3* 3.8*   Albumin g/dL  --  2.6* 2.4* 2.2*   Total Bilirubin mg/dL  --   0.5  --   --    Alkaline Phosphatase U/L  --  87  --   --    AST U/L  --  70*  --   --    ALT U/L  --  17  --   --    Phosphorus mg/dL  --   --  4.6* 4.0       Drug levels (last 3 results):  No results for input(s): VANCOMYCINRA, VANCOMYCINPE, VANCOMYCINTR in the last 72 hours.    Microbiologic Results:  Microbiology Results (last 7 days)       Procedure Component Value Units Date/Time    Blood culture [[id number]]     Order Status: Sent Specimen: Blood     Blood culture [[id number]]     Order Status: Sent Specimen: Blood

## 2022-03-04 NOTE — ASSESSMENT & PLAN NOTE
HTN, HLD, CAD, acute on chronic resp failure with hypoxia, pHTN, AS, elevated troponin  - Echo 03/03 shows EF 48%, severe AS.  - LHC 03/03 showed LAD, LCx 70% stenosis and RCA 60% stenosis. Transfer in process to [facility] for CTS evaluation.  - Continue aspirin 81mg PO daily, atorvastatin 80mg PO daily, carvedilol 12.5mg PO BID. Holding clopidogrel for time being pending surgical evaluation in the event procedure is desired.  - Weaned to baseline supplemental O2.   - Continuing heparin gtt.  - Appreciate cardiology assistance.

## 2022-03-04 NOTE — PROGRESS NOTES
"    Cardiology Progress Note    3/4/2022  8:40 AM    Subjective/Interim:      No complaints this morning.  No chest pain or shortness of breath.  Troponin peaked at 42 and trended down to 30. Coronary angiogram yesterday showed multivessel disease.  Awaiting transfer to [facility].     Objective:   24-hour Vitals:  Temp:  [97.7 °F (36.5 °C)-98.8 °F (37.1 °C)] 98.5 °F (36.9 °C)  Pulse:  [68-87] 84  Resp:  [11-34] 25  SpO2:  [88 %-100 %] 99 %  BP: ()/(45-74) 195/74    Physical Examination:  Vitals: BP (!) 195/74   Pulse 84   Temp 98.5 °F (36.9 °C)   Resp (!) 25   Ht 5' 1" (1.549 m)   Wt 90 kg (198 lb 6.6 oz)   LMP  (LMP Unknown)   SpO2 99%   BMI 37.49 kg/m²     Physical Exam  Constitutional:       General: She is not in acute distress.  Neck:      Vascular: Carotid bruit present. No JVD.   Cardiovascular:      Rate and Rhythm: Normal rate and regular rhythm.      Pulses:           Carotid pulses are 2+ on the right side and 2+ on the left side.       Radial pulses are 2+ on the right side and 2+ on the left side.        Dorsalis pedis pulses are 1+ on the right side and 1+ on the left side.      Heart sounds: S1 normal and S2 normal. Murmur heard.    Harsh midsystolic murmur is present with a grade of 2/6 at the upper right sternal border radiating to the neck.     Comments: R chest tunneled catheter  Pulmonary:      Breath sounds: No wheezing, rhonchi or rales.   Musculoskeletal:      Right lower leg: No edema.      Left lower leg: No edema.   Neurological:      Mental Status: She is alert.         Laboratory:  Trended Lab Data:  Recent Labs   Lab 03/02/22 2315 03/02/22  2322 03/03/22  0745 03/04/22  0359   WBC 15.69*  --  11.01 8.93   HGB 12.8  --  10.4* 9.6*   HCT 41.7 38 33.4* 30.9*     --  213 207       Recent Labs   Lab 03/02/22  2342 03/03/22  0745 03/04/22  0359    139 135*   K 4.0 4.8 4.2   CL 94* 95 97   CO2 27 32* 28   BUN 64* 68* 43*   * 76 135*   CALCIUM 10.2 10.3 9.1   PHOS  " --  4.6* 4.0       Recent Labs   Lab 03/02/22  2342 03/03/22  0745 03/04/22  0359   PROT 7.1  --   --    ALBUMIN 2.6* 2.4* 2.2*   BILITOT 0.5  --   --    AST 70*  --   --    ALT 17  --   --    ALKPHOS 87  --   --        Recent Labs   Lab 03/03/22  0745   INR 1.0       Cardiac:   Recent Labs   Lab 03/02/22  2315 03/02/22  2342 03/03/22  0400 03/03/22  0745 03/03/22  1612   TROPONINI  --    < > 26.590* 41.947* 30.324*   BNP 2,268*  --   --   --   --     < > = values in this interval not displayed.       FLP:   Lab Results   Component Value Date    CHOL 163 02/22/2021    HDL 45 02/22/2021    LDLCALC 102 (H) 02/22/2021    TRIG 142 06/12/2021    CHOLHDL 27.6 02/22/2021     DM:   Lab Results   Component Value Date    HGBA1C 5.5 03/03/2022    HGBA1C 6.7 (H) 09/28/2021    HGBA1C 5.9 (H) 06/08/2021    LDLCALC 102 (H) 02/22/2021    CREATININE 3.8 (H) 03/04/2022     Thyroid:   Lab Results   Component Value Date    TSH 2.411 07/02/2021    FREET4 1.26 03/25/2021    G4OOJUY 75 10/03/2019     Anemia:   Lab Results   Component Value Date    IRON 37 07/03/2021    TIBC 256 07/03/2021    FERRITIN 121 07/03/2021    [id number] 867 07/04/2021    FOLATE 10.6 07/04/2021     Urinalysis:   Lab Results   Component Value Date    LABURIN No growth 03/08/2021    COLORU Yellow 03/02/2022    SPECGRAV 1.025 03/02/2022    NITRITE Negative 03/02/2022    KETONESU Negative 03/02/2022    UROBILINOGEN Negative 03/02/2022     @      Other Results:  EKG (my interpretation):    TELEMETRY:  sinus    Echo:    Radiology:  X-Ray Chest AP Portable    Result Date: 3/3/2022  EXAMINATION: XR CHEST AP PORTABLE CLINICAL HISTORY: Sepsis; TECHNIQUE: Single frontal view of the chest was performed. COMPARISON: 09/28/2021, 06/30/2021, 06/15/2021 chest x-ray FINDINGS: Cardiomediastinal silhouette is stable and nonenlarged.  Bilateral congestive changes are noted.  Acute bilateral reticular opacities are noted more so in the perihilar regions and left lower lung.  There is  no significant pleural effusion or pneumothorax. Double-lumen catheter via the right jugular approach is stable with the tips over the distal SVC.  Osseous structures grossly appear intact     Acute bilateral lung opacities and vascular congestive changes raises question of CHF.  Infectious or inflammatory pneumonitis is considered. No significant cardiomegaly. This report was flagged in Epic as abnormal.  Is Electronically signed by: Elsa Meza Date:    03/03/2022 Time:    01:03    Echo    Result Date: 3/3/2022  · Low normal systolic function. · The estimated ejection fraction is 48%. · Indeterminate left ventricular diastolic function. · There is left ventricular global hypokinesis. · Normal right ventricular size with normal right ventricular systolic function. · There is moderate aortic valve stenosis. · Aortic valve area is 0.63 cm2; peak velocity is 2.02 m/s; mean gradient is 11 mmHg. · Mild mitral regurgitation.      Cardiac catheterization    Result Date: 3/3/2022  · The Prox RCA lesion was 60% stenosed. · The Mid LAD lesion was 70% stenosed. · The Mid Cx lesion was 70% stenosed. · The estimated blood loss was none. · There was two vessel coronary artery disease.  After informed consent was obtained, patient was brought to the cardiac catheterization laboratory in a fasting state where patient was prepped and draped in the usual sterile fashion.  A preprocedure time-out was performed.  Patient receive conscious sedation with IV Versed and fentanyl.  The right wrist was anesthetized with 1 cc of lidocaine.  The right radial artery was accessed with direct ultrasound guidance and a 6 Indonesian Slender sheath was inserted over the wire.  Radial cocktail consisting of 200 mcg of nitroglycerin, 2.5 mg of verapamil and 4000 units of heparin was given via the sheath.  A 5 Indonesian TIG catheter was inserted and selective coronary angiogram performed of the left coronary artery successfully.  The right coronary  artery was only successful to image using JR4 diagnostic catheter. Catheter was removed over guidewire.  The sheath was removed and hemostasis was obtained using a Vasc band.        Current Medications:     Infusions:   sodium chloride 0.9% 250 mL/hr at 03/03/22 1805    heparin (porcine) in D5W 14 Units/kg/hr (03/03/22 4086)        Scheduled:   sodium chloride 0.9%   Intravenous Once    aspirin  81 mg Oral Daily    atorvastatin  80 mg Oral Daily    carvediloL  12.5 mg Oral BID    citalopram  20 mg Oral Daily    insulin detemir U-100  20 Units Subcutaneous BID    levothyroxine  75 mcg Oral Before breakfast    mupirocin   Nasal BID    pantoprazole  40 mg Oral Daily    vancomycin (VANCOCIN) IVPB  2,000 mg Intravenous Once    vitamin renal formula (B-complex-vitamin c-folic acid)  1 capsule Oral Daily        PRN:  sodium chloride 0.9%, acetaminophen, acetaminophen, dextrose 10%, dextrose 10%, diazePAM, glucagon (human recombinant), glucose, glucose, heparin (porcine), heparin (PORCINE), heparin (PORCINE), hydrOXYzine HCL, insulin aspart U-100, melatonin, ondansetron, ondansetron, sodium chloride 0.9%, sodium chloride 0.9%, Pharmacy to dose Vancomycin consult **AND** vancomycin - pharmacy to dose     Assessment and Plan:     Melinda Knight is a 76 y.o.female with  NSTEMI with troponin still trending up to 42.  EKG showed sinus with RBBB.  Denies any CP at present.  -angiogram showed significant mid LAD and LCx lesion and borderline RCA lesion.  EF 48%.  No angina today.  Awaiting transfer to [facility] for CABG +/- AVR evaluation.  -Continue ASA 81mg qd, high intensity statin, carvedilol.  Plavix has been stopped since 3/3/22.     Moderate AS  -echo showed EF 48% with PORTILLO 0.63cm2.  Will need further evaluation for LFLG AS at [facility].     HTN  -continue home meds and monitor.  Increase carvedilol if needed.     ESRD on HD  -as per renal team.  Discussed with OG España     DNR     Discussed with Dr. Gonzalez.          Tex Alicea MD        Disclaimer: This document was created using voice recognition software ([employer]). Although it may be edited, this document may contain errors related to incorrect recognition of the spoken word. Please call the physician if clarification is needed.

## 2022-03-04 NOTE — PROVIDER TRANSFER
Outside Transfer Acceptance Note / Regional Referral Center    Referring facility: [facility] LOCATION ([facility])   Referring provider: LIDA ESCOBAR, OTONIEL DAVILA, RADHA DAVILA, KIANA CARLTON D. SCOTT  Accepting facility: OUTSIDE FACILITY  OUTSIDE FACILITY  OUTSIDE FACILITY  OUTSIDE FACILITY  OUTSIDE FACILITY  [facility]  Accepting provider: ROSIO GARDNER MALCOLM G.  Reason for transfer: CTS  Transfer diagnosis: NSTEMI  Transfer specialty requested: [facility]  Cardiothoracic Surgery  Transfer specialty notified: yes  Transfer level: NUMBER 1-5: 2  Bed type requested: stepdown  Isolation status: No active isolations   Admission class or status: IP- Inpatient  IP- Inpatient      Narrative     76-year-old female with a history of coronary artery disease, end-stage renal disease on hemodialysis, diastolic heart failure, hyperlipidemia, hypertension, prior stroke, thyroid disease, and diabetes initially presented to [facility] Emergency Department on March 2 with nausea, vomiting, and dyspnea. Initial troponin was 13.739 with repeat 26.59, but EKG did not show evidence of STEMI.  She was placed on BiPAP with improvement in oxygenation.  She received IV Lasix.  She was subsequently transferred to [facility] Medicine ICU for NSTEMI.  She is currently on aspirin, statin, Coreg, and initiated on heparin infusion.  She was seen by Cardiology and underwent left heart catheterization on March 3. Left heart catheterization on March 3 noted 60% proximal RCA stenosis, 70% mid LAD stenosis, 70% mid circumflex stenosis.  Plavix is on hold until it is known whether she will undergo further procedures.  Oxygenation has improved, and she is currently stable on 3 L nasal cannula.  She  had hemodialysis on March 3. Referring service is requesting transfer for CT Surgery evaluation.  Hospital Medicine was asked to admit.      She is presently in the ICU at [facility]. Referring provider felt the patient was likely stable for transition to step-down unit but will need to reassess prior to final acceptance (depending on oxygenation/BP and CT Surgery input). Awaiting input from CT Surgery to determine timing of transfer and level of care.    March 3:  Sodium 139, potassium 4.8, chloride 95, CO2 32, BUN 68, creatinine 4.3, calcium 10.3, A1C 5.5, PTH to a 7.4, INR 1, PTT 27.1, troponin 41.947, white blood cells 11.01, hemoglobin 10.4, hematocrit 33.4, platelets 213    March 2:  BNP 2268, troponin 13.739, lactic acid 1.2, COVID negative    Chest x-ray on March 2 showed acute bilateral lung opacities and vascular congestive changes raising question for CHF.  No significant cardiomegaly.    Echocardiogram on March 3 showed ejection fraction 48%, indeterminate LV diastolic function, LV global hypokinesis, normal RV size and systolic function, moderate aortic valve stenosis (valve area 0.63, peak velocity 2.02, mean gradient 11)    VS:  Temperature 98°, pulse 70-75, respirations 13-29, blood pressure 99//53, O2 sats 88-97% on 3L nasal cannula        Instructions      Gary Bauman-  Admit to Hospital Medicine  Upon patient arrival to floor, please send SecureChat to [facility] or call extension [phone] (if no answer, this will flip to a beeper, so enter your call back number) for Hospital Medicine admit team assignment and for additional admit orders for the patient.  Do not page the attending physician associated with the patient on arrival (this physician may not be on duty at the time of arrival).  Rather, always call [phone] to reach the triage physician for orders and team assignment.

## 2022-03-04 NOTE — HOSPITAL COURSE
Admitted as transfer from [facility] ED with NSTEMI. Cardiology and nephrology consulted. Volume removed with HD and weaned to baseline supplemental O2. LHC performed showing triple vessel disease. Cardiology recommended CTS evaluation. Patient not a CABG candidate per CTS. IC consulted for possible PCI. Blood cultures from ED 03/02 showed GPCs; started IV Vancomycin. Permacath exchanged on 03/04. ID consulted for bacteremia. After long discussion with primary and cardiology, patient and family elected to not proceed with LHC/PCI and maintain DNR status. Pursuing medical management. During dialysis 3/9/22 patient became hypotensive. Held BP meds and added as able. ID requested 48 hour line holiday to see if blood cultures remain clear. General surgery consulted for line removal. Patient recultured, given 48 hour line holiday. Right femoral trialysis line placed by anesthesia at bedside. Nephro to continue dialysis, but temp line noted to be non-functional on 3/14. S/p R tunneled catheter placement on 3/15. Clotting found over guidewire, interventional nephrology recommending definitive AC. Patient discharged on ASA/Plavix and Eliquis. Instructed to follow closely with cardiology and stop Aspirin in 1 month per cardiology recommendations. Discharged after HD in good condition.

## 2022-03-04 NOTE — ASSESSMENT & PLAN NOTE
- Blood cultures drawn in ED 03/02 showing GPCs resembling staph.  - No clear source of infection at this time. No noted areas of skin breakdown, tunneled line does not appear grossly infected, UA unremarkable, CXR at presentation more consistent with pulmonary edema than infectious process.  - Convert from clindamycin to vancomycin IV with pharmacy dosing consult with line presence. De-escalate as feasible.  - Repeat cultures.

## 2022-03-04 NOTE — ASSESSMENT & PLAN NOTE
- On detemir 30U subQ BID at home.  - Continue insulin detemir 20U subQ BID, low-dose sliding scale insulin aspart 0-5U subQ TIDWM PRN while inpatient.

## 2022-03-04 NOTE — PROGRESS NOTES
"[facility] - Intensive Care [facility] Medicine  Progress Note    Patient Name: Melinda Knight  MRN: [id number]  Patient Class: IP- Inpatient   Admission Date: 3/3/2022  Length of Stay: 1 days  Attending Physician: DEBORA Gonzalez MD  Primary Care Provider: Duke Cole MD        Subjective:     Principal Problem:NSTEMI (non-ST elevated myocardial infarction)        HPI:  Ms. Knight is a 76/F with PMH HTN, HLD, CAD, DMII, hypothyroidism, ESRD on HD, GERD, restrictive lung disease with chronic hypoxemic resp failure (on 2-3L O2) who presented to [facility] 03/03 as a transfer from [place] ED with a two day history of "heartburn pain," nausea, vomiting and SOB. She reports she was in her usual state of health prior when she began having the above symptoms. Difficult to determine which symptom began first, but they gradually worsened. She describes her pain as being burning in character without significant radiation and 4-5/10 in severity. She reports her symptoms were mildly improved with her PPI and nausea medication. With persistent discomfort she presented to ED for further evaluation. ED workup was notable for troponin 13, elevated BNP (baseline 400-600s), and CXR with bilateral opacities concerning for volume overload. She was reported to have been hypoxic prior to ED presentation with sats down to 75. She was placed on BiPAP. Transfer center contacted and she was transferred to [facility] for further management.      Overview/Hospital Course:  Admitted as transfer from [facility] ED with NSTEMI. Cardiology and nephrology consulted. Volume removed with HD and weaned to baseline supplemental O2. LHC performed showing triple vessel disease. Cardiology recommended CTS evaluation. Blood cultures from ED 03/02 showed GPCs; started antibiotic therapy.      Interval History: Noted positive blood cultures from overnight. She reports noting no areas of redness or swelling in the recent past. States she had some " itching at her line site and had received an ointment approx 2-3 weeks ago for itching but denies other infectious symptoms. No other areas of noted breakdown.    Review of Systems   Constitutional:  Negative for chills and fever.   Respiratory:  Negative for cough and shortness of breath.    Cardiovascular:  Negative for chest pain and palpitations.   Gastrointestinal:  Negative for abdominal pain, nausea and vomiting.   Objective:     Vital Signs (Most Recent):  Temp: 98.5 °F (36.9 °C) (03/04/22 0311)  Pulse: 70 (03/04/22 0600)  Resp: (!) 25 (03/04/22 0600)  BP: 136/62 (03/04/22 0600)  SpO2: 99 % (03/04/22 0600)   Vital Signs (24h Range):  Temp:  [97.7 °F (36.5 °C)-98.8 °F (37.1 °C)] 98.5 °F (36.9 °C)  Pulse:  [68-87] 70  Resp:  [11-34] 25  SpO2:  [88 %-100 %] 99 %  BP: ()/(45-74) 136/62     Weight: 90 kg (198 lb 6.6 oz)  Body mass index is 37.49 kg/m².    Intake/Output Summary (Last 24 hours) at 3/4/2022 0755  Last data filed at 3/4/2022 0600  Gross per 24 hour   Intake 2194.15 ml   Output 2345 ml   Net -150.85 ml      Physical Exam  Vitals and nursing note reviewed.   Constitutional:       General: She is not in acute distress.     Appearance: She is well-developed.   HENT:      Head: Normocephalic and atraumatic.   Eyes:      General:         Right eye: No discharge.         Left eye: No discharge.      Conjunctiva/sclera: Conjunctivae normal.   Cardiovascular:      Rate and Rhythm: Normal rate.      Pulses: Normal pulses.      Heart sounds: Murmur heard.   Pulmonary:      Effort: Pulmonary effort is normal. No respiratory distress.   Chest:      Comments: R tunneled line without erythema or induration.  Abdominal:      Palpations: Abdomen is soft.      Tenderness: There is no abdominal tenderness.   Musculoskeletal:         General: Normal range of motion.      Right lower leg: No edema.      Left lower leg: No edema.   Skin:     General: Skin is warm and dry.   Neurological:      Mental Status: She is  alert and oriented to person, place, and time.     Significant Labs:   CBC:  Recent Labs   Lab 03/02/22  2315 03/02/22  2322 03/03/22  0745 03/04/22  0359   WBC 15.69*  --  11.01 8.93   HGB 12.8  --  10.4* 9.6*   HCT 41.7 38 33.4* 30.9*     --  213 207   GRAN 89.4*  14.0*  --  85.1*  9.4* 69.1  6.2   LYMPH 5.0*  0.8*  --  6.9*  0.8* 12.7*  1.1   MONO 4.5  0.7  --  7.1  0.8 14.3  1.3*   EOS 0.0  --  0.0 0.3   BASO 0.07  --  0.04 0.07   CMP:  Recent Labs   Lab 03/02/22  2342 03/03/22  0745 03/04/22  0359    139 135*   K 4.0 4.8 4.2   CL 94* 95 97   CO2 27 32* 28   BUN 64* 68* 43*   CREATININE 3.9* 4.3* 3.8*   * 76 135*   CALCIUM 10.2 10.3 9.1   PHOS  --  4.6* 4.0   ALKPHOS 87  --   --    AST 70*  --   --    ALT 17  --   --    BILITOT 0.5  --   --    PROT 7.1  --   --    ALBUMIN 2.6* 2.4* 2.2*   ANIONGAP 17* 12 10     Significant Imaging:   Echo 03/03:  · Low normal systolic function.  · The estimated ejection fraction is 48%.  · Indeterminate left ventricular diastolic function.  · There is left ventricular global hypokinesis.  · Normal right ventricular size with normal right ventricular systolic function.  · There is moderate aortic valve stenosis.  · Aortic valve area is 0.63 cm2; peak velocity is 2.02 m/s; mean gradient is 11 mmHg.  · Mild mitral regurgitation.        Assessment/Plan:      * NSTEMI (non-ST elevated myocardial infarction)  HTN, HLD, CAD, acute on chronic resp failure with hypoxia, pHTN, AS, elevated troponin  - Echo 03/03 shows EF 48%, severe AS.  - LHC 03/03 showed LAD, LCx 70% stenosis and RCA 60% stenosis. Transfer in process to [facility] for CTS evaluation.  - Continue aspirin 81mg PO daily, atorvastatin 80mg PO daily, carvedilol 12.5mg PO BID. Holding clopidogrel for time being pending surgical evaluation in the event procedure is desired.  - Weaned to baseline supplemental O2.   - Continuing heparin gtt.  - Appreciate cardiology assistance.    Positive blood  culture  - Blood cultures drawn in ED 03/02 showing GPCs resembling staph.  - No clear source of infection at this time. No noted areas of skin breakdown, tunneled line does not appear grossly infected, UA unremarkable, CXR at presentation more consistent with pulmonary edema than infectious process.  - Convert from clindamycin to vancomycin IV with pharmacy dosing consult with line presence. De-escalate as feasible.  - Repeat cultures.    Advance care planning  - DNR status; current [place]POST reflects her goals of care.    Debility  - PT/OT.    Type 2 diabetes mellitus with diabetic polyneuropathy  - On detemir 30U subQ BID at home.  - Continue insulin detemir 20U subQ BID, low-dose sliding scale insulin aspart 0-5U subQ TIDWM PRN while inpatient.    Hypothyroidism (acquired)  - Continue levothyroxine 75mcg PO daily.    GERD (gastroesophageal reflux disease)  - Continue pantoprazole 40mg PO daily.    VTE Risk Mitigation (From admission, onward)         Ordered     heparin 25,000 units in dextrose 5% (100 units/ml) IV bolus from bag - ADDITIONAL PRN BOLUS - 60 units/kg (max bolus 4000 units)  As needed (PRN)        Question:  Heparin Infusion Adjustment (DO NOT MODIFY ANSWER)  Answer:  [email or web address]\Images\Pharmacy\HeparinInfusions\heparin LOW INTENSITY nomogram for [facility] [id number].pdf    03/03/22 0810     heparin 25,000 units in dextrose 5% (100 units/ml) IV bolus from bag - ADDITIONAL PRN BOLUS - 30 units/kg (max bolus 4000 units)  As needed (PRN)        Question:  Heparin Infusion Adjustment (DO NOT MODIFY ANSWER)  Answer:  [email or web address]\Images\Pharmacy\HeparinInfusions\heparin LOW INTENSITY nomogram for [facility] [id number]    03/03/22 0810     heparin (porcine) injection 5,000 Units  As needed (PRN)         03/03/22 0755     heparin 25,000 units in dextrose 5% 250 mL (100 units/mL) infusion LOW INTENSITY nomogram - [facility]  Continuous        Question Answer Comment   Heparin Infusion Adjustment (DO NOT MODIFY ANSWER)  [email or web address]\Images\Pharmacy\HeparinInfusions\heparin LOW INTENSITY nomogram for [facility] [id number]    Begin at (in units/kg/hr) 12        03/03/22 0810     IP VTE HIGH RISK PATIENT  Once         03/03/22 0650     Place sequential compression device  Until discontinued         03/03/22 0650                Discharge Planning   UMAIR:      Code Status: DNR   Is the patient medically ready for discharge?:     Reason for patient still in hospital (select all that apply): Treatment                     D Alfonso Gonzalez MD  Department of Hospital Medicine   [facility] - Intensive Care ([place])

## 2022-03-04 NOTE — PLAN OF CARE
"  ICU PLAN OF CARE NOTE    SHIFT EVENTS:  VSS / No acute events this shift. Patient and/or family updated on plan of care; questions and concerns addressed. See flow sheets for full assessment details. Will continue to monitor patient closely.      Dx: NSTEMI (non-ST elevated myocardial infarction)    Vital Signs: /70 (BP Location: Right arm, Patient Position: Sitting)   Pulse 86   Temp 100.1 °F (37.8 °C) (Oral)   Resp (!) 26   Ht 5' 1" (1.549 m)   Wt 90 kg (198 lb 6.6 oz)   LMP  (LMP Unknown)   SpO2 (!) 93%   Breastfeeding No   BMI 37.49 kg/m²     Neuro: AAO x4    Respiratory: Nasal Cannula    Cardiac: Right bundle branch block     Diet: Regular Diet, renal     Gtts: Heparin    Urine Output: Urinary Catheter 85 cc/shift    Drains: dialysis cath         Labs/Accuchecks: daily labs and AC/HS accuchecks.    SKIN NOTE:  Skin: bruising/hematoma noted to right wrist. Blanchable redness noted to buttocks.    Skin precautions maintained including:  Sacrum and heels with foam dressing in place for pressure protection. Frequent weight shift encouraged / assistance provided / continuous rotational turning bed feature utilized; Patient turned Q2 hr to prevent further breakdown. Bed plugged in and mattress inflated. Adhesive use limited. Heels elevated off bed. Positioned off wounds. Pressure points protected and positioning supports utilized.  Skin-to-device areas padded. Skin-to-skin areas padded.    New Orders:    HD today   Vancomycin 2g  Pelletier cath D/C'd  Blood culture ordered and collected    Shift Events:  HD today at bedside     Aptt therapeutic x's 2, daily Aptt draws now noted. Continues on heparin tolerated well. 0 signs of active bleeding noted. Continues on current dose of 14u/kg/hr    Report called to Neena RUSSELL at [facility] at 1630. Informed of latest Aptt results as well as therapeutic range met x's two results as well as pending blood culture results.     1805- pt noted with SAT of 88%. Pt " noted with eyes closed at this time. Staff woke pt up. Pt verbalized no complaints at this time, no c/o SOB. Staff gave pt some time to recover from 88%. Pt unsuccessful. O2 requirements increased from 3L to 4L     Daughter Sherry notified of patients room number at [facility]    1820- Pt exiting facility with transport. NADN. Report given to [facility] staff. Notified of recent increase in O2 demand. Insulin as well as personal belongings sent with patient.

## 2022-03-04 NOTE — PROGRESS NOTES
"Nephrology  Progress Note    Admit Date: 3/3/2022   LOS: 1 day     SUBJECTIVE:     Follow-up For:  NSTEMI (non-ST elevated myocardial infarction)    Interval History:     Events noted.  CV angio with 3 vessel disease and needs CABG.  Awaiting transfer to [facility].  +blood cultures noted.  Discussed with team.  HD today.  Eating breakfast w/o complaints.     Review of Systems:  Constitutional: No fever or chills  Respiratory: No cough or shortness of breath  Cardiovascular: No chest pain or palpitations  Gastrointestinal: No nausea or vomiting  Neurological: No confusion or weakness    OBJECTIVE:     Vital Signs Range (Last 24H):  /62   Pulse 70   Temp 98.5 °F (36.9 °C)   Resp (!) 25   Ht 5' 1" (1.549 m)   Wt 90 kg (198 lb 6.6 oz)   LMP  (LMP Unknown)   SpO2 99%   BMI 37.49 kg/m²     Temp:  [97.7 °F (36.5 °C)-98.8 °F (37.1 °C)]   Pulse:  [68-87]   Resp:  [11-34]   BP: ()/(45-74)   SpO2:  [88 %-100 %]     I & O (Last 24H):    Intake/Output Summary (Last 24 hours) at 3/4/2022 0741  Last data filed at 3/4/2022 0600  Gross per 24 hour   Intake 2194.15 ml   Output 2345 ml   Net -150.85 ml       Physical Exam:  General appearance: Well developed, well nourished and NAD  Eyes:  Conjunctivae/corneas clear. PERRL.  Lungs: Normal respiratory effort,  Diminished.     Heart: Regular rate and rhythm, S1, S2 normal, no murmur, rub or henri.  Abdomen: Soft, non-tender non-distended; bowel sounds normal; no masses,  no organomegaly  Extremities: No cyanosis or clubbing. No edema.    Skin: Skin color, texture, turgor normal. No rashes or lesions  Neurologic: Normal strength and tone. No focal numbness or weakness   Right IJ TDC  desir    Laboratory Data:  Recent Labs   Lab 03/04/22 0359   WBC 8.93   RBC 2.90*   HGB 9.6*   HCT 30.9*      *   MCH 33.1*   MCHC 31.1*       BMP:   Recent Labs   Lab 03/04/22  0359   *   *   K 4.2   CL 97   CO2 28   BUN 43*   CREATININE 3.8*   CALCIUM 9.1     Lab " Results   Component Value Date    CALCIUM 9.1 03/04/2022    PHOS 4.0 03/04/2022       Lab Results   Component Value Date    .4 (H) 03/03/2022    CALCIUM 9.1 03/04/2022    PHOS 4.0 03/04/2022       No results found for: URICACID    BNP  Recent Labs   Lab 03/02/22  2315   BNP 2,268*       Medications:  Medication list was reviewed and changes noted under Assessment/Plan.    Diagnostic Results:        ASSESSMENT/PLAN:     ESRD on HD MWF at [facility] with Dr. Tammi Zhang with volume overload:  -missed HD Wednesday.   -consented for HD.  -see orders.  -salvage left arm for AVF  -updated primary nephrologist.   -no need for desir.  -Renally dose meds, avoid nephrotoxins, and monitor I/O's closely.        NSTEMI/CAD:  -angio noted with 3 vessel disease  -awaiting transfer [facility] CABG.        Anemia of CKD:  -no need for FATOUMATA and hold with new CAD        MBD:  -nephrocaps.  -PTH/phos at goal.       GPC resembling Staph:  -true vs contaminant?  -agree with coverage until final results.  -WBC wnl  -no fever  -TDC w/o signs of infection.        HD today  Transfer to [facility]

## 2022-03-04 NOTE — SUBJECTIVE & OBJECTIVE
Interval History: Noted positive blood cultures from overnight. She reports noting no areas of redness or swelling in the recent past. States she had some itching at her line site and had received an ointment approx 2-3 weeks ago for itching but denies other infectious symptoms. No other areas of noted breakdown.    Review of Systems   Constitutional:  Negative for chills and fever.   Respiratory:  Negative for cough and shortness of breath.    Cardiovascular:  Negative for chest pain and palpitations.   Gastrointestinal:  Negative for abdominal pain, nausea and vomiting.   Objective:     Vital Signs (Most Recent):  Temp: 98.5 °F (36.9 °C) (03/04/22 0311)  Pulse: 70 (03/04/22 0600)  Resp: (!) 25 (03/04/22 0600)  BP: 136/62 (03/04/22 0600)  SpO2: 99 % (03/04/22 0600)   Vital Signs (24h Range):  Temp:  [97.7 °F (36.5 °C)-98.8 °F (37.1 °C)] 98.5 °F (36.9 °C)  Pulse:  [68-87] 70  Resp:  [11-34] 25  SpO2:  [88 %-100 %] 99 %  BP: ()/(45-74) 136/62     Weight: 90 kg (198 lb 6.6 oz)  Body mass index is 37.49 kg/m².    Intake/Output Summary (Last 24 hours) at 3/4/2022 0755  Last data filed at 3/4/2022 0600  Gross per 24 hour   Intake 2194.15 ml   Output 2345 ml   Net -150.85 ml      Physical Exam  Vitals and nursing note reviewed.   Constitutional:       General: She is not in acute distress.     Appearance: She is well-developed.   HENT:      Head: Normocephalic and atraumatic.   Eyes:      General:         Right eye: No discharge.         Left eye: No discharge.      Conjunctiva/sclera: Conjunctivae normal.   Cardiovascular:      Rate and Rhythm: Normal rate.      Pulses: Normal pulses.      Heart sounds: Murmur heard.   Pulmonary:      Effort: Pulmonary effort is normal. No respiratory distress.   Chest:      Comments: R tunneled line without erythema or induration.  Abdominal:      Palpations: Abdomen is soft.      Tenderness: There is no abdominal tenderness.   Musculoskeletal:         General: Normal range of  motion.      Right lower leg: No edema.      Left lower leg: No edema.   Skin:     General: Skin is warm and dry.   Neurological:      Mental Status: She is alert and oriented to person, place, and time.     Significant Labs:   CBC:  Recent Labs   Lab 03/02/22  2315 03/02/22  2322 03/03/22  0745 03/04/22  0359   WBC 15.69*  --  11.01 8.93   HGB 12.8  --  10.4* 9.6*   HCT 41.7 38 33.4* 30.9*     --  213 207   GRAN 89.4*  14.0*  --  85.1*  9.4* 69.1  6.2   LYMPH 5.0*  0.8*  --  6.9*  0.8* 12.7*  1.1   MONO 4.5  0.7  --  7.1  0.8 14.3  1.3*   EOS 0.0  --  0.0 0.3   BASO 0.07  --  0.04 0.07   CMP:  Recent Labs   Lab 03/02/22  2342 03/03/22  0745 03/04/22  0359    139 135*   K 4.0 4.8 4.2   CL 94* 95 97   CO2 27 32* 28   BUN 64* 68* 43*   CREATININE 3.9* 4.3* 3.8*   * 76 135*   CALCIUM 10.2 10.3 9.1   PHOS  --  4.6* 4.0   ALKPHOS 87  --   --    AST 70*  --   --    ALT 17  --   --    BILITOT 0.5  --   --    PROT 7.1  --   --    ALBUMIN 2.6* 2.4* 2.2*   ANIONGAP 17* 12 10     Significant Imaging:   Echo 03/03:  Low normal systolic function.  The estimated ejection fraction is 48%.  Indeterminate left ventricular diastolic function.  There is left ventricular global hypokinesis.  Normal right ventricular size with normal right ventricular systolic function.  There is moderate aortic valve stenosis.  Aortic valve area is 0.63 cm2; peak velocity is 2.02 m/s; mean gradient is 11 mmHg.  Mild mitral regurgitation.

## 2022-03-04 NOTE — NURSING
Tx complete. Pt tolerated well. UF 2.5L. CVC locked and capped cdi. Vss. Report given to Shira RN

## 2022-03-04 NOTE — PT/OT/SLP PROGRESS
Physical Therapy  Not Seen    Patient Name:  Melinda Knight   MRN:  [id number]    Patient not seen today secondary to Dialysis initial attempts (1230, 1400) then Nausea/vomiting (1500). Will follow-up tomorrow, 3/5.

## 2022-03-04 NOTE — NURSING
BP reading very low at beginning of shift. Cuff adjusted from both legs to R arm radial. LUE remains limb alert. VSS over night.    Anaerobic blood cultures from 3/2 grew gram + cocci resembling staph. PA notified. ABX ordered. No repeat cultures drawn before abx administered.   Aptt 35.3. Bolus of 30 unit/kg given and gtt increased to 14 units.   Repeat APTT therapeutic at 49. Transfer center updated on pt's status. Still awaiting a bed.

## 2022-03-04 NOTE — PLAN OF CARE
Problem: Adult Inpatient Plan of Care  Goal: Plan of Care Review  Outcome: Ongoing, Not Progressing  Goal: Patient-Specific Goal (Individualized)  Outcome: Ongoing, Not Progressing  Goal: Absence of Hospital-Acquired Illness or Injury  Outcome: Ongoing, Not Progressing  Goal: Optimal Comfort and Wellbeing  Outcome: Ongoing, Not Progressing  Goal: Readiness for Transition of Care  Outcome: Ongoing, Not Progressing     Problem: Diabetes Comorbidity  Goal: Blood Glucose Level Within Targeted Range  Outcome: Ongoing, Not Progressing     Problem: Infection  Goal: Absence of Infection Signs and Symptoms  Outcome: Ongoing, Not Progressing     Problem: Skin Injury Risk Increased  Goal: Skin Health and Integrity  Outcome: Ongoing, Not Progressing     Problem: Device-Related Complication Risk (Hemodialysis)  Goal: Safe, Effective Therapy Delivery  Outcome: Ongoing, Not Progressing     Problem: Hemodynamic Instability (Hemodialysis)  Goal: Effective Tissue Perfusion  Outcome: Ongoing, Not Progressing

## 2022-03-04 NOTE — PLAN OF CARE
Informed by RN that blood cultures have resulted w/ gram positive cocci in clusters in both anaerobic bottles. Patient has been afebrile throughout her stay, and has no elevated WBC count, or left shift on labs. UA is without UTI on 3/2/22. CXR shows b/l opacities   Will cover with clindamycin.

## 2022-03-04 NOTE — PT/OT/SLP PROGRESS
Occupational Therapy      Patient Name:  Melinda Knight   MRN:  [id number]    Patient not seen today secondary to  (Multiple attempts but unable to evaluate pt today due to dialysis and nausea.). Will follow-up 3/5/22.    3/4/2022

## 2022-03-05 NOTE — ASSESSMENT & PLAN NOTE
Nephrology History  iHD Schedule: MWF   Unit/MD: [facility]/ Dr. Ugalde   Duration: 4 hours  UF: ?  EDW: ??94kg   Access: R. Tunneled cath   Residual Renal Function: moderate     Assessment:   - Will provide dialysis for metabolic clearance and volume management Monday 3/7  - PT multivessel coronary disease and aortic stenos - Per CTS  patient is not an operative candidate  - Dialysate adjusted to current labs   - Will obtain OP dialysis records  - Continue to monitor intake and output, daily weights   - Avoid nephrotoxic medication and renal dose medications to GFR  - Will continue to monitor    Anemia of Chronic Kidney Disease   - Hgb 10 .Goal in ESRD is Hgb of 10-11.   - Will review chronic care plan from outpatient dialysis center for FATOUMATA dosing.     Mineral Bone Disease in CKD   -  Recommend renal diet. Phos 3.2  - Continue home phos binder   - Daily renal panel so that phos and albumin is monitored daily.   - Vitamin D and PTH to be checked with am labs.

## 2022-03-05 NOTE — ASSESSMENT & PLAN NOTE
Last A1c 5.5 (3/3/22). On detemir 30U subQ BID at home.  - Continue insulin detemir 20U subQ BID  - LDSSI  - Accuchecks achs  - Diabetic diet

## 2022-03-05 NOTE — HPI
Melinda Knight is a 76 y.o. female with h/o HTN, HLD, DM, ESRD on HD (MWF), restrictive lung disease with chronic hypoxemic respiratory failure on 2-3L home oxygen who transferred to [facility] for evaluation for CABG, AVR. Patient presented with NSTEMI to outside hospital. LHC revealed proximal RCA 60% stenosed, mid LAD 70% stenosed, and mid Cx 70% stenosed. TTE revealed EF 48% with PORTILLO 0.63cm2. 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 Pt denies N/V/D, chest pain, edema, or swelling. Last dialysis on 3/4/22. K 4.2. Nephrology consulted for ESRD/HD management while inpatient.

## 2022-03-05 NOTE — SUBJECTIVE & OBJECTIVE
Past Medical History:   Diagnosis Date    Acute on chronic diastolic congestive heart failure 4/14/2021    Carotid artery occlusion     Coronary artery disease     Hyperlipidemia     Hypertension     Skin cancer     cyst on the face , was removed 20years +    Stroke     Thyroid disease     Type 2 diabetes mellitus        Past Surgical History:   Procedure Laterality Date    CAROTID ENDARTERECTOMY Left 03/24/2016    CHOLECYSTECTOMY      ESOPHAGOGASTRODUODENOSCOPY Left 9/11/2018    Procedure: EGD (ESOPHAGOGASTRODUODENOSCOPY);  Surgeon: Stevenson Mckee MD;  Location: [facility] ENDO;  Service: Endoscopy;  Laterality: Left;    GALLBLADDER SURGERY      1996    LEFT HEART CATHETERIZATION Left 3/3/2022    Procedure: CATHETERIZATION, HEART, LEFT;  Surgeon: Tex Alicea MD;  Location: [facility] CATH LAB;  Service: Cardiology;  Laterality: Left;    RIGHT HEART CATHETERIZATION Right 3/26/2021    Procedure: INSERTION, CATHETER, RIGHT HEART;  Surgeon: Gennaro Sampson MD;  Location: [facility] CATH LAB;  Service: Cardiology;  Laterality: Right;    SKIN BIOPSY      TONSILLECTOMY      TUBAL LIGATION Bilateral 3/18/2016       Review of patient's allergies indicates:   Allergen Reactions    Sulfa (sulfonamide antibiotics) Nausea And Vomiting    Menthol contain prod      Current Facility-Administered Medications   Medication Frequency    0.9%  NaCl infusion Once    0.9%  NaCl infusion Continuous PRN    acetaminophen tablet 650 mg Q6H PRN    acetaminophen tablet 650 mg Q4H PRN    aspirin chewable tablet 81 mg Daily    atorvastatin tablet 80 mg Daily    carvediloL tablet 12.5 mg BID    citalopram tablet 20 mg Daily    dextrose 10% bolus 125 mL PRN    dextrose 10% bolus 250 mL PRN    diazePAM tablet 5 mg On Call Procedure    glucagon (human recombinant) injection 1 mg PRN    glucose chewable tablet 16 g PRN    glucose chewable tablet 24 g PRN    heparin (porcine) injection 1,000 Units PRN    heparin 25,000 units in dextrose 5% (100 units/ml) IV bolus  from bag - ADDITIONAL PRN BOLUS - 30 units/kg (max bolus 4000 units) PRN    heparin 25,000 units in dextrose 5% (100 units/ml) IV bolus from bag - ADDITIONAL PRN BOLUS - 60 units/kg (max bolus 4000 units) PRN    heparin 25,000 units in dextrose 5% 250 mL (100 units/mL) infusion LOW INTENSITY nomogram - [facility] Continuous    hydrOXYzine HCL tablet 50 mg Q4H PRN    insulin aspart U-100 pen 0-5 Units QID (AC + HS) PRN    insulin detemir U-100 pen 20 Units BID    levothyroxine tablet 75 mcg Before breakfast    melatonin tablet 6 mg Nightly PRN    mupirocin 2 % ointment BID    ondansetron disintegrating tablet 4 mg Q8H PRN    ondansetron injection 4 mg Q8H PRN    pantoprazole EC tablet 40 mg Daily    sodium chloride 0.9% bolus 250 mL PRN    sodium chloride 0.9% flush 10 mL PRN    vancomycin - pharmacy to dose pharmacy to manage frequency    vitamin renal formula (B-complex-vitamin c-folic acid) 1 mg per capsule 1 capsule Daily     Family History       Problem Relation (Age of Onset)    Diabetes Mother, Sister, Brother    Heart failure Mother    Hypertension Mother          Tobacco Use    Smoking status: Never Smoker    Smokeless tobacco: Never Used   Substance and Sexual Activity    Alcohol use: Not Currently     Alcohol/week: 0.0 standard drinks     Comment: occasions    Drug use: No    Sexual activity: Not Currently     Review of Systems   Constitutional: Negative.    HENT: Negative.     Eyes: Negative.    Respiratory:  Negative for shortness of breath (on exertion).    Cardiovascular:  Negative for chest pain and leg swelling.   Gastrointestinal: Negative.    Genitourinary: Negative.    Musculoskeletal: Negative.    Neurological: Negative.    Psychiatric/Behavioral: Negative.     Objective:     Vital Signs (Most Recent):  Temp: 97.6 °F (36.4 °C) (03/05/22 0608)  Pulse: 88 (03/05/22 1104)  Resp: 16 (03/05/22 0608)  BP: 139/61 (03/05/22 0608)  SpO2: 96 % (03/05/22 0608)  O2 Device (Oxygen Therapy): nasal cannula (03/04/22  1805) Vital Signs (24h Range):  Temp:  [97.6 °F (36.4 °C)-100.5 °F (38.1 °C)] 97.6 °F (36.4 °C)  Pulse:  [67-90] 88  Resp:  [16-34] 16  SpO2:  [92 %-100 %] 96 %  BP: (100-182)/(46-76) 139/61     Weight: 90 kg (198 lb 6.6 oz) (03/04/22 0600)  Body mass index is 37.49 kg/m².  Body surface area is 1.97 meters squared.    I/O last 3 completed shifts:  In: 1815 [P.O.:520; I.V.:229; Other:500; IV Piggyback:565.9]  Out: 3210 [Urine:210; Other:3000]    Physical Exam  Vitals and nursing note reviewed.   HENT:      Mouth/Throat:      Pharynx: Oropharynx is clear.   Eyes:      Conjunctiva/sclera: Conjunctivae normal.   Cardiovascular:      Rate and Rhythm: Normal rate.      Heart sounds: Murmur heard.   Pulmonary:      Effort: Pulmonary effort is normal.   Abdominal:      Palpations: Abdomen is soft.   Musculoskeletal:      Cervical back: Neck supple.      Right lower leg: No edema.      Left lower leg: No edema.   Skin:     General: Skin is dry.   Neurological:      Mental Status: She is alert and oriented to person, place, and time.       Significant Labs:  CBC:   Recent Labs   Lab 03/05/22  0321   WBC 9.34   RBC 3.06*   HGB 10.0*   HCT 32.3*      *   MCH 32.7*   MCHC 31.0*     CMP:   Recent Labs   Lab 03/02/22  2342 03/03/22  0745 03/05/22  0321   *   < > 89   CALCIUM 10.2   < > 9.1   ALBUMIN 2.6*   < > 2.4*   PROT 7.1  --   --       < > 134*   K 4.0   < > 4.2   CO2 27   < > 28   CL 94*   < > 93*   BUN 64*   < > 32*   CREATININE 3.9*   < > 3.7*   ALKPHOS 87  --   --    ALT 17  --   --    AST 70*  --   --    BILITOT 0.5  --   --     < > = values in this interval not displayed.     All labs within the past 24 hours have been reviewed.

## 2022-03-05 NOTE — CONSULTS
[facility] - Cardiology Stepdown  Nephrology  Consult Note    Patient Name: Melinda Knight  MRN: [id number]  Admission Date: 3/3/2022  Hospital Length of Stay: 2 days  Attending Provider: Christin Randhawa MD   Primary Care Physician: Duke Cole MD  Principal Problem:NSTEMI (non-ST elevated myocardial infarction)    Inpatient consult to Nephrology  Consult performed by: Diandra Arshad DNP  Consult ordered by: Chapis Sofia DO  Reason for consult: ESRD/HD management while IP        Subjective:     HPI: Melinda Knight is a 76 y.o. female with h/o HTN, HLD, DM, ESRD on HD (MWF), restrictive lung disease with chronic hypoxemic respiratory failure on 2-3L home oxygen who transferred to [facility] for evaluation for CABG, AVR. Patient presented with NSTEMI to outside hospital. LHC revealed proximal RCA 60% stenosed, mid LAD 70% stenosed, and mid Cx 70% stenosed. TTE revealed EF 48% with PORTILLO 0.63cm2. 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 Pt denies N/V/D, chest pain, edema, or swelling. Last dialysis on 3/4/22. K 4.2. Nephrology consulted for ESRD/HD management while inpatient.       HPI obtained via EMR and pt interview     Past Medical History:   Diagnosis Date    Acute on chronic diastolic congestive heart failure 4/14/2021    Carotid artery occlusion     Coronary artery disease     Hyperlipidemia     Hypertension     Skin cancer     cyst on the face , was removed 20years +    Stroke     Thyroid disease     Type 2 diabetes mellitus        Past Surgical History:   Procedure Laterality Date    CAROTID ENDARTERECTOMY Left 03/24/2016    CHOLECYSTECTOMY      ESOPHAGOGASTRODUODENOSCOPY Left 9/11/2018    Procedure: EGD (ESOPHAGOGASTRODUODENOSCOPY);  Surgeon: Stevenson Mckee MD;  Location: [facility] ENDO;  Service: Endoscopy;  Laterality: Left;    GALLBLADDER SURGERY      1996    LEFT HEART CATHETERIZATION Left 3/3/2022    Procedure: CATHETERIZATION, HEART, LEFT;  Surgeon: Tex Alicea MD;  Location: [facility] CATH  LAB;  Service: Cardiology;  Laterality: Left;    RIGHT HEART CATHETERIZATION Right 3/26/2021    Procedure: INSERTION, CATHETER, RIGHT HEART;  Surgeon: Gennaro Sampson MD;  Location: [facility] CATH LAB;  Service: Cardiology;  Laterality: Right;    SKIN BIOPSY      TONSILLECTOMY      TUBAL LIGATION Bilateral 3/18/2016       Review of patient's allergies indicates:   Allergen Reactions    Sulfa (sulfonamide antibiotics) Nausea And Vomiting    Menthol contain prod      Current Facility-Administered Medications   Medication Frequency    0.9%  NaCl infusion Once    0.9%  NaCl infusion Continuous PRN    acetaminophen tablet 650 mg Q6H PRN    acetaminophen tablet 650 mg Q4H PRN    aspirin chewable tablet 81 mg Daily    atorvastatin tablet 80 mg Daily    carvediloL tablet 12.5 mg BID    citalopram tablet 20 mg Daily    dextrose 10% bolus 125 mL PRN    dextrose 10% bolus 250 mL PRN    diazePAM tablet 5 mg On Call Procedure    glucagon (human recombinant) injection 1 mg PRN    glucose chewable tablet 16 g PRN    glucose chewable tablet 24 g PRN    heparin (porcine) injection 1,000 Units PRN    heparin 25,000 units in dextrose 5% (100 units/ml) IV bolus from bag - ADDITIONAL PRN BOLUS - 30 units/kg (max bolus 4000 units) PRN    heparin 25,000 units in dextrose 5% (100 units/ml) IV bolus from bag - ADDITIONAL PRN BOLUS - 60 units/kg (max bolus 4000 units) PRN    heparin 25,000 units in dextrose 5% 250 mL (100 units/mL) infusion LOW INTENSITY nomogram - [facility] Continuous    hydrOXYzine HCL tablet 50 mg Q4H PRN    insulin aspart U-100 pen 0-5 Units QID (AC + HS) PRN    insulin detemir U-100 pen 20 Units BID    levothyroxine tablet 75 mcg Before breakfast    melatonin tablet 6 mg Nightly PRN    mupirocin 2 % ointment BID    ondansetron disintegrating tablet 4 mg Q8H PRN    ondansetron injection 4 mg Q8H PRN    pantoprazole EC tablet 40 mg Daily    sodium chloride 0.9% bolus 250 mL PRN    sodium chloride  0.9% flush 10 mL PRN    vancomycin - pharmacy to dose pharmacy to manage frequency    vitamin renal formula (B-complex-vitamin c-folic acid) 1 mg per capsule 1 capsule Daily     Family History       Problem Relation (Age of Onset)    Diabetes Mother, Sister, Brother    Heart failure Mother    Hypertension Mother          Tobacco Use    Smoking status: Never Smoker    Smokeless tobacco: Never Used   Substance and Sexual Activity    Alcohol use: Not Currently     Alcohol/week: 0.0 standard drinks     Comment: occasions    Drug use: No    Sexual activity: Not Currently     Review of Systems   Constitutional: Negative.    HENT: Negative.     Eyes: Negative.    Respiratory:  Negative for shortness of breath (on exertion).    Cardiovascular:  Negative for chest pain and leg swelling.   Gastrointestinal: Negative.    Genitourinary: Negative.    Musculoskeletal: Negative.    Neurological: Negative.    Psychiatric/Behavioral: Negative.     Objective:     Vital Signs (Most Recent):  Temp: 97.6 °F (36.4 °C) (03/05/22 0608)  Pulse: 88 (03/05/22 1104)  Resp: 16 (03/05/22 0608)  BP: 139/61 (03/05/22 0608)  SpO2: 96 % (03/05/22 0608)  O2 Device (Oxygen Therapy): nasal cannula (03/04/22 1805) Vital Signs (24h Range):  Temp:  [97.6 °F (36.4 °C)-100.5 °F (38.1 °C)] 97.6 °F (36.4 °C)  Pulse:  [67-90] 88  Resp:  [16-34] 16  SpO2:  [92 %-100 %] 96 %  BP: (100-182)/(46-76) 139/61     Weight: 90 kg (198 lb 6.6 oz) (03/04/22 0600)  Body mass index is 37.49 kg/m².  Body surface area is 1.97 meters squared.    I/O last 3 completed shifts:  In: 1815 [P.O.:520; I.V.:229; Other:500; IV Piggyback:565.9]  Out: 3210 [Urine:210; Other:3000]    Physical Exam  Vitals and nursing note reviewed.   HENT:      Mouth/Throat:      Pharynx: Oropharynx is clear.   Eyes:      Conjunctiva/sclera: Conjunctivae normal.   Cardiovascular:      Rate and Rhythm: Normal rate.      Heart sounds: Murmur heard.   Pulmonary:      Effort: Pulmonary effort is  normal.   Abdominal:      Palpations: Abdomen is soft.   Musculoskeletal:      Cervical back: Neck supple.      Right lower leg: No edema.      Left lower leg: No edema.   Skin:     General: Skin is dry.   Neurological:      Mental Status: She is alert and oriented to person, place, and time.       Significant Labs:  CBC:   Recent Labs   Lab 03/05/22  0321   WBC 9.34   RBC 3.06*   HGB 10.0*   HCT 32.3*      *   MCH 32.7*   MCHC 31.0*     CMP:   Recent Labs   Lab 03/02/22  2342 03/03/22  0745 03/05/22  0321   *   < > 89   CALCIUM 10.2   < > 9.1   ALBUMIN 2.6*   < > 2.4*   PROT 7.1  --   --       < > 134*   K 4.0   < > 4.2   CO2 27   < > 28   CL 94*   < > 93*   BUN 64*   < > 32*   CREATININE 3.9*   < > 3.7*   ALKPHOS 87  --   --    ALT 17  --   --    AST 70*  --   --    BILITOT 0.5  --   --     < > = values in this interval not displayed.     All labs within the past 24 hours have been reviewed.      Assessment/Plan:     * NSTEMI (non-ST elevated myocardial infarction)  - Management per primary     ESRD (end stage renal disease) on dialysis  Nephrology History  iHD Schedule: MWF   Unit/MD: [facility]/ Dr. Ugalde   Duration: 4 hours  UF: ?  EDW: ??94kg   Access: R. Tunneled cath   Residual Renal Function: moderate     Assessment:   - Will provide dialysis for metabolic clearance and volume management Monday 3/7  - PT multivessel coronary disease and aortic stenos - Per CTS  patient is not an operative candidate  - Dialysate adjusted to current labs   - Will obtain OP dialysis records  - Continue to monitor intake and output, daily weights   - Avoid nephrotoxic medication and renal dose medications to GFR  - Will continue to monitor    Anemia of Chronic Kidney Disease   - Hgb 10 .Goal in ESRD is Hgb of 10-11.   - Will review chronic care plan from outpatient dialysis center for FATOUMATA dosing.     Mineral Bone Disease in CKD   -  Recommend renal diet. Phos 3.2  - Continue home phos  binder   - Daily renal panel so that phos and albumin is monitored daily.   - Vitamin D and PTH to be checked with am labs.              Thank you for your consult. I will follow-up with patient. Please contact us if you have any additional questions.    Diandra Arshad DNP  Nephrology  [facility] - Cardiology Stepdown

## 2022-03-05 NOTE — H&P
"[facility] - Cardiology [facility] Medicine  History & Physical    Patient Name: Melinda Knight  MRN: [id number]  Patient Class: IP- Inpatient  Admission Date: 3/3/2022  Attending Physician: Christin Randhawa MD   Primary Care Provider: Duke Cole MD         Patient information was obtained from patient, past medical records and ER records.     Subjective:     Principal Problem:NSTEMI (non-ST elevated myocardial infarction)    Chief Complaint: chest pain     HPI: Melinda Knight is a 76 y.o. F with history of HTN, HLD, CAD, DMII, hypothyroidism, ESRD on HD MWF, GERD, restrictive lung disease with chronic hypoxemic resp failure (on 2-3L O2) who transfers to [facility] for CTS evaluation for CABG +/- AVR. Initially presented to [facility] ED w/ 2 day history of "heartburn pain," N/V, and SOB. Workup notable for troponin 13 (peaked at 42 on 3/3), elevated BNP 2268 (baseline 400-600s), and radiographic evidence of pulmonary edema. EKG SR with RBBB. She was hypoxic and placed on BiPAP. Transferred to [facility] [place], operated by [facility] w/ NSTEMI. Cardiology and nephrology following. Volume removal with HD and weaned to baseline supplemental O2. BCx 3/2 resulted GPC resembling Staph and vanc initiated. Repeat BCx NGTD. LHC on 3/3 revealed proximal RCA 60% stenosed, mid LAD 70% stenosed, and mid Cx 70% stenosed. TTE revealed EF 48% with PORTILLO 0.63cm2, LFLG AS. Cardiology recommended CTS evaluation for CABG +/- AVR. Last dose of Plavix 3/3. She denies CP, SOB, N/V.      Past Medical History:   Diagnosis Date    Acute on chronic diastolic congestive heart failure 4/14/2021    Carotid artery occlusion     Coronary artery disease     Hyperlipidemia     Hypertension     Skin cancer     cyst on the face , was removed 20years +    Stroke     Thyroid disease     Type 2 diabetes mellitus        Past Surgical History:   Procedure Laterality Date    CAROTID ENDARTERECTOMY Left 03/24/2016    CHOLECYSTECTOMY      ESOPHAGOGASTRODUODENOSCOPY Left 9/11/2018 "    Procedure: EGD (ESOPHAGOGASTRODUODENOSCOPY);  Surgeon: Stevenson Mckee MD;  Location: [facility] ENDO;  Service: Endoscopy;  Laterality: Left;    GALLBLADDER SURGERY      1996    LEFT HEART CATHETERIZATION Left 3/3/2022    Procedure: CATHETERIZATION, HEART, LEFT;  Surgeon: Tex Alicea MD;  Location: [facility] CATH LAB;  Service: Cardiology;  Laterality: Left;    RIGHT HEART CATHETERIZATION Right 3/26/2021    Procedure: INSERTION, CATHETER, RIGHT HEART;  Surgeon: Gennaro Sampson MD;  Location: [facility] CATH LAB;  Service: Cardiology;  Laterality: Right;    SKIN BIOPSY      TONSILLECTOMY      TUBAL LIGATION Bilateral 3/18/2016       Review of patient's allergies indicates:   Allergen Reactions    Sulfa (sulfonamide antibiotics) Nausea And Vomiting    Menthol contain prod        No current facility-administered medications on file prior to encounter.     Current Outpatient Medications on File Prior to Encounter   Medication Sig    albuterol (ACCUNEB) 0.63 mg/3 mL Nebu Take 3 mLs (0.63 mg total) by nebulization 3 (three) times daily as needed. Rescue    aspirin 81 MG Chew Take 1 tablet (81 mg total) by mouth once daily.    atorvastatin (LIPITOR) 80 MG tablet Take 1 tablet (80 mg total) by mouth once daily.    blood sugar diagnostic (BLOOD GLUCOSE TEST) Strp 1 strip by Misc.(Non-Drug; Combo Route) route 2 (two) times daily. Prodigy    carvediloL (COREG) 12.5 MG tablet Take 1 tablet by mouth twice daily    citalopram (CELEXA) 20 MG tablet Take 1 tablet by mouth once daily    clopidogreL (PLAVIX) 75 mg tablet Take 1 tablet (75 mg total) by mouth once daily.    dicyclomine (BENTYL) 10 MG capsule Take 1 capsule by mouth 4 (four) times daily.    insulin detemir U-100 (LEVEMIR FLEXTOUCH) 100 unit/mL (3 mL) SubQ InPn pen Inject 30 Units into the skin 2 (two) times daily.    levothyroxine (SYNTHROID) 75 MCG tablet Take 1 tablet (75 mcg total) by mouth before breakfast.    linaCLOtide (LINZESS) 72 mcg Cap capsule Take  "1 capsule (72 mcg total) by mouth before breakfast.    melatonin (MELATIN) 3 mg tablet Take 2 tablets (6 mg total) by mouth nightly as needed for Insomnia.    melatonin 10 mg Tab Take by mouth.    multivitamin (ONE DAILY MULTIVITAMIN) per tablet Take 1 tablet by mouth once daily.    ondansetron (ZOFRAN-ODT) 4 MG TbDL Take 1 tablet (4 mg total) by mouth every 6 (six) hours as needed (nausea).    pantoprazole (PROTONIX) 40 MG tablet Take 1 tablet by mouth once daily    pen needle, diabetic (NOVOTWIST) 32 gauge x 1/5" Ndle USE AS DIRECTED WITH  INSULIN  PEN    pen needle, diabetic 32 gauge x 5/32" Ndle Use daily with Humalog or as directed    senna (SENOKOT) 8.6 mg tablet Take 1 tablet by mouth once daily.     Family History       Problem Relation (Age of Onset)    Diabetes Mother, Sister, Brother    Heart failure Mother    Hypertension Mother          Tobacco Use    Smoking status: Never Smoker    Smokeless tobacco: Never Used   Substance and Sexual Activity    Alcohol use: Not Currently     Alcohol/week: 0.0 standard drinks     Comment: occasions    Drug use: No    Sexual activity: Not Currently     Review of Systems   Constitutional:  Negative for chills and fever.   HENT:  Negative for sore throat.    Respiratory:  Negative for cough and shortness of breath.    Cardiovascular:  Positive for chest pain. Negative for leg swelling.   Gastrointestinal:  Positive for nausea and vomiting. Negative for abdominal pain, constipation and diarrhea.   Genitourinary:  Negative for dysuria.   Musculoskeletal:  Negative for myalgias.   Skin:  Negative for rash.   Neurological:  Negative for dizziness and headaches.   Psychiatric/Behavioral:  Negative for confusion.    Objective:     Vital Signs (Most Recent):  Temp: 98.9 °F (37.2 °C) (03/05/22 1520)  Pulse: 77 (03/05/22 1520)  Resp: 20 (03/05/22 1520)  BP: (!) 159/66 (03/05/22 1520)  SpO2: 96 % (03/05/22 1520)   Vital Signs (24h Range):  Temp:  [97.6 °F (36.4 " °C)-100.5 °F (38.1 °C)] 98.9 °F (37.2 °C)  Pulse:  [67-90] 77  Resp:  [16-20] 20  SpO2:  [92 %-96 %] 96 %  BP: (100-159)/(46-66) 159/66     Weight: 90 kg (198 lb 6.6 oz)  Body mass index is 37.49 kg/m².    Physical Exam  Constitutional:       Appearance: Normal appearance.   HENT:      Head: Normocephalic and atraumatic.      Mouth/Throat:      Mouth: Mucous membranes are moist.      Pharynx: Oropharynx is clear.   Eyes:      Extraocular Movements: Extraocular movements intact.      Pupils: Pupils are equal, round, and reactive to light.   Cardiovascular:      Rate and Rhythm: Normal rate and regular rhythm.      Pulses: Normal pulses.      Heart sounds: Normal heart sounds.   Pulmonary:      Effort: Pulmonary effort is normal. No respiratory distress.      Breath sounds: Normal breath sounds.   Abdominal:      General: Abdomen is flat. Bowel sounds are normal.      Palpations: Abdomen is soft.   Musculoskeletal:         General: No swelling.      Cervical back: Neck supple.   Skin:     General: Skin is warm and dry.      Capillary Refill: Capillary refill takes less than 2 seconds.   Neurological:      General: No focal deficit present.      Mental Status: She is alert and oriented to person, place, and time. Mental status is at baseline.   Psychiatric:         Mood and Affect: Mood normal.         Behavior: Behavior normal.         Thought Content: Thought content normal.         Judgment: Judgment normal.         CRANIAL NERVES     CN III, IV, VI   Pupils are equal, round, and reactive to light.     Significant Labs: All pertinent labs within the past 24 hours have been reviewed.  BMP:   Recent Labs   Lab 03/05/22 0321   GLU 89   *   K 4.2   CL 93*   CO2 28   BUN 32*   CREATININE 3.7*   CALCIUM 9.1     CBC:   Recent Labs   Lab 03/04/22 0359 03/05/22 0321   WBC 8.93 9.34   HGB 9.6* 10.0*   HCT 30.9* 32.3*    231     CMP:   Recent Labs   Lab 03/04/22 0359 03/05/22 0321   * 134*   K 4.2 4.2    CL 97 93*   CO2 28 28   * 89   BUN 43* 32*   CREATININE 3.8* 3.7*   CALCIUM 9.1 9.1   ALBUMIN 2.2* 2.4*   ANIONGAP 10 13   EGFRNONAA 11* 11.4*     Troponin:   Recent Labs   Lab 03/03/22  1612   TROPONINI 30.324*       Significant Imaging: I have reviewed all pertinent imaging results/findings within the past 24 hours.    X-Ray Chest AP Portable     Result Date: 3/3/2022  EXAMINATION: XR CHEST AP PORTABLE CLINICAL HISTORY: Sepsis; TECHNIQUE: Single frontal view of the chest was performed. COMPARISON: 09/28/2021, 06/30/2021, 06/15/2021 chest x-ray FINDINGS: Cardiomediastinal silhouette is stable and nonenlarged.  Bilateral congestive changes are noted.  Acute bilateral reticular opacities are noted more so in the perihilar regions and left lower lung.  There is no significant pleural effusion or pneumothorax. Double-lumen catheter via the right jugular approach is stable with the tips over the distal SVC.  Osseous structures grossly appear intact      Acute bilateral lung opacities and vascular congestive changes raises question of CHF.  Infectious or inflammatory pneumonitis is considered. No significant cardiomegaly. This report was flagged in Epic as abnormal.  Is Electronically signed by:           Elsa Meza Date:                                     03/03/2022 Time:                                            01:03     Echo     Result Date: 3/3/2022  · Low normal systolic function. · The estimated ejection fraction is 48%. · Indeterminate left ventricular diastolic function. · There is left ventricular global hypokinesis. · Normal right ventricular size with normal right ventricular systolic function. · There is moderate aortic valve stenosis. · Aortic valve area is 0.63 cm2; peak velocity is 2.02 m/s; mean gradient is 11 mmHg. · Mild mitral regurgitation.       Cardiac catheterization     Result Date: 3/3/2022  · The Prox RCA lesion was 60% stenosed. · The Mid LAD lesion was 70% stenosed. · The Mid  Cx lesion was 70% stenosed. · The estimated blood loss was none. · There was two vessel coronary artery disease.  After informed consent was obtained, patient was brought to the cardiac catheterization laboratory in a fasting state where patient was prepped and draped in the usual sterile fashion.  A preprocedure time-out was performed.  Patient receive conscious sedation with IV Versed and fentanyl.  The right wrist was anesthetized with 1 cc of lidocaine.  The right radial artery was accessed with direct ultrasound guidance and a 6 Guamanian Slender sheath was inserted over the wire.  Radial cocktail consisting of 200 mcg of nitroglycerin, 2.5 mg of verapamil and 4000 units of heparin was given via the sheath.  A 5 Guamanian TIG catheter was inserted and selective coronary angiogram performed of the left coronary artery successfully.  The right coronary artery was only successful to image using JR4 diagnostic catheter. Catheter was removed over guidewire.  The sheath was removed and hemostasis was obtained using a Vasc band.    Assessment/Plan:     * NSTEMI (non-ST elevated myocardial infarction)  HTN, HLD, CAD, acute on chronic resp failure with hypoxia, pHTN, AS, elevated troponin  - Echo 03/03 shows EF 48%, severe AS.  - LHC 03/03 showed LAD and LCx 70% stenosis and RCA 60% stenosis. Transfered [facility] for CTS evaluation; per CTS pt is poor surgical candidate. Recommending high risk PCI vs medical management  - Interventional cardiology consulted  - Continue aspirin 81mg and clopidogrel 75mg PO daily, atorvastatin 80mg PO daily, carvedilol 12.5mg PO BID.  - Appears volume up, IV Lasix 40 mg once  - Weaned to baseline supplemental O2.   - Continuing heparin gtt.  - Appreciate cardiology assistance.    Positive blood culture  Blood cultures drawn in ED 03/02 showing GPCs resembling staph. No clear source of infection at this time. No noted areas of skin breakdown, tunneled line does not appear grossly infected, UA  unremarkable, CXR at presentation more consistent with pulmonary edema than infectious process.    - Continue vancomycin IV. De-escalate as feasible.  - F/U repeat cultures.    Advance care planning  - DNR status; current [place] reflects her goals of care.    Debility  - PT/OT.    Aortic valve stenosis  See NSTEMI      Type 2 diabetes mellitus with diabetic polyneuropathy  Last A1c 5.5 (3/3/22). On detemir 30U subQ BID at home.  - Continue insulin detemir 20U subQ BID  - LDSSI  - Accuchecks achs  - Diabetic diet      Hypothyroidism (acquired)  - Continue levothyroxine 75mcg PO daily.    GERD (gastroesophageal reflux disease)  - Continue pantoprazole 40mg PO daily.    Mixed hyperlipidemia  Chronic, stable.    - Continue home Lipitor 80 mg daily      Essential hypertension  Chronic, normotensive on evaluation.    - Continue home Coreg 12.5 mg BID  - Monitor BP        VTE Risk Mitigation (From admission, onward)         Ordered     heparin (porcine) injection 1,000 Units  As needed (PRN)         03/04/22 1420     heparin 25,000 units in dextrose 5% (100 units/ml) IV bolus from bag - ADDITIONAL PRN BOLUS - 60 units/kg (max bolus 4000 units)  As needed (PRN)        Question:  Heparin Infusion Adjustment (DO NOT MODIFY ANSWER)  Answer:  [email or web address]\epic\Images\Pharmacy\HeparinInfusions\heparin LOW INTENSITY nomogram for [facility] [id number]    03/03/22 0810     heparin 25,000 units in dextrose 5% (100 units/ml) IV bolus from bag - ADDITIONAL PRN BOLUS - 30 units/kg (max bolus 4000 units)  As needed (PRN)        Question:  Heparin Infusion Adjustment (DO NOT MODIFY ANSWER)  Answer:  [email or web address]\epic\Images\Pharmacy\HeparinInfusions\heparin LOW INTENSITY nomogram for [facility] [id number].pdf    03/03/22 0810     heparin (porcine) injection 5,000 Units  As needed (PRN)         03/03/22 0755     heparin 25,000 units in dextrose 5% 250 mL (100 units/mL) infusion LOW INTENSITY nomogram - [facility]  Continuous        Question Answer Comment    Heparin Infusion Adjustment (DO NOT MODIFY ANSWER) [email or web address]\[email or web address]\Images\Pharmacy\HeparinInfusions\heparin LOW INTENSITY nomogram for [facility] [id number].pdf    Begin at (in units/kg/hr) 12        03/03/22 0810     IP VTE HIGH RISK PATIENT  Once         03/03/22 0650     Place sequential compression device  Until discontinued         03/03/22 0650                   Chapis Sofia DO  Department of Hospital Medicine   [facility] - Cardiology Stepdown

## 2022-03-05 NOTE — ASSESSMENT & PLAN NOTE
HTN, HLD, CAD, acute on chronic resp failure with hypoxia, pHTN, AS, elevated troponin  - Echo 03/03 shows EF 48%, severe AS.  - LHC 03/03 showed LAD and LCx 70% stenosis and RCA 60% stenosis. Transfered [facility] for CTS evaluation; per CTS pt is poor surgical candidate. Recommending high risk PCI vs medical management  - Interventional cardiology consulted  - Continue aspirin 81mg and clopidogrel 75mg PO daily, atorvastatin 80mg PO daily, carvedilol 12.5mg PO BID.  - Appears volume up, IV Lasix 40 mg once  - Weaned to baseline supplemental O2.   - Continuing heparin gtt.  - Appreciate cardiology assistance.

## 2022-03-05 NOTE — ASSESSMENT & PLAN NOTE
Blood cultures drawn in ED 03/02 showing GPCs resembling staph. No clear source of infection at this time. No noted areas of skin breakdown, tunneled line does not appear grossly infected, UA unremarkable, CXR at presentation more consistent with pulmonary edema than infectious process.    - Continue vancomycin IV. De-escalate as feasible.  - F/U repeat cultures.

## 2022-03-05 NOTE — SUBJECTIVE & OBJECTIVE
Past Medical History:   Diagnosis Date    Acute on chronic diastolic congestive heart failure 4/14/2021    Carotid artery occlusion     Coronary artery disease     Hyperlipidemia     Hypertension     Skin cancer     cyst on the face , was removed 20years +    Stroke     Thyroid disease     Type 2 diabetes mellitus        Past Surgical History:   Procedure Laterality Date    CAROTID ENDARTERECTOMY Left 03/24/2016    CHOLECYSTECTOMY      ESOPHAGOGASTRODUODENOSCOPY Left 9/11/2018    Procedure: EGD (ESOPHAGOGASTRODUODENOSCOPY);  Surgeon: Stevenson Mckee MD;  Location: [facility] ENDO;  Service: Endoscopy;  Laterality: Left;    GALLBLADDER SURGERY      1996    LEFT HEART CATHETERIZATION Left 3/3/2022    Procedure: CATHETERIZATION, HEART, LEFT;  Surgeon: Tex Alicea MD;  Location: [facility] CATH LAB;  Service: Cardiology;  Laterality: Left;    RIGHT HEART CATHETERIZATION Right 3/26/2021    Procedure: INSERTION, CATHETER, RIGHT HEART;  Surgeon: Gennaro Sampson MD;  Location: [facility] CATH LAB;  Service: Cardiology;  Laterality: Right;    SKIN BIOPSY      TONSILLECTOMY      TUBAL LIGATION Bilateral 3/18/2016       Review of patient's allergies indicates:   Allergen Reactions    Sulfa (sulfonamide antibiotics) Nausea And Vomiting    Menthol contain prod        No current facility-administered medications on file prior to encounter.     Current Outpatient Medications on File Prior to Encounter   Medication Sig    albuterol (ACCUNEB) 0.63 mg/3 mL Nebu Take 3 mLs (0.63 mg total) by nebulization 3 (three) times daily as needed. Rescue    aspirin 81 MG Chew Take 1 tablet (81 mg total) by mouth once daily.    atorvastatin (LIPITOR) 80 MG tablet Take 1 tablet (80 mg total) by mouth once daily.    blood sugar diagnostic (BLOOD GLUCOSE TEST) Strp 1 strip by Misc.(Non-Drug; Combo Route) route 2 (two) times daily. Prodigy    carvediloL (COREG) 12.5 MG tablet Take 1 tablet by mouth twice daily    citalopram (CELEXA) 20 MG tablet Take 1 tablet by  "mouth once daily    clopidogreL (PLAVIX) 75 mg tablet Take 1 tablet (75 mg total) by mouth once daily.    dicyclomine (BENTYL) 10 MG capsule Take 1 capsule by mouth 4 (four) times daily.    insulin detemir U-100 (LEVEMIR FLEXTOUCH) 100 unit/mL (3 mL) SubQ InPn pen Inject 30 Units into the skin 2 (two) times daily.    levothyroxine (SYNTHROID) 75 MCG tablet Take 1 tablet (75 mcg total) by mouth before breakfast.    linaCLOtide (LINZESS) 72 mcg Cap capsule Take 1 capsule (72 mcg total) by mouth before breakfast.    melatonin (MELATIN) 3 mg tablet Take 2 tablets (6 mg total) by mouth nightly as needed for Insomnia.    melatonin 10 mg Tab Take by mouth.    multivitamin (ONE DAILY MULTIVITAMIN) per tablet Take 1 tablet by mouth once daily.    ondansetron (ZOFRAN-ODT) 4 MG TbDL Take 1 tablet (4 mg total) by mouth every 6 (six) hours as needed (nausea).    pantoprazole (PROTONIX) 40 MG tablet Take 1 tablet by mouth once daily    pen needle, diabetic (NOVOTWIST) 32 gauge x 1/5" Ndle USE AS DIRECTED WITH  INSULIN  PEN    pen needle, diabetic 32 gauge x 5/32" Ndle Use daily with Humalog or as directed    senna (SENOKOT) 8.6 mg tablet Take 1 tablet by mouth once daily.     Family History       Problem Relation (Age of Onset)    Diabetes Mother, Sister, Brother    Heart failure Mother    Hypertension Mother          Tobacco Use    Smoking status: Never Smoker    Smokeless tobacco: Never Used   Substance and Sexual Activity    Alcohol use: Not Currently     Alcohol/week: 0.0 standard drinks     Comment: occasions    Drug use: No    Sexual activity: Not Currently     Review of Systems   Constitutional:  Negative for chills and fever.   HENT:  Negative for sore throat.    Respiratory:  Negative for cough and shortness of breath.    Cardiovascular:  Positive for chest pain. Negative for leg swelling.   Gastrointestinal:  Positive for nausea and vomiting. Negative for abdominal pain, constipation and diarrhea.   Genitourinary:  " Negative for dysuria.   Musculoskeletal:  Negative for myalgias.   Skin:  Negative for rash.   Neurological:  Negative for dizziness and headaches.   Psychiatric/Behavioral:  Negative for confusion.    Objective:     Vital Signs (Most Recent):  Temp: 98.9 °F (37.2 °C) (03/05/22 1520)  Pulse: 77 (03/05/22 1520)  Resp: 20 (03/05/22 1520)  BP: (!) 159/66 (03/05/22 1520)  SpO2: 96 % (03/05/22 1520)   Vital Signs (24h Range):  Temp:  [97.6 °F (36.4 °C)-100.5 °F (38.1 °C)] 98.9 °F (37.2 °C)  Pulse:  [67-90] 77  Resp:  [16-20] 20  SpO2:  [92 %-96 %] 96 %  BP: (100-159)/(46-66) 159/66     Weight: 90 kg (198 lb 6.6 oz)  Body mass index is 37.49 kg/m².    Physical Exam  Constitutional:       Appearance: Normal appearance.   HENT:      Head: Normocephalic and atraumatic.      Mouth/Throat:      Mouth: Mucous membranes are moist.      Pharynx: Oropharynx is clear.   Eyes:      Extraocular Movements: Extraocular movements intact.      Pupils: Pupils are equal, round, and reactive to light.   Cardiovascular:      Rate and Rhythm: Normal rate and regular rhythm.      Pulses: Normal pulses.      Heart sounds: Normal heart sounds.   Pulmonary:      Effort: Pulmonary effort is normal. No respiratory distress.      Breath sounds: Normal breath sounds.   Abdominal:      General: Abdomen is flat. Bowel sounds are normal.      Palpations: Abdomen is soft.   Musculoskeletal:         General: No swelling.      Cervical back: Neck supple.   Skin:     General: Skin is warm and dry.      Capillary Refill: Capillary refill takes less than 2 seconds.   Neurological:      General: No focal deficit present.      Mental Status: She is alert and oriented to person, place, and time. Mental status is at baseline.   Psychiatric:         Mood and Affect: Mood normal.         Behavior: Behavior normal.         Thought Content: Thought content normal.         Judgment: Judgment normal.         CRANIAL NERVES     CN III, IV, VI   Pupils are equal, round,  and reactive to light.     Significant Labs: All pertinent labs within the past 24 hours have been reviewed.  BMP:   Recent Labs   Lab 03/05/22  0321   GLU 89   *   K 4.2   CL 93*   CO2 28   BUN 32*   CREATININE 3.7*   CALCIUM 9.1     CBC:   Recent Labs   Lab 03/04/22  0359 03/05/22  0321   WBC 8.93 9.34   HGB 9.6* 10.0*   HCT 30.9* 32.3*    231     CMP:   Recent Labs   Lab 03/04/22  0359 03/05/22  0321   * 134*   K 4.2 4.2   CL 97 93*   CO2 28 28   * 89   BUN 43* 32*   CREATININE 3.8* 3.7*   CALCIUM 9.1 9.1   ALBUMIN 2.2* 2.4*   ANIONGAP 10 13   EGFRNONAA 11* 11.4*     Troponin:   Recent Labs   Lab 03/03/22  1612   TROPONINI 30.324*       Significant Imaging: I have reviewed all pertinent imaging results/findings within the past 24 hours.    X-Ray Chest AP Portable     Result Date: 3/3/2022  EXAMINATION: XR CHEST AP PORTABLE CLINICAL HISTORY: Sepsis; TECHNIQUE: Single frontal view of the chest was performed. COMPARISON: 09/28/2021, 06/30/2021, 06/15/2021 chest x-ray FINDINGS: Cardiomediastinal silhouette is stable and nonenlarged.  Bilateral congestive changes are noted.  Acute bilateral reticular opacities are noted more so in the perihilar regions and left lower lung.  There is no significant pleural effusion or pneumothorax. Double-lumen catheter via the right jugular approach is stable with the tips over the distal SVC.  Osseous structures grossly appear intact      Acute bilateral lung opacities and vascular congestive changes raises question of CHF.  Infectious or inflammatory pneumonitis is considered. No significant cardiomegaly. This report was flagged in Epic as abnormal.  Is Electronically signed by:           Elsa Meza Date:                                     03/03/2022 Time:                                            01:03     Echo     Result Date: 3/3/2022  · Low normal systolic function. · The estimated ejection fraction is 48%. · Indeterminate left ventricular  diastolic function. · There is left ventricular global hypokinesis. · Normal right ventricular size with normal right ventricular systolic function. · There is moderate aortic valve stenosis. · Aortic valve area is 0.63 cm2; peak velocity is 2.02 m/s; mean gradient is 11 mmHg. · Mild mitral regurgitation.       Cardiac catheterization     Result Date: 3/3/2022  · The Prox RCA lesion was 60% stenosed. · The Mid LAD lesion was 70% stenosed. · The Mid Cx lesion was 70% stenosed. · The estimated blood loss was none. · There was two vessel coronary artery disease.  After informed consent was obtained, patient was brought to the cardiac catheterization laboratory in a fasting state where patient was prepped and draped in the usual sterile fashion.  A preprocedure time-out was performed.  Patient receive conscious sedation with IV Versed and fentanyl.  The right wrist was anesthetized with 1 cc of lidocaine.  The right radial artery was accessed with direct ultrasound guidance and a 6 Latvian Slender sheath was inserted over the wire.  Radial cocktail consisting of 200 mcg of nitroglycerin, 2.5 mg of verapamil and 4000 units of heparin was given via the sheath.  A 5 Latvian TIG catheter was inserted and selective coronary angiogram performed of the left coronary artery successfully.  The right coronary artery was only successful to image using JR4 diagnostic catheter. Catheter was removed over guidewire.  The sheath was removed and hemostasis was obtained using a Vasc band.

## 2022-03-05 NOTE — CONSULTS
[facility] - Cardiology Stepdown  CTS  Consult Note    Inpatient consult to Cardiothoracic Surgery  Consult performed by: Gisell Obrien MD  Consult ordered by: Chaips Sofia DO        Subjective:     History of Present Illness:   Melinda Knight is a 76 y.o. female with h/o HTN, HLD, DM, ESRD on HD, restrictive lung disease with chronic hypoxemic respiratory failure on 2-3L home oxygen who transferred to [facility] for evaluation for CABG, AVR. Patient presented with NSTEMI to outside hospital. LHC revealed proximal RCA 60% stenosed, mid LAD 70% stenosed, and mid Cx 70% stenosed. TTE revealed EF 48% with PORTILOL 0.63cm2.   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 Patient states that she requires a walker to walk and even with a walker is unable to walk a block due to shortness of breath.      No current facility-administered medications on file prior to encounter.     Current Outpatient Medications on File Prior to Encounter   Medication Sig    albuterol (ACCUNEB) 0.63 mg/3 mL Nebu Take 3 mLs (0.63 mg total) by nebulization 3 (three) times daily as needed. Rescue    aspirin 81 MG Chew Take 1 tablet (81 mg total) by mouth once daily.    atorvastatin (LIPITOR) 80 MG tablet Take 1 tablet (80 mg total) by mouth once daily.    blood sugar diagnostic (BLOOD GLUCOSE TEST) Strp 1 strip by Misc.(Non-Drug; Combo Route) route 2 (two) times daily. Prodigy    carvediloL (COREG) 12.5 MG tablet Take 1 tablet by mouth twice daily    citalopram (CELEXA) 20 MG tablet Take 1 tablet by mouth once daily    clopidogreL (PLAVIX) 75 mg tablet Take 1 tablet (75 mg total) by mouth once daily.    dicyclomine (BENTYL) 10 MG capsule Take 1 capsule by mouth 4 (four) times daily.    insulin detemir U-100 (LEVEMIR FLEXTOUCH) 100 unit/mL (3 mL) SubQ InPn pen Inject 30 Units into the skin 2 (two) times daily.    levothyroxine (SYNTHROID) 75 MCG tablet Take 1 tablet (75 mcg total) by mouth before breakfast.    linaCLOtide (LINZESS) 72 mcg Cap capsule  "Take 1 capsule (72 mcg total) by mouth before breakfast.    melatonin (MELATIN) 3 mg tablet Take 2 tablets (6 mg total) by mouth nightly as needed for Insomnia.    melatonin 10 mg Tab Take by mouth.    multivitamin (ONE DAILY MULTIVITAMIN) per tablet Take 1 tablet by mouth once daily.    ondansetron (ZOFRAN-ODT) 4 MG TbDL Take 1 tablet (4 mg total) by mouth every 6 (six) hours as needed (nausea).    pantoprazole (PROTONIX) 40 MG tablet Take 1 tablet by mouth once daily    pen needle, diabetic (NOVOTWIST) 32 gauge x 1/5" Ndle USE AS DIRECTED WITH  INSULIN  PEN    pen needle, diabetic 32 gauge x 5/32" Ndle Use daily with Humalog or as directed    senna (SENOKOT) 8.6 mg tablet Take 1 tablet by mouth once daily.       Review of patient's allergies indicates:   Allergen Reactions    Sulfa (sulfonamide antibiotics) Nausea And Vomiting    Menthol contain prod        Past Medical History:   Diagnosis Date    Acute on chronic diastolic congestive heart failure 4/14/2021    Carotid artery occlusion     Coronary artery disease     Hyperlipidemia     Hypertension     Skin cancer     cyst on the face , was removed 20years +    Stroke     Thyroid disease     Type 2 diabetes mellitus      Past Surgical History:   Procedure Laterality Date    CAROTID ENDARTERECTOMY Left 03/24/2016    CHOLECYSTECTOMY      ESOPHAGOGASTRODUODENOSCOPY Left 9/11/2018    Procedure: EGD (ESOPHAGOGASTRODUODENOSCOPY);  Surgeon: Stevenson Mckee MD;  Location: [facility] ENDO;  Service: Endoscopy;  Laterality: Left;    GALLBLADDER SURGERY      1996    LEFT HEART CATHETERIZATION Left 3/3/2022    Procedure: CATHETERIZATION, HEART, LEFT;  Surgeon: Tex Alicea MD;  Location: [facility] CATH LAB;  Service: Cardiology;  Laterality: Left;    RIGHT HEART CATHETERIZATION Right 3/26/2021    Procedure: INSERTION, CATHETER, RIGHT HEART;  Surgeon: Gennaro Sampson MD;  Location: [facility] CATH LAB;  Service: Cardiology;  Laterality: Right;    SKIN BIOPSY      TONSILLECTOMY      TUBAL " LIGATION Bilateral 3/18/2016     Family History       Problem Relation (Age of Onset)    Diabetes Mother, Sister, Brother    Heart failure Mother    Hypertension Mother          Tobacco Use    Smoking status: Never Smoker    Smokeless tobacco: Never Used   Substance and Sexual Activity    Alcohol use: Not Currently     Alcohol/week: 0.0 standard drinks     Comment: occasions    Drug use: No    Sexual activity: Not Currently     Review of Systems   Constitutional: Negative for activity change, chills and fever.   Respiratory: Negative for cough and shortness of breath.    Cardiovascular: Negative for chest pain and palpitations.   Gastrointestinal: Negative for abdominal distention, abdominal pain, constipation, diarrhea, nausea and vomiting.   Musculoskeletal: Negative for arthralgias and myalgias.   Neurological: Negative for dizziness and headaches.   Psychiatric/Behavioral: Negative for agitation and confusion.     Objective:     Vital Signs (Most Recent):  Temp: 97.6 °F (36.4 °C) (03/05/22 0608)  Pulse: 83 (03/05/22 0812)  Resp: 16 (03/05/22 0608)  BP: 139/61 (03/05/22 0608)  SpO2: 96 % (03/05/22 0608) Vital Signs (24h Range):  Temp:  [97.6 °F (36.4 °C)-100.5 °F (38.1 °C)] 97.6 °F (36.4 °C)  Pulse:  [67-90] 83  Resp:  [16-34] 16  SpO2:  [92 %-100 %] 96 %  BP: (100-189)/(46-79) 139/61     Weight: 90 kg (198 lb 6.6 oz)  Body mass index is 37.49 kg/m².      Intake/Output Summary (Last 24 hours) at 3/5/2022 1048  Last data filed at 3/4/2022 1820  Gross per 24 hour   Intake 1005.7 ml   Output 3075 ml   Net -2069.3 ml       Physical Exam  Constitutional:       General: She is not in acute distress.     Appearance: She is well-developed.   Cardiovascular:      Rate and Rhythm: Normal rate and regular rhythm.   Pulmonary:      Effort: Pulmonary effort is normal. No respiratory distress.      Comments: On supplemental oxygen  Abdominal:      General: There is no distension.      Palpations: Abdomen is soft.       Tenderness: There is no abdominal tenderness.   Skin:     General: Skin is warm and dry.   Neurological:      Mental Status: She is alert and oriented to person, place, and time.   Psychiatric:         Behavior: Behavior normal.         Significant Labs:  CBC:   Recent Labs   Lab 03/05/22 0321   WBC 9.34   RBC 3.06*   HGB 10.0*   HCT 32.3*      *   MCH 32.7*   MCHC 31.0*     CMP:   Recent Labs   Lab 03/05/22 0321   GLU 89   CALCIUM 9.1   ALBUMIN 2.4*   *   K 4.2   CO2 28   CL 93*   BUN 32*   CREATININE 3.7*       Significant Diagnostics:  I have reviewed all pertinent imaging results/findings within the past 24 hours.    Assessment/Plan:     Active Diagnoses:    Diagnosis Date Noted POA    PRINCIPAL PROBLEM:  NSTEMI (non-ST elevated myocardial infarction) [I21.4] 03/06/2021 Yes    Positive blood culture [R78.81] 03/04/2022 Yes    Acute on chronic respiratory failure with hypoxemia [J96.21] 03/03/2022 Yes    Debility [R53.81] 03/03/2022 Yes    Advance care planning [Z71.89] 03/03/2022 Not Applicable    Elevated troponin [R77.8] 09/28/2021 Yes    ESRD (end stage renal disease) on dialysis [N18.6, Z99.2] 07/01/2021 Not Applicable     Chronic    Pulmonary hypertension [I27.20] 04/14/2021 Yes     Chronic    Anemia of chronic disease [D63.8] 04/05/2021 Yes     Chronic    Aortic valve stenosis [I35.0] 02/10/2021 Yes     Chronic    Hypothyroidism (acquired) [E03.9] 03/29/2016 Yes     Chronic    Type 2 diabetes mellitus with diabetic polyneuropathy [E11.42] 03/29/2016 Yes     Chronic    GERD (gastroesophageal reflux disease) [K21.9] 03/21/2016 Yes     Chronic    Essential hypertension [I10] 02/24/2016 Yes     Chronic    Mixed hyperlipidemia [E78.2] 02/24/2016 Yes     Chronic      Problems Resolved During this Admission:     Patient with multivessel coronary disease and aortic stenosis. Unfortunately, due to porcelain aorta, respiratory failure, and poor functional status patient is not an operative  candidate. Would pursue high risk PCI vs. Medical management    Gisell Obrien MD, PGY-6  Cardiothoracic Surgery   [phone]       I have seen the patient and reviewed the fellow's note above. I have personally interviewed and examined the patient at bedside and agree with the findings.     Due to her significant comorbidities especially unclampable aorta (porcelain aorta) and severe lung disease on home oxygen, Ms. Knight is not a candidate for surgery.  We recommend percutaneous coronary intervention versus medical management.      Steve Evans MD  Cardiothoracic Surgery  [facility]

## 2022-03-05 NOTE — PT/OT/SLP PROGRESS
Physical Therapy      Patient Name:  Melinda Knight   MRN:  [id number]    Patient not seen today secondary to Other (Comment) (needs new therapy orders 2/2 patient transfer to [facility]). Please consult therapy when patient is appropriate for out of bed mobility.

## 2022-03-05 NOTE — PLAN OF CARE
Recv'd pt as a transfer from another facility via stretcher. Pt AAO, no distress noted or sob on 3Lnc. Pt denies pain at this time. Pt received with Heparin infusing at 12.6ml/hr. Rgt chest wall permacath intact..VSS, SR on monitor, blood sugar 261 and covered per ISS. Pure wick applied. Meds given and aurelio well. Will cont to monitor.

## 2022-03-05 NOTE — PLAN OF CARE
"Melinda Knight is a 76 y.o. F with history of HTN, HLD, CAD, DMII, hypothyroidism, ESRD on HD MWF, GERD, restrictive lung disease with chronic hypoxemic resp failure (on 2-3L O2) who transfers to [facility] for CTS evaluation for CABG +/- AVR. Initially presented to [facility] ED w/ 2 day history of "heartburn pain," N/V, and SOB. Workup notable for troponin 13 (peaked at 42 on 3/3), elevated BNP 2268 (baseline 400-600s), and radiographic evidence of pulmonary edema. EKG SR with RBBB. She was hypoxic and placed on BiPAP. Transferred to [facility] [place], operated by [facility] w/ NSTEMI. Cardiology and nephrology following. Volume removal with HD and weaned to baseline supplemental O2. BCx 3/2 resulted GPC resembling Staph and vanc initiated. Repeat BCx NGTD. LHC on 3/3 revealed proximal RCA 60% stenosed, mid LAD 70% stenosed, and mid Cx 70% stenosed. TTE revealed EF 48% with PORTILLO 0.63cm2, LFLG AS. Cardiology recommended CTS evaluation for CABG +/- AVR. Last dose of Plavix 3/3. Continue to hold for washout. Continue heparin gtt per ACS protocol, ASA, high intensity statin, carvedilol. CTS and nephro consulted.    Chapis Sofia, DO  Internal Medicine PGY-2  Hospital Medicine  "

## 2022-03-06 PROBLEM — B95.62 MRSA BACTEREMIA: Status: ACTIVE | Noted: 2022-01-01

## 2022-03-06 NOTE — ASSESSMENT & PLAN NOTE
Blood cultures drawn in ED 03/02 showing GPCs resembling staph. No clear source of infection at this time. No noted areas of skin breakdown, tunneled line does not appear grossly infected (replaced on 3/4), UA unremarkable, CXR at presentation more consistent with pulmonary edema than infectious process.    - Continue IV Vancomycin. De-escalate as feasible.  - ID consulted, appreciate recs  - F/U repeat cultures, if no clearance may require ALLISON to r/o endocarditis

## 2022-03-06 NOTE — PLAN OF CARE
Problem: Physical Therapy Goal  Goal: Physical Therapy Goal  Description: Goals to be met by: 3/16     Patient will increase functional independence with mobility by performin. Supine to sit with Stand-by Assistance  2. Sit to supine with Stand-by Assistance  3. Sit to stand transfer with Stand-by Assistance  4. Gait  x 50 feet with Stand-by Assistance using LRAD.   5. Lower extremity exercise program x15 reps per handout, with independence to improve strength and activity tolerance.     Outcome: Ongoing, Progressing   Evaluation completed, initiated plan of care.   Nella Giles, PT  3/6/2022

## 2022-03-06 NOTE — PLAN OF CARE
Recv'd pt awake in bed AAO. Pt denies sob on o2, Denies pain at this time. VSS, SR on monitor. Blood sugar 248 and covered per ISS. Meds given and aurelio well. Heparin infusing at 14 units/kg. No signs of bleeding. Will cont to monitor.

## 2022-03-06 NOTE — CONSULTS
[facility] - Cardiology Stepdown  Infectious Disease  Consult Note    Patient Name: Melinda Knight  MRN: [id number]  Admission Date: 3/3/2022  Hospital Length of Stay: 3 days  Attending Physician: Christin Randhawa MD  Primary Care Provider: Duke Cole MD     Isolation Status: No active isolations    Patient information was obtained from patient, past medical records and ER records.      Inpatient consult to Infectious Diseases  Consult performed by: Myrtle Alvarez MD  Consult ordered by: Geovani Morton MD        Assessment/Plan:     MRSA bacteremia  Afebrile and without leukocytosis. Blood cultures 3/2 with MRSA. Repeat blood cultures 3/4 are NGTD. TTE without evidence of infective endocarditis.   · Continue vancomycin. PharmD service to manage dosing and levels.   · Repeat blood cultures.   · Consider HD catheter removal or exchange.   · If patient develops persistent bacteremia then favor ALLISON.       Thank you for your consult. I will follow-up with patient. Please contact us if you have any additional questions.    Myrtle Alvarez MD  Infectious Disease  [facility] - Cardiology Stepdown    Subjective:     Principal Problem: NSTEMI (non-ST elevated myocardial infarction)    HPI: A 76-year-old woman with CAD, HTN, DM2, ESRD on HD, restrictive lung disease, chronic hypoxemic respiratory failure on home oxygen (2-3 LNC) who was transferred from [facility] for CTS evaluation. Mrs. Knight has presented to [facility] ED with 2 days of epigastric pain, nausea, vomiting and SOB. She was evaluated and subsequently transferred to [facility] after work up revealed elevated troponin plus BNP, and a CXR consistent with pulmonary edema. On admission to [facility] she was afebrile and with leukocytosis. Blood cultures were performed and positive for MRSA.     On transfer to [facility] she was afebrile and without leukocytosis. Blood cultures repeated on 3/4 are NGTD. She was evaluated by CTS and deemed a non surgical  "candidate.     Infectious Diseases consulted for "Pt with NSTEMI, ESRD with staph bacteremia, susceptibilities pending, on IV Vanc. Likely source permacath."            Past Medical History:   Diagnosis Date    Acute on chronic diastolic congestive heart failure 4/14/2021    Carotid artery occlusion     Coronary artery disease     Hyperlipidemia     Hypertension     Skin cancer     cyst on the face , was removed 20years +    Stroke     Thyroid disease     Type 2 diabetes mellitus        Past Surgical History:   Procedure Laterality Date    CAROTID ENDARTERECTOMY Left 03/24/2016    CHOLECYSTECTOMY      ESOPHAGOGASTRODUODENOSCOPY Left 9/11/2018    Procedure: EGD (ESOPHAGOGASTRODUODENOSCOPY);  Surgeon: Stevenson Mckee MD;  Location: [facility] ENDO;  Service: Endoscopy;  Laterality: Left;    GALLBLADDER SURGERY      1996    LEFT HEART CATHETERIZATION Left 3/3/2022    Procedure: CATHETERIZATION, HEART, LEFT;  Surgeon: Tex Alicea MD;  Location: [facility] CATH LAB;  Service: Cardiology;  Laterality: Left;    RIGHT HEART CATHETERIZATION Right 3/26/2021    Procedure: INSERTION, CATHETER, RIGHT HEART;  Surgeon: Gennaro Sampson MD;  Location: [facility] CATH LAB;  Service: Cardiology;  Laterality: Right;    SKIN BIOPSY      TONSILLECTOMY      TUBAL LIGATION Bilateral 3/18/2016       Review of patient's allergies indicates:   Allergen Reactions    Sulfa (sulfonamide antibiotics) Nausea And Vomiting    Menthol contain prod        Medications:  Medications Prior to Admission   Medication Sig    albuterol (ACCUNEB) 0.63 mg/3 mL Nebu Take 3 mLs (0.63 mg total) by nebulization 3 (three) times daily as needed. Rescue    aspirin 81 MG Chew Take 1 tablet (81 mg total) by mouth once daily.    atorvastatin (LIPITOR) 80 MG tablet Take 1 tablet (80 mg total) by mouth once daily.    blood sugar diagnostic (BLOOD GLUCOSE TEST) Strp 1 strip by Misc.(Non-Drug; Combo Route) route 2 (two) times daily. Prodigy    carvediloL (COREG) " "12.5 MG tablet Take 1 tablet by mouth twice daily    citalopram (CELEXA) 20 MG tablet Take 1 tablet by mouth once daily    clopidogreL (PLAVIX) 75 mg tablet Take 1 tablet (75 mg total) by mouth once daily.    dicyclomine (BENTYL) 10 MG capsule Take 1 capsule by mouth 4 (four) times daily.    insulin detemir U-100 (LEVEMIR FLEXTOUCH) 100 unit/mL (3 mL) SubQ InPn pen Inject 30 Units into the skin 2 (two) times daily.    levothyroxine (SYNTHROID) 75 MCG tablet Take 1 tablet (75 mcg total) by mouth before breakfast.    linaCLOtide (LINZESS) 72 mcg Cap capsule Take 1 capsule (72 mcg total) by mouth before breakfast.    melatonin (MELATIN) 3 mg tablet Take 2 tablets (6 mg total) by mouth nightly as needed for Insomnia.    melatonin 10 mg Tab Take by mouth.    multivitamin (ONE DAILY MULTIVITAMIN) per tablet Take 1 tablet by mouth once daily.    ondansetron (ZOFRAN-ODT) 4 MG TbDL Take 1 tablet (4 mg total) by mouth every 6 (six) hours as needed (nausea).    pantoprazole (PROTONIX) 40 MG tablet Take 1 tablet by mouth once daily    pen needle, diabetic (NOVOTWIST) 32 gauge x 1/5" Ndle USE AS DIRECTED WITH  INSULIN  PEN    pen needle, diabetic 32 gauge x 5/32" Ndle Use daily with Humalog or as directed    senna (SENOKOT) 8.6 mg tablet Take 1 tablet by mouth once daily.     Antibiotics (From admission, onward)                Start     Stop Route Frequency Ordered    03/04/22 0900  vancomycin - pharmacy to dose  (vancomycin IVPB)        "And" Linked Group Details    -- IV pharmacy to manage frequency 03/04/22 0801    03/03/22 0900  mupirocin 2 % ointment         03/08 0859 Nasl 2 times daily 03/03/22 0755          Antifungals (From admission, onward)                None          Antivirals (From admission, onward)      None             Immunization History   Administered Date(s) Administered    COVID-19, MRNA, LN-S, PF (Pfizer) (Purple Cap) 01/20/2021, 02/10/2021    Influenza 11/19/2012    Influenza - High Dose " - PF (65 years and older) 11/15/2017, 10/03/2018, 10/11/2019, 10/18/2021    Influenza - Trivalent (ADULT) 11/19/2012    PPD Test 03/29/2016, 06/17/2021, 07/01/2021    Pneumococcal Conjugate - 13 Valent 09/16/2016    Pneumococcal Conjugate - 7 Valent 08/26/2009    Pneumococcal Polysaccharide - 23 Valent 11/27/2013    Td - PF (ADULT) 10/11/2019    Zoster 10/19/2015       Family History       Problem Relation (Age of Onset)    Diabetes Mother, Sister, Brother    Heart failure Mother    Hypertension Mother          Social History     Socioeconomic History    Marital status:    Tobacco Use    Smoking status: Never Smoker    Smokeless tobacco: Never Used   Substance and Sexual Activity    Alcohol use: Not Currently     Alcohol/week: 0.0 standard drinks     Comment: occasions    Drug use: No    Sexual activity: Not Currently     Social Determinants of Health     Financial Resource Strain: High Risk    Difficulty of Paying Living Expenses: Hard   Food Insecurity: Food Insecurity Present    Worried About Running Out of Food in the Last Year: Sometimes true    Ran Out of Food in the Last Year: Sometimes true   Transportation Needs: No Transportation Needs    Lack of Transportation (Medical): No    Lack of Transportation (Non-Medical): No   Physical Activity: Inactive    Days of Exercise per Week: 0 days    Minutes of Exercise per Session: 0 min   Stress: Stress Concern Present    Feeling of Stress : Very much   Social Connections: Unknown    Frequency of Communication with Friends and Family: Three times a week    Frequency of Social Gatherings with Friends and Family: Three times a week    Active Member of Clubs or Organizations: No    Attends Club or Organization Meetings: Never    Marital Status:    Housing Stability: Low Risk     Unable to Pay for Housing in the Last Year: No    Number of Places Lived in the Last Year: 1    Unstable Housing in the Last Year: No     Review of  Systems   Constitutional:  Negative for chills, fatigue, fever and unexpected weight change.   HENT:  Negative for ear pain, facial swelling, hearing loss, mouth sores, nosebleeds, rhinorrhea, sinus pressure, sore throat, tinnitus, trouble swallowing and voice change.    Eyes:  Negative for photophobia, pain, redness and visual disturbance.   Respiratory:  Positive for shortness of breath. Negative for cough, chest tightness and wheezing.    Cardiovascular:  Negative for chest pain, palpitations and leg swelling.   Gastrointestinal:  Negative for abdominal pain, blood in stool, constipation, diarrhea, nausea and vomiting.   Endocrine: Negative for cold intolerance, heat intolerance, polydipsia, polyphagia and polyuria.   Genitourinary:  Negative for decreased urine volume, dysuria, flank pain, frequency, hematuria, menstrual problem, urgency, vaginal bleeding, vaginal discharge and vaginal pain.   Musculoskeletal:  Negative for arthralgias, back pain, joint swelling, myalgias and neck pain.   Skin:  Negative for rash.   Allergic/Immunologic: Negative for environmental allergies, food allergies and immunocompromised state.   Neurological:  Negative for dizziness, seizures, syncope, weakness, light-headedness, numbness and headaches.   Hematological:  Negative for adenopathy. Does not bruise/bleed easily.   Psychiatric/Behavioral:  Negative for confusion, hallucinations, self-injury, sleep disturbance and suicidal ideas. The patient is not nervous/anxious.    Objective:     Vital Signs (Most Recent):  Temp: 96.7 °F (35.9 °C) (03/06/22 1118)  Pulse: 77 (03/06/22 1442)  Resp: 16 (03/06/22 1346)  BP: (!) 194/79 (03/06/22 1118)  SpO2: 95 % (03/06/22 1346)   Vital Signs (24h Range):  Temp:  [96.7 °F (35.9 °C)-98.9 °F (37.2 °C)] 96.7 °F (35.9 °C)  Pulse:  [72-93] 77  Resp:  [16-20] 16  SpO2:  [95 %-100 %] 95 %  BP: (141-194)/(62-79) 194/79     Weight: 90 kg (198 lb 6.6 oz)  Body mass index is 37.49 kg/m².    Estimated  Creatinine Clearance: 10.3 mL/min (A) (based on SCr of 4.9 mg/dL (H)).    Physical Exam  Vitals and nursing note reviewed.   Constitutional:       Appearance: She is well-developed.   HENT:      Head: Normocephalic and atraumatic.      Right Ear: External ear normal.      Left Ear: External ear normal.   Eyes:      General: No scleral icterus.        Right eye: No discharge.         Left eye: No discharge.      Conjunctiva/sclera: Conjunctivae normal.   Cardiovascular:      Rate and Rhythm: Normal rate and regular rhythm.      Heart sounds: Normal heart sounds. No murmur heard.    No friction rub. No gallop.   Pulmonary:      Effort: Pulmonary effort is normal. No respiratory distress.      Breath sounds: No stridor. Examination of the right-lower field reveals rales. Examination of the left-lower field reveals rales. Rales present. No wheezing.   Abdominal:      General: Bowel sounds are normal.   Musculoskeletal:         General: No tenderness. Normal range of motion.   Skin:     General: Skin is warm and dry.   Neurological:      Mental Status: She is alert and oriented to person, place, and time.       Significant Labs: Blood Culture:   Recent Labs   Lab 03/02/22  2315 03/02/22  2330 03/04/22  1039   LABBLOO Gram stain alivia bottle: Gram positive cocci in clusters resembling Staph   Results called to and read back by:Chelsey Porter RN 03/04/2022  00:16  METHICILLIN RESISTANT STAPHYLOCOCCUS AUREUS  ID consult required at [facility] and [facility].  For susceptibility see order #[id number]  * Gram stain peds bottle: Gram positive cocci in clusters resembling Staph   Results called to and read back by: Chelsey Porter RN 03/04/2022  00:49  METHICILLIN RESISTANT STAPHYLOCOCCUS AUREUS  ID consult required at [facility] locations.  * No Growth to date  No Growth to date  No Growth to date  No Growth to date     BMP:   Recent Labs   Lab 03/06/22  0356      *   K 4.6    CL 93*   CO2 25   BUN 45*   CREATININE 4.9*   CALCIUM 9.5     CBC:   Recent Labs   Lab 03/05/22  0321 03/06/22  0356   WBC 9.34 7.42   HGB 10.0* 9.6*   HCT 32.3* 30.7*    242       Significant Imaging: I have reviewed all pertinent imaging results/findings within the past 24 hours.

## 2022-03-06 NOTE — PROGRESS NOTES
"[facility] - Cardiology [facility] Medicine  Progress Note    Patient Name: Melinda Knight  MRN: [id number]  Patient Class: IP- Inpatient   Admission Date: 3/3/2022  Length of Stay: 3 days  Attending Physician: Christin Randhawa MD  Primary Care Provider: Duke Cole MD        Subjective:     Principal Problem:NSTEMI (non-ST elevated myocardial infarction)        HPI:  Melinda Knight is a 76 y.o. F with history of HTN, HLD, CAD, DMII, hypothyroidism, ESRD on HD MWF, GERD, restrictive lung disease with chronic hypoxemic resp failure (on 2-3L O2) who [place] [facility] [place] for CABG +/- AVR. Initially presented to [facility] ED w/ 2 day history of "heartburn pain," N/V, and SOB. Workup notable for troponin 13 (peaked at 42 on 3/3), elevated BNP 2268 (baseline 400-600s), and radiographic evidence of pulmonary edema. EKG SR with RBBB. She was hypoxic and placed on BiPAP. Transferred to [facility] w/ NSTEMI. Cardiology and nephrology following. Volume removal with HD and weaned to baseline supplemental O2. BCx 3/2 resulted GPC resembling Staph and vanc initiated. Repeat BCx NGTD. LHC on 3/3 revealed proximal RCA 60% stenosed, mid LAD 70% stenosed, and mid Cx 70% stenosed. TTE revealed EF 48% with PORTILLO 0.63cm2, LFLG AS. Cardiology recommended CTS evaluation for CABG +/- AVR. Last dose of Plavix 3/3. She denies CP, SOB, N/V.      Overview/Hospital Course:  Admitted as transfer from [facility] ED with NSTEMI. Cardiology and nephrology consulted. Volume removed with HD and weaned to baseline supplemental O2. LHC performed showing triple vessel disease. Cardiology recommended CTS evaluation. Patient not a CABG candidate per CTS. IC consulted for possible PCI. Blood cultures from ED 03/02 showed GPCs; started IV Vancomycin. Permacath exchanged on 03/04. ID consulted for bacteremia.       Interval History: NAEO. AF hypertensive to 169/72. No new complaints at this time. Patient reinforced current DNR status. " Interventional cardiology to evaluate tomorrow for PCI, unlikely given DNR status. HD tomorrow per Nephro.    Review of Systems   Constitutional:  Negative for chills and fever.   HENT:  Negative for sore throat.    Respiratory:  Negative for cough and shortness of breath.    Cardiovascular:  Negative for chest pain and leg swelling.   Gastrointestinal:  Negative for abdominal pain, constipation, diarrhea, nausea and vomiting.   Genitourinary:  Negative for dysuria.   Musculoskeletal:  Negative for myalgias.   Skin:  Negative for rash.   Neurological:  Negative for dizziness and headaches.   Psychiatric/Behavioral:  Negative for confusion. The patient is nervous/anxious.    Objective:     Vital Signs (Most Recent):  Temp: 96.7 °F (35.9 °C) (03/06/22 1118)  Pulse: 93 (03/06/22 1156)  Resp: 20 (03/06/22 1118)  BP: (!) 194/79 (03/06/22 1118)  SpO2: 100 % (03/06/22 1118)   Vital Signs (24h Range):  Temp:  [96.7 °F (35.9 °C)-98.9 °F (37.2 °C)] 96.7 °F (35.9 °C)  Pulse:  [72-93] 93  Resp:  [18-20] 20  SpO2:  [96 %-100 %] 100 %  BP: (141-194)/(62-79) 194/79     Weight: 90 kg (198 lb 6.6 oz)  Body mass index is 37.49 kg/m².    Intake/Output Summary (Last 24 hours) at 3/6/2022 1238  Last data filed at 3/6/2022 0950  Gross per 24 hour   Intake 590.4 ml   Output --   Net 590.4 ml      Physical Exam  Constitutional:       Appearance: Normal appearance.   HENT:      Head: Normocephalic and atraumatic.      Mouth/Throat:      Mouth: Mucous membranes are moist.      Pharynx: Oropharynx is clear.   Eyes:      Extraocular Movements: Extraocular movements intact.      Pupils: Pupils are equal, round, and reactive to light.   Neck:      Comments: No JVD  Cardiovascular:      Rate and Rhythm: Normal rate and regular rhythm.      Pulses: Normal pulses.      Heart sounds: Normal heart sounds.   Pulmonary:      Effort: Pulmonary effort is normal. No respiratory distress.      Breath sounds: Wheezing present. No rales.      Comments: Faint  bibasilar crackles  Abdominal:      General: Abdomen is flat. Bowel sounds are normal. There is no distension.      Palpations: Abdomen is soft.      Tenderness: There is no abdominal tenderness. There is no guarding or rebound.   Musculoskeletal:         General: No swelling.      Cervical back: Neck supple.      Right lower leg: Edema (trace) present.      Left lower leg: Edema (trace) present.   Skin:     General: Skin is warm and dry.      Capillary Refill: Capillary refill takes less than 2 seconds.   Neurological:      General: No focal deficit present.      Mental Status: She is alert and oriented to person, place, and time. Mental status is at baseline.   Psychiatric:         Mood and Affect: Mood normal.         Behavior: Behavior normal.         Thought Content: Thought content normal.         Judgment: Judgment normal.       Significant Labs: All pertinent labs within the past 24 hours have been reviewed.  Recent Lab Results         03/06/22  1120   03/06/22  0806   03/06/22  0634   03/06/22  0356   03/05/22  2103        Albumin       2.2         Anion Gap       10         aPTT     36.0  Comment: aPTT therapeutic range = 39-69 seconds           Baso #       0.10         Basophil %       1.3         BUN       45         Calcium       9.5         Chloride       93         CO2       25         Creatinine       4.9         Differential Method       Automated         eGFR if        9.4         eGFR if non        8.1  Comment: Calculation used to obtain the estimated glomerular filtration  rate (eGFR) is the CKD-EPI equation.            Eos #       0.4         Eosinophil %       5.1         Free T4       0.95         Glucose       106         Gran # (ANC)       4.5         Gran %       60.4         Hematocrit       30.7         Hemoglobin       9.6         Immature Grans (Abs)       0.03  Comment: Mild elevation in immature granulocytes is non specific and   can be seen in a  variety of conditions including stress response,   acute inflammation, trauma and pregnancy. Correlation with other   laboratory and clinical findings is essential.           Immature Granulocytes       0.4         Lymph #       1.5         Lymph %       20.4         MCH       32.2         MCHC       31.3         MCV       103         Mono #       0.9         Mono %       12.4         MPV       11.5         nRBC       0         Phosphorus       4.9         Platelets       242         POCT Glucose 204   94       248       Potassium       4.6         RBC       2.98         RDW       11.9         Sodium       128         TSH       2.663         Vancomycin, Random       17.4         WBC       7.42                            03/05/22  1521        Albumin       Anion Gap       aPTT       Baso #       Basophil %       BUN       Calcium       Chloride       CO2       Creatinine       Differential Method       eGFR if        eGFR if non        Eos #       Eosinophil %       Free T4       Glucose       Gran # (ANC)       Gran %       Hematocrit       Hemoglobin       Immature Grans (Abs)       Immature Granulocytes       Lymph #       Lymph %       MCH       MCHC       MCV       Mono #       Mono %       MPV       nRBC       Phosphorus       Platelets       POCT Glucose 249       Potassium       RBC       RDW       Sodium       TSH       Vancomycin, Random       WBC               Significant Imaging:       I have reviewed all pertinent imaging results/findings within the past 24 hours.      Assessment/Plan:      * NSTEMI (non-ST elevated myocardial infarction)  HTN, HLD, CAD, acute on chronic resp failure with hypoxia, pHTN, AS, elevated troponin  - Echo 03/03 shows EF 48%, severe AS.  - LHC 03/03 showed LAD and LCx 70% stenosis and RCA 60% stenosis. Transfered [facility] for CTS evaluation; per CTS pt is poor surgical candidate. Recommending high risk PCI vs medical management  -  Interventional cardiology consulted, unlikely to undergo PCI but will evaluate  - Continue aspirin 81mg and clopidogrel 75mg PO daily, atorvastatin 80mg PO daily, carvedilol 12.5mg PO BID.  - Appears more euvolemic after IV Lasix 40 mg once  - Weaned to baseline supplemental O2.  - Duo nebs and inhalation treatment q6h   - Continuing heparin gtt.  - Appreciate cardiology assistance.    MRSA bacteremia  Blood cultures drawn in ED 03/02 showing GPCs resembling staph. No clear source of infection at this time. No noted areas of skin breakdown, tunneled line does not appear grossly infected (replaced on 3/4), UA unremarkable, CXR at presentation more consistent with pulmonary edema than infectious process.    - Continue IV Vancomycin. De-escalate as feasible.  - ID consulted, appreciate recs  - F/U repeat cultures, if no clearance may require ALLISON to r/o endocarditis    Advance care planning  DNR status on admit; current LaPOST reflects her goals of care. Engaged the patient in a GOC discussion on 3/6. Patient has full capacity and stating that she would not want excessive life-saving measures including CPR or intubation.    - DNR order in place    Debility  - PT/OT.    Acute on chronic respiratory failure with hypoxemia  Patient with Hypoxic Respiratory failure which is Acute on chronic.  she is on home oxygen at 2-3 LPM. Supplemental oxygen was provided and noted-  .   Signs/symptoms of respiratory failure include- respiratory distress. Contributing diagnoses includes - Interstitial lung disease Labs and images were reviewed. Patient Has recent ABG, which has been reviewed. Will treat underlying causes and adjust management of respiratory failure as follows:    - Duo nebs q6h  - Inhalation treatment q6h  - ICS and flutter valve  - Supplemental O2 PRN    Elevated troponin  See NSTEMI      ESRD (end stage renal disease) on dialysis  ESRD on HD M,W,F outpatient. Access through right chest tunneled catheter  Oliguric at  baseline, still makes some urine  Nephrology consulted for HD while inpatient    - HD per nephro, next session scheduled for 3/7  - Renal function panel daily  - Monitor intake/output and daily standing weights.  - Replete electrolytes PRN, goal Mag/Phos/K 2/3/4   - Avoid nephrotoxic agents such as NSAIDs, gadolinium and IV radiocontrast.  - Renally dose meds to current GFR.  - Maintain MAP > 65.        Pulmonary hypertension  Likely 2/2 chronic ILD    See acute on chronic hypoxemic respiratory failure      Anemia of chronic disease  Baseline Hb 10, Hb 9.6 on admission  Antiplatelet/anticoagulation: Heparin, Aspirin, Plavix  Likely 2/2 ESRD    - Monitor CBC daily  - Transfuse with goal Hb > 7        Aortic valve stenosis  See NSTEMI      Type 2 diabetes mellitus with diabetic polyneuropathy  Last A1c 5.5 (3/3/22). On detemir 30U subQ BID at home. Some postprandial derangements.    - Continue insulin detemir 20U subQ BID  - Start Aspart 3u TIDWM  - LDSSI  - Accuchecks achs  - Diabetic diet      Hypothyroidism (acquired)  TSH, T4 stable in 3/2021. Hyponatremia likely 2/2 renal failure but may be component of hypothyroidism    - Continue levothyroxine 75mcg PO daily.  - Repeat TSH, T4    GERD (gastroesophageal reflux disease)  - Continue pantoprazole 40mg PO daily.    Mixed hyperlipidemia  Chronic, stable.    - Continue home Lipitor 80 mg daily    Essential hypertension  Chronic, hypertensive on evaluation today.    - Increase home Coreg 12.5 to 25 mg BID  - Labetalol 10 mg PRN for SBP >160  - Monitor BP        VTE Risk Mitigation (From admission, onward)         Ordered     heparin (porcine) injection 1,000 Units  As needed (PRN)         03/04/22 1420     heparin 25,000 units in dextrose 5% (100 units/ml) IV bolus from bag - ADDITIONAL PRN BOLUS - 60 units/kg (max bolus 4000 units)  As needed (PRN)        Question:  Heparin Infusion Adjustment (DO NOT MODIFY ANSWER)  Answer:   [email or web address]\epic\Images\Pharmacy\HeparinInfusions\heparin LOW INTENSITY nomogram for [facility] [id number]    03/03/22 0810     heparin 25,000 units in dextrose 5% (100 units/ml) IV bolus from bag - ADDITIONAL PRN BOLUS - 30 units/kg (max bolus 4000 units)  As needed (PRN)        Question:  Heparin Infusion Adjustment (DO NOT MODIFY ANSWER)  Answer:  [email or web address]\epic\Images\Pharmacy\HeparinInfusions\heparin LOW INTENSITY nomogram for [facility] [id number].pdf    03/03/22 0810     heparin (porcine) injection 5,000 Units  As needed (PRN)         03/03/22 0755     heparin 25,000 units in dextrose 5% 250 mL (100 units/mL) infusion LOW INTENSITY nomogram - [facility]  Continuous        Question Answer Comment   Heparin Infusion Adjustment (DO NOT MODIFY ANSWER) [email or web address]\epic\Images\Pharmacy\HeparinInfusions\heparin LOW INTENSITY nomogram for [facility] [id number].pdf    Begin at (in units/kg/hr) 12        03/03/22 0810     IP VTE HIGH RISK PATIENT  Once         03/03/22 0650     Place sequential compression device  Until discontinued         03/03/22 0650                Discharge Planning   UMAIR: 3/8/2022     Code Status: DNR   Is the patient medically ready for discharge?: No    Reason for patient still in hospital (select all that apply): Patient trending condition, Treatment and Consult recommendations             Geovani Morton MD  Department of Hospital Medicine   [facility] - Cardiology Stepdown

## 2022-03-06 NOTE — PROGRESS NOTES
Pharmacokinetic Assessment Follow Up: IV Vancomycin    Vancomycin serum concentration assessment/plan:    Assessment/Plan:     · Bld Cx 2 of 2.  MRSA bacteremia.  · HD MWF.  Random level drawn over weekend to ensure adequate therapy.    · Vancomycin 500 mg x 1 on 3/6 at 1030  · Random level for 3/7 with am labs  · Re-dose when random < 25 mg/dL  · Goal: 15-20 mg/dL      Drug levels (last 3 results):  Recent Labs   Lab Result Units 03/06/22  0356   Vancomycin, Random ug/mL 17.4       Pharmacy will continue to follow and monitor vancomycin.    Please contact pharmacy at extension [phone] for questions regarding this assessment.    Thank you for the consult,   Jorge Small       Patient brief summary:  Melinda Knight is a 76 y.o. female initiated on antimicrobial therapy with IV Vancomycin for treatment of bacteremia      Drug Allergies:   Review of patient's allergies indicates:   Allergen Reactions    Sulfa (sulfonamide antibiotics) Nausea And Vomiting    Menthol contain prod        Actual Body Weight:   90 kg    Renal Function:   Estimated Creatinine Clearance: 10.3 mL/min (A) (based on SCr of 4.9 mg/dL (H)).,     Dialysis Method (if applicable):  intermittent HD    CBC (last 72 hours):  Recent Labs   Lab Result Units 03/04/22 0359 03/05/22 0321 03/06/22  0356   WBC K/uL 8.93 9.34 7.42   Hemoglobin g/dL 9.6* 10.0* 9.6*   Hematocrit % 30.9* 32.3* 30.7*   Platelets K/uL 207 231 242   Gran % % 69.1 70.3 60.4   Lymph % % 12.7* 12.6* 20.4   Mono % % 14.3 12.5 12.4   Eosinophil % % 2.8 3.1 5.1   Basophil % % 0.8 0.9 1.3   Differential Method  Automated Automated Automated       Metabolic Panel (last 72 hours):  Recent Labs   Lab Result Units 03/04/22  0359 03/05/22 0321 03/06/22  0356   Sodium mmol/L 135* 134* 128*   Potassium mmol/L 4.2 4.2 4.6   Chloride mmol/L 97 93* 93*   CO2 mmol/L 28 28 25   Glucose mg/dL 135* 89 106   BUN mg/dL 43* 32* 45*   Creatinine mg/dL 3.8* 3.7* 4.9*   Albumin g/dL 2.2* 2.4* 2.2*    Phosphorus mg/dL 4.0 3.2 4.9*       Vancomycin Administrations:  vancomycin given in the last 96 hours                   vancomycin 2 g in dextrose 5 % 500 mL IVPB (mg) 2,000 mg New Bag 03/04/22 0841                Microbiologic Results:  Microbiology Results (last 7 days)     Procedure Component Value Units Date/Time    Blood culture [[id number]] Collected: 03/04/22 1039    Order Status: Completed Specimen: Blood Updated: 03/05/22 1612     Blood Culture, Routine No Growth to date      No Growth to date    Blood culture [[id number]] Collected: 03/04/22 1039    Order Status: Completed Specimen: Blood from Peripheral, Right Updated: 03/05/22 1612     Blood Culture, Routine No Growth to date      No Growth to date

## 2022-03-06 NOTE — PT/OT/SLP EVAL
"Occupational Therapy   Evaluation w PT  Co-treat with PT due to medical complexity of pt and need for skilled hands for safe intervention.    Name: Melinda Knight  MRN: [id number]  Admitting Diagnosis:  NSTEMI (non-ST elevated myocardial infarction) 3 Days Post-Op  Length of Stay: 3 days    Recommendations:     Discharge Recommendations: home health OT (24 hr assist from family, pending medical plan)  Discharge Equipment Recommendations:  none  Barriers to discharge:  Other (Comment) (impaired functional endurance)    Plan:     Patient to be seen 3 x/week to address the above listed problems via self-care/home management, therapeutic activities, therapeutic exercises  · Plan of Care Expires: 04/05/22  · Plan of Care Reviewed with: patient    Assessment:     Melinda Knight is a 76 y.o. female with a medical diagnosis of NSTEMI (non-ST elevated myocardial infarction).  She presents with the following performance deficits affecting function: weakness, impaired endurance, impaired self care skills, impaired functional mobilty, gait instability, impaired balance, decreased upper extremity function, decreased lower extremity function, impaired cardiopulmonary response to activity.      Once patient is medically stable, patient is safe to discharge home with continued OT services via HHOT w 24 hr assist from family pending medical plan.       Rehab Prognosis: Good; patient would benefit from acute skilled OT services to address these deficits and reach maximum level of function.       Subjective   Communicated with: RN prior to session.  Patient found HOB elevated with bed alarm, peripheral IV, pulse ox (continuous), telemetry upon OT entry to room.    Chief Complaint: No chief complaint on file.    Patient/Family Comments/goals: "my daughter helps me with all of that" referring to bathing at bed level, toileting in adult brief, and getting dressed/grooming ADLs.     Pain/Comfort:  · Pain Rating 1: 0/10  · Pain Rating " "Post-Intervention 1: 0/10    Patients cultural, spiritual, Anabaptism conflicts given the current situation: no    Occupational Profile:  Living Environment: pt lives with adult daughter in [facility], [address]. Patient's family carries her in/out home in wheelchair.   Prior Level of Function: Patient reports being Mod I with stand pivot transfers to wheelchair with mobility & receives assist with all ADLs. Patient's daughter bathes pt at bed level, pt toilets in adult briefs, pt receives A with dressing/grooming. Of note: patient has functional strength to complete many of these tasks on her own.   Patient uses DME as follows: walker, rolling, wheelchair, rollator, other (see comments) (bed with adjustable head).   DME owned (not currently used): none.  Roles/Repsonsibilities:   Hand Dominance: right   Work: no.   Drive: no.   Managing Medicines/Managing Home: no.   Hobbies: enjoys collecting plush animals and spending time with her daughters.  Equipment Used at Home:  walker, rolling, wheelchair, rollator, other (see comments) (bed with adjustable head)    Patient reports they will have assistance from daughters upon discharge.      Objective:     Patient found with: bed alarm, peripheral IV, pulse ox (continuous), telemetry   General Precautions: Standard, Cardiac fall   Orthopedic Precautions:N/A   Braces: N/A   Respiratory Status:    Room air  Vitals: BP (!) 194/79 (BP Location: Left arm, Patient Position: Lying)   Pulse 93   Temp 96.7 °F (35.9 °C) (Oral)   Resp 20   Ht 5' 1" (1.549 m)   Wt 90 kg (198 lb 6.6 oz)   LMP  (LMP Unknown)   SpO2 100%   Breastfeeding No   BMI 37.49 kg/m²     Cognitive and Psychosocial Function:   · AxOx4 --    · Follows Commands/attention:follows one-step commands  · Communication:  clear/fluent  · Memory: No Deficits noted  · Safety awareness/insight to disability: intact and pt noted to be self-limiting   · Mood/Affect/Coping skills/emotional control: Appropriate to " situation    Hearing: Impaired: [place]    Vision:  Intact visual fields    Physical Exam:  Postural examination/scapula alignment:    -       Rounded shoulders  -       Forward head  Skin integrity: Visible skin intact and Thin     Left UE Right UE   UE Edema absent absent   UE ROM AROM WFL AROM WFL   UE Strength 4/5 4/5    Strength grasp WFL grasp WFL   Sensation LUE INTACT:WFL RUE INTACT: WFL   Fine Motor Coordination:  LUE INTACT: WFL RUE INTACT: WFL   Gross Motor Coordination: LUE INTACT: WFL RUE INTACT:WFL     Occupational Performance:  Bed Mobility:    · Patient completed Rolling/Turning to Left with  contact guard assistance  · Patient completed Supine to Sit with minimum assistance on L side of bed  · Scooting anteriorly to EOB to have both feet planted on floor: minimum assistance    Functional Mobility/Transfers:   Static Sitting EOB: SBA   Dynamic Sitting EOB: SBA   Patient completed Sit > Stand Transfer with contact guard assistance  with  rolling walker    Note: patient Mod A for stand>sit w RW 2* pt fatigue   Static Standing Balance: SBA w RW   Dynamic Standing Balance: CGA w RW   Patient completed Bed <> Chair Transfer using Step Transfer technique with contact guard assistance with rolling walker      Activities of Daily Living:  · Feeding:  modified independence set up seated in chair BUE WFL  · Grooming: modified independence set up seated in chair; BUE WFL; pt combed hair in standing with SBA, but stated that her family assists her with that and requested therapist assist to complete.   · Upper Body Dressing: contact guard assistance donning gown like robe seated EOB  · Toileting: stand by assistance at bed level, anterior karlene hygeine with set up, pt able to wipe karlene area. purewick donned again seated in chair. Pt wears briefs at home.       AMPAC 6 Click ADL:  AMPAC Total Score: 19    Treatment & Education:  -OT POC, safety during ADLs and mobility   -Education on energy conservation  and task modification to maximize safety and independence  -Questions answered within OT scope of practice.      Patient left up in chair with all lines intact, call button in reach and RN notified   -RW and BSC retrieved for room    GOALS:   Multidisciplinary Problems     Occupational Therapy Goals        Problem: Occupational Therapy Goal    Goal Priority Disciplines Outcome Interventions   Occupational Therapy Goal     OT, PT/OT Ongoing, Progressing    Description: Goals set on 3/6 with expiration date 3/20:  Patient will increase functional independence with ADLs by performing:    Supine <> Sit with SBA.   Feeding while seated EOB/bedside chair with  Mod I.   Grooming while seated EOB with SBA.   UB Dressing with Min A.   LB Dressing with Min A.   Step transfer with  Mod I with DME as needed.  Pt will demonstrate understanding of education provided regarding energy conservation and task modification through teach-back method.   Patient and/or patient's family will verbalize understanding of POC and post d/c support needs, and personal risk factors for fall risk reduction.                        History:     Past Medical History:   Diagnosis Date    Acute on chronic diastolic congestive heart failure 4/14/2021    Carotid artery occlusion     Coronary artery disease     Hyperlipidemia     Hypertension     Skin cancer     cyst on the face , was removed 20years +    Stroke     Thyroid disease     Type 2 diabetes mellitus        Past Surgical History:   Procedure Laterality Date    CAROTID ENDARTERECTOMY Left 03/24/2016    CHOLECYSTECTOMY      ESOPHAGOGASTRODUODENOSCOPY Left 9/11/2018    Procedure: EGD (ESOPHAGOGASTRODUODENOSCOPY);  Surgeon: Stevenson Mckee MD;  Location: [facility];  Service: Endoscopy;  Laterality: Left;    GALLBLADDER SURGERY      1996    LEFT HEART CATHETERIZATION Left 3/3/2022    Procedure: CATHETERIZATION, HEART, LEFT;  Surgeon: Tex Alicea MD;  Location[facility] CATH LAB;   Service: Cardiology;  Laterality: Left;    RIGHT HEART CATHETERIZATION Right 3/26/2021    Procedure: INSERTION, CATHETER, RIGHT HEART;  Surgeon: Gennaro Sampson MD;  Location: [facility] CATH LAB;  Service: Cardiology;  Laterality: Right;    SKIN BIOPSY      TONSILLECTOMY      TUBAL LIGATION Bilateral 3/18/2016       Time Tracking:       OT Date of Treatment: 03/06/22  OT Start Time: 0906  OT Stop Time: 0930  OT Total Time (min): 24 min  Additional staff present: PT      Billable Minutes:Evaluation 10  Self Care/Home Management 14      3/6/2022

## 2022-03-06 NOTE — SUBJECTIVE & OBJECTIVE
Past Medical History:   Diagnosis Date    Acute on chronic diastolic congestive heart failure 4/14/2021    Carotid artery occlusion     Coronary artery disease     Hyperlipidemia     Hypertension     Skin cancer     cyst on the face , was removed 20years +    Stroke     Thyroid disease     Type 2 diabetes mellitus        Past Surgical History:   Procedure Laterality Date    CAROTID ENDARTERECTOMY Left 03/24/2016    CHOLECYSTECTOMY      ESOPHAGOGASTRODUODENOSCOPY Left 9/11/2018    Procedure: EGD (ESOPHAGOGASTRODUODENOSCOPY);  Surgeon: Stevenson Mckee MD;  Location: [facility] ENDO;  Service: Endoscopy;  Laterality: Left;    GALLBLADDER SURGERY      1996    LEFT HEART CATHETERIZATION Left 3/3/2022    Procedure: CATHETERIZATION, HEART, LEFT;  Surgeon: Tex Alicea MD;  Location: [facility] CATH LAB;  Service: Cardiology;  Laterality: Left;    RIGHT HEART CATHETERIZATION Right 3/26/2021    Procedure: INSERTION, CATHETER, RIGHT HEART;  Surgeon: Gennaro Sampson MD;  Location[facility] C[facility] LAB;  Service: Cardiology;  Laterality: Right;    SKIN BIOPSY      TONSILLECTOMY      TUBAL LIGATION Bilateral 3/18/2016       Review of patient's allergies indicates:   Allergen Reactions    Sulfa (sulfonamide antibiotics) Nausea And Vomiting    Menthol contain prod        Medications:  Medications Prior to Admission   Medication Sig    albuterol (ACCUNEB) 0.63 mg/3 mL Nebu Take 3 mLs (0.63 mg total) by nebulization 3 (three) times daily as needed. Rescue    aspirin 81 MG Chew Take 1 tablet (81 mg total) by mouth once daily.    atorvastatin (LIPITOR) 80 MG tablet Take 1 tablet (80 mg total) by mouth once daily.    blood sugar diagnostic (BLOOD GLUCOSE TEST) Strp 1 strip by Misc.(Non-Drug; Combo Route) route 2 (two) times daily. Prodigy    carvediloL (COREG) 12.5 MG tablet Take 1 tablet by mouth twice daily    citalopram (CELEXA) 20 MG tablet Take 1 tablet by mouth once daily    clopidogreL (PLAVIX) 75 mg tablet Take 1 tablet (75 mg total) by mouth  "once daily.    dicyclomine (BENTYL) 10 MG capsule Take 1 capsule by mouth 4 (four) times daily.    insulin detemir U-100 (LEVEMIR FLEXTOUCH) 100 unit/mL (3 mL) SubQ InPn pen Inject 30 Units into the skin 2 (two) times daily.    levothyroxine (SYNTHROID) 75 MCG tablet Take 1 tablet (75 mcg total) by mouth before breakfast.    linaCLOtide (LINZESS) 72 mcg Cap capsule Take 1 capsule (72 mcg total) by mouth before breakfast.    melatonin (MELATIN) 3 mg tablet Take 2 tablets (6 mg total) by mouth nightly as needed for Insomnia.    melatonin 10 mg Tab Take by mouth.    multivitamin (ONE DAILY MULTIVITAMIN) per tablet Take 1 tablet by mouth once daily.    ondansetron (ZOFRAN-ODT) 4 MG TbDL Take 1 tablet (4 mg total) by mouth every 6 (six) hours as needed (nausea).    pantoprazole (PROTONIX) 40 MG tablet Take 1 tablet by mouth once daily    pen needle, diabetic (NOVOTWIST) 32 gauge x 1/5" Ndle USE AS DIRECTED WITH  INSULIN  PEN    pen needle, diabetic 32 gauge x 5/32" Ndle Use daily with Humalog or as directed    senna (SENOKOT) 8.6 mg tablet Take 1 tablet by mouth once daily.     Antibiotics (From admission, onward)                Start     Stop Route Frequency Ordered    03/04/22 0900  vancomycin - pharmacy to dose  (vancomycin IVPB)        "And" Linked Group Details    -- IV pharmacy to manage frequency 03/04/22 0801    03/03/22 0900  mupirocin 2 % ointment         03/08 0859 Nasl 2 times daily 03/03/22 0755          Antifungals (From admission, onward)                None          Antivirals (From admission, onward)      None             Immunization History   Administered Date(s) Administered    COVID-19, MRNA, LN-S, PF (Pfizer) (Purple Cap) 01/20/2021, 02/10/2021    Influenza 11/19/2012    Influenza - High Dose - PF (65 years and older) 11/15/2017, 10/03/2018, 10/11/2019, 10/18/2021    Influenza - Trivalent (ADULT) 11/19/2012    PPD Test 03/29/2016, 06/17/2021, 07/01/2021    Pneumococcal Conjugate - 13 Valent " 09/16/2016    Pneumococcal Conjugate - 7 Valent 08/26/2009    Pneumococcal Polysaccharide - 23 Valent 11/27/2013    Td - PF (ADULT) 10/11/2019    Zoster 10/19/2015       Family History       Problem Relation (Age of Onset)    Diabetes Mother, Sister, Brother    Heart failure Mother    Hypertension Mother          Social History     Socioeconomic History    Marital status:    Tobacco Use    Smoking status: Never Smoker    Smokeless tobacco: Never Used   Substance and Sexual Activity    Alcohol use: Not Currently     Alcohol/week: 0.0 standard drinks     Comment: occasions    Drug use: No    Sexual activity: Not Currently     Social Determinants of Health     Financial Resource Strain: High Risk    Difficulty of Paying Living Expenses: Hard   Food Insecurity: Food Insecurity Present    Worried About Running Out of Food in the Last Year: Sometimes true    Ran Out of Food in the Last Year: Sometimes true   Transportation Needs: No Transportation Needs    Lack of Transportation (Medical): No    Lack of Transportation (Non-Medical): No   Physical Activity: Inactive    Days of Exercise per Week: 0 days    Minutes of Exercise per Session: 0 min   Stress: Stress Concern Present    Feeling of Stress : Very much   Social Connections: Unknown    Frequency of Communication with Friends and Family: Three times a week    Frequency of Social Gatherings with Friends and Family: Three times a week    Active Member of Clubs or Organizations: No    Attends Club or Organization Meetings: Never    Marital Status:    Housing Stability: Low Risk     Unable to Pay for Housing in the Last Year: No    Number of Places Lived in the Last Year: 1    Unstable Housing in the Last Year: No     Review of Systems   Constitutional:  Negative for chills, fatigue, fever and unexpected weight change.   HENT:  Negative for ear pain, facial swelling, hearing loss, mouth sores, nosebleeds, rhinorrhea, sinus pressure, sore throat, tinnitus,  trouble swallowing and voice change.    Eyes:  Negative for photophobia, pain, redness and visual disturbance.   Respiratory:  Positive for shortness of breath. Negative for cough, chest tightness and wheezing.    Cardiovascular:  Negative for chest pain, palpitations and leg swelling.   Gastrointestinal:  Negative for abdominal pain, blood in stool, constipation, diarrhea, nausea and vomiting.   Endocrine: Negative for cold intolerance, heat intolerance, polydipsia, polyphagia and polyuria.   Genitourinary:  Negative for decreased urine volume, dysuria, flank pain, frequency, hematuria, menstrual problem, urgency, vaginal bleeding, vaginal discharge and vaginal pain.   Musculoskeletal:  Negative for arthralgias, back pain, joint swelling, myalgias and neck pain.   Skin:  Negative for rash.   Allergic/Immunologic: Negative for environmental allergies, food allergies and immunocompromised state.   Neurological:  Negative for dizziness, seizures, syncope, weakness, light-headedness, numbness and headaches.   Hematological:  Negative for adenopathy. Does not bruise/bleed easily.   Psychiatric/Behavioral:  Negative for confusion, hallucinations, self-injury, sleep disturbance and suicidal ideas. The patient is not nervous/anxious.    Objective:     Vital Signs (Most Recent):  Temp: 96.7 °F (35.9 °C) (03/06/22 1118)  Pulse: 77 (03/06/22 1442)  Resp: 16 (03/06/22 1346)  BP: (!) 194/79 (03/06/22 1118)  SpO2: 95 % (03/06/22 1346)   Vital Signs (24h Range):  Temp:  [96.7 °F (35.9 °C)-98.9 °F (37.2 °C)] 96.7 °F (35.9 °C)  Pulse:  [72-93] 77  Resp:  [16-20] 16  SpO2:  [95 %-100 %] 95 %  BP: (141-194)/(62-79) 194/79     Weight: 90 kg (198 lb 6.6 oz)  Body mass index is 37.49 kg/m².    Estimated Creatinine Clearance: 10.3 mL/min (A) (based on SCr of 4.9 mg/dL (H)).    Physical Exam  Vitals and nursing note reviewed.   Constitutional:       Appearance: She is well-developed.   HENT:      Head: Normocephalic and atraumatic.       Right Ear: External ear normal.      Left Ear: External ear normal.   Eyes:      General: No scleral icterus.        Right eye: No discharge.         Left eye: No discharge.      Conjunctiva/sclera: Conjunctivae normal.   Cardiovascular:      Rate and Rhythm: Normal rate and regular rhythm.      Heart sounds: Normal heart sounds. No murmur heard.    No friction rub. No gallop.   Pulmonary:      Effort: Pulmonary effort is normal. No respiratory distress.      Breath sounds: No stridor. Examination of the right-lower field reveals rales. Examination of the left-lower field reveals rales. Rales present. No wheezing.   Abdominal:      General: Bowel sounds are normal.   Musculoskeletal:         General: No tenderness. Normal range of motion.   Skin:     General: Skin is warm and dry.   Neurological:      Mental Status: She is alert and oriented to person, place, and time.       Significant Labs: Blood Culture:   Recent Labs   Lab 03/02/22  2315 03/02/22  2330 03/04/22  1039   LABBLOO Gram stain alivia bottle: Gram positive cocci in clusters resembling Staph   Results called to and read back by:Chelsey Porter RN 03/04/2022  00:16  METHICILLIN RESISTANT STAPHYLOCOCCUS AUREUS  ID consult required at [facility].  For susceptibility see order #[id number]  * Gram stain peds bottle: Gram positive cocci in clusters resembling Staph   Results called to and read back by: Chelsey Porter RN 03/04/2022  00:49  METHICILLIN RESISTANT STAPHYLOCOCCUS AUREUS  ID consult required at [facility].  * No Growth to date  No Growth to date  No Growth to date  No Growth to date     BMP:   Recent Labs   Lab 03/06/22  0356      *   K 4.6   CL 93*   CO2 25   BUN 45*   CREATININE 4.9*   CALCIUM 9.5     CBC:   Recent Labs   Lab 03/05/22  0321 03/06/22  0356   WBC 9.34 7.42   HGB 10.0* 9.6*   HCT 32.3* 30.7*    242       Significant Imaging: I have reviewed all  pertinent imaging results/findings within the past 24 hours.

## 2022-03-06 NOTE — ASSESSMENT & PLAN NOTE
DNR status on admit; current LaPOST reflects her goals of care. Engaged the patient in a GOC discussion on 3/6. Patient has full capacity and stating that she would not want excessive life-saving measures including CPR or intubation.    - DNR order in place

## 2022-03-06 NOTE — HPI
"A 76-year-old woman with CAD, HTN, DM2, ESRD on HD, restrictive lung disease, chronic hypoxemic respiratory failure on home oxygen (2-3 LNC) who was transferred from [facility] for CTS evaluation. Mrs. Knight has presented to [facility] ED with 2 days of epigastric pain, nausea, vomiting and SOB. She was evaluated and subsequently transferred to [facility] after work up revealed elevated troponin plus BNP, and a CXR consistent with pulmonary edema. On admission to [facility] she was afebrile and with leukocytosis. Blood cultures were performed and positive for MRSA.     On transfer to [facility] she was afebrile and without leukocytosis. Blood cultures repeated on 3/4 are NGTD. She was evaluated by CTS and deemed a non surgical candidate.     Infectious Diseases consulted for "Pt with NSTEMI, ESRD with staph bacteremia, susceptibilities pending, on IV Vanc. Likely source permacath."        "

## 2022-03-06 NOTE — ASSESSMENT & PLAN NOTE
TSH, T4 stable in 3/2021. Hyponatremia likely 2/2 renal failure but may be component of hypothyroidism    - Continue levothyroxine 75mcg PO daily.  - Repeat TSH, T4

## 2022-03-06 NOTE — ASSESSMENT & PLAN NOTE
Last A1c 5.5 (3/3/22). On detemir 30U subQ BID at home. Some postprandial derangements.    - Continue insulin detemir 20U subQ BID  - Start Aspart 3u TIDWM  - LDSSI  - Accuchecks achs  - Diabetic diet

## 2022-03-06 NOTE — ASSESSMENT & PLAN NOTE
HTN, HLD, CAD, acute on chronic resp failure with hypoxia, pHTN, AS, elevated troponin  - Echo 03/03 shows EF 48%, severe AS.  - LHC 03/03 showed LAD and LCx 70% stenosis and RCA 60% stenosis. Transfered [facility] for CTS evaluation; per CTS pt is poor surgical candidate. Recommending high risk PCI vs medical management  - Interventional cardiology consulted, unlikely to undergo PCI but will evaluate  - Continue aspirin 81mg and clopidogrel 75mg PO daily, atorvastatin 80mg PO daily, carvedilol 12.5mg PO BID.  - Appears more euvolemic after IV Lasix 40 mg once  - Weaned to baseline supplemental O2.  - Duo nebs and inhalation treatment q6h   - Continuing heparin gtt.  - Appreciate cardiology assistance.

## 2022-03-06 NOTE — PT/OT/SLP EVAL
"Physical Therapy Evaluation  Co-evaluation with OT due to acuity of condition, level of skilled assist needed for assessment of safety with mobility.   Patient Name:  Melinda Knight   MRN:  [id number]    Recommendations:     Discharge Recommendations:  home health PT (24 hr assist, pending medical plan)   Discharge Equipment Recommendations: none   Barriers to discharge: None    Assessment:     Melinda Knight is a 76 y.o. female admitted with a medical diagnosis of NSTEMI (non-ST elevated myocardial infarction).  She presents with the following impairments/functional limitations:  weakness, gait instability, impaired endurance, impaired functional mobilty, decreased lower extremity function, decreased upper extremity function, impaired cardiopulmonary response to activity, impaired self care skills. The patient reports that she required extensive assist from family at home, she primarily uses wheelchair for mobility and reports that she requires maximum/total assistance for ADLs. She ambulated 20' with RW and contact guard assist, activity tolerance limited due to fatigue. She does rely heavily on UE for transfers and gait. She would benefit from HHPT and has access to 24 hr caregiver assist.     Rehab Prognosis: Good; patient would benefit from acute skilled PT services to address these deficits and reach maximum level of function.    Recent Surgery: Procedure(s) (LRB):  CATHETERIZATION, HEART, LEFT (Left) 3 Days Post-Op    Plan:     During this hospitalization, patient to be seen 3 x/week to address the identified rehab impairments via gait training, therapeutic activities, therapeutic exercises, neuromuscular re-education and progress toward the following goals:    · Plan of Care Expires:  04/05/22    Subjective     Chief Complaint: "My daughter does this for me"  Patient/Family Comments/goals: willing to mobilize with encouragement  Pain/Comfort:  · Pain Rating 1: 0/10    Patients cultural, spiritual, Denominational " conflicts given the current situation: no    Living Environment:  The patient lives with her daughter in a H, 3 NELDA; tub-shower.   Prior to admission, patients level of function was requiring significant assist with mobility- primarily performing stand pivot to wheelchair, able to ambulate short distances with RW. Reports that her family gives her bed baths and she uses briefs in lieu of toileting. She is on 2-3L O2 at home.  Equipment used at home: walker, rolling, wheelchair, rollator, bedside commode, oxygen (adjustable bed).  DME owned (not currently used): none.  Upon discharge, patient will have assistance from daughter.    Objective:     Communicated with RN prior to session.  Patient found HOB elevated with telemetry, peripheral IV, bed alarm, oxygen  upon PT entry to room.    General Precautions: Standard, fall   Orthopedic Precautions:N/A   Braces: N/A  Respiratory Status: Nasal cannula, flow 3 L/min    Exams:    Cognitive Exam  Patient is A&O x4 and follows 100% of one -step commands    Fine Motor Coordination   -       WNL     Postural Exam Patient presented with the following abnormalities:    -       Rounded shoulders  -       Forward head  -       Kyphosis     Sensation    -       Light touch intact SIMONE LE   Skin Integrity/Edema     -       Skin integrity: visibly intact  -       Edema: 2+ SIMONE lower legs   R LE ROM WNL   R LE Strength 3+/5 hip flexion, knee ext/flex, and ankle DF/PF   L LE ROM WNL   L LE Strength  3+/5 hip flexion, knee ext/flex, and ankle DF/PF       Balance   Static Sitting supervision assistance    Dynamic Sitting stand by assistance    Static Standing contact guard assist with RW   Dynamic Standing       contact guard assist with RW          Functional Mobility:    Bed Mobility  Supine to Sit on the L side:  minimum assistance, HOB elevated, use of HR, cues for sequencing   Transfers Sit to Stand:  minimum assistance   Stand to sit: minimum assistance to moderate assistance,  poor eccentric control   Gait  Gait Distance: 20 ft with RW  Assistance Level: contact guard assist  Description: kyphotic posture, WBing significantly through UE, ambulating outside RW ELYSIA, decreased step length and gait speed          Therapeutic Activities and Exercises:   Patient educated on role of therapy, goals of session, benefits of out of bed mobility. Patient agreeable to mobilize with therapy.  Discussed PT plan of care during hospitalization. Patient educated that they need to call for assistance to mobilize out of bed. Whiteboard updated as appropriate. Patient educated on how their diagnosis impacts their mobility within PT scope of practice.     Gait training: cued to ambulate within RW ELYSIA, cued for reciprocal strides and upright posture    Patient educated on PT schedule.  Encouraged patient to ambulate, sit up in chair 3x/day to prevent deconditioning during hospitalization. Patient verbalized understanding and agreement not to mobilize without RN assist. Patient in agreement with PT POC, HHPT with family assist.      The patient is safe to ambulate with RN assist x1 person with RW.     AM-PAC 6 CLICK MOBILITY  Total Score:18     Patient left up in chair with all lines intact and call button in reach.    GOALS:   Multidisciplinary Problems     Physical Therapy Goals        Problem: Physical Therapy Goal    Goal Priority Disciplines Outcome Goal Variances Interventions   Physical Therapy Goal     PT, PT/OT Ongoing, Progressing     Description: Goals to be met by: 3/16     Patient will increase functional independence with mobility by performin. Supine to sit with Stand-by Assistance  2. Sit to supine with Stand-by Assistance  3. Sit to stand transfer with Stand-by Assistance  4. Gait  x 50 feet with Stand-by Assistance using LRAD.   5. Lower extremity exercise program x15 reps per handout, with independence to improve strength and activity tolerance.                      History:     Past  Medical History:   Diagnosis Date    Acute on chronic diastolic congestive heart failure 4/14/2021    Carotid artery occlusion     Coronary artery disease     Hyperlipidemia     Hypertension     Skin cancer     cyst on the face , was removed 20years +    Stroke     Thyroid disease     Type 2 diabetes mellitus        Past Surgical History:   Procedure Laterality Date    CAROTID ENDARTERECTOMY Left 03/24/2016    CHOLECYSTECTOMY      ESOPHAGOGASTRODUODENOSCOPY Left 9/11/2018    Procedure: EGD (ESOPHAGOGASTRODUODENOSCOPY);  Surgeon: Stevenson Mckee MD;  [facility] END[facility];  Service: Endoscopy;  Laterality: Left;    GALLBLADDER SURGERY      1996    LEFT HEART CATHETERIZATION Left 3/3/2022    Procedure: CATHETERIZATION, HEART, LEFT;  Surgeon: Tex Alieca MD;  Location: [facility] CATH LAB;  Service: Cardiology;  Laterality: Left;    RIGHT HEART CATHETERIZATION Right 3/26/2021    Procedure: INSERTION, CATHETER, RIGHT HEART;  Surgeon: Gennaro Sampson MD;  Location: [facility] CATH LAB;  Service: Cardiology;  Laterality: Right;    SKIN BIOPSY      TONSILLECTOMY      TUBAL LIGATION Bilateral 3/18/2016       Time Tracking:     PT Received On: 03/06/22  PT Start Time: 0907     PT Stop Time: 0932  PT Total Time (min): 25 min     Billable Minutes: Evaluation 10 and Gait Training 12      03/06/2022

## 2022-03-06 NOTE — SUBJECTIVE & OBJECTIVE
Interval History: NAEO. AF hypertensive to 169/72. No new complaints at this time. Patient reinforced current DNR status. Interventional cardiology to evaluate tomorrow for PCI, unlikely given DNR status. HD tomorrow per Nephro.    Review of Systems   Constitutional:  Negative for chills and fever.   HENT:  Negative for sore throat.    Respiratory:  Negative for cough and shortness of breath.    Cardiovascular:  Negative for chest pain and leg swelling.   Gastrointestinal:  Negative for abdominal pain, constipation, diarrhea, nausea and vomiting.   Genitourinary:  Negative for dysuria.   Musculoskeletal:  Negative for myalgias.   Skin:  Negative for rash.   Neurological:  Negative for dizziness and headaches.   Psychiatric/Behavioral:  Negative for confusion. The patient is nervous/anxious.    Objective:     Vital Signs (Most Recent):  Temp: 96.7 °F (35.9 °C) (03/06/22 1118)  Pulse: 93 (03/06/22 1156)  Resp: 20 (03/06/22 1118)  BP: (!) 194/79 (03/06/22 1118)  SpO2: 100 % (03/06/22 1118)   Vital Signs (24h Range):  Temp:  [96.7 °F (35.9 °C)-98.9 °F (37.2 °C)] 96.7 °F (35.9 °C)  Pulse:  [72-93] 93  Resp:  [18-20] 20  SpO2:  [96 %-100 %] 100 %  BP: (141-194)/(62-79) 194/79     Weight: 90 kg (198 lb 6.6 oz)  Body mass index is 37.49 kg/m².    Intake/Output Summary (Last 24 hours) at 3/6/2022 1238  Last data filed at 3/6/2022 0950  Gross per 24 hour   Intake 590.4 ml   Output --   Net 590.4 ml      Physical Exam  Constitutional:       Appearance: Normal appearance.   HENT:      Head: Normocephalic and atraumatic.      Mouth/Throat:      Mouth: Mucous membranes are moist.      Pharynx: Oropharynx is clear.   Eyes:      Extraocular Movements: Extraocular movements intact.      Pupils: Pupils are equal, round, and reactive to light.   Neck:      Comments: No JVD  Cardiovascular:      Rate and Rhythm: Normal rate and regular rhythm.      Pulses: Normal pulses.      Heart sounds: Normal heart sounds.   Pulmonary:      Effort:  Pulmonary effort is normal. No respiratory distress.      Breath sounds: Wheezing present. No rales.      Comments: Faint bibasilar crackles  Abdominal:      General: Abdomen is flat. Bowel sounds are normal. There is no distension.      Palpations: Abdomen is soft.      Tenderness: There is no abdominal tenderness. There is no guarding or rebound.   Musculoskeletal:         General: No swelling.      Cervical back: Neck supple.      Right lower leg: Edema (trace) present.      Left lower leg: Edema (trace) present.   Skin:     General: Skin is warm and dry.      Capillary Refill: Capillary refill takes less than 2 seconds.   Neurological:      General: No focal deficit present.      Mental Status: She is alert and oriented to person, place, and time. Mental status is at baseline.   Psychiatric:         Mood and Affect: Mood normal.         Behavior: Behavior normal.         Thought Content: Thought content normal.         Judgment: Judgment normal.       Significant Labs: All pertinent labs within the past 24 hours have been reviewed.  Recent Lab Results         03/06/22  1120   03/06/22  0806   03/06/22  0634   03/06/22  0356   03/05/22  2103        Albumin       2.2         Anion Gap       10         aPTT     36.0  Comment: aPTT therapeutic range = 39-69 seconds           Baso #       0.10         Basophil %       1.3         BUN       45         Calcium       9.5         Chloride       93         CO2       25         Creatinine       4.9         Differential Method       Automated         eGFR if        9.4         eGFR if non        8.1  Comment: Calculation used to obtain the estimated glomerular filtration  rate (eGFR) is the CKD-EPI equation.            Eos #       0.4         Eosinophil %       5.1         Free T4       0.95         Glucose       106         Gran # (ANC)       4.5         Gran %       60.4         Hematocrit       30.7         Hemoglobin       9.6          Immature Grans (Abs)       0.03  Comment: Mild elevation in immature granulocytes is non specific and   can be seen in a variety of conditions including stress response,   acute inflammation, trauma and pregnancy. Correlation with other   laboratory and clinical findings is essential.           Immature Granulocytes       0.4         Lymph #       1.5         Lymph %       20.4         MCH       32.2         MCHC       31.3         MCV       103         Mono #       0.9         Mono %       12.4         MPV       11.5         nRBC       0         Phosphorus       4.9         Platelets       242         POCT Glucose 204   94       248       Potassium       4.6         RBC       2.98         RDW       11.9         Sodium       128         TSH       2.663         Vancomycin, Random       17.4         WBC       7.42                            03/05/22  1521        Albumin       Anion Gap       aPTT       Baso #       Basophil %       BUN       Calcium       Chloride       CO2       Creatinine       Differential Method       eGFR if        eGFR if non        Eos #       Eosinophil %       Free T4       Glucose       Gran # (ANC)       Gran %       Hematocrit       Hemoglobin       Immature Grans (Abs)       Immature Granulocytes       Lymph #       Lymph %       MCH       MCHC       MCV       Mono #       Mono %       MPV       nRBC       Phosphorus       Platelets       POCT Glucose 249       Potassium       RBC       RDW       Sodium       TSH       Vancomycin, Random       WBC               Significant Imaging:       I have reviewed all pertinent imaging results/findings within the past 24 hours.

## 2022-03-06 NOTE — ASSESSMENT & PLAN NOTE
Baseline Hb 10, Hb 9.6 on admission  Antiplatelet/anticoagulation: Heparin, Aspirin, Plavix  Likely 2/2 ESRD    - Monitor CBC daily  - Transfuse with goal Hb > 7

## 2022-03-06 NOTE — PLAN OF CARE
Problem: Occupational Therapy Goal  Goal: Occupational Therapy Goal  Description: Goals set on 3/6 with expiration date 3/20:  Patient will increase functional independence with ADLs by performing:    Supine <> Sit with SBA.   Feeding while seated EOB/bedside chair with  Mod I.   Grooming while seated EOB with SBA.   UB Dressing with Min A.   LB Dressing with Min A.   Step transfer with  Mod I with DME as needed.  Pt will demonstrate understanding of education provided regarding energy conservation and task modification through teach-back method.   Patient and/or patient's family will verbalize understanding of POC and post d/c support needs, and personal risk factors for fall risk reduction.       Outcome: Ongoing, Progressing       Once patient is medically stable, patient is safe to discharge home with continued OT services via HHOT.     HUY Melvin

## 2022-03-06 NOTE — PLAN OF CARE
Received awake, alert, following commands, vss. Heparin drip infusing at 14units/kg/hr which equals 9.2ml/hour: APTT therapeutic this am:45.5 next APTT with am labs. Family was at bedside this shift updated on plan of care by resident on call. Resting comfortably at this time.   Problem: Adult Inpatient Plan of Care  Goal: Plan of Care Review  Outcome: Ongoing, Progressing  Goal: Patient-Specific Goal (Individualized)  Outcome: Ongoing, Progressing  Goal: Absence of Hospital-Acquired Illness or Injury  Outcome: Ongoing, Progressing  Goal: Optimal Comfort and Wellbeing  Outcome: Ongoing, Progressing  Goal: Readiness for Transition of Care  Outcome: Ongoing, Progressing     Problem: Diabetes Comorbidity  Goal: Blood Glucose Level Within Targeted Range  Outcome: Ongoing, Progressing     Problem: Infection  Goal: Absence of Infection Signs and Symptoms  Outcome: Ongoing, Progressing     Problem: Skin Injury Risk Increased  Goal: Skin Health and Integrity  Outcome: Ongoing, Progressing     Problem: Device-Related Complication Risk (Hemodialysis)  Goal: Safe, Effective Therapy Delivery  Outcome: Ongoing, Progressing     Problem: Hemodynamic Instability (Hemodialysis)  Goal: Effective Tissue Perfusion  Outcome: Ongoing, Progressing     Problem: Infection (Hemodialysis)  Goal: Absence of Infection Signs and Symptoms  Outcome: Ongoing, Progressing     Problem: Fall Injury Risk  Goal: Absence of Fall and Fall-Related Injury  Outcome: Ongoing, Progressing

## 2022-03-06 NOTE — ASSESSMENT & PLAN NOTE
Chronic, hypertensive on evaluation today.    - Increase home Coreg 12.5 to 25 mg BID  - Labetalol 10 mg PRN for SBP >160  - Monitor BP

## 2022-03-06 NOTE — ASSESSMENT & PLAN NOTE
ESRD on HD M,W,F outpatient. Access through right chest tunneled catheter  Oliguric at baseline, still makes some urine  Nephrology consulted for HD while inpatient    - HD per nephro, next session scheduled for 3/7  - Renal function panel daily  - Monitor intake/output and daily standing weights.  - Replete electrolytes PRN, goal Mag/Phos/K 2/3/4   - Avoid nephrotoxic agents such as NSAIDs, gadolinium and IV radiocontrast.  - Renally dose meds to current GFR.  - Maintain MAP > 65.

## 2022-03-06 NOTE — ASSESSMENT & PLAN NOTE
Patient with Hypoxic Respiratory failure which is Acute on chronic.  she is on home oxygen at 2-3 LPM. Supplemental oxygen was provided and noted-  .   Signs/symptoms of respiratory failure include- respiratory distress. Contributing diagnoses includes - Interstitial lung disease Labs and images were reviewed. Patient Has recent ABG, which has been reviewed. Will treat underlying causes and adjust management of respiratory failure as follows:    - Duo nebs q6h  - Inhalation treatment q6h  - ICS and flutter valve  - Supplemental O2 PRN

## 2022-03-06 NOTE — ASSESSMENT & PLAN NOTE
Afebrile and without leukocytosis. Blood cultures 3/2 with MRSA. Repeat blood cultures 3/4 are NGTD. TTE without evidence of infective endocarditis.   · Continue vancomycin. PharmD service to manage dosing and levels.   · Repeat blood cultures.   · Consider HD catheter removal or exchange.   · If patient develops persistent bacteremia then favor ALLISON.

## 2022-03-07 NOTE — ASSESSMENT & PLAN NOTE
TSH, T4 stable. Hyponatremia likely 2/2 renal failure.    - Continue levothyroxine 75mcg PO daily.

## 2022-03-07 NOTE — PROGRESS NOTES
"[facility] - Cardiology [facility] Medicine  Progress Note    Patient Name: Melinda Knight  MRN: [id number]  Patient Class: IP- Inpatient   Admission Date: 3/3/2022  Length of Stay: 4 days  Attending Physician: Christin Randhawa MD  Primary Care Provider: Duke Cole MD        Subjective:     Principal Problem:NSTEMI (non-ST elevated myocardial infarction)        HPI:  Melinda Knight is a 76 y.o. F with history of HTN, HLD, CAD, DMII, hypothyroidism, ESRD on HD MWF, GERD, restrictive lung disease with chronic hypoxemic resp failure (on 2-3L O2) who [place] [facility] [place] for CABG +/- AVR. Initially presented to [facility] ED w/ 2 day history of "heartburn pain," N/V, and SOB. Workup notable for troponin 13 (peaked at 42 on 3/3), elevated BNP 2268 (baseline 400-600s), and radiographic evidence of pulmonary edema. EKG SR with RBBB. She was hypoxic and placed on BiPAP. Transferred to [facility] w/ NSTEMI. Cardiology and nephrology following. Volume removal with HD and weaned to baseline supplemental O2. BCx 3/2 resulted GPC resembling Staph and vanc initiated. Repeat BCx NGTD. LHC on 3/3 revealed proximal RCA 60% stenosed, mid LAD 70% stenosed, and mid Cx 70% stenosed. TTE revealed EF 48% with PORTILLO 0.63cm2, LFLG AS. Cardiology recommended CTS evaluation for CABG +/- AVR. Last dose of Plavix 3/3. She denies CP, SOB, N/V.      Overview/Hospital Course:  Admitted as transfer from [facility] ED with NSTEMI. Cardiology and nephrology consulted. Volume removed with HD and weaned to baseline supplemental O2. LHC performed showing triple vessel disease. Cardiology recommended CTS evaluation. Patient not a CABG candidate per CTS. IC consulted for possible PCI. Blood cultures from ED 03/02 showed GPCs; started IV Vancomycin. Permacath exchanged on 03/04. ID consulted for bacteremia. Pending GOC discussion with family and possible DNR reversal if IC to proceed with LHC/PCI. May also need HD line exchanged.      Interval " History: NAEO. AF VSS. Tolerated BiPap throughout the night. No new complaints. IC to discuss GOC with family prior to any LHC/PCI. Repeat blood cxs NGTD. HD today.    Review of Systems   Constitutional:  Negative for chills and fever.   HENT:  Negative for sore throat.    Respiratory:  Negative for cough and shortness of breath.    Cardiovascular:  Negative for chest pain and leg swelling.   Gastrointestinal:  Negative for abdominal pain, constipation, diarrhea, nausea and vomiting.   Genitourinary:  Negative for dysuria.   Musculoskeletal:  Negative for myalgias.   Skin:  Negative for rash.   Neurological:  Negative for dizziness and headaches.   Psychiatric/Behavioral:  Negative for confusion. The patient is nervous/anxious.      Objective:     Vital Signs (Most Recent):  Temp: 97.5 °F (36.4 °C) (03/07/22 0311)  Pulse: 68 (03/07/22 0700)  Resp: 16 (03/07/22 0311)  BP: 139/62 (03/07/22 0311)  SpO2: 95 % (03/07/22 0311) Vital Signs (24h Range):  Temp:  [96.7 °F (35.9 °C)-97.9 °F (36.6 °C)] 97.5 °F (36.4 °C)  Pulse:  [60-93] 68  Resp:  [16-20] 16  SpO2:  [95 %-100 %] 95 %  BP: (139-194)/(62-79) 139/62     Weight: 90 kg (198 lb 6.6 oz)  Body mass index is 37.49 kg/m².    Intake/Output Summary (Last 24 hours) at 3/7/2022 0855  Last data filed at 3/7/2022 0600  Gross per 24 hour   Intake 974 ml   Output 475 ml   Net 499 ml      Physical Exam  Constitutional:       Appearance: Normal appearance.   HENT:      Head: Normocephalic and atraumatic.      Mouth/Throat:      Mouth: Mucous membranes are moist.      Pharynx: Oropharynx is clear.   Eyes:      Extraocular Movements: Extraocular movements intact.      Pupils: Pupils are equal, round, and reactive to light.   Neck:      Comments: No JVD  Cardiovascular:      Rate and Rhythm: Normal rate and regular rhythm.      Pulses: Normal pulses.      Heart sounds: Normal heart sounds.   Pulmonary:      Effort: Pulmonary effort is normal. No respiratory distress.      Breath  sounds: Wheezing present. No rales.   Abdominal:      General: Abdomen is flat. Bowel sounds are normal. There is no distension.      Palpations: Abdomen is soft.      Tenderness: There is no abdominal tenderness. There is no guarding or rebound.   Musculoskeletal:         General: No swelling.      Cervical back: Neck supple.      Right lower leg: Edema (trace) present.      Left lower leg: Edema (trace) present.   Skin:     General: Skin is warm and dry.      Capillary Refill: Capillary refill takes less than 2 seconds.   Neurological:      General: No focal deficit present.      Mental Status: She is alert and oriented to person, place, and time. Mental status is at baseline.   Psychiatric:         Mood and Affect: Mood normal.         Behavior: Behavior normal.         Thought Content: Thought content normal.         Judgment: Judgment normal.       Significant Labs: All pertinent labs within the past 24 hours have been reviewed.  Recent Lab Results         03/07/22  0336   03/07/22  0335   03/06/22  2024 03/06/22 2009 03/06/22  1531        Albumin   2.3             Anion Gap   15             aPTT 56.6  Comment: aPTT therapeutic range = 39-69 seconds       30.6  Comment: aPTT therapeutic range = 39-69 seconds         Baso # 0.08               Basophil % 0.9               BUN   58             Calcium   9.5             Chloride   92             CO2   22             Creatinine   5.4             Differential Method Automated               eGFR if    8.3             eGFR if non    7.2  Comment: Calculation used to obtain the estimated glomerular filtration  rate (eGFR) is the CKD-EPI equation.                Eos # 0.5               Eosinophil % 6.4               Glucose   119             Gran # (ANC) 5.0               Gran % 59.8               Hematocrit 33.3               Hemoglobin 10.0               Immature Grans (Abs) 0.11  Comment: Mild elevation in immature granulocytes is  non specific and   can be seen in a variety of conditions including stress response,   acute inflammation, trauma and pregnancy. Correlation with other   laboratory and clinical findings is essential.                 Immature Granulocytes 1.3               Lymph # 1.6               Lymph % 19.4               Magnesium   1.7             MCH 32.2               MCHC 30.0                              Mono # 1.0               Mono % 12.2               MPV 11.6               nRBC 0               Phosphorus   5.2             Platelets 256               POCT Glucose     172     260       Potassium   4.9             RBC 3.11               RDW 11.7               Sodium   129             Vancomycin, Random   20.7             WBC 8.44                                  03/06/22  1341   03/06/22  1120        Albumin         Anion Gap         aPTT 42.2  Comment: aPTT therapeutic range = 39-69 seconds         Baso #         Basophil %         BUN         Calcium         Chloride         CO2         Creatinine         Differential Method         eGFR if          eGFR if non          Eos #         Eosinophil %         Glucose         Gran # (ANC)         Gran %         Hematocrit         Hemoglobin         Immature Grans (Abs)         Immature Granulocytes         Lymph #         Lymph %         Magnesium         MCH         MCHC         MCV         Mono #         Mono %         MPV         nRBC         Phosphorus         Platelets         POCT Glucose   204       Potassium         RBC         RDW         Sodium         Vancomycin, Random         WBC                 Significant Imaging:       I have reviewed all pertinent imaging results/findings within the past 24 hours.      Assessment/Plan:      * NSTEMI (non-ST elevated myocardial infarction)  HTN, HLD, CAD, acute on chronic resp failure with hypoxia, pHTN, AS, elevated troponin  - Echo 03/03 shows EF 48%, severe AS.  - LHC 03/03 showed LAD  and LCx 70% stenosis and RCA 60% stenosis. Transferred [facility] for CTS evaluation; per CTS pt is poor surgical candidate. Recommending high risk PCI vs medical management  - Interventional cardiology consulted, will discuss GOC with family and possible DNR reversal prior to any LHC/PCI.  - Will need HD line exchanged prior to any cath lab procedure  - Continue aspirin 81mg and clopidogrel 75mg PO daily, atorvastatin 80mg PO daily, carvedilol 12.5mg PO BID.  - Weaned to baseline supplemental O2.  - Duo nebs and inhalation treatment q6h   - Continuing heparin gtt.  - Appreciate cardiology assistance.    MRSA bacteremia  Blood cultures drawn in ED 03/02 showing GPCs resembling staph. No clear source of infection at this time. No noted areas of skin breakdown, tunneled line does not appear grossly infected (replaced on 3/4), UA unremarkable, CXR at presentation more consistent with pulmonary edema than infectious process.    - Continue IV Vancomycin. De-escalate as feasible.  - ID consulted, appreciate recs  - F/U repeat cultures, if no clearance may require ALLISON to r/o endocarditis    Advance care planning  DNR status on admit; current LaPOST reflects her goals of care. Engaged the patient in a GOC discussion on 3/6. Patient has full capacity and stating that she would not want excessive life-saving measures including CPR or intubation.    - DNR order in place    Debility  - PT/OT recommending HH    Acute on chronic respiratory failure with hypoxemia  Patient with Hypoxic Respiratory failure which is Acute on chronic.  she is on home oxygen at 2-3 LPM. Supplemental oxygen was provided and noted- Oxygen Concentration (%):  [30-32] 30.   Signs/symptoms of respiratory failure include- respiratory distress. Contributing diagnoses includes - Interstitial lung disease Labs and images were reviewed. Patient Has recent ABG, which has been reviewed. Will treat underlying causes and adjust management of respiratory failure as  follows:    - Duo nebs q6h  - Inhalation treatment q6h  - ICS and flutter valve  - Supplemental O2 PRN  - Bipap QHS PRN    Elevated troponin  See NSTEMI      ESRD (end stage renal disease) on dialysis  ESRD on HD M,W,F outpatient. Access through right chest tunneled catheter  Oliguric at baseline, still makes some urine  Nephrology consulted for HD while inpatient    - HD per nephro, next session scheduled for 3/7  - Renal function panel daily  - Monitor intake/output and daily standing weights.  - Replete electrolytes PRN, goal Mag/Phos/K 2/3/4   - Avoid nephrotoxic agents such as NSAIDs, gadolinium and IV radiocontrast.  - Renally dose meds to current GFR.  - Maintain MAP > 65.        Pulmonary hypertension  Likely 2/2 chronic ILD    See acute on chronic hypoxemic respiratory failure      Anemia of chronic disease  Baseline Hb 10, Hb 9.6 on admission  Antiplatelet/anticoagulation: Heparin, Aspirin, Plavix  Likely 2/2 ESRD    - Monitor CBC daily  - Transfuse with goal Hb > 7        Aortic valve stenosis  See NSTEMI      Type 2 diabetes mellitus with diabetic polyneuropathy  Last A1c 5.5 (3/3/22). On detemir 30U subQ BID at home. Some postprandial derangements.    - Continue insulin detemir 20U subQ BID  - Continue Aspart 3u TIDWM  - LDSSI  - Accuchecks achs  - Diabetic diet      Hypothyroidism (acquired)  TSH, T4 stable. Hyponatremia likely 2/2 renal failure.    - Continue levothyroxine 75mcg PO daily.    GERD (gastroesophageal reflux disease)  - Continue pantoprazole 40mg PO daily.    Mixed hyperlipidemia  Chronic, stable.    - Continue home Lipitor 80 mg daily    Essential hypertension  Chronic, normotensive on evaluation today.    - Continue Coreg 12.5 to 25 mg BID  - Labetalol 10 mg PRN for SBP >160  - Monitor BP        VTE Risk Mitigation (From admission, onward)         Ordered     heparin (porcine) injection 1,000 Units  As needed (PRN)         03/04/22 1420     heparin 25,000 units in dextrose 5% (100  units/ml) IV bolus from bag - ADDITIONAL PRN BOLUS - 60 units/kg (max bolus 4000 units)  As needed (PRN)        Question:  Heparin Infusion Adjustment (DO NOT MODIFY ANSWER)  Answer:  [email or web address]\Images\Pharmacy\HeparinInfusions\heparin LOW INTENSITY nomogram for [facility] [id number]    03/03/22 0810     heparin 25,000 units in dextrose 5% (100 units/ml) IV bolus from bag - ADDITIONAL PRN BOLUS - 30 units/kg (max bolus 4000 units)  As needed (PRN)        Question:  Heparin Infusion Adjustment (DO NOT MODIFY ANSWER)  Answer:  [email or web address]\Images\Pharmacy\HeparinInfusions\heparin LOW INTENSITY nomogram for [facility] [id number]    03/03/22 0810     heparin (porcine) injection 5,000 Units  As needed (PRN)         03/03/22 0755     heparin 25,000 units in dextrose 5% 250 mL (100 units/mL) infusion LOW INTENSITY nomogram - [facility]  Continuous        Question Answer Comment   Heparin Infusion Adjustment (DO NOT MODIFY ANSWER) [email or web address]\Images\Pharmacy\HeparinInfusions\heparin LOW INTENSITY nomogram for [facility] [id number]    Begin at (in units/kg/hr) 12        03/03/22 0810     IP VTE HIGH RISK PATIENT  Once         03/03/22 0650     Place sequential compression device  Until discontinued         03/03/22 0650                Discharge Planning   UMAIR: 3/8/2022     Code Status: DNR   Is the patient medically ready for discharge?: No    Reason for patient still in hospital (select all that apply): Treatment and Consult recommendations             Geovani Morton MD  Department of Hospital Medicine   [facility] - Cardiology Stepdown

## 2022-03-07 NOTE — HPI
76 y.o. F with history of HTN, HLD, DMII, hypothyroidism, ESRD on HD MWF, GERD, restrictive lung disease with chronic hypoxemic resp failure (on 2-3L O2) who transfers to [facility] following NSTEMI. Presented with chest pain with inferolateral ST depressions, subsequent LHC with multivessel disease. DNR reversed for this. LHC on 3/3 revealed proximal RCA 60%, mid LAD 70%, mid Cx 70%. Patient referred to [facility] for CTS evaluation for CABG +/- AVR (though possible low-flow, low-gradient on my review). Currently on medical management w ASA, plavix, heparin gtt. Now chest pain free. On bipap. Trop to 40, since downtrending. BCx 3/2 resulted GPC resembling Staph and vanc initiated. Repeat BCx NGTD. Currently DNR, awaiting further input from family regarding goals of care, planning on arriving later this morning.

## 2022-03-07 NOTE — PROGRESS NOTES
[facility] - Cardiology Stepdown  Infectious Disease  Progress Note    Patient Name: Melinda Knight  MRN: [id number]  Admission Date: 3/3/2022  Length of Stay: 4 days  Attending Physician: Christin Randhawa MD  Primary Care Provider: Duke Cole MD    Isolation Status: Contact  Assessment/Plan:      MRSA bacteremia  Afebrile and without leukocytosis. Blood cultures 3/2 with MRSA. Repeat blood cultures 3/4 are NGTD. TTE without evidence of infective endocarditis. Patient today mentions scratching at HD line site due to itching. Was also bleeding at some point. This is a likely source of initial bacteremia.     --Continue vancomycin per pharmacy dosing to target MRSA   --Recommend removing HD catheter and repeating 2 sets of blood cultures; if they remain sterile without the line then will likely treat as CLABSI   --Primary team and nephrology updated; both are in agreement with above plan       Thank you for your consult. I will follow-up with patient. Please contact us if you have any additional questions.    Charly Bhat, DO  Infectious Disease  [facility] - Cardiology Stepdown    Subjective:     Principal Problem:NSTEMI (non-ST elevated myocardial infarction)    HPI: A 76-year-old woman with CAD, HTN, DM2, ESRD on HD, restrictive lung disease, chronic hypoxemic respiratory failure on home oxygen (2-3 LNC) who was transferred from [facility] for CTS evaluation. Mrs. Knight has presented to [facility] ED with 2 days of epigastric pain, nausea, vomiting and SOB. She was evaluated and subsequently transferred to [facility] after work up revealed elevated troponin plus BNP, and a CXR consistent with pulmonary edema. On admission to [facility] she was afebrile and with leukocytosis. Blood cultures were performed and positive for MRSA.     On transfer to [facility] she was afebrile and without leukocytosis. Blood cultures repeated on 3/4 are NGTD. She was evaluated by CTS and deemed a non surgical candidate.  "    Infectious Diseases consulted for "Pt with NSTEMI, ESRD with staph bacteremia, susceptibilities pending, on IV Vanc. Likely source permacath."          Interval History: Patient seen at bedside. Says she has been itching at HD line site and scratching as a result. Could be what introduced MRSA into her blood. Blood cultures have cleared. Would still benefit from line holiday.     Review of Systems   Constitutional:  Negative for chills, fatigue, fever and unexpected weight change.   HENT:  Negative for congestion, postnasal drip and trouble swallowing.    Respiratory:  Negative for cough, chest tightness and shortness of breath.    Cardiovascular:  Negative for chest pain, palpitations and leg swelling.   Gastrointestinal:  Negative for abdominal pain, blood in stool, constipation, diarrhea, nausea and vomiting.   Genitourinary:  Negative for difficulty urinating, dysuria and hematuria.   Musculoskeletal:  Negative for arthralgias and back pain.   Skin:         Itching at right chest HD line site    Neurological:  Negative for dizziness and light-headedness.   Psychiatric/Behavioral:  Negative for confusion.    Objective:     Vital Signs (Most Recent):  Temp: 97.5 °F (36.4 °C) (03/07/22 0311)  Pulse: 71 (03/07/22 1312)  Resp: 18 (03/07/22 1312)  BP: (!) 131/59 (03/07/22 1312)  SpO2: 95 % (03/07/22 0311)   Vital Signs (24h Range):  Temp:  [96.7 °F (35.9 °C)-97.9 °F (36.6 °C)] 97.5 °F (36.4 °C)  Pulse:  [60-77] 71  Resp:  [16-20] 18  SpO2:  [95 %-99 %] 95 %  BP: ()/(49-79) 131/59     Weight: 90 kg (198 lb 6.6 oz)  Body mass index is 37.49 kg/m².    Estimated Creatinine Clearance: 9.3 mL/min (A) (based on SCr of 5.4 mg/dL (H)).    Physical Exam  Constitutional:       General: She is not in acute distress.     Appearance: Normal appearance. She is not ill-appearing.   Cardiovascular:      Rate and Rhythm: Normal rate and regular rhythm.      Pulses: Normal pulses.      Heart sounds: Normal heart sounds. No " murmur heard.    No friction rub. No gallop.   Pulmonary:      Effort: Pulmonary effort is normal. No respiratory distress.      Breath sounds: Rales present.   Abdominal:      General: Abdomen is flat. Bowel sounds are normal. There is no distension.      Palpations: Abdomen is soft.      Tenderness: There is no abdominal tenderness.   Skin:     General: Skin is warm and dry.      Comments: Right side of chest with no erythema at HD line site; no drainage or wound noted; intact and dry being used for dialysis today    Neurological:      Mental Status: She is alert.       Significant Labs: All pertinent labs within the past 24 hours have been reviewed.    Significant Imaging: I have reviewed all pertinent imaging results/findings within the past 24 hours.

## 2022-03-07 NOTE — PROGRESS NOTES
[facility] - [facility]iology Stepdown  Nephrology  Progress Note    Patient Name: Melinda Knight  MRN: [id number]  Admission Date: 3/3/2022  Hospital Length of Stay: 4 days  Attending Provider: Christin Randhawa MD   Primary Care Physician: Duke Cole MD  Principal Problem:NSTEMI (non-ST elevated myocardial infarction)    Subjective:     HPI: Melinda Knight is a 76 y.o. female with h/o HTN, HLD, DM, ESRD on HD (MWF), restrictive lung disease with chronic hypoxemic respiratory failure on 2-3L home oxygen [place] [facility] [place]. Patient presented with NSTEMI to outside hospital. LHC revealed proximal RCA 60% stenosed, mid LAD 70% stenosed, and mid Cx 70% stenosed. TTE revealed EF 48% with PORTILLO 0.63cm2. 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 Pt denies N/V/D, chest pain, edema, or swelling. Last dialysis on 3/4/22. K 4.2. Nephrology consulted for ESRD/HD management while inpatient.       Interval History:   No acute events overnight, SBPs 160's-180's, pulse 60's-70's  Had 475ml of urine/24 hrs  Last iHD 3/4         Review of patient's allergies indicates:   Allergen Reactions    Sulfa (sulfonamide antibiotics) Nausea And Vomiting    Menthol contain prod      Current Facility-Administered Medications   Medication Frequency    0.9%  NaCl infusion Once    acetaminophen tablet 650 mg Q6H PRN    acetaminophen tablet 650 mg Q4H PRN    albuterol-ipratropium 2.5 mg-0.5 mg/3 mL nebulizer solution 3 mL Q6H WAKE    aspirin chewable tablet 81 mg Daily    atorvastatin tablet 80 mg Daily    carvediloL tablet 25 mg BID    citalopram tablet 20 mg Daily    clopidogreL tablet 75 mg Daily    dextrose 10% bolus 125 mL PRN    dextrose 10% bolus 250 mL PRN    glucagon (human recombinant) injection 1 mg PRN    glucose chewable tablet 16 g PRN    glucose chewable tablet 24 g PRN    heparin (porcine) injection 1,000 Units PRN    heparin 25,000 units in dextrose 5% (100 units/ml) IV bolus from bag -  ADDITIONAL PRN BOLUS - 30 units/kg (max bolus 4000 units) PRN    heparin 25,000 units in dextrose 5% (100 units/ml) IV bolus from bag - ADDITIONAL PRN BOLUS - 60 units/kg (max bolus 4000 units) PRN    heparin 25,000 units in dextrose 5% 250 mL (100 units/mL) infusion LOW INTENSITY nomogram - [facility] Continuous    hydrOXYzine HCL tablet 50 mg Q4H PRN    insulin aspart U-100 pen 0-5 Units QID (AC + HS) PRN    insulin aspart U-100 pen 3 Units TIDWM    insulin detemir U-100 pen 20 Units BID    labetalol 20 mg/4 mL (5 mg/mL) IV syring Q4H PRN    levothyroxine tablet 75 mcg Before breakfast    melatonin tablet 6 mg Nightly PRN    mupirocin 2 % ointment BID    ondansetron disintegrating tablet 4 mg Q8H PRN    ondansetron injection 4 mg Q8H PRN    pantoprazole EC tablet 40 mg Daily    sevelamer carbonate tablet 800 mg TID WM    sodium chloride 0.9% bolus 250 mL PRN    sodium chloride 0.9% flush 10 mL PRN    vancomycin - pharmacy to dose pharmacy to manage frequency    vitamin renal formula (B-complex-vitamin c-folic acid) 1 mg per capsule 1 capsule Daily       Objective:     Vital Signs (Most Recent):  Temp: 97.5 °F (36.4 °C) (03/07/22 0311)  Pulse: 71 (03/07/22 1015)  Resp: 18 (03/07/22 0923)  BP: (!) 178/72 (03/07/22 1015)  SpO2: 95 % (03/07/22 0311)  O2 Device (Oxygen Therapy): room air (03/07/22 0923)   Vital Signs (24h Range):  Temp:  [96.7 °F (35.9 °C)-97.9 °F (36.6 °C)] 97.5 °F (36.4 °C)  Pulse:  [60-93] 71  Resp:  [16-20] 18  SpO2:  [95 %-100 %] 95 %  BP: (139-195)/(62-79) 178/72     Weight: 90 kg (198 lb 6.6 oz) (03/04/22 0600)  Body mass index is 37.49 kg/m².  Body surface area is 1.97 meters squared.    I/O last 3 completed shifts:  In: 1354.6 [P.O.:840; I.V.:284.5; IV Piggyback:230.1]  Out: 475 [Urine:475]    Physical Exam  Vitals and nursing note reviewed.   Constitutional:       General: She is not in acute distress.     Appearance: Normal appearance. She is not ill-appearing, toxic-appearing or  diaphoretic.   Cardiovascular:      Rate and Rhythm: Normal rate and regular rhythm.      Pulses: Normal pulses.      Heart sounds: Murmur heard.      Comments: R IJ tunneled catheter  Pulmonary:      Effort: Pulmonary effort is normal. No respiratory distress.      Breath sounds: No wheezing, rhonchi or rales.      Comments: Decreased breath sounds in posterior lung fields  Abdominal:      General: Abdomen is flat. Bowel sounds are normal. There is no distension.      Palpations: Abdomen is soft. There is no mass.      Tenderness: There is no abdominal tenderness. There is no guarding or rebound.   Musculoskeletal:         General: No swelling, tenderness or deformity.      Right lower leg: Edema present.      Left lower leg: Edema present.      Comments: Trace LE edema purvi    Skin:     General: Skin is warm.      Capillary Refill: Capillary refill takes 2 to 3 seconds.      Coloration: Skin is not jaundiced or pale.      Findings: No bruising, erythema, lesion or rash.   Neurological:      Mental Status: She is alert and oriented to person, place, and time.   Psychiatric:         Mood and Affect: Mood normal.         Behavior: Behavior normal.         Thought Content: Thought content normal.         Judgment: Judgment normal.       Significant Labs:  Cardiac Markers: No results for input(s): CKMB, TROPONINT, MYOGLOBIN in the last 168 hours.  CBC:   Recent Labs   Lab 03/07/22  0336   WBC 8.44   RBC 3.11*   HGB 10.0*   HCT 33.3*      *   MCH 32.2*   MCHC 30.0*     CMP:   Recent Labs   Lab 03/02/22  2342 03/03/22  0745 03/07/22  0335   *   < > 119*   CALCIUM 10.2   < > 9.5   ALBUMIN 2.6*   < > 2.3*   PROT 7.1  --   --       < > 129*   K 4.0   < > 4.9   CO2 27   < > 22*   CL 94*   < > 92*   BUN 64*   < > 58*   CREATININE 3.9*   < > 5.4*   ALKPHOS 87  --   --    ALT 17  --   --    AST 70*  --   --    BILITOT 0.5  --   --     < > = values in this interval not displayed.     Coagulation:    Recent Labs   Lab 03/03/22  0745 03/03/22  1612 03/07/22  0336   INR 1.0  --   --    APTT 27.1   < > 56.6*    < > = values in this interval not displayed.     PTH:   Recent Labs   Lab 03/03/22  0745   .4*     All labs within the past 24 hours have been reviewed.     Significant Imaging:  Labs: Reviewed    Assessment/Plan:     * NSTEMI (non-ST elevated myocardial infarction)   -s/p LHC stenosed proximal RCA 60%, mid LAD 70% stenosed on 3/3  - Management per primary     ESRD (end stage renal disease) on dialysis  ESKD 2/2 HTN & DM   Outpatient HD Information:  -Dialysis modality: Hemodialysis  -Outpatient HD unit: [facility]   -Nephrologist: Dr. Ugalde   -HD TX days: Monday/Wednesday/Friday, duration of treatment: 4 hours   -Last HD TX prior to hospital admission: 03/04/2022  -Dialysis access: dialysis catheter R IJ   -Residual urine: Yes   -EDW: ?    Plan/Recommendations:   -iHD today (03/07/2022) with UF -1-2L as BP tolerates for metabolic clearance and volume management   -Electrolytes: acceptable, Na 129, K 4.9, Mg 1.7, replace Mg   -Mineral & Bone Disorder:   Phosphorus 5.2, PTH level: 207.4, cont phosphate binders sevelamer 800mg tid w/ meals   -ESKD anemia: hgb 10, goal hgb 10-11g/dL, check iron studies, ESAs not indicated, transfuse if hgb <7  -Renal function panel on HD days   -Renal diet with protein supplements, if not NPO  -Strict I/O's and daily weights     Anemia of chronic disease  See ESRD    Type 2 diabetes mellitus with diabetic polyneuropathy  Lab Results   Component Value Date    HGBA1C 5.5 03/03/2022     -Plan per primary team       Essential hypertension  Meds: carvedilol 25mg bid  If SBPs cont >180, would add amlodipine     -Plan per primary team           Thank you for your consult. I will follow-up with patient. Please contact us if you have any additional questions.    Nadia Olivier MD  Nephrology  [facility] - [facility] Step[facility]

## 2022-03-07 NOTE — SUBJECTIVE & OBJECTIVE
Interval History: Patient seen at bedside. Says she has been itching at HD line site and scratching as a result. Could be what introduced MRSA into her blood. Blood cultures have cleared. Would still benefit from line holiday.     Review of Systems   Constitutional:  Negative for chills, fatigue, fever and unexpected weight change.   HENT:  Negative for congestion, postnasal drip and trouble swallowing.    Respiratory:  Negative for cough, chest tightness and shortness of breath.    Cardiovascular:  Negative for chest pain, palpitations and leg swelling.   Gastrointestinal:  Negative for abdominal pain, blood in stool, constipation, diarrhea, nausea and vomiting.   Genitourinary:  Negative for difficulty urinating, dysuria and hematuria.   Musculoskeletal:  Negative for arthralgias and back pain.   Skin:         Itching at right chest HD line site    Neurological:  Negative for dizziness and light-headedness.   Psychiatric/Behavioral:  Negative for confusion.    Objective:     Vital Signs (Most Recent):  Temp: 97.5 °F (36.4 °C) (03/07/22 0311)  Pulse: 71 (03/07/22 1312)  Resp: 18 (03/07/22 1312)  BP: (!) 131/59 (03/07/22 1312)  SpO2: 95 % (03/07/22 0311)   Vital Signs (24h Range):  Temp:  [96.7 °F (35.9 °C)-97.9 °F (36.6 °C)] 97.5 °F (36.4 °C)  Pulse:  [60-77] 71  Resp:  [16-20] 18  SpO2:  [95 %-99 %] 95 %  BP: ()/(49-79) 131/59     Weight: 90 kg (198 lb 6.6 oz)  Body mass index is 37.49 kg/m².    Estimated Creatinine Clearance: 9.3 mL/min (A) (based on SCr of 5.4 mg/dL (H)).    Physical Exam  Constitutional:       General: She is not in acute distress.     Appearance: Normal appearance. She is not ill-appearing.   Cardiovascular:      Rate and Rhythm: Normal rate and regular rhythm.      Pulses: Normal pulses.      Heart sounds: Normal heart sounds. No murmur heard.    No friction rub. No gallop.   Pulmonary:      Effort: Pulmonary effort is normal. No respiratory distress.      Breath sounds: Rales present.    Abdominal:      General: Abdomen is flat. Bowel sounds are normal. There is no distension.      Palpations: Abdomen is soft.      Tenderness: There is no abdominal tenderness.   Skin:     General: Skin is warm and dry.      Comments: Right side of chest with no erythema at HD line site; no drainage or wound noted; intact and dry being used for dialysis today    Neurological:      Mental Status: She is alert.       Significant Labs: All pertinent labs within the past 24 hours have been reviewed.    Significant Imaging: I have reviewed all pertinent imaging results/findings within the past 24 hours.

## 2022-03-07 NOTE — SUBJECTIVE & OBJECTIVE
Past Medical History:   Diagnosis Date    Acute on chronic diastolic congestive heart failure 4/14/2021    Carotid artery occlusion     Coronary artery disease     Hyperlipidemia     Hypertension     Skin cancer     cyst on the face , was removed 20years +    Stroke     Thyroid disease     Type 2 diabetes mellitus        Past Surgical History:   Procedure Laterality Date    CAROTID ENDARTERECTOMY Left 03/24/2016    CHOLECYSTECTOMY      ESOPHAGOGASTRODUODENOSCOPY Left 9/11/2018    Procedure: EGD (ESOPHAGOGASTRODUODENOSCOPY);  Surgeon: Stevenson Mckee MD;  Location: [facility] ENDO;  Service: Endoscopy;  Laterality: Left;    GALLBLADDER SURGERY      1996    LEFT HEART CATHETERIZATION Left 3/3/2022    Procedure: CATHETERIZATION, HEART, LEFT;  Surgeon: Tex Alicea MD;  Location: [facility] CATH LAB;  Service: Cardiology;  Laterality: Left;    RIGHT HEART CATHETERIZATION Right 3/26/2021    Procedure: INSERTION, CATHETER, RIGHT HEART;  Surgeon: Gennaro Sampson MD;  Location: [facility] CATH LAB;  Service: Cardiology;  Laterality: Right;    SKIN BIOPSY      TONSILLECTOMY      TUBAL LIGATION Bilateral 3/18/2016       Review of patient's allergies indicates:   Allergen Reactions    Sulfa (sulfonamide antibiotics) Nausea And Vomiting    Menthol contain prod        PTA Medications   Medication Sig    albuterol (ACCUNEB) 0.63 mg/3 mL Nebu Take 3 mLs (0.63 mg total) by nebulization 3 (three) times daily as needed. Rescue    aspirin 81 MG Chew Take 1 tablet (81 mg total) by mouth once daily.    atorvastatin (LIPITOR) 80 MG tablet Take 1 tablet (80 mg total) by mouth once daily.    blood sugar diagnostic (BLOOD GLUCOSE TEST) Strp 1 strip by Misc.(Non-Drug; Combo Route) route 2 (two) times daily. Prodigy    carvediloL (COREG) 12.5 MG tablet Take 1 tablet by mouth twice daily    citalopram (CELEXA) 20 MG tablet Take 1 tablet by mouth once daily    clopidogreL (PLAVIX) 75 mg tablet Take 1 tablet (75 mg total) by mouth once daily.    dicyclomine  "(BENTYL) 10 MG capsule Take 1 capsule by mouth 4 (four) times daily.    insulin detemir U-100 (LEVEMIR FLEXTOUCH) 100 unit/mL (3 mL) SubQ InPn pen Inject 30 Units into the skin 2 (two) times daily.    levothyroxine (SYNTHROID) 75 MCG tablet Take 1 tablet (75 mcg total) by mouth before breakfast.    linaCLOtide (LINZESS) 72 mcg Cap capsule Take 1 capsule (72 mcg total) by mouth before breakfast.    melatonin (MELATIN) 3 mg tablet Take 2 tablets (6 mg total) by mouth nightly as needed for Insomnia.    melatonin 10 mg Tab Take by mouth.    multivitamin (ONE DAILY MULTIVITAMIN) per tablet Take 1 tablet by mouth once daily.    ondansetron (ZOFRAN-ODT) 4 MG TbDL Take 1 tablet (4 mg total) by mouth every 6 (six) hours as needed (nausea).    pantoprazole (PROTONIX) 40 MG tablet Take 1 tablet by mouth once daily    pen needle, diabetic (NOVOTWIST) 32 gauge x 1/5" Ndle USE AS DIRECTED WITH  INSULIN  PEN    pen needle, diabetic 32 gauge x 5/32" Ndle Use daily with Humalog or as directed    senna (SENOKOT) 8.6 mg tablet Take 1 tablet by mouth once daily.     Family History       Problem Relation (Age of Onset)    Diabetes Mother, Sister, Brother    Heart failure Mother    Hypertension Mother          Tobacco Use    Smoking status: Never Smoker    Smokeless tobacco: Never Used   Substance and Sexual Activity    Alcohol use: Not Currently     Alcohol/week: 0.0 standard drinks     Comment: occasions    Drug use: No    Sexual activity: Not Currently     Review of Systems   Constitutional: Negative. Negative for chills and fever.   HENT: Negative.     Eyes: Negative.    Cardiovascular:  Negative for chest pain, claudication and paroxysmal nocturnal dyspnea.   Respiratory:  Negative for cough, shortness of breath and wheezing.    Endocrine: Negative.    Hematologic/Lymphatic: Does not bruise/bleed easily.   Skin:  Negative for nail changes and rash.   Musculoskeletal: Negative.  Negative for back pain.   Gastrointestinal:  Negative " for abdominal pain, melena, nausea and vomiting.   Neurological:  Negative for dizziness and headaches.   Psychiatric/Behavioral:  Negative for altered mental status, depression and substance abuse.    Allergic/Immunologic: Negative.    Objective:     Vital Signs (Most Recent):  Temp: 97.5 °F (36.4 °C) (03/07/22 0311)  Pulse: 68 (03/07/22 0700)  Resp: 16 (03/07/22 0311)  BP: 139/62 (03/07/22 0311)  SpO2: 95 % (03/07/22 0311) Vital Signs (24h Range):  Temp:  [96.7 °F (35.9 °C)-97.9 °F (36.6 °C)] 97.5 °F (36.4 °C)  Pulse:  [60-93] 68  Resp:  [16-20] 16  SpO2:  [95 %-100 %] 95 %  BP: (139-194)/(62-79) 139/62     Weight: 90 kg (198 lb 6.6 oz)  Body mass index is 37.49 kg/m².    SpO2: 95 %  O2 Device (Oxygen Therapy): BiPAP      Intake/Output Summary (Last 24 hours) at 3/7/2022 0842  Last data filed at 3/7/2022 0600  Gross per 24 hour   Intake 974 ml   Output 475 ml   Net 499 ml       Lines/Drains/Airways       Central Venous Catheter Line  Duration                  Hemodialysis Catheter 09/30/21 1150 right internal jugular 157 days              Peripheral Intravenous Line  Duration                  Peripheral IV - Single Lumen 03/02/22 2310 18 G Right Antecubital 4 days                    Physical Exam  Constitutional:       Appearance: She is well-developed.   HENT:      Head: Normocephalic and atraumatic.   Eyes:      Conjunctiva/sclera: Conjunctivae normal.      Pupils: Pupils are equal, round, and reactive to light.   Neck:      Vascular: No JVD.   Cardiovascular:      Rate and Rhythm: Normal rate and regular rhythm.      Pulses: Intact distal pulses.      Heart sounds: Normal heart sounds. No murmur heard.    No friction rub. No gallop.   Pulmonary:      Effort: Pulmonary effort is normal.      Breath sounds: No stridor. No wheezing or rales.      Comments: On bipap  Chest:      Chest wall: No tenderness.   Abdominal:      General: Bowel sounds are normal. There is no distension.      Palpations: Abdomen is soft.  There is no mass.      Tenderness: There is no abdominal tenderness. There is no guarding.   Musculoskeletal:         General: No tenderness or deformity.   Skin:     General: Skin is warm and dry.      Findings: No erythema or rash.   Neurological:      Mental Status: She is alert and oriented to person, place, and time.       Significant Labs: CMP   Recent Labs   Lab 03/06/22  0356 03/07/22  0335   * 129*   K 4.6 4.9   CL 93* 92*   CO2 25 22*    119*   BUN 45* 58*   CREATININE 4.9* 5.4*   CALCIUM 9.5 9.5   ALBUMIN 2.2* 2.3*   ANIONGAP 10 15   ESTGFRAFRICA 9.4* 8.3*   EGFRNONAA 8.1* 7.2*    and CBC   Recent Labs   Lab 03/06/22  0356 03/07/22  0336   WBC 7.42 8.44   HGB 9.6* 10.0*   HCT 30.7* 33.3*    256       Significant Imaging: Echocardiogram: Transthoracic echo (TTE) complete (Cupid Only):   Results for orders placed or performed during the hospital encounter of 03/03/22   Echo   Result Value Ref Range    AV mean gradient 11 mmHg    Ao peak hank 2.02 m/s    Ao VTI 47.59 cm    IVRT 48.53 msec    IVS 0.75 0.6 - 1.1 cm    LA size 3.55 cm    Left Atrium Major Axis 4.68 cm    Left Atrium Minor Axis 4.86 cm    LVIDd 5.09 3.5 - 6.0 cm    LVIDs 3.89 2.1 - 4.0 cm    LVOT diameter 1.80 cm    LVOT peak VTI 11.79 cm    Posterior Wall 0.75 0.6 - 1.1 cm    MV Peak A Hank 1.15 m/s    E wave deceleration time 193.23 msec    MV Peak E Hank 1.10 m/s    PV Peak D Hank 0.28 m/s    PV Peak S Hank 0.60 m/s    RA Major Axis 3.99 cm    RA Width 3.06 cm    Sinus 2.35 cm    TAPSE 1.57 cm    TR Max Hank 2.04 m/s    LA WIDTH 3.76 cm    PV PEAK VELOCITY 0.86 cm/s    MV stenosis pressure 1/2 time 56.04 ms    LV Diastolic Volume 122.98 mL    LV Systolic Volume 65.70 mL    LVOT peak hank 0.63 m/s    TDI LATERAL 0.06 m/s    TDI SEPTAL 0.02 m/s    Mr max hank 0.04 m/s    LV LATERAL E/E' RATIO 18.33 m/s    LV SEPTAL E/E' RATIO 55.00 m/s    FS 24 %    LA volume 54.10 cm3    LV mass 128.99 g    Left Ventricle Relative Wall Thickness  0.29 cm    AV valve area 0.63 cm2    AV Velocity Ratio 0.31     AV index (prosthetic) 0.25     MV valve area p 1/2 method 3.93 cm2    E/A ratio 0.96     Mean e' 0.04 m/s    Pulm vein S/D ratio 2.14     LVOT area 2.5 cm2    LVOT stroke volume 29.99 cm3    AV peak gradient 16 mmHg    E/E' ratio 27.50 m/s    Triscuspid Valve Regurgitation Peak Gradient 17 mmHg    BSA 1.99 m2    LV Systolic Volume Index 34.6 mL/m2    LV Diastolic Volume Index 64.73 mL/m2    LA Volume Index 28.5 mL/m2    LV Mass Index 68 g/m2    LA Volume Index (Mod) 28.4 mL/m2    LA volume (mod) 54.00 cm3    EF 48 %    Narrative    · Low normal systolic function.  · The estimated ejection fraction is 48%.  · Indeterminate left ventricular diastolic function.  · There is left ventricular global hypokinesis.  · Normal right ventricular size with normal right ventricular systolic   function.  · There is moderate aortic valve stenosis.  · Aortic valve area is 0.63 cm2; peak velocity is 2.02 m/s; mean gradient   is 11 mmHg.  · Mild mitral regurgitation.

## 2022-03-07 NOTE — CONSULTS
[facility] - Cardiology Stepdown  Interventional Cardiology  Consult Note    Patient Name: Melinda Knight  MRN: [id number]  Admission Date: 3/3/2022  Hospital Length of Stay: 4 days  Code Status: DNR   Attending Provider: Christin Randhawa MD  Consulting Provider: Ammon Hampton MD  Primary Care Physician: Duke Cole MD  Principal Problem:NSTEMI (non-ST elevated myocardial infarction)    Patient information was obtained from patient, past medical records and ER records.     Inpatient consult to Interventional Cardiology  Consult performed by: Ammon Hampton MD  Consult ordered by: Geovani Morton MD        Subjective:     Chief Complaint:  NSTEMI     HPI:  76 y.o. F with history of HTN, HLD, DMII, hypothyroidism, ESRD on HD MWF, GERD, restrictive lung disease with chronic hypoxemic resp failure (on 2-3L O2) who transfers to [facility] following NSTEMI. Presented with chest pain with inferolateral ST depressions, subsequent LHC with multivessel disease. DNR reversed for this. LHC on 3/3 revealed proximal RCA 60%, mid LAD 70%, mid Cx 70%. Patient referred to [facility] for CTS evaluation for CABG +/- AVR (though possible low-flow, low-gradient on my review), declined for surgery. Currently on medical management w ASA, plavix, heparin gtt. Now chest pain free. On bipap. Trop to 40, since downtrending. BCx 3/2 resulted GPC resembling Staph and vanc initiated. Repeat BCx NGTD. Currently DNR, awaiting further input from family regarding goals of care, planning on arriving later this morning.      Past Medical History:   Diagnosis Date    Acute on chronic diastolic congestive heart failure 4/14/2021    Carotid artery occlusion     Coronary artery disease     Hyperlipidemia     Hypertension     Skin cancer     cyst on the face , was removed 20years +    Stroke     Thyroid disease     Type 2 diabetes mellitus        Past Surgical History:   Procedure Laterality Date    CAROTID ENDARTERECTOMY Left 03/24/2016     CHOLECYSTECTOMY      ESOPHAGOGASTRODUODENOSCOPY Left 9/11/2018    Procedure: EGD (ESOPHAGOGASTRODUODENOSCOPY);  Surgeon: Stevenson Mckee MD;  Location: [facility] ENDO;  Service: Endoscopy;  Laterality: Left;    GALLBLADDER SURGERY      1996    LEFT HEART CATHETERIZATION Left 3/3/2022    Procedure: CATHETERIZATION, HEART, LEFT;  Surgeon: Tex Alicea MD;  Location: [facility] CATH LAB;  Service: Cardiology;  Laterality: Left;    RIGHT HEART CATHETERIZATION Right 3/26/2021    Procedure: INSERTION, CATHETER, RIGHT HEART;  Surgeon: Gennaro Sampson MD;  [facility];  Service: Cardiology;  Laterality: Right;    SKIN BIOPSY      TONSILLECTOMY      TUBAL LIGATION Bilateral 3/18/2016       Review of patient's allergies indicates:   Allergen Reactions    Sulfa (sulfonamide antibiotics) Nausea And Vomiting    Menthol contain prod        PTA Medications   Medication Sig    albuterol (ACCUNEB) 0.63 mg/3 mL Nebu Take 3 mLs (0.63 mg total) by nebulization 3 (three) times daily as needed. Rescue    aspirin 81 MG Chew Take 1 tablet (81 mg total) by mouth once daily.    atorvastatin (LIPITOR) 80 MG tablet Take 1 tablet (80 mg total) by mouth once daily.    blood sugar diagnostic (BLOOD GLUCOSE TEST) Strp 1 strip by Misc.(Non-Drug; Combo Route) route 2 (two) times daily. Prodigy    carvediloL (COREG) 12.5 MG tablet Take 1 tablet by mouth twice daily    citalopram (CELEXA) 20 MG tablet Take 1 tablet by mouth once daily    clopidogreL (PLAVIX) 75 mg tablet Take 1 tablet (75 mg total) by mouth once daily.    dicyclomine (BENTYL) 10 MG capsule Take 1 capsule by mouth 4 (four) times daily.    insulin detemir U-100 (LEVEMIR FLEXTOUCH) 100 unit/mL (3 mL) SubQ InPn pen Inject 30 Units into the skin 2 (two) times daily.    levothyroxine (SYNTHROID) 75 MCG tablet Take 1 tablet (75 mcg total) by mouth before breakfast.    linaCLOtide (LINZESS) 72 mcg Cap capsule Take 1 capsule (72 mcg total) by mouth before breakfast.  "   melatonin (MELATIN) 3 mg tablet Take 2 tablets (6 mg total) by mouth nightly as needed for Insomnia.    melatonin 10 mg Tab Take by mouth.    multivitamin (ONE DAILY MULTIVITAMIN) per tablet Take 1 tablet by mouth once daily.    ondansetron (ZOFRAN-ODT) 4 MG TbDL Take 1 tablet (4 mg total) by mouth every 6 (six) hours as needed (nausea).    pantoprazole (PROTONIX) 40 MG tablet Take 1 tablet by mouth once daily    pen needle, diabetic (NOVOTWIST) 32 gauge x 1/5" Ndle USE AS DIRECTED WITH  INSULIN  PEN    pen needle, diabetic 32 gauge x 5/32" Ndle Use daily with Humalog or as directed    senna (SENOKOT) 8.6 mg tablet Take 1 tablet by mouth once daily.     Family History       Problem Relation (Age of Onset)    Diabetes Mother, Sister, Brother    Heart failure Mother    Hypertension Mother          Tobacco Use    Smoking status: Never Smoker    Smokeless tobacco: Never Used   Substance and Sexual Activity    Alcohol use: Not Currently     Alcohol/week: 0.0 standard drinks     Comment: occasions    Drug use: No    Sexual activity: Not Currently     Review of Systems   Constitutional: Negative. Negative for chills and fever.   HENT: Negative.     Eyes: Negative.    Cardiovascular:  Negative for chest pain, claudication and paroxysmal nocturnal dyspnea.   Respiratory:  Negative for cough, shortness of breath and wheezing.    Endocrine: Negative.    Hematologic/Lymphatic: Does not bruise/bleed easily.   Skin:  Negative for nail changes and rash.   Musculoskeletal: Negative.  Negative for back pain.   Gastrointestinal:  Negative for abdominal pain, melena, nausea and vomiting.   Neurological:  Negative for dizziness and headaches.   Psychiatric/Behavioral:  Negative for altered mental status, depression and substance abuse.    Allergic/Immunologic: Negative.    Objective:     Vital Signs (Most Recent):  Temp: 97.5 °F (36.4 °C) (03/07/22 0311)  Pulse: 68 (03/07/22 0700)  Resp: 16 (03/07/22 0311)  BP: 139/62 " (03/07/22 0311)  SpO2: 95 % (03/07/22 0311) Vital Signs (24h Range):  Temp:  [96.7 °F (35.9 °C)-97.9 °F (36.6 °C)] 97.5 °F (36.4 °C)  Pulse:  [60-93] 68  Resp:  [16-20] 16  SpO2:  [95 %-100 %] 95 %  BP: (139-194)/(62-79) 139/62     Weight: 90 kg (198 lb 6.6 oz)  Body mass index is 37.49 kg/m².    SpO2: 95 %  O2 Device (Oxygen Therapy): BiPAP      Intake/Output Summary (Last 24 hours) at 3/7/2022 0842  Last data filed at 3/7/2022 0600  Gross per 24 hour   Intake 974 ml   Output 475 ml   Net 499 ml       Lines/Drains/Airways       Central Venous Catheter Line  Duration                  Hemodialysis Catheter 09/30/21 1150 right internal jugular 157 days              Peripheral Intravenous Line  Duration                  Peripheral IV - Single Lumen 03/02/22 2310 18 G Right Antecubital 4 days                    Physical Exam  Constitutional:       Appearance: She is well-developed.   HENT:      Head: Normocephalic and atraumatic.   Eyes:      Conjunctiva/sclera: Conjunctivae normal.      Pupils: Pupils are equal, round, and reactive to light.   Neck:      Vascular: No JVD.   Cardiovascular:      Rate and Rhythm: Normal rate and regular rhythm.      Pulses: Intact distal pulses.      Heart sounds: Normal heart sounds. No murmur heard.    No friction rub. No gallop.   Pulmonary:      Effort: Pulmonary effort is normal.      Breath sounds: No stridor. No wheezing or rales.      Comments: On bipap  Chest:      Chest wall: No tenderness.   Abdominal:      General: Bowel sounds are normal. There is no distension.      Palpations: Abdomen is soft. There is no mass.      Tenderness: There is no abdominal tenderness. There is no guarding.   Musculoskeletal:         General: No tenderness or deformity.   Skin:     General: Skin is warm and dry.      Findings: No erythema or rash.   Neurological:      Mental Status: She is alert and oriented to person, place, and time.       Significant Labs: [facility]   Recent Labs   Lab 03/06/22  6148  03/07/22  0335   * 129*   K 4.6 4.9   CL 93* 92*   CO2 25 22*    119*   BUN 45* 58*   CREATININE 4.9* 5.4*   CALCIUM 9.5 9.5   ALBUMIN 2.2* 2.3*   ANIONGAP 10 15   ESTGFRAFRICA 9.4* 8.3*   EGFRNONAA 8.1* 7.2*    and CBC   Recent Labs   Lab 03/06/22  0356 03/07/22  0336   WBC 7.42 8.44   HGB 9.6* 10.0*   HCT 30.7* 33.3*    256       Significant Imaging: Echocardiogram: Transthoracic echo (TTE) complete (Cupid Only):   Results for orders placed or performed during the hospital encounter of 03/03/22   Echo   Result Value Ref Range    AV mean gradient 11 mmHg    Ao peak hank 2.02 m/s    Ao VTI 47.59 cm    IVRT 48.53 msec    IVS 0.75 0.6 - 1.1 cm    LA size 3.55 cm    Left Atrium Major Axis 4.68 cm    Left Atrium Minor Axis 4.86 cm    LVIDd 5.09 3.5 - 6.0 cm    LVIDs 3.89 2.1 - 4.0 cm    LVOT diameter 1.80 cm    LVOT peak VTI 11.79 cm    Posterior Wall 0.75 0.6 - 1.1 cm    MV Peak A Hank 1.15 m/s    E wave deceleration time 193.23 msec    MV Peak E Hank 1.10 m/s    PV Peak D Hank 0.28 m/s    PV Peak S Hank 0.60 m/s    RA Major Axis 3.99 cm    RA Width 3.06 cm    Sinus 2.35 cm    TAPSE 1.57 cm    TR Max Hank 2.04 m/s    LA WIDTH 3.76 cm    PV PEAK VELOCITY 0.86 cm/s    MV stenosis pressure 1/2 time 56.04 ms    LV Diastolic Volume 122.98 mL    LV Systolic Volume 65.70 mL    LVOT peak hank 0.63 m/s    TDI LATERAL 0.06 m/s    TDI SEPTAL 0.02 m/s    Mr max hank 0.04 m/s    LV LATERAL E/E' RATIO 18.33 m/s    LV SEPTAL E/E' RATIO 55.00 m/s    FS 24 %    LA volume 54.10 cm3    LV mass 128.99 g    Left Ventricle Relative Wall Thickness 0.29 cm    AV valve area 0.63 cm2    AV Velocity Ratio 0.31     AV index (prosthetic) 0.25     MV valve area p 1/2 method 3.93 cm2    E/A ratio 0.96     Mean e' 0.04 m/s    Pulm vein S/D ratio 2.14     LVOT area 2.5 cm2    LVOT stroke volume 29.99 cm3    AV peak gradient 16 mmHg    E/E' ratio 27.50 m/s    Triscuspid Valve Regurgitation Peak Gradient 17 mmHg    BSA 1.99 m2    LV Systolic  Volume Index 34.6 mL/m2    LV Diastolic Volume Index 64.73 mL/m2    LA Volume Index 28.5 mL/m2    LV Mass Index 68 g/m2    LA Volume Index (Mod) 28.4 mL/m2    LA volume (mod) 54.00 cm3    EF 48 %    Narrative    · Low normal systolic function.  · The estimated ejection fraction is 48%.  · Indeterminate left ventricular diastolic function.  · There is left ventricular global hypokinesis.  · Normal right ventricular size with normal right ventricular systolic   function.  · There is moderate aortic valve stenosis.  · Aortic valve area is 0.63 cm2; peak velocity is 2.02 m/s; mean gradient   is 11 mmHg.  · Mild mitral regurgitation.        Assessment and Plan:     * NSTEMI (non-ST elevated myocardial infarction)  - films reviewed  - further input needed from family regarding goals of care, currently DNR, would need to be reversed for further interventions  - MRSA bacteremia x 2 3/2, subsequent blood cultures negative, will discuss need for catheter exchange if proceeding with LHC+/- PCI  - continued ACS mgmt with DAPT, heparin gtt, high intensity statin  - continue BB  - O2 if patient desaturates   - PRN NGT  - maintain NPO for now          VTE Risk Mitigation (From admission, onward)         Ordered     heparin (porcine) injection 1,000 Units  As needed (PRN)         03/04/22 1420     heparin 25,000 units in dextrose 5% (100 units/ml) IV bolus from bag - ADDITIONAL PRN BOLUS - 60 units/kg (max bolus 4000 units)  As needed (PRN)        Question:  Heparin Infusion Adjustment (DO NOT MODIFY ANSWER)  Answer:  \\[facility]\[place]\Images\Pharmacy\HeparinInfusions\heparin LOW INTENSITY nomogram for [facility] [id number]    03/03/22 0810     heparin 25,000 units in dextrose 5% (100 units/ml) IV bolus from bag - ADDITIONAL PRN BOLUS - 30 units/kg (max bolus 4000 units)  As needed (PRN)        Question:  Heparin Infusion Adjustment (DO NOT MODIFY ANSWER)  Answer:  \[employer]\Images\Pharmacy\HeparinInfusions\heparin LOW INTENSITY  nomogram for [facility] [id number].pdf    03/03/22 0810     heparin (porcine) injection 5,000 Units  As needed (PRN)         03/03/22 0755     heparin 25,000 units in dextrose 5% 250 mL (100 units/mL) infusion LOW INTENSITY nomogram - [facility]  Continuous        Question Answer Comment   Heparin Infusion Adjustment (DO NOT MODIFY ANSWER) \\[email or web address]\[employer]\Images\Pharmacy\HeparinInfusions\heparin LOW INTENSITY nomogram for [facility] [id number]    Begin at (in units/kg/hr) 12        03/03/22 0810     IP VTE HIGH RISK PATIENT  Once         03/03/22 0650     Place sequential compression device  Until discontinued         03/03/22 0650                Thank you for your consult. I will follow-up with patient. Please contact us if you have any additional questions.    Ammon Hampton MD  Interventional Cardiology   [facility] - Cardiology Stepdown

## 2022-03-07 NOTE — ASSESSMENT & PLAN NOTE
Last A1c 5.5 (3/3/22). On detemir 30U subQ BID at home. Some postprandial derangements.    - Continue insulin detemir 20U subQ BID  - Continue Aspart 3u TIDWM  - LDSSI  - Accuchecks achs  - Diabetic diet

## 2022-03-07 NOTE — ASSESSMENT & PLAN NOTE
ESKD 2/2 HTN & DM   Outpatient HD Information:  -Dialysis modality: Hemodialysis  -Outpatient HD unit: [facility]   -Nephrologist: Dr. Ugalde   -HD TX days: Monday/Wednesday/Friday, duration of treatment: 4 hours   -Last HD TX prior to hospital admission: 03/04/2022  -Dialysis access: dialysis catheter R IJ   -Residual urine: Yes   -EDW: ?    Plan/Recommendations:   -iHD today (03/07/2022) with UF -1-2L as BP tolerates for metabolic clearance and volume management   -Electrolytes: acceptable, Na 129, K 4.9, Mg 1.7, replace Mg   -Mineral & Bone Disorder:   Phosphorus 5.2, PTH level: 207.4, cont phosphate binders sevelamer 800mg tid w/ meals   -ESKD anemia: hgb 10, goal hgb 10-11g/dL, check iron studies, ESAs not indicated, transfuse if hgb <7  -Renal function panel on HD days   -Renal diet with protein supplements, if not NPO  -Strict I/O's and daily weights

## 2022-03-07 NOTE — ASSESSMENT & PLAN NOTE
- films reviewed  - further input needed from family regarding goals of care, currently DNR, would need to be reversed for further interventions  - MRSA bacteremia x 2 3/2, subsequent blood cultures negative, will discuss need for catheter exchange if proceeding with LHC+/- PCI  - continued ACS mgmt with DAPT, heparin gtt, high intensity statin  - continue BB  - O2 if patient desaturates   - PRN NGT  - maintain NPO for now

## 2022-03-07 NOTE — ASSESSMENT & PLAN NOTE
Afebrile and without leukocytosis. Blood cultures 3/2 with MRSA. Repeat blood cultures 3/4 are NGTD. TTE without evidence of infective endocarditis. Patient today mentions scratching at HD line site due to itching. Was also bleeding at some point. This is a likely source of initial bacteremia.     --Continue vancomycin per pharmacy dosing to target MRSA   --Recommend removing HD catheter and repeating 2 sets of blood cultures; if they remain sterile without the line then will likely treat as CLABSI   --Primary team and nephrology updated; both are in agreement with above plan

## 2022-03-07 NOTE — ASSESSMENT & PLAN NOTE
Patient with Hypoxic Respiratory failure which is Acute on chronic.  she is on home oxygen at 2-3 LPM. Supplemental oxygen was provided and noted- Oxygen Concentration (%):  [30-32] 30.   Signs/symptoms of respiratory failure include- respiratory distress. Contributing diagnoses includes - Interstitial lung disease Labs and images were reviewed. Patient Has recent ABG, which has been reviewed. Will treat underlying causes and adjust management of respiratory failure as follows:    - Duo nebs q6h  - Inhalation treatment q6h  - ICS and flutter valve  - Supplemental O2 PRN  - Bipap QHS PRN

## 2022-03-07 NOTE — SUBJECTIVE & OBJECTIVE
Interval History: NAEO. AF VSS. Tolerated BiPap throughout the night. No new complaints. IC to discuss GOC with family prior to any LHC/PCI. Repeat blood cxs NGTD. HD today.    Review of Systems   Constitutional:  Negative for chills and fever.   HENT:  Negative for sore throat.    Respiratory:  Negative for cough and shortness of breath.    Cardiovascular:  Negative for chest pain and leg swelling.   Gastrointestinal:  Negative for abdominal pain, constipation, diarrhea, nausea and vomiting.   Genitourinary:  Negative for dysuria.   Musculoskeletal:  Negative for myalgias.   Skin:  Negative for rash.   Neurological:  Negative for dizziness and headaches.   Psychiatric/Behavioral:  Negative for confusion. The patient is nervous/anxious.      Objective:     Vital Signs (Most Recent):  Temp: 97.5 °F (36.4 °C) (03/07/22 0311)  Pulse: 68 (03/07/22 0700)  Resp: 16 (03/07/22 0311)  BP: 139/62 (03/07/22 0311)  SpO2: 95 % (03/07/22 0311) Vital Signs (24h Range):  Temp:  [96.7 °F (35.9 °C)-97.9 °F (36.6 °C)] 97.5 °F (36.4 °C)  Pulse:  [60-93] 68  Resp:  [16-20] 16  SpO2:  [95 %-100 %] 95 %  BP: (139-194)/(62-79) 139/62     Weight: 90 kg (198 lb 6.6 oz)  Body mass index is 37.49 kg/m².    Intake/Output Summary (Last 24 hours) at 3/7/2022 0855  Last data filed at 3/7/2022 0600  Gross per 24 hour   Intake 974 ml   Output 475 ml   Net 499 ml      Physical Exam  Constitutional:       Appearance: Normal appearance.   HENT:      Head: Normocephalic and atraumatic.      Mouth/Throat:      Mouth: Mucous membranes are moist.      Pharynx: Oropharynx is clear.   Eyes:      Extraocular Movements: Extraocular movements intact.      Pupils: Pupils are equal, round, and reactive to light.   Neck:      Comments: No JVD  Cardiovascular:      Rate and Rhythm: Normal rate and regular rhythm.      Pulses: Normal pulses.      Heart sounds: Normal heart sounds.   Pulmonary:      Effort: Pulmonary effort is normal. No respiratory distress.       Breath sounds: Wheezing present. No rales.   Abdominal:      General: Abdomen is flat. Bowel sounds are normal. There is no distension.      Palpations: Abdomen is soft.      Tenderness: There is no abdominal tenderness. There is no guarding or rebound.   Musculoskeletal:         General: No swelling.      Cervical back: Neck supple.      Right lower leg: Edema (trace) present.      Left lower leg: Edema (trace) present.   Skin:     General: Skin is warm and dry.      Capillary Refill: Capillary refill takes less than 2 seconds.   Neurological:      General: No focal deficit present.      Mental Status: She is alert and oriented to person, place, and time. Mental status is at baseline.   Psychiatric:         Mood and Affect: Mood normal.         Behavior: Behavior normal.         Thought Content: Thought content normal.         Judgment: Judgment normal.       Significant Labs: All pertinent labs within the past 24 hours have been reviewed.  Recent Lab Results         03/07/22  0336   03/07/22  0335   03/06/22  2024   03/06/22 2009 03/06/22  1531        Albumin   2.3             Anion Gap   15             aPTT 56.6  Comment: aPTT therapeutic range = 39-69 seconds       30.6  Comment: aPTT therapeutic range = 39-69 seconds         Baso # 0.08               Basophil % 0.9               BUN   58             Calcium   9.5             Chloride   92             CO2   22             Creatinine   5.4             Differential Method Automated               eGFR if    8.3             eGFR if non    7.2  Comment: Calculation used to obtain the estimated glomerular filtration  rate (eGFR) is the CKD-EPI equation.                Eos # 0.5               Eosinophil % 6.4               Glucose   119             Gran # (ANC) 5.0               Gran % 59.8               Hematocrit 33.3               Hemoglobin 10.0               Immature Grans (Abs) 0.11  Comment: Mild elevation in immature  granulocytes is non specific and   can be seen in a variety of conditions including stress response,   acute inflammation, trauma and pregnancy. Correlation with other   laboratory and clinical findings is essential.                 Immature Granulocytes 1.3               Lymph # 1.6               Lymph % 19.4               Magnesium   1.7             MCH 32.2               MCHC 30.0                              Mono # 1.0               Mono % 12.2               MPV 11.6               nRBC 0               Phosphorus   5.2             Platelets 256               POCT Glucose     172     260       Potassium   4.9             RBC 3.11               RDW 11.7               Sodium   129             Vancomycin, Random   20.7             WBC 8.44                                  03/06/22  1341   03/06/22  1120        Albumin         Anion Gap         aPTT 42.2  Comment: aPTT therapeutic range = 39-69 seconds         Baso #         Basophil %         BUN         Calcium         Chloride         CO2         Creatinine         Differential Method         eGFR if          eGFR if non          Eos #         Eosinophil %         Glucose         Gran # (ANC)         Gran %         Hematocrit         Hemoglobin         Immature Grans (Abs)         Immature Granulocytes         Lymph #         Lymph %         Magnesium         MCH         MCHC         MCV         Mono #         Mono %         MPV         nRBC         Phosphorus         Platelets         POCT Glucose   204       Potassium         RBC         RDW         Sodium         Vancomycin, Random         WBC                 Significant Imaging:       I have reviewed all pertinent imaging results/findings within the past 24 hours.

## 2022-03-07 NOTE — CONSULTS
Pharmacokinetic Assessment Follow Up: IV Vancomycin    Vancomycin serum concentration assessment(s):    Vancomycin concentration = 20.7 mcg/mL. Likely HD today    Vancomycin Regimen Plan:  1. Hold vancomycin today. Will continue pulse dosing  2. Concentration in AM 3/8; goal 15-20 mcg/mL    Thank you for the consult, will continue to follow  Chapincito Jimenez Pharm.D., BCPS  [id number]       Patient brief summary:  Melinda Knight is a 76 y.o. female initiated on antimicrobial therapy with IV Vancomycin for treatment of MRSA bacteremia       Drug levels (last 3 results):  Recent Labs   Lab Result Units 03/06/22 0356 03/07/22  0335   Vancomycin, Random ug/mL 17.4 20.7       Drug Allergies:   Review of patient's allergies indicates:   Allergen Reactions    Sulfa (sulfonamide antibiotics) Nausea And Vomiting    Menthol contain prod        Actual Body Weight:   90 kg    Renal Function:   Estimated Creatinine Clearance: 9.3 mL/min (A) (based on SCr of 5.4 mg/dL (H)).,     Dialysis Method (if applicable):  HD MWF    CBC (last 72 hours):  Recent Labs   Lab Result Units 03/05/22 0321 03/06/22 0356 03/07/22  0336   WBC K/uL 9.34 7.42 8.44   Hemoglobin g/dL 10.0* 9.6* 10.0*   Hematocrit % 32.3* 30.7* 33.3*   Platelets K/uL 231 242 256   Gran % % 70.3 60.4 59.8   Lymph % % 12.6* 20.4 19.4   Mono % % 12.5 12.4 12.2   Eosinophil % % 3.1 5.1 6.4   Basophil % % 0.9 1.3 0.9   Differential Method  Automated Automated Automated       Metabolic Panel (last 72 hours):  Recent Labs   Lab Result Units 03/05/22 0321 03/06/22 0356 03/07/22  0335   Sodium mmol/L 134* 128* 129*   Potassium mmol/L 4.2 4.6 4.9   Chloride mmol/L 93* 93* 92*   CO2 mmol/L 28 25 22*   Glucose mg/dL 89 106 119*   BUN mg/dL 32* 45* 58*   Creatinine mg/dL 3.7* 4.9* 5.4*   Albumin g/dL 2.4* 2.2* 2.3*   Magnesium mg/dL  --   --  1.7   Phosphorus mg/dL 3.2 4.9* 5.2*       Vancomycin Administrations:  vancomycin given in the last 96 hours                   vancomycin 500 mg  in dextrose 5 % 100 mL IVPB (ready to mix system) (mg) 500 mg New Bag 03/06/22 1108    vancomycin 2 g in dextrose 5 % 500 mL IVPB (mg) 2,000 mg New Bag 03/04/22 0841                Microbiologic Results:  Microbiology Results (last 7 days)     Procedure Component Value Units Date/Time    Blood culture [[id number]] Collected: 03/04/22 1039    Order Status: Completed Specimen: Blood Updated: 03/06/22 1612     Blood Culture, Routine No Growth to date      No Growth to date      No Growth to date    Blood culture [[id number]] Collected: 03/04/22 1039    Order Status: Completed Specimen: Blood from Peripheral, Right Updated: 03/06/22 1612     Blood Culture, Routine No Growth to date      No Growth to date      No Growth to date

## 2022-03-07 NOTE — PLAN OF CARE
Recv'd pt in bed awake. AAO, no sob or distress and denies pain. VSS and SR on monitor. Heparin gtt infusing per order with no signs of bleeding. Meds given and aurelio well. Pt agreed for bipap tonight. Will cont to monitor.

## 2022-03-07 NOTE — PROGRESS NOTES
ESRD MWF HD initiated to RCW catheter.  Tolerated well.        Pt arrived to [facility] on a stretcher.  Uses walker to ambulate normally.

## 2022-03-07 NOTE — PLAN OF CARE
[facility] - Cardiology Stepdown      HOME HEALTH ORDERS  FACE TO FACE ENCOUNTER    Patient Name: Melinda Knight  YOB: 1945    PCP: Duke Cole MD   PCP Address: [address] / [place] [address]  PCP Phone Number: [phone]  PCP Fax: [phone]    Encounter Date: 3/3/22    Admit to Home Health    Diagnoses:  Active Hospital Problems    Diagnosis  POA    *NSTEMI (non-ST elevated myocardial infarction) [I21.4]  Yes    MRSA bacteremia [R78.81, B95.62]  Yes    Acute on chronic respiratory failure with hypoxemia [J96.21]  Yes    Debility [R53.81]  Yes    Advance care planning [Z71.89]  Not Applicable    Elevated troponin [R77.8]  Yes    ESRD (end stage renal disease) on dialysis [N18.6, Z99.2]  Not Applicable     Chronic    Pulmonary hypertension [I27.20]  Yes     Chronic    Anemia of chronic disease [D63.8]  Yes     Chronic    Aortic valve stenosis [I35.0]  Yes     Chronic    Hypothyroidism (acquired) [E03.9]  Yes     Chronic    Type 2 diabetes mellitus with diabetic polyneuropathy [E11.42]  Yes     Chronic    GERD (gastroesophageal reflux disease) [K21.9]  Yes     Chronic    Essential hypertension [I10]  Yes     Chronic    Mixed hyperlipidemia [E78.2]  Yes     Chronic      Resolved Hospital Problems   No resolved problems to display.       Follow Up Appointments:  No future appointments.    Allergies:  Review of patient's allergies indicates:   Allergen Reactions    Sulfa (sulfonamide antibiotics) Nausea And Vomiting    Menthol contain prod        Medications: Review discharge medications with patient and family and provide education.    Current Facility-Administered Medications   Medication Dose Route Frequency Provider Last Rate Last Admin    0.9%  NaCl infusion   Intravenous Once Higinio España NP        0.9%  NaCl infusion    Continuous PRN [place]  mL/hr at 03/03/22 1805 Rate Verify at 03/03/22 1805    0.9%  NaCl infusion    Intravenous Once Diandra Arshad DNP        acetaminophen tablet 650 mg  650 mg Oral Q6H PRN DEBORA Gonzalez MD   650 mg at 03/06/22 2020    acetaminophen tablet 650 mg  650 mg Oral Q4H PRN [place]a MD   650 mg at 03/06/22 1933    albuterol-ipratropium 2.5 mg-0.5 mg/3 mL nebulizer solution 3 mL  3 mL Nebulization Q6H WAKE Geovani Morton MD   3 mL at 03/06/22 2006    aspirin chewable tablet 81 mg  81 mg Oral Daily DEBORA Gonzalez MD   81 mg at 03/06/22 0809    atorvastatin tablet 80 mg  80 mg Oral Daily DEBORA Gonzalez MD   80 mg at 03/06/22 0809    carvediloL tablet 25 mg  25 mg Oral BID Geovani Morton MD   25 mg at 03/06/22 2020    citalopram tablet 20 mg  20 mg Oral Daily DEBORA Gonzalez MD   20 mg at 03/06/22 0810    clopidogreL tablet 75 mg  75 mg Oral Daily Geovani Morton MD   75 mg at 03/06/22 0809    dextrose 10% bolus 125 mL  12.5 g Intravenous PRN DEBORA Gonzalez MD        dextrose 10% bolus 250 mL  25 g Intravenous PRN DEBORA Gonzalez MD        diazePAM tablet 5 mg  5 mg Oral On Call Procedure DEBORA Gonzalez MD   5 mg at 03/06/22 2020    glucagon (human recombinant) injection 1 mg  1 mg Intramuscular PRN DEBORA Gonzalez MD        glucose chewable tablet 16 g  16 g Oral PRN DEBORA Gonzalez MD        glucose chewable tablet 24 g  24 g Oral PRN DEBORA Gonzalez MD        heparin (porcine) injection 1,000 Units  1,000 Units Intra-Catheter PRN Higinio España NP        heparin 25,000 units in dextrose 5% (100 units/ml) IV bolus from bag - ADDITIONAL PRN BOLUS - 30 units/kg (max bolus 4000 units)  30 Units/kg (Adjusted) Intravenous PRN DEBORA Gonzalez MD   1,970 Units at 03/03/22 2344    heparin 25,000 units in dextrose 5% (100 units/ml) IV bolus from bag - ADDITIONAL PRN BOLUS - 60 units/kg (max bolus 4000 units)  60 Units/kg (Adjusted) Intravenous PRN DEBORA Gonzalez MD   3,940 Units at 03/06/22 2157    heparin 25,000 units in dextrose 5% 250 mL (100 units/mL) infusion  LOW INTENSITY nomogram - [facility]  0-40 Units/kg/hr (Adjusted) Intravenous Continuous DSofia Gonzalez MD 12.5 mL/hr at 03/07/22 0256 19 Units/kg/hr at 03/07/22 0256    hydrOXYzine HCL tablet 50 mg  50 mg Oral Q4H PRN DEBORA Gonzalez MD   50 mg at 03/06/22 2020    insulin aspart U-100 pen 0-5 Units  0-5 Units Subcutaneous QID (AC + HS) PRN DEBORA Gonzalez MD   3 Units at 03/06/22 1630    insulin aspart U-100 pen 3 Units  3 Units Subcutaneous TIDWM Geovani Morton MD   3 Units at 03/06/22 1629    insulin detemir U-100 pen 20 Units  20 Units Subcutaneous BID DEBORA Gonzalez MD   20 Units at 03/06/22 2022    labetalol 20 mg/4 mL (5 mg/mL) IV syring  10 mg Intravenous Q4H PRN Geovani Morton MD   10 mg at 03/06/22 1504    levothyroxine tablet 75 mcg  75 mcg Oral Before breakfast DEBORA Gonzalez MD   75 mcg at 03/07/22 0604    melatonin tablet 6 mg  6 mg Oral Nightly PRN DSofia Gonzalez MD   6 mg at 03/06/22 2020    mupirocin 2 % ointment   Nasal BID Higinio España NP   Given at 03/06/22 2100    ondansetron disintegrating tablet 4 mg  4 mg Oral Q8H PRN DEBORA Gonzalez MD   4 mg at 03/04/22 1445    ondansetron injection 4 mg  4 mg Intravenous Q8H PRN DEBORA Gonzalez MD        pantoprazole EC tablet 40 mg  40 mg Oral Daily DEBORA Gonzalez MD   40 mg at 03/06/22 0810    sevelamer carbonate tablet 800 mg  800 mg Oral TID  Geovani Morton MD        sodium chloride 0.9% bolus 250 mL  250 mL Intravenous PRN Higinio España NP        sodium chloride 0.9% flush 10 mL  10 mL Intravenous PRN DEBORA Gonzalez MD        vancomycin - pharmacy to dose   Intravenous pharmacy to manage frequency DEBORA Gonzalez MD        vitamin renal formula (B-complex-vitamin c-folic acid) 1 mg per capsule 1 capsule  1 capsule Oral Daily Higinio España NP   1 capsule at 03/06/22 0809     Current Discharge Medication List      CONTINUE these medications which have NOT CHANGED    Details   albuterol (ACCUNEB) 0.63 mg/3 mL Nebu Take 3  mLs (0.63 mg total) by nebulization 3 (three) times daily as needed. Rescue  Qty: 75 mL, Refills: 3      aspirin 81 MG Chew Take 1 tablet (81 mg total) by mouth once daily.  Qty: 90 tablet, Refills: 3      atorvastatin (LIPITOR) 80 MG tablet Take 1 tablet (80 mg total) by mouth once daily.  Qty: 90 tablet, Refills: 3      blood sugar diagnostic (BLOOD GLUCOSE TEST) Strp 1 strip by Misc.(Non-Drug; Combo Route) route 2 (two) times daily. Prodigy  Qty: 200 strip, Refills: 5    Associated Diagnoses: Type 2 diabetes mellitus with stage 3b chronic kidney disease, with long-term current use of insulin; Type 2 diabetes mellitus with diabetic polyneuropathy, with long-term current use of insulin      carvediloL (COREG) 12.5 MG tablet Take 1 tablet by mouth twice daily  Qty: 180 tablet, Refills: 1      citalopram (CELEXA) 20 MG tablet Take 1 tablet by mouth once daily  Qty: 90 tablet, Refills: 1    Associated Diagnoses: Current mild episode of major depressive disorder, unspecified whether recurrent      clopidogreL (PLAVIX) 75 mg tablet Take 1 tablet (75 mg total) by mouth once daily.  Qty: 30 tablet, Refills: 11      dicyclomine (BENTYL) 10 MG capsule Take 1 capsule by mouth 4 (four) times daily.      insulin detemir U-100 (LEVEMIR FLEXTOUCH) 100 unit/mL (3 mL) SubQ InPn pen Inject 30 Units into the skin 2 (two) times daily.  Qty: 18 mL, Refills: 11      levothyroxine (SYNTHROID) 75 MCG tablet Take 1 tablet (75 mcg total) by mouth before breakfast.  Qty: 30 tablet, Refills: 11      linaCLOtide (LINZESS) 72 mcg Cap capsule Take 1 capsule (72 mcg total) by mouth before breakfast.  Qty: 90 capsule, Refills: 1    Associated Diagnoses: Chronic constipation      !! melatonin (MELATIN) 3 mg tablet Take 2 tablets (6 mg total) by mouth nightly as needed for Insomnia.  Qty: 30 tablet, Refills: 0      !! melatonin 10 mg Tab Take by mouth.      multivitamin (ONE DAILY MULTIVITAMIN) per tablet Take 1 tablet by mouth once daily.     "  ondansetron (ZOFRAN-ODT) 4 MG TbDL Take 1 tablet (4 mg total) by mouth every 6 (six) hours as needed (nausea).  Qty: 30 tablet, Refills: 1      pantoprazole (PROTONIX) 40 MG tablet Take 1 tablet by mouth once daily  Qty: 90 tablet, Refills: 0      pen needle, diabetic (NOVOTWIST) 32 gauge x 1/5" Ndle USE AS DIRECTED WITH  INSULIN  PEN  Qty: 200 each, Refills: 3    Associated Diagnoses: Type 2 diabetes mellitus with diabetic polyneuropathy, with long-term current use of insulin      pen needle, diabetic 32 gauge x 5/32" Ndle Use daily with Humalog or as directed  Qty: 100 each, Refills: 3    Associated Diagnoses: Type 2 diabetes mellitus with stage 3b chronic kidney disease, with long-term current use of insulin; Type 2 diabetes mellitus with diabetic polyneuropathy, with long-term current use of insulin      senna (SENOKOT) 8.6 mg tablet Take 1 tablet by mouth once daily.       !! - Potential duplicate medications found. Please discuss with provider.            I have seen and examined this patient within the last 30 days. My clinical findings that support the need for the home health skilled services and home bound status are the following:no   Weakness/numbness causing balance and gait disturbance due to Coronary Heart Disease making it taxing to leave home.     Diet:   cardiac diet      Referrals/ Consults  Physical Therapy to evaluate and treat. Evaluate for home safety and equipment needs; Establish/upgrade home exercise program. Perform / instruct on therapeutic exercises, gait training, transfer training, and Range of Motion.  Occupational Therapy to evaluate and treat. Evaluate home environment for safety and equipment needs. Perform/Instruct on transfers, ADL training, ROM, and therapeutic exercises.    Activities:   activity as tolerated          I certify that this patient is confined to her home and needs physical therapy and occupational therapy.        "

## 2022-03-07 NOTE — ASSESSMENT & PLAN NOTE
Chronic, normotensive on evaluation today.    - Continue Coreg 12.5 to 25 mg BID  - Labetalol 10 mg PRN for SBP >160  - Monitor BP

## 2022-03-07 NOTE — ASSESSMENT & PLAN NOTE
HTN, HLD, CAD, acute on chronic resp failure with hypoxia, pHTN, AS, elevated troponin  - Echo 03/03 shows EF 48%, severe AS.  - LHC 03/03 showed LAD and LCx 70% stenosis and RCA 60% stenosis. Transferred [facility] for CTS evaluation; per CTS pt is poor surgical candidate. Recommending high risk PCI vs medical management  - Interventional cardiology consulted, will discuss GOC with family and possible DNR reversal prior to any LHC/PCI.  - Will need HD line exchanged prior to any cath lab procedure  - Continue aspirin 81mg and clopidogrel 75mg PO daily, atorvastatin 80mg PO daily, carvedilol 12.5mg PO BID.  - Weaned to baseline supplemental O2.  - Duo nebs and inhalation treatment q6h   - Continuing heparin gtt.  - Appreciate cardiology assistance.

## 2022-03-07 NOTE — PROGRESS NOTES
HD txt complete.  2.5L UF removed.  Tolerated well.  Heparin locks in catheter lumens.      Pt ambulated to stretcher with 2 person assist.  Passed out trying to turn around to sit on stretcher.  Blood glucose 153.  /63 pulse 62. Given ondansetron 4mg for nausea.    RN escorted transport back to patient's room.

## 2022-03-07 NOTE — PLAN OF CARE
Went to room x2. First round patient was off the unit at HD. Second round patient was having patient care.    Will follow up with patient in the morning.      Julie Haase RN  Case Management [phone]

## 2022-03-07 NOTE — SUBJECTIVE & OBJECTIVE
Interval History:   No acute events overnight, SBPs 160's-180's, pulse 60's-70's  Had 475ml of urine/24 hrs  Last iHD 3/4         Review of patient's allergies indicates:   Allergen Reactions    Sulfa (sulfonamide antibiotics) Nausea And Vomiting    Menthol contain prod      Current Facility-Administered Medications   Medication Frequency    0.9%  NaCl infusion Once    acetaminophen tablet 650 mg Q6H PRN    acetaminophen tablet 650 mg Q4H PRN    albuterol-ipratropium 2.5 mg-0.5 mg/3 mL nebulizer solution 3 mL Q6H WAKE    aspirin chewable tablet 81 mg Daily    atorvastatin tablet 80 mg Daily    carvediloL tablet 25 mg BID    citalopram tablet 20 mg Daily    clopidogreL tablet 75 mg Daily    dextrose 10% bolus 125 mL PRN    dextrose 10% bolus 250 mL PRN    glucagon (human recombinant) injection 1 mg PRN    glucose chewable tablet 16 g PRN    glucose chewable tablet 24 g PRN    heparin (porcine) injection 1,000 Units PRN    heparin 25,000 units in dextrose 5% (100 units/ml) IV bolus from bag - ADDITIONAL PRN BOLUS - 30 units/kg (max bolus 4000 units) PRN    heparin 25,000 units in dextrose 5% (100 units/ml) IV bolus from bag - ADDITIONAL PRN BOLUS - 60 units/kg (max bolus 4000 units) PRN    heparin 25,000 units in dextrose 5% 250 mL (100 units/mL) infusion LOW INTENSITY nomogram - [facility] Continuous    hydrOXYzine HCL tablet 50 mg Q4H PRN    insulin aspart U-100 pen 0-5 Units QID (AC + HS) PRN    insulin aspart U-100 pen 3 Units TIDWM    insulin detemir U-100 pen 20 Units BID    labetalol 20 mg/4 mL (5 mg/mL) IV syring Q4H PRN    levothyroxine tablet 75 mcg Before breakfast    melatonin tablet 6 mg Nightly PRN    mupirocin 2 % ointment BID    ondansetron disintegrating tablet 4 mg Q8H PRN    ondansetron injection 4 mg Q8H PRN    pantoprazole EC tablet 40 mg Daily    sevelamer carbonate tablet 800 mg TID WM    sodium chloride 0.9% bolus 250 mL PRN    sodium chloride 0.9% flush 10 mL PRN    vancomycin - pharmacy to dose  pharmacy to manage frequency    vitamin renal formula (B-complex-vitamin c-folic acid) 1 mg per capsule 1 capsule Daily       Objective:     Vital Signs (Most Recent):  Temp: 97.5 °F (36.4 °C) (03/07/22 0311)  Pulse: 71 (03/07/22 1015)  Resp: 18 (03/07/22 0923)  BP: (!) 178/72 (03/07/22 1015)  SpO2: 95 % (03/07/22 0311)  O2 Device (Oxygen Therapy): room air (03/07/22 0923)   Vital Signs (24h Range):  Temp:  [96.7 °F (35.9 °C)-97.9 °F (36.6 °C)] 97.5 °F (36.4 °C)  Pulse:  [60-93] 71  Resp:  [16-20] 18  SpO2:  [95 %-100 %] 95 %  BP: (139-195)/(62-79) 178/72     Weight: 90 kg (198 lb 6.6 oz) (03/04/22 0600)  Body mass index is 37.49 kg/m².  Body surface area is 1.97 meters squared.    I/O last 3 completed shifts:  In: 1354.6 [P.O.:840; I.V.:284.5; IV Piggyback:230.1]  Out: 475 [Urine:475]    Physical Exam  Vitals and nursing note reviewed.   Constitutional:       General: She is not in acute distress.     Appearance: Normal appearance. She is not ill-appearing, toxic-appearing or diaphoretic.   Cardiovascular:      Rate and Rhythm: Normal rate and regular rhythm.      Pulses: Normal pulses.      Heart sounds: Murmur heard.      Comments: R IJ tunneled catheter  Pulmonary:      Effort: Pulmonary effort is normal. No respiratory distress.      Breath sounds: No wheezing, rhonchi or rales.      Comments: Decreased breath sounds in posterior lung fields  Abdominal:      General: Abdomen is flat. Bowel sounds are normal. There is no distension.      Palpations: Abdomen is soft. There is no mass.      Tenderness: There is no abdominal tenderness. There is no guarding or rebound.   Musculoskeletal:         General: No swelling, tenderness or deformity.      Right lower leg: Edema present.      Left lower leg: Edema present.      Comments: Trace LE edema purvi    Skin:     General: Skin is warm.      Capillary Refill: Capillary refill takes 2 to 3 seconds.      Coloration: Skin is not jaundiced or pale.      Findings: No  bruising, erythema, lesion or rash.   Neurological:      Mental Status: She is alert and oriented to person, place, and time.   Psychiatric:         Mood and Affect: Mood normal.         Behavior: Behavior normal.         Thought Content: Thought content normal.         Judgment: Judgment normal.       Significant Labs:  Cardiac Markers: No results for input(s): CKMB, TROPONINT, MYOGLOBIN in the last 168 hours.  CBC:   Recent Labs   Lab 03/07/22  0336   WBC 8.44   RBC 3.11*   HGB 10.0*   HCT 33.3*      *   MCH 32.2*   MCHC 30.0*     CMP:   Recent Labs   Lab 03/02/22  2342 03/03/22  0745 03/07/22  0335   *   < > 119*   CALCIUM 10.2   < > 9.5   ALBUMIN 2.6*   < > 2.3*   PROT 7.1  --   --       < > 129*   K 4.0   < > 4.9   CO2 27   < > 22*   CL 94*   < > 92*   BUN 64*   < > 58*   CREATININE 3.9*   < > 5.4*   ALKPHOS 87  --   --    ALT 17  --   --    AST 70*  --   --    BILITOT 0.5  --   --     < > = values in this interval not displayed.     Coagulation:   Recent Labs   Lab 03/03/22  0745 03/03/22  1612 03/07/22  0336   INR 1.0  --   --    APTT 27.1   < > 56.6*    < > = values in this interval not displayed.     PTH:   Recent Labs   Lab 03/03/22 0745   .4*     All labs within the past 24 hours have been reviewed.     Significant Imaging:  Labs: Reviewed

## 2022-03-07 NOTE — CARE UPDATE
pt had an episode of near-fall shortly after completion of her HD treatment, she had a net UF of 2.5L. She did not have any other problems during her HD treatment.   Pt was feeling as she was going to faint/fall so she was assisted to sitting position and then transferred to [facility]. pt was pale, clammy, HR in the 40's then 60-80's, SBPs 140's and her blood glucose was 150's, she was nauseous and vomited x3, bile-color liquid. she denies chest pain or shortness of breath. HD nurse accompanied with pt and transport back to pt's room. Pt received Zofran sublingual prior to leaving the HD unit. Primary team notified.      Nadia Olivier M.D.   Nephrology  [facility]

## 2022-03-08 NOTE — ASSESSMENT & PLAN NOTE
HTN, HLD, CAD, acute on chronic resp failure with hypoxia, pHTN, AS, elevated troponin  - Echo 03/03 shows EF 48%, severe AS.  - LHC 03/03 showed LAD and LCx 70% stenosis and RCA 60% stenosis. Transferred [facility] for CTS evaluation; per CTS pt is poor surgical candidate. Recommended high risk PCI vs medical management  - Interventional cardiology consulted, after lengthy discussion with family, elected not to pursue invasive measures, treating medically  - Continue aspirin 81mg daily for life and clopidogrel 75mg PO daily for 1 year  - Continue atorvastatin 80mg PO daily, carvedilol 12.5mg PO BID.  - Start Losartan 25 mg daily for better BP control  - Weaned to baseline supplemental O2.  - Duo nebs and inhalation treatment q6h   - d/c heparin gtt.  - Appreciate cardiology assistance.

## 2022-03-08 NOTE — PT/OT/SLP PROGRESS
Occupational Therapy   Treatment    Name: Melinda Knight  MRN: [id number]  Admitting Diagnosis:  NSTEMI (non-ST elevated myocardial infarction)  5 Days Post-Op    Recommendations:     Discharge Recommendations: home health OT  Discharge Equipment Recommendations:  none  Barriers to discharge:  None    Assessment:     Melinda Knight is a 76 y.o. female with a medical diagnosis of NSTEMI (non-ST elevated myocardial infarction).  She presents close to baseline, though pt is able to do more for herself rather than depending on family. Pt declined mobility to bathroom to complete self-care tasks despite being able to ambulate the short distance. Pt required encouragement to complete ADL at bedside and up in chair. Performance deficits affecting function are weakness, impaired endurance, impaired self care skills, impaired functional mobilty, gait instability, impaired balance, impaired cardiopulmonary response to activity.     Rehab Prognosis:  Good; patient would benefit from acute skilled OT services to address these deficits and reach maximum level of function.       Plan:     Patient to be seen 3 x/week to address the above listed problems via self-care/home management, therapeutic activities, therapeutic exercises  · Plan of Care Expires: 04/05/22  · Plan of Care Reviewed with: patient, daughter    Subjective     Pain/Comfort:  · Pain Rating 1: 0/10  · Pain Rating Post-Intervention 1: 0/10    Objective:     Communicated with: RN prior to session.  Patient found supine with peripheral IV, telemetry, oxygen, PureWick upon OT entry to room.    General Precautions: Standard, fall   Orthopedic Precautions:N/A   Braces:    Respiratory Status: Nasal cannula, flow 3 L/min     Occupational Performance:     Bed Mobility:    · Patient completed Supine to Sit with Contact guard assistance     Functional Mobility/Transfers:  · Patient completed Sit <> Stand Transfer with contact guard assistance  with  rolling walker   · Patient  completed Bed <> Chair Transfer using Step Transfer technique with contact guard assistance with rolling walker  · Functional Mobility: CGA with RW, but limited distance 2* pt declining stating she didn't go to the bathroom before admit    Activities of Daily Living:  · Feeding:  modified independence    · Grooming: stand by assistance    · Lower Body Dressing: moderate assistance    · Toileting: moderate assistance        AMPA 6 Click ADL: 17    Treatment & Education:  Pt ed on OT POC  Pt/family ed on importance of OOB and pt's self-participation in ADL    Patient left up in chair with all lines intact, call button in reach, RN notified and dtr and DAY presentEducation:      GOALS:   Multidisciplinary Problems     Occupational Therapy Goals        Problem: Occupational Therapy Goal    Goal Priority Disciplines Outcome Interventions   Occupational Therapy Goal     OT, PT/OT Ongoing, Progressing    Description: Goals set on 3/6 with expiration date 3/20:  Patient will increase functional independence with ADLs by performing:    Supine <> Sit with SBA.   Feeding while seated EOB/bedside chair with  Mod I.   Grooming while seated EOB with SBA.   UB Dressing with Min A.   LB Dressing with Min A.   Step transfer with  Mod I with DME as needed.  Pt will demonstrate understanding of education provided regarding energy conservation and task modification through teach-back method.   Patient and/or patient's family will verbalize understanding of POC and post d/c support needs, and personal risk factors for fall risk reduction.                        Time Tracking:     OT Date of Treatment: 03/08/22  OT Start Time: 1019  OT Stop Time: 1042  OT Total Time (min): 23 min    Billable Minutes:Self Care/Home Management 23               3/8/2022

## 2022-03-08 NOTE — PROGRESS NOTES
"[facility] - Cardiology [facility] Medicine  Progress Note    Patient Name: Melinda Knight  MRN: [id number]  Patient Class: IP- Inpatient   Admission Date: 3/3/2022  Length of Stay: 5 days  Attending Physician: Christin Randhawa MD  Primary Care Provider: Duke Cole MD        Subjective:     Principal Problem:NSTEMI (non-ST elevated myocardial infarction)        HPI:  Melinda Knight is a 76 y.o. F with history of HTN, HLD, CAD, DMII, hypothyroidism, ESRD on HD MWF, GERD, restrictive lung disease with chronic hypoxemic resp failure (on 2-3L O2) who transfers to [facility] for CTS evaluation for CABG +/- AVR. Initially presented to [facility] ED w/ 2 day history of "heartburn pain," N/V, and SOB. Workup notable for troponin 13 (peaked at 42 on 3/3), elevated BNP 2268 (baseline 400-600s), and radiographic evidence of pulmonary edema. EKG SR with RBBB. She was hypoxic and placed on BiPAP. Transferred to [facility] w/ NSTEMI. Cardiology and nephrology following. Volume removal with HD and weaned to baseline supplemental O2. BCx 3/2 resulted GPC resembling Staph and vanc initiated. Repeat BCx NGTD. LHC on 3/3 revealed proximal RCA 60% stenosed, mid LAD 70% stenosed, and mid Cx 70% stenosed. TTE revealed EF 48% with PORTILLO 0.63cm2, LFLG AS. Cardiology recommended CTS evaluation for CABG +/- AVR. Last dose of Plavix 3/3. She denies CP, SOB, N/V.      Overview/Hospital Course:  Admitted as transfer from [facility] ED with NSTEMI. Cardiology and nephrology consulted. Volume removed with HD and weaned to baseline supplemental O2. LHC performed showing triple vessel disease. Cardiology recommended CTS evaluation. Patient not a CABG candidate per CTS. IC consulted for possible PCI. Blood cultures from ED 03/02 showed GPCs; started IV Vancomycin. Permacath exchanged on 03/04. ID consulted for bacteremia. After long discussion with primary and cardiology, patient and family elected to not proceed with LHC/PCI and maintain DNR status. Pursuing " medical management. ID requested 48 hour line holiday to see if blood cultures remain clear. General surgery consulted for line removal.      Interval History: NAEO. AF VSS. After long discussion with IC, pt pursuing medical management of NSTEMI. General surgery consulted for line removal. Will watch cultures for 48 hours. Should get HD tomorrow prior to line removal.    Review of Systems   Constitutional:  Negative for chills and fever.   HENT:  Negative for sore throat.    Respiratory:  Negative for cough and shortness of breath.    Cardiovascular:  Negative for chest pain and leg swelling.   Gastrointestinal:  Negative for abdominal pain, constipation, diarrhea, nausea and vomiting.   Genitourinary:  Negative for dysuria.   Musculoskeletal:  Negative for myalgias.   Skin:  Negative for rash.   Neurological:  Negative for dizziness and headaches.   Psychiatric/Behavioral:  Negative for confusion. The patient is nervous/anxious.      Objective:     Vital Signs (Most Recent):  Temp: 96.3 °F (35.7 °C) (03/08/22 1127)  Pulse: 63 (03/08/22 1127)  Resp: 20 (03/08/22 1127)  BP: (!) 121/59 (03/08/22 1127)  SpO2: 98 % (03/08/22 1127)   Vital Signs (24h Range):  Temp:  [96.3 °F (35.7 °C)-97.5 °F (36.4 °C)] 96.3 °F (35.7 °C)  Pulse:  [63-86] 63  Resp:  [16-20] 20  SpO2:  [93 %-100 %] 98 %  BP: ()/(49-72) 121/59     Weight: 90 kg (198 lb 6.6 oz)  Body mass index is 37.49 kg/m².    Intake/Output Summary (Last 24 hours) at 3/8/2022 1237  Last data filed at 3/8/2022 0930  Gross per 24 hour   Intake 1130 ml   Output 3350 ml   Net -2220 ml      Physical Exam  Constitutional:       Appearance: Normal appearance.   HENT:      Head: Normocephalic and atraumatic.      Mouth/Throat:      Mouth: Mucous membranes are moist.      Pharynx: Oropharynx is clear.   Eyes:      Extraocular Movements: Extraocular movements intact.      Pupils: Pupils are equal, round, and reactive to light.   Neck:      Comments: No JVD  Cardiovascular:       Rate and Rhythm: Normal rate and regular rhythm.      Pulses: Normal pulses.      Heart sounds: Normal heart sounds.   Pulmonary:      Effort: Pulmonary effort is normal. No respiratory distress.      Breath sounds: Wheezing present. No rales.   Abdominal:      General: Abdomen is flat. Bowel sounds are normal. There is no distension.      Palpations: Abdomen is soft.      Tenderness: There is no abdominal tenderness. There is no guarding or rebound.   Musculoskeletal:         General: No swelling.      Cervical back: Neck supple.      Right lower leg: Edema (trace) present.      Left lower leg: Edema (trace) present.   Skin:     General: Skin is warm and dry.      Capillary Refill: Capillary refill takes less than 2 seconds.   Neurological:      General: No focal deficit present.      Mental Status: She is alert and oriented to person, place, and time. Mental status is at baseline.   Psychiatric:         Mood and Affect: Mood normal.         Behavior: Behavior normal.         Thought Content: Thought content normal.         Judgment: Judgment normal.       Significant Labs: All pertinent labs within the past 24 hours have been reviewed.  Recent Lab Results         03/08/22  1141   03/08/22  0714   03/07/22  2133   03/07/22  1640   03/07/22  1522        POCT Glucose 188   223   276   168   173                          03/07/22  1339        POCT Glucose 153               Significant Imaging:       I have reviewed all pertinent imaging results/findings within the past 24 hours.      Assessment/Plan:      * NSTEMI (non-ST elevated myocardial infarction)  HTN, HLD, CAD, acute on chronic resp failure with hypoxia, pHTN, AS, elevated troponin  - Echo 03/03 shows EF 48%, severe AS.  - LHC 03/03 showed LAD and LCx 70% stenosis and RCA 60% stenosis. Transferred [facility] for CTS evaluation; per CTS pt is poor surgical candidate. Recommended high risk PCI vs medical management  - Interventional cardiology consulted, after  lengthy discussion with family, elected not to pursue invasive measures, treating medically  - Continue aspirin 81mg daily for life and clopidogrel 75mg PO daily for 1 year  - Continue atorvastatin 80mg PO daily, carvedilol 12.5mg PO BID.  - Start Losartan 25 mg daily for better BP control  - Weaned to baseline supplemental O2.  - Duo nebs and inhalation treatment q6h   - d/c heparin gtt.  - Appreciate cardiology assistance.    Bacteremia  Blood cultures drawn in ED 03/02 showing GPCs resembling staph. No clear source of infection at this time. No noted areas of skin breakdown, tunneled line does not appear grossly infected (replaced on 3/4), UA unremarkable, CXR at presentation more consistent with pulmonary edema than infectious process.    - Continue IV Vancomycin. De-escalate as feasible.  - ID consulted, appreciate recs  - Will attempt 48 hour line holiday after HD tomorrow  - Patient had presyncopal episode after HD yesterday due to being NPO  - NPO at midnight with D10 running, general surgery consulted for line removal after HD  - F/U repeat cultures    Advance care planning  DNR status on admit; current [place] reflects her goals of care. Engaged the patient in a GOC discussion on 3/6. Patient has full capacity and stating that she would not want excessive life-saving measures including CPR or intubation.    - DNR order in place    Debility  - PT/OT recommending HH    Acute on chronic respiratory failure with hypoxemia  Patient with Hypoxic Respiratory failure which is Acute on chronic.  she is on home oxygen at 2-3 LPM. Supplemental oxygen was provided and noted- Oxygen Concentration (%):  [29] 29.   Signs/symptoms of respiratory failure include- respiratory distress. Contributing diagnoses includes - Interstitial lung disease Labs and images were reviewed. Patient Has recent ABG, which has been reviewed. Will treat underlying causes and adjust management of respiratory failure as follows:    - Duo nebs  q6h  - Inhalation treatment q6h  - ICS and flutter valve  - Supplemental O2 PRN  - Bipap QHS PRN    Elevated troponin  See NSTEMI      ESRD (end stage renal disease) on dialysis  ESRD on HD M,W,F outpatient. Access through right chest tunneled catheter  Oliguric at baseline, still makes some urine  Nephrology consulted for HD while inpatient    - HD per nephro, next session scheduled for 3/9  - Renal function panel daily  - Monitor intake/output and daily standing weights.  - Replete electrolytes PRN, goal Mag/Phos/K 2/3/4   - Avoid nephrotoxic agents such as NSAIDs, gadolinium and IV radiocontrast.  - Renally dose meds to current GFR.  - Maintain MAP > 65.        Pulmonary hypertension  Likely 2/2 chronic ILD    See acute on chronic hypoxemic respiratory failure      Anemia of chronic disease  Baseline Hb 10, Hb 9.6 on admission  Antiplatelet/anticoagulation: Aspirin, Plavix  Likely 2/2 ESRD    - Monitor CBC daily  - Transfuse with goal Hb > 7        Aortic valve stenosis  See NSTEMI      Type 2 diabetes mellitus with diabetic polyneuropathy  Last A1c 5.5 (3/3/22). On detemir 30U subQ BID at home. Some postprandial derangements.    - Continue insulin detemir 20U subQ BID  - Continue Aspart 5u TIDWM  - LDSSI  - Accuchecks achs  - Diabetic diet      Hypothyroidism (acquired)  TSH, T4 stable. Hyponatremia likely 2/2 renal failure.    - Continue levothyroxine 75mcg PO daily.    GERD (gastroesophageal reflux disease)  - Continue pantoprazole 40mg PO daily.    Mixed hyperlipidemia  Chronic, stable.    - Continue home Lipitor 80 mg daily    Essential hypertension  Chronic, hypertensive on evaluation today.    - Coreg 25 mg BID  - Losartan 25 mg daily  - Labetalol 10 mg PRN for SBP >160  - Monitor BP        VTE Risk Mitigation (From admission, onward)         Ordered     heparin (porcine) injection 1,000 Units  As needed (PRN)         03/04/22 1420     heparin (porcine) injection 5,000 Units  As needed (PRN)          03/03/22 0755     IP VTE HIGH RISK PATIENT  Once         03/03/22 0650     Place sequential compression device  Until discontinued         03/03/22 0650                Discharge Planning   UMAIR: 3/8/2022     Code Status: DNR   Is the patient medically ready for discharge?: No    Reason for patient still in hospital (select all that apply): Patient trending condition, Laboratory test, Treatment and Consult recommendations               Geovani Morton MD  Department of Hospital Medicine   [facility] - Cardiology Stepdown

## 2022-03-08 NOTE — ASSESSMENT & PLAN NOTE
Blood cultures drawn in ED 03/02 showing GPCs resembling staph. No clear source of infection at this time. No noted areas of skin breakdown, tunneled line does not appear grossly infected (replaced on 3/4), UA unremarkable, CXR at presentation more consistent with pulmonary edema than infectious process.    - Continue IV Vancomycin. De-escalate as feasible.  - ID consulted, appreciate recs  - Will attempt 48 hour line holiday after HD tomorrow  - Patient had presyncopal episode after HD yesterday due to being NPO  - NPO at midnight with D10 running, general surgery consulted for line removal after HD  - F/U repeat cultures

## 2022-03-08 NOTE — ASSESSMENT & PLAN NOTE
Last A1c 5.5 (3/3/22). On detemir 30U subQ BID at home. Some postprandial derangements.    - Continue insulin detemir 20U subQ BID  - Continue Aspart 5u TIDWM  - LDSSI  - Accuchecks achs  - Diabetic diet

## 2022-03-08 NOTE — ASSESSMENT & PLAN NOTE
Baseline Hb 10, Hb 9.6 on admission  Antiplatelet/anticoagulation: Aspirin, Plavix  Likely 2/2 ESRD    - Monitor CBC daily  - Transfuse with goal Hb > 7

## 2022-03-08 NOTE — ASSESSMENT & PLAN NOTE
Chronic, hypertensive on evaluation today.    - Coreg 25 mg BID  - Losartan 25 mg daily  - Labetalol 10 mg PRN for SBP >160  - Monitor BP

## 2022-03-08 NOTE — ASSESSMENT & PLAN NOTE
ESRD on HD M,W,F outpatient. Access through right chest tunneled catheter  Oliguric at baseline, still makes some urine  Nephrology consulted for HD while inpatient    - HD per nephro, next session scheduled for 3/9  - Renal function panel daily  - Monitor intake/output and daily standing weights.  - Replete electrolytes PRN, goal Mag/Phos/K 2/3/4   - Avoid nephrotoxic agents such as NSAIDs, gadolinium and IV radiocontrast.  - Renally dose meds to current GFR.  - Maintain MAP > 65.

## 2022-03-08 NOTE — PLAN OF CARE
Problem: Occupational Therapy Goal  Goal: Occupational Therapy Goal  Description: Goals set on 3/6 with expiration date 3/20:  Patient will increase functional independence with ADLs by performing:    Supine <> Sit with SBA.   Feeding while seated EOB/bedside chair with  Mod I.   Grooming while seated EOB with SBA.   UB Dressing with Min A.   LB Dressing with Min A.   Step transfer with  Mod I with DME as needed.  Pt will demonstrate understanding of education provided regarding energy conservation and task modification through teach-back method.   Patient and/or patient's family will verbalize understanding of POC and post d/c support needs, and personal risk factors for fall risk reduction.       Outcome: Ongoing, Progressing

## 2022-03-08 NOTE — PROGRESS NOTES
"[facility] - Cardiology Stepdown  Infectious Disease  Progress Note    Patient Name: Melinda Knight  MRN: [id number]  Admission Date: 3/3/2022  Length of Stay: 5 days  Attending Physician: Christin Randhawa MD  Primary Care Provider: Duke Cole MD    Isolation Status: Contact  Assessment/Plan:      Bacteremia  Afebrile and without leukocytosis. Blood cultures 3/2 with MRSA. Repeat blood cultures 3/4 are NGTD. TTE without evidence of infective endocarditis. Patient today mentions scratching at HD line site due to itching. Was also bleeding at some point. This is a likely source of initial bacteremia.     Recommendations:    --Continue vancomycin per pharmacy dosing to target MRSA   --Recommend removing HD catheter and repeating 2 sets of blood cultures.            Thank you for your consult. I will follow-up with patient. Please contact us if you have any additional questions.    Rosie Rehman MD  Infectious Disease  [facility]    Subjective:     Principal Problem:NSTEMI (non-ST elevated myocardial infarction)    HPI: A 76-year-old woman with CAD, HTN, DM2, ESRD on HD, restrictive lung disease, chronic hypoxemic respiratory failure on home oxygen (2-3 LNC) who was transferred from [facility] for CTS evaluation. Mrs. Knight has presented to [facility] ED with 2 days of epigastric pain, nausea, vomiting and SOB. She was evaluated and subsequently transferred to [facility] after work up revealed elevated troponin plus BNP, and a CXR consistent with pulmonary edema. On admission to [facility] she was afebrile and with leukocytosis. Blood cultures were performed and positive for MRSA.     On [place] [facility] she was afebrile and without leukocytosis. Blood cultures repeated on 3/4 are NGTD. She was evaluated by CTS and deemed a non surgical candidate.     Infectious Diseases consulted for "Pt with NSTEMI, ESRD with staph bacteremia, susceptibilities pending, on IV Vanc. Likely source " "permacath."          Interval History: NEON    Review of Systems   Constitutional:  Negative for chills, fatigue, fever and unexpected weight change.   HENT:  Negative for congestion, postnasal drip and trouble swallowing.    Respiratory:  Negative for cough, chest tightness and shortness of breath.    Cardiovascular:  Negative for chest pain, palpitations and leg swelling.   Gastrointestinal:  Negative for abdominal pain, blood in stool, constipation, diarrhea, nausea and vomiting.   Genitourinary:  Negative for difficulty urinating, dysuria and hematuria.   Musculoskeletal:  Negative for arthralgias and back pain.   Skin:         Itching at right chest HD line site    Neurological:  Negative for dizziness and light-headedness.   Psychiatric/Behavioral:  Negative for confusion.    Objective:     Vital Signs (Most Recent):  Temp: 97.4 °F (36.3 °C) (03/08/22 1504)  Pulse: 73 (03/08/22 1504)  Resp: 20 (03/08/22 1504)  BP: (!) 141/60 (03/08/22 1504)  SpO2: 95 % (03/08/22 1504)   Vital Signs (24h Range):  Temp:  [96.3 °F (35.7 °C)-97.5 °F (36.4 °C)] 97.4 °F (36.3 °C)  Pulse:  [63-85] 73  Resp:  [16-20] 20  SpO2:  [93 %-100 %] 95 %  BP: (121-179)/(59-72) 141/60     Weight: 90 kg (198 lb 6.6 oz)  Body mass index is 37.49 kg/m².    Estimated Creatinine Clearance: 9.3 mL/min (A) (based on SCr of 5.4 mg/dL (H)).    Physical Exam  Constitutional:       General: She is not in acute distress.     Appearance: Normal appearance. She is not ill-appearing.   Cardiovascular:      Rate and Rhythm: Normal rate and regular rhythm.      Pulses: Normal pulses.      Heart sounds: Normal heart sounds. No murmur heard.    No friction rub. No gallop.   Pulmonary:      Effort: Pulmonary effort is normal. No respiratory distress.      Breath sounds: Rales present.   Abdominal:      General: Abdomen is flat. Bowel sounds are normal. There is no distension.      Palpations: Abdomen is soft.      Tenderness: There is no abdominal tenderness. "   Skin:     General: Skin is warm and dry.      Comments: Right side of chest with no erythema at HD line site; no drainage or wound noted; intact and dry being used for dialysis today    Neurological:      Mental Status: She is alert.       Significant Labs: All pertinent labs within the past 24 hours have been reviewed.    Significant Imaging: I have reviewed all pertinent imaging results/findings within the past 24 hours.

## 2022-03-08 NOTE — PT/OT/SLP PROGRESS
Physical Therapy      Patient Name:  Melinda Knight   MRN:  [id number]    Patient not seen today secondary to  (pt not seen but can meet POC with subsequent PT visits.). Will follow-up at a later date.    3/8/2022  .

## 2022-03-08 NOTE — SUBJECTIVE & OBJECTIVE
Interval History: NEON    Review of Systems   Constitutional:  Negative for chills, fatigue, fever and unexpected weight change.   HENT:  Negative for congestion, postnasal drip and trouble swallowing.    Respiratory:  Negative for cough, chest tightness and shortness of breath.    Cardiovascular:  Negative for chest pain, palpitations and leg swelling.   Gastrointestinal:  Negative for abdominal pain, blood in stool, constipation, diarrhea, nausea and vomiting.   Genitourinary:  Negative for difficulty urinating, dysuria and hematuria.   Musculoskeletal:  Negative for arthralgias and back pain.   Skin:         Itching at right chest HD line site    Neurological:  Negative for dizziness and light-headedness.   Psychiatric/Behavioral:  Negative for confusion.    Objective:     Vital Signs (Most Recent):  Temp: 97.4 °F (36.3 °C) (03/08/22 1504)  Pulse: 73 (03/08/22 1504)  Resp: 20 (03/08/22 1504)  BP: (!) 141/60 (03/08/22 1504)  SpO2: 95 % (03/08/22 1504)   Vital Signs (24h Range):  Temp:  [96.3 °F (35.7 °C)-97.5 °F (36.4 °C)] 97.4 °F (36.3 °C)  Pulse:  [63-85] 73  Resp:  [16-20] 20  SpO2:  [93 %-100 %] 95 %  BP: (121-179)/(59-72) 141/60     Weight: 90 kg (198 lb 6.6 oz)  Body mass index is 37.49 kg/m².    Estimated Creatinine Clearance: 9.3 mL/min (A) (based on SCr of 5.4 mg/dL (H)).    Physical Exam  Constitutional:       General: She is not in acute distress.     Appearance: Normal appearance. She is not ill-appearing.   Cardiovascular:      Rate and Rhythm: Normal rate and regular rhythm.      Pulses: Normal pulses.      Heart sounds: Normal heart sounds. No murmur heard.    No friction rub. No gallop.   Pulmonary:      Effort: Pulmonary effort is normal. No respiratory distress.      Breath sounds: Rales present.   Abdominal:      General: Abdomen is flat. Bowel sounds are normal. There is no distension.      Palpations: Abdomen is soft.      Tenderness: There is no abdominal tenderness.   Skin:     General: Skin  is warm and dry.      Comments: Right side of chest with no erythema at HD line site; no drainage or wound noted; intact and dry being used for dialysis today    Neurological:      Mental Status: She is alert.       Significant Labs: All pertinent labs within the past 24 hours have been reviewed.    Significant Imaging: I have reviewed all pertinent imaging results/findings within the past 24 hours.

## 2022-03-08 NOTE — ASSESSMENT & PLAN NOTE
Patient with Hypoxic Respiratory failure which is Acute on chronic.  she is on home oxygen at 2-3 LPM. Supplemental oxygen was provided and noted- Oxygen Concentration (%):  [29] 29.   Signs/symptoms of respiratory failure include- respiratory distress. Contributing diagnoses includes - Interstitial lung disease Labs and images were reviewed. Patient Has recent ABG, which has been reviewed. Will treat underlying causes and adjust management of respiratory failure as follows:    - Duo nebs q6h  - Inhalation treatment q6h  - ICS and flutter valve  - Supplemental O2 PRN  - Bipap QHS PRN

## 2022-03-08 NOTE — PLAN OF CARE
[facility] - Cardiology Stepdown  Initial Discharge Assessment       Primary Care Provider: Duke Cole MD    Admission Diagnosis: NSTEMI (non-ST elevated myocardial infarction) [I21.4]    Admission Date: 3/3/2022  Expected Discharge Date: 3/11/2022    Discharge Barriers Identified: None    Payor: [employer] MANAGED MEDICARE / Plan: [employer] HEALTH / Product Type: Medicare Advantage /     Extended Emergency Contact Information  Primary Emergency Contact: ElieSherry  Address: [address] [place] [address] [facility]  Home Phone: [phone]  Mobile Phone: [phone]  Relation: Daughter  Secondary Emergency Contact: Selina Knight   [facility]  Home Phone: [phone]  Mobile Phone: [phone]  Relation: Daughter    Discharge Plan A: Home Health  Discharge Plan B: Home with family, Home      [facility] - [place] - [address]  [address] [place] [address]  Phone: [phone] Fax: [phone]    [id number] - [place], [place] - [address]  [address]  [place] [address]  Phone: [phone] Fax: [phone]      Initial Assessment (most recent)     Adult Discharge Assessment - 03/08/22 1533        Discharge Assessment    Assessment Type Discharge Planning Assessment     Confirmed/corrected address, phone number and insurance Yes     Confirmed Demographics Correct on Facesheet     Source of Information patient;family;health record     Communicated UMAIR with patient/caregiver Date not available/Unable to determine     Lives With child(aster), adult     Do you expect to return to your current living situation? Yes     Do you have help at home or someone to help you manage your care at home? Yes     Who are your caregiver(s) and their phone number(s)? Selina (daughter) [phone]     Prior to hospitilization cognitive status: Alert/Oriented     Current cognitive status:  Alert/Oriented     Walking or Climbing Stairs Difficulty ambulation difficulty, requires equipment     Mobility Management Walker     Dressing/Bathing Difficulty bathing difficulty, requires equipment     Dressing/Bathing Management BSC     Equipment Currently Used at Home bedside commode;walker, rolling;oxygen;nebulizer;CPAP   oxygen through [employer] 2-3L    Readmission within 30 days? No     Patient currently being followed by outpatient case management? No     Do you currently have service(s) that help you manage your care at home? No     Do you have prescription coverage? Yes     Coverage PHN/Medicaid     Do you have any problems affording any of your prescribed medications? TBD     Who is going to help you get home at discharge? Daughters     How do you get to doctors appointments? family or friend will provide     Are you on dialysis? Yes     Dialysis Name and Scheduled days MWF HD at [facility]     Do you take coumadin? No     Discharge Plan A Home Health     Discharge Plan B Home with family;Home     DME Needed Upon Discharge  none     Discharge Plan discussed with: Patient;Adult children     Discharge Barriers Identified None                    Pt admitted 3/3/2022  6:50 AM    For NSTEMI (non-ST elevated myocardial infarction) [I21.4]       CM to bedside for assessment. Information obtained from patient and her daughter, Gretta. Pt has multiple family members at bedside.   Pt lives with daughter in house. Daughter will bring patient home at discharge. Will have support from family at d/c. Anticipate d/c to home with no needs.      PCP is Duke Cole MD  [phone] (p)  [phone] (f)      Payor: [employer] MA[employer]GED MEDICARE / Plan: [employer] [facility] HEALTH / Product Type: Medicare Advantage /       [facility] - [place] - [address]  [address] [place] [address]  Phone: [phone] Fax: [phone]    [id number] -  [place] - [address]  [address]  [place] [address]  Phone: [phone] Fax: [phone]      Extended Emergency Contact Information  Primary Emergency Contact: Sherry Delgadillo  Address: [address] [place] [address] [facility]  Home Phone: [phone]  Mobile Phone: [phone]  Relation: Daughter  Secondary Emergency Contact: Selina Knight   [facility]  Home Phone: [phone]  Mobile Phone: [phone]  Relation: Daughter    Future Appointments   Date Time Provider Department Center   3/9/2022  7:00 AM DIALYSIS, [facility]         Julie Haase RN  Case Management [phone]

## 2022-03-08 NOTE — PLAN OF CARE
Brief Interventional Cardiology Note    Had extended risks and benefits discussion with patient, daughter and son-in-law. Her primary priority is minimizing pain and procedures and expediting discharge. I believe this is reasonable given her clinical picture of diffuse, nonfocal CAD, for which she has been declined for CABG, and her desire to pursue medical therapy.    Would continue medical therapy as below:    - OK to eat from cardiology standpoint  - would continue aspirin 81mg daily for life and plavix 75mg daily for at least 1 year  - continue BB  - continue high intensity statin  - needs improved BP control, consider addition of ACE/ARB vs CCB  - needs prn sublingual nitro at discharge; instructed on appropriate use.  - HD per primary team and nephrology      Case discussed with attending, Dr. Schreiber.    Thank you for this interesting consult. Cardiology consult will sign off. Please call with questions as needed.      Ammon Hampton MD  PGY-6, Cardiology  Pager [phone]

## 2022-03-08 NOTE — ASSESSMENT & PLAN NOTE
Afebrile and without leukocytosis. Blood cultures 3/2 with MRSA. Repeat blood cultures 3/4 are NGTD. TTE without evidence of infective endocarditis. Patient today mentions scratching at HD line site due to itching. Was also bleeding at some point. This is a likely source of initial bacteremia.     Recommendations:    --Continue vancomycin per pharmacy dosing to target MRSA   --Recommend removing HD catheter and repeating 2 sets of blood cultures.

## 2022-03-08 NOTE — PLAN OF CARE
Plan of care discussed.all night meds given and tolerated.pt denies pain or discomfort.nc 2l intact.no complaitns of resp.distress

## 2022-03-09 NOTE — PROGRESS NOTES
Dialysis completed. Right IJ tunneled catheter flushed, heparinized, capped and taped. Patient dialyzed for 3 hours with no fluid removal due to drop in blood pressure. Patient was asymptomatic. Dr. Olivier of nephrology aware.  Patient returned to her room via bed after dialysis.

## 2022-03-09 NOTE — ASSESSMENT & PLAN NOTE
Meds: carvedilol 25mg bid and losartan 25mg daily   Since SBPs have been in the low 110's-120's consider decreasing dose and holding meds prior to HD

## 2022-03-09 NOTE — PT/OT/SLP PROGRESS
Physical Therapy      Patient Name:  Melinda Knight   MRN:  [id number]    Patient not seen today secondary to Dialysis. Will follow-up as appropriate.

## 2022-03-09 NOTE — PROGRESS NOTES
Patient received in dialysis via bed. Maintenance dialysis started via right IJ tunneled catheter. Isolation precautions maintained.

## 2022-03-09 NOTE — CONSULTS
Pharmacokinetic Assessment Follow Up: IV Vancomycin    Vancomycin serum concentration assessment(s):    S/p HD 3/7. Vancomycin concentration intended for this AM was drawn yesterday afternoon (33.5 mcg/mL). Labs not drawn this AM. Concentration likely still > 20 mcg/mL. Anticipate HD today    Vancomycin Regimen Plan:  1. Hold vancomycin today  2. Concentration in AM 3/10/22; goal 15-20 mcg/mL    Thank you for the consult, will continue to follow  Melvin ReichD., BCPS  [id number]       Patient brief summary:  Melinda Knight is a 76 y.o. female initiated on antimicrobial therapy with IV Vancomycin for treatment of MRSA bacteremia       Drug levels (last 3 results):  Recent Labs   Lab Result Units 03/07/22  0335 03/08/22  1636   Vancomycin, Random ug/mL 20.7 33.5       Drug Allergies:   Review of patient's allergies indicates:   Allergen Reactions    Sulfa (sulfonamide antibiotics) Nausea And Vomiting    Menthol contain prod        Actual Body Weight:   90 kg    Renal Function:   Estimated Creatinine Clearance: 11.7 mL/min (A) (based on SCr of 4.3 mg/dL (H)).,     Dialysis Method (if applicable):  HD MWF    CBC (last 72 hours):  Recent Labs   Lab Result Units 03/07/22  0336 03/08/22  1636   WBC K/uL 8.44 9.59   Hemoglobin g/dL 10.0* 9.9*   Hematocrit % 33.3* 31.2*   Platelets K/uL 256 331   Gran % % 59.8 70.2   Lymph % % 19.4 13.3*   Mono % % 12.2 9.1   Eosinophil % % 6.4 4.3   Basophil % % 0.9 0.8   Differential Method  Automated Automated       Metabolic Panel (last 72 hours):  Recent Labs   Lab Result Units 03/07/22  0335 03/08/22  1636   Sodium mmol/L 129* 131*   Potassium mmol/L 4.9 4.5   Chloride mmol/L 92* 91*   CO2 mmol/L 22* 26   Glucose mg/dL 119* 185*   BUN mg/dL 58* 43*   Creatinine mg/dL 5.4* 4.3*   Albumin g/dL 2.3* 2.6*   Magnesium mg/dL 1.7 2.2   Phosphorus mg/dL 5.2* 4.3       Vancomycin Administrations:  vancomycin given in the last 96 hours                   vancomycin 500 mg in dextrose 5 % 100  mL IVPB (ready to mix system) (mg) 500 mg New Bag 03/06/22 1108    vancomycin 2 g in dextrose 5 % 500 mL IVPB (mg) 2,000 mg New Bag 03/04/22 0841                Microbiologic Results:  Microbiology Results (last 7 days)     Procedure Component Value Units Date/Time    Blood culture [[id number]] Collected: 03/04/22 1039    Order Status: Completed Specimen: Blood Updated: 03/08/22 1612     Blood Culture, Routine No Growth to date      No Growth to date      No Growth to date      No Growth to date      No Growth to date    Blood culture [[id number]] Collected: 03/04/22 1039    Order Status: Completed Specimen: Blood from Peripheral, Right Updated: 03/08/22 1612     Blood Culture, Routine No Growth to date      No Growth to date      No Growth to date      No Growth to date      No Growth to date

## 2022-03-09 NOTE — NURSING
Overnight resident MD called for clarification on new order for dextrose 10% to run overnight.MD resident stated to continue with orders.

## 2022-03-09 NOTE — ASSESSMENT & PLAN NOTE
Chronic, hypertensive on evaluation today.    - Coreg 25 mg BID  - Losartan 25 mg daily  - Labetalol 10 mg PRN for SBP >160    Plan  - Hold in the morning before dialysis

## 2022-03-09 NOTE — PROGRESS NOTES
"[facility] - Cardiology [facility] Medicine  Progress Note    Patient Name: Melinda Knight  MRN: [id number]  Patient Class: IP- Inpatient   Admission Date: 3/3/2022  Length of Stay: 6 days  Attending Physician: Christin Randhawa MD  Primary Care Provider: Duke Cole MD        Subjective:     Principal Problem:NSTEMI (non-ST elevated myocardial infarction)        HPI:  Melinda Knight is a 76 y.o. F with history of HTN, HLD, CAD, DMII, hypothyroidism, ESRD on HD MWF, GERD, restrictive lung disease with chronic hypoxemic resp failure (on 2-3L O2) who transfers to [facility] for CTS evaluation for CABG +/- AVR. Initially presented to [facility] ED w/ 2 day history of "heartburn pain," N/V, and SOB. Workup notable for troponin 13 (peaked at 42 on 3/3), elevated BNP 2268 (baseline 400-600s), and radiographic evidence of pulmonary edema. EKG SR with RBBB. She was hypoxic and placed on BiPAP. Transferred to [facility] w/ NSTEMI. Cardiology and nephrology following. Volume removal with HD and weaned to baseline supplemental O2. BCx 3/2 resulted GPC resembling Staph and vanc initiated. Repeat BCx NGTD. LHC on 3/3 revealed proximal RCA 60% stenosed, mid LAD 70% stenosed, and mid Cx 70% stenosed. TTE revealed EF 48% with PORTILLO 0.63cm2, LFLG AS. Cardiology recommended CTS evaluation for CABG +/- AVR. Last dose of Plavix 3/3. She denies CP, SOB, N/V.      Overview/Hospital Course:  Admitted as transfer from [facility] ED with NSTEMI. Cardiology and nephrology consulted. Volume removed with HD and weaned to baseline supplemental O2. LHC performed showing triple vessel disease. Cardiology recommended CTS evaluation. Patient not a CABG candidate per CTS. IC consulted for possible PCI. Blood cultures from ED 03/02 showed GPCs; started IV Vancomycin. Permacath exchanged on 03/04. ID consulted for bacteremia. After long discussion with primary and cardiology, patient and family elected to not proceed with LHC/PCI and maintain DNR status. Pursuing " medical management. ID requested 48 hour line holiday to see if blood cultures remain clear. General surgery consulted for line removal. Procedure to be done today. During dialysis 3/9/22 patient became hypotensive. Will hold BP meds for now and add as able.      Interval History:    Dialysis this AM. Not able to get a lot of fluid out. General Surgery to remove tunneled cath today. Patient does not have to be NPO for removal.    Review of Systems   Constitutional:  Negative for chills and fever.   HENT:  Negative for sore throat.    Respiratory:  Negative for cough and shortness of breath.    Cardiovascular:  Negative for chest pain and leg swelling.   Gastrointestinal:  Negative for abdominal pain, constipation, diarrhea, nausea and vomiting.   Genitourinary:  Negative for dysuria.   Musculoskeletal:  Negative for myalgias.   Skin:  Negative for rash.   Neurological:  Negative for dizziness and headaches.   Psychiatric/Behavioral:  Negative for confusion. The patient is nervous/anxious.       Objective:     Vital Signs (Most Recent):  Temp: 97.9 °F (36.6 °C) (03/09/22 1145)  Pulse: 78 (03/09/22 1429)  Resp: 20 (03/09/22 1429)  BP: (!) 103/56 (03/09/22 1145)  SpO2: (!) 94 % (03/09/22 1429)   Vital Signs (24h Range):  Temp:  [97.7 °F (36.5 °C)-99.1 °F (37.3 °C)] 97.9 °F (36.6 °C)  Pulse:  [64-89] 78  Resp:  [16-20] 20  SpO2:  [94 %-99 %] 94 %  BP: ()/(40-67) 103/56     Weight: 90 kg (198 lb 6.6 oz)  Body mass index is 37.49 kg/m².    Intake/Output Summary (Last 24 hours) at 3/9/2022 1507  Last data filed at 3/9/2022 0600  Gross per 24 hour   Intake --   Output 150 ml   Net -150 ml        Physical Exam  Constitutional:       Appearance: Normal appearance.   HENT:      Head: Normocephalic and atraumatic.      Mouth/Throat:      Mouth: Mucous membranes are moist.      Pharynx: Oropharynx is clear.   Eyes:      Extraocular Movements: Extraocular movements intact.      Pupils: Pupils are equal, round, and reactive  to light.   Neck:      Comments: No JVD  Cardiovascular:      Rate and Rhythm: Normal rate and regular rhythm.      Pulses: Normal pulses.      Heart sounds: Normal heart sounds.   Pulmonary:      Effort: Pulmonary effort is normal. No respiratory distress.      Breath sounds: Wheezing present. No rales.   Abdominal:      General: Abdomen is flat. Bowel sounds are normal. There is no distension.      Palpations: Abdomen is soft.      Tenderness: There is no abdominal tenderness. There is no guarding or rebound.   Musculoskeletal:         General: No swelling.      Cervical back: Neck supple.      Right lower leg: Edema (trace) present.      Left lower leg: Edema (trace) present.   Skin:     General: Skin is warm and dry.      Capillary Refill: Capillary refill takes less than 2 seconds.   Neurological:      General: No focal deficit present.      Mental Status: She is alert and oriented to person, place, and time. Mental status is at baseline.   Psychiatric:         Mood and Affect: Mood normal.         Behavior: Behavior normal.         Thought Content: Thought content normal.         Judgment: Judgment normal.     Significant Labs: BMP:   Recent Labs   Lab 03/09/22  1328   *   *   K 3.9   CL 94*   CO2 25   BUN 22   CREATININE 3.1*   CALCIUM 8.2*   MG 1.8     CBC:   Recent Labs   Lab 03/08/22  1636 03/09/22  1328   WBC 9.59 8.08   HGB 9.9* 8.8*   HCT 31.2* 28.2*    299       Significant Imaging: I have reviewed all pertinent imaging results/findings within the past 24 hours.      Assessment/Plan:      * NSTEMI (non-ST elevated myocardial infarction)  HTN, HLD, CAD, acute on chronic resp failure with hypoxia, pHTN, AS, elevated troponin  - Echo 03/03 shows EF 48%, severe AS.  - LHC 03/03 showed LAD and LCx 70% stenosis and RCA 60% stenosis. Transferred [facility] for CTS evaluation; per CTS pt is poor surgical candidate. Recommended high risk PCI vs medical management  - Interventional  cardiology consulted, after lengthy discussion with family, elected not to pursue invasive measures, treating medically  - Continue aspirin 81mg daily for life and clopidogrel 75mg PO daily for 1 year  - Continue atorvastatin 80mg PO daily, carvedilol 12.5mg PO BID.  - Start Losartan 25 mg daily for better BP control  - Weaned to baseline supplemental O2.  - Duo nebs and inhalation treatment q6h   - d/c heparin gtt.  - Appreciate cardiology assistance.    Bacteremia  Blood cultures drawn in ED 03/02 showing GPCs resembling staph. No clear source of infection at this time. No noted areas of skin breakdown, tunneled line does not appear grossly infected (replaced on 3/4), UA unremarkable, CXR at presentation more consistent with pulmonary edema than infectious process.    - Continue IV Vancomycin. De-escalate as feasible.  - ID consulted, appreciate recs  - Will attempt 48 hour line holiday after HD tomorrow  - Patient had presyncopal episode after HD yesterday due to being NPO  - General Surgery to remove tunneled catheter today. Will order repeat blood cultures whenever catheter is removed.  - F/U repeat cultures    Advance care planning  DNR status on admit; current [place] reflects her goals of care. Engaged the patient in a GOC discussion on 3/6. Patient has full capacity and stating that she would not want excessive life-saving measures including CPR or intubation.    - DNR order in place    Debility  - PT/OT recommending HH    Acute on chronic respiratory failure with hypoxemia  Patient with Hypoxic Respiratory failure which is Acute on chronic.  she is on home oxygen at 2-3 LPM. Supplemental oxygen was provided and noted-  .   Signs/symptoms of respiratory failure include- respiratory distress. Contributing diagnoses includes - Interstitial lung disease Labs and images were reviewed. Patient Has recent ABG, which has been reviewed. Will treat underlying causes and adjust management of respiratory failure as  follows:    - Duo nebs q6h  - Inhalation treatment q6h  - ICS and flutter valve  - Supplemental O2 PRN  - Bipap QHS PRN    Elevated troponin  See NSTEMI      ESRD (end stage renal disease) on dialysis  ESRD on HD M,W,F outpatient. Access through right chest tunneled catheter  Oliguric at baseline, still makes some urine  Nephrology consulted for HD while inpatient  Dialysis 3/9/22. Unable to get a lot of fluid out due to hypotension. Holding BP meds.    - Renal function panel daily  - Monitor intake/output and daily standing weights.  - Replete electrolytes PRN, goal Mag/Phos/K 2/3/4   - Avoid nephrotoxic agents such as NSAIDs, gadolinium and IV radiocontrast.  - Renally dose meds to current GFR.  - Maintain MAP > 65.        Pulmonary hypertension  Likely 2/2 chronic ILD    See acute on chronic hypoxemic respiratory failure      Anemia of chronic disease  Baseline Hb 10, Hb 9.6 on admission  Antiplatelet/anticoagulation: Aspirin, Plavix  Likely 2/2 ESRD    - Monitor CBC daily  - Transfuse with goal Hb > 7        Aortic valve stenosis  See NSTEMI      Type 2 diabetes mellitus with diabetic polyneuropathy  Last A1c 5.5 (3/3/22). On detemir 30U subQ BID at home. Some postprandial derangements.    - Continue insulin detemir 20U subQ BID  - Continue Aspart 5u TIDWM  - LDSSI  - Accuchecks achs  - Diabetic diet      Hypothyroidism (acquired)  TSH, T4 stable. Hyponatremia likely 2/2 renal failure.    - Continue levothyroxine 75mcg PO daily.    GERD (gastroesophageal reflux disease)  - Continue pantoprazole 40mg PO daily.    Mixed hyperlipidemia  Chronic, stable.    - Continue home Lipitor 80 mg daily    Essential hypertension  Chronic, hypertensive on evaluation today.    - Coreg 25 mg BID  - Losartan 25 mg daily  - Labetalol 10 mg PRN for SBP >160    Plan  - Hold in the morning before dialysis      VTE Risk Mitigation (From admission, onward)         Ordered     heparin (porcine) injection 1,000 Units  As needed (PRN)          03/08/22 1845     heparin (porcine) injection 5,000 Units  As needed (PRN)         03/03/22 0755     IP VTE HIGH RISK PATIENT  Once         03/03/22 0650     Place sequential compression device  Until discontinued         03/03/22 0650                Discharge Planning   UMAIR: 3/11/2022     Code Status: DNR   Is the patient medically ready for discharge?: No    Reason for patient still in hospital (select all that apply): Treatment  Discharge Plan A: Home Health                  Kary Chamberlain MD  Department of Hospital Medicine   [facility] - Cardiology Stepdown

## 2022-03-09 NOTE — SUBJECTIVE & OBJECTIVE
Interval History:   No acute events overnight, no labs this morning   SBPs 110's-120's, pulse 60's-70's  Pt states feeling well w/o chest pain, shortness of breath, dizziness, lightheadedness, n/v/d or diarrhea  Had 150 ml of urine/24 hrs  Last iHD 3/7, UF -2.5L      Review of patient's allergies indicates:   Allergen Reactions    Sulfa (sulfonamide antibiotics) Nausea And Vomiting    Menthol contain prod      Current Facility-Administered Medications   Medication Frequency    0.9%  NaCl infusion Once    acetaminophen tablet 650 mg Q6H PRN    acetaminophen tablet 650 mg Q4H PRN    albuterol-ipratropium 2.5 mg-0.5 mg/3 mL nebulizer solution 3 mL Q6H WAKE    aspirin chewable tablet 81 mg Daily    atorvastatin tablet 80 mg Daily    carvediloL tablet 25 mg BID    citalopram tablet 20 mg Daily    clopidogreL tablet 75 mg Daily    dextrose 10% bolus 125 mL PRN    dextrose 10% bolus 250 mL PRN    glucagon (human recombinant) injection 1 mg PRN    glucose chewable tablet 16 g PRN    glucose chewable tablet 24 g PRN    heparin (porcine) injection 1,000 Units PRN    hydrOXYzine HCL tablet 50 mg Q4H PRN    insulin aspart U-100 pen 0-5 Units QID (AC + HS) PRN    insulin aspart U-100 pen 5 Units TIDWM    insulin detemir U-100 pen 20 Units BID    labetalol 20 mg/4 mL (5 mg/mL) IV syring Q4H PRN    levothyroxine tablet 75 mcg Before breakfast    losartan tablet 25 mg Daily    melatonin tablet 6 mg Nightly PRN    ondansetron disintegrating tablet 4 mg Q8H PRN    ondansetron injection 4 mg Q8H PRN    pantoprazole EC tablet 40 mg Daily    sevelamer carbonate tablet 800 mg TID WM    sodium chloride 0.9% bolus 250 mL PRN    sodium chloride 0.9% flush 10 mL PRN    vancomycin - pharmacy to dose pharmacy to manage frequency    vitamin renal formula (B-complex-vitamin c-folic acid) 1 mg per capsule 1 capsule Daily       Objective:     Vital Signs (Most Recent):  Temp: 97.9 °F (36.6 °C) (03/09/22 0830)  Pulse: 69 (03/09/22 1030)  Resp: 18  (03/09/22 0830)  BP: (!) 92/47 (03/09/22 1030)  SpO2: 96 % (03/09/22 0830)  O2 Device (Oxygen Therapy): nasal cannula (03/09/22 0830)   Vital Signs (24h Range):  Temp:  [96.3 °F (35.7 °C)-99.1 °F (37.3 °C)] 97.9 °F (36.6 °C)  Pulse:  [63-89] 69  Resp:  [16-20] 18  SpO2:  [95 %-99 %] 96 %  BP: ()/(40-67) 92/47     Weight: 90 kg (198 lb 6.6 oz) (03/04/22 0600)  Body mass index is 37.49 kg/m².  Body surface area is 1.97 meters squared.    I/O last 3 completed shifts:  In: 702 [P.O.:702]  Out: 350 [Urine:350]    Physical Exam  Vitals and nursing note reviewed.   Constitutional:       General: She is not in acute distress.     Appearance: Normal appearance. She is not ill-appearing, toxic-appearing or diaphoretic.   Cardiovascular:      Rate and Rhythm: Normal rate and regular rhythm.      Pulses: Normal pulses.      Heart sounds: Murmur heard.      Comments: R IJ tunneled catheter  Pulmonary:      Effort: Pulmonary effort is normal. No respiratory distress.      Breath sounds: No wheezing, rhonchi or rales.      Comments: Decreased breath sounds in posterior lung fields  Musculoskeletal:         General: No swelling, tenderness or deformity.      Right lower leg: Edema present.      Left lower leg: Edema present.      Comments: Trace LE edema purvi    Skin:     General: Skin is warm.      Capillary Refill: Capillary refill takes 2 to 3 seconds.      Coloration: Skin is not jaundiced or pale.      Findings: No bruising, erythema, lesion or rash.   Neurological:      Mental Status: She is alert and oriented to person, place, and time.   Psychiatric:         Mood and Affect: Mood normal.         Behavior: Behavior normal.         Thought Content: Thought content normal.         Judgment: Judgment normal.       Significant Labs:  Cardiac Markers: No results for input(s): CKMB, TROPONINT, MYOGLOBIN in the last 168 hours.  CBC:   Recent Labs   Lab 03/08/22  1636   WBC 9.59   RBC 3.05*   HGB 9.9*   HCT 31.2*       *   MCH 32.5*   MCHC 31.7*       CMP:   Recent Labs   Lab 03/02/22  2342 03/03/22  0745 03/08/22  1636   *   < > 185*   CALCIUM 10.2   < > 9.5   ALBUMIN 2.6*   < > 2.6*   PROT 7.1  --   --       < > 131*   K 4.0   < > 4.5   CO2 27   < > 26   CL 94*   < > 91*   BUN 64*   < > 43*   CREATININE 3.9*   < > 4.3*   ALKPHOS 87  --   --    ALT 17  --   --    AST 70*  --   --    BILITOT 0.5  --   --     < > = values in this interval not displayed.       Coagulation:   Recent Labs   Lab 03/03/22  0745 03/03/22  1612 03/07/22  1022   INR 1.0  --   --    APTT 27.1   < > 62.5*    < > = values in this interval not displayed.       PTH:   Recent Labs   Lab 03/03/22  0745   .4*       All labs within the past 24 hours have been reviewed.     Significant Imaging:  Labs: Reviewed

## 2022-03-09 NOTE — ASSESSMENT & PLAN NOTE
ESRD on HD M,W,F outpatient. Access through right chest tunneled catheter  Oliguric at baseline, still makes some urine  Nephrology consulted for HD while inpatient  Dialysis 3/9/22. Unable to get a lot of fluid out due to hypotension. Holding BP meds.    - Renal function panel daily  - Monitor intake/output and daily standing weights.  - Replete electrolytes PRN, goal Mag/Phos/K 2/3/4   - Avoid nephrotoxic agents such as NSAIDs, gadolinium and IV radiocontrast.  - Renally dose meds to current GFR.  - Maintain MAP > 65.

## 2022-03-09 NOTE — PROCEDURES
Physician: Tao Miranda     Assistant:  None     Diagnosis: Bacteremia     Procedure: Permacath removal     Date: 3/09/22     Location:  Right Chest     Anesthesia: Local. 5mL 1% Lidocaine w/o epinephrine     Procedure:  After informed consent and timeout was performed, the patient was positioned in her bed in a supine position. Following this, local anesthetic was injected on the anterior right chest along the lateral aspect of the catheter tract. The field was prepped and draped in a sterile fashion with chlorhexidine. Stay sutures were cut from the catheter and removed. A hemostat was then used to bluntly dissect the catheter from the subcutaneous tract. tension was applied and the catheter was removed in one piece. Pressure was held at the IJ insertion site for ten minutes.  There was no evidence of bleeding from the skin site or hematoma formation. A sterile dressing was applied to the skin site. Patient tolerated the procedure well.      Complications/Comments: None     Estimated Blood Loss: None     Disposition: Home

## 2022-03-09 NOTE — SUBJECTIVE & OBJECTIVE
Interval History:    Dialysis this AM. Not able to get a lot of fluid out. General Surgery to remove tunneled cath today. Patient does not have to be NPO for removal.    Review of Systems  Objective:     Vital Signs (Most Recent):  Temp: 97.9 °F (36.6 °C) (03/09/22 1145)  Pulse: 78 (03/09/22 1429)  Resp: 20 (03/09/22 1429)  BP: (!) 103/56 (03/09/22 1145)  SpO2: (!) 94 % (03/09/22 1429)   Vital Signs (24h Range):  Temp:  [97.7 °F (36.5 °C)-99.1 °F (37.3 °C)] 97.9 °F (36.6 °C)  Pulse:  [64-89] 78  Resp:  [16-20] 20  SpO2:  [94 %-99 %] 94 %  BP: ()/(40-67) 103/56     Weight: 90 kg (198 lb 6.6 oz)  Body mass index is 37.49 kg/m².    Intake/Output Summary (Last 24 hours) at 3/9/2022 1507  Last data filed at 3/9/2022 0600  Gross per 24 hour   Intake --   Output 150 ml   Net -150 ml      Physical Exam    Significant Labs: BMP:   Recent Labs   Lab 03/09/22  1328   *   *   K 3.9   CL 94*   CO2 25   BUN 22   CREATININE 3.1*   CALCIUM 8.2*   MG 1.8     CBC:   Recent Labs   Lab 03/08/22  1636 03/09/22  1328   WBC 9.59 8.08   HGB 9.9* 8.8*   HCT 31.2* 28.2*    299       Significant Imaging: I have reviewed all pertinent imaging results/findings within the past 24 hours.

## 2022-03-09 NOTE — ASSESSMENT & PLAN NOTE
HTN, HLD, CAD, acute on chronic resp failure with hypoxia, pHTN, AS, elevated troponin  - Echo 03/03 shows EF 48%, severe AS.  - LHC 03/03 showed LAD and LCx 70% stenosis and RCA 60% stenosis. Transferred [facility] for CTS evaluation; per CTS pt is poor surgical candidate. Recommended high risk PCI vs medical management  - Interventional cardiology consulted, after lengthy discussion with family, elected not to pursue invasive measures, treating medically  - Continue aspirin 81mg daily for life and clopidogrel 75mg PO daily for 1 year  - Continue atorvastatin 80mg PO daily, carvedilol 12.5mg PO BID.  - Start Losartan 25 mg daily for better BP control  - Weaned to baseline supplemental O2.  - Duo nebs and inhalation treatment q6h   - d/c heparin gtt.  - Appreciate cardiology assistance.

## 2022-03-09 NOTE — SUBJECTIVE & OBJECTIVE
Interval History: NAEO. AF VSS. HD line removed yesterday. Tolerated procedure well. Blood cxs NGTD. Plan for line placement prior to next HD session on Friday.    Review of Systems   Constitutional:  Negative for chills and fever.   HENT:  Negative for sore throat.    Respiratory:  Negative for cough and shortness of breath.    Cardiovascular:  Negative for chest pain and leg swelling.   Gastrointestinal:  Negative for abdominal pain, constipation, diarrhea, nausea and vomiting.   Genitourinary:  Negative for dysuria.   Musculoskeletal:  Negative for myalgias.   Skin:  Negative for rash.   Neurological:  Negative for dizziness and headaches.   Psychiatric/Behavioral:  Negative for confusion. The patient is nervous/anxious.    Objective:     Vital Signs (Most Recent):  Temp: 97.9 °F (36.6 °C) (03/09/22 1145)  Pulse: 78 (03/09/22 1429)  Resp: 20 (03/09/22 1429)  BP: (!) 103/56 (03/09/22 1145)  SpO2: (!) 94 % (03/09/22 1429)   Vital Signs (24h Range):  Temp:  [97.7 °F (36.5 °C)-99.1 °F (37.3 °C)] 97.9 °F (36.6 °C)  Pulse:  [64-89] 78  Resp:  [16-20] 20  SpO2:  [94 %-99 %] 94 %  BP: ()/(40-67) 103/56     Weight: 90 kg (198 lb 6.6 oz)  Body mass index is 37.49 kg/m².    Intake/Output Summary (Last 24 hours) at 3/9/2022 1515  Last data filed at 3/9/2022 0600  Gross per 24 hour   Intake --   Output 150 ml   Net -150 ml      Physical Exam  Constitutional:       Appearance: Normal appearance.   HENT:      Head: Normocephalic and atraumatic.      Mouth/Throat:      Mouth: Mucous membranes are moist.      Pharynx: Oropharynx is clear.   Eyes:      Extraocular Movements: Extraocular movements intact.      Pupils: Pupils are equal, round, and reactive to light.   Neck:      Comments: No JVD  Cardiovascular:      Rate and Rhythm: Normal rate and regular rhythm.      Pulses: Normal pulses.      Heart sounds: Normal heart sounds.   Pulmonary:      Effort: Pulmonary effort is normal. No respiratory distress.      Breath  sounds: Wheezing present. No rales.   Abdominal:      General: Abdomen is flat. Bowel sounds are normal. There is no distension.      Palpations: Abdomen is soft.      Tenderness: There is no abdominal tenderness. There is no guarding or rebound.   Musculoskeletal:         General: No swelling.      Cervical back: Neck supple.      Right lower leg: Edema (trace) present.      Left lower leg: Edema (trace) present.   Skin:     General: Skin is warm and dry.      Capillary Refill: Capillary refill takes less than 2 seconds.   Neurological:      General: No focal deficit present.      Mental Status: She is alert and oriented to person, place, and time. Mental status is at baseline.   Psychiatric:         Mood and Affect: Mood normal.         Behavior: Behavior normal.         Thought Content: Thought content normal.         Judgment: Judgment normal.       Significant Labs: All pertinent labs within the past 24 hours have been reviewed.  Recent Lab Results         03/10/22  0712   03/10/22  0400   03/09/22  1740   03/09/22  1739   03/09/22  1545        Albumin   2.4             Anion Gap   10             Baso #   0.11             Basophil %   1.2             Blood Culture, Routine     No Growth to date  [P]   No Growth to date  [P]         BUN   30             Calcium   9.2             Chloride   94             CO2   28             Creatinine   3.9             Differential Method   Automated             eGFR if    12.4             eGFR if non    10.7  Comment: Calculation used to obtain the estimated glomerular filtration  rate (eGFR) is the CKD-EPI equation.                Eos #   0.5             Eosinophil %   5.1             Glucose   248             Gran # (ANC)   5.4             Gran %   60.8             Hematocrit   30.6             Hemoglobin   9.5             Immature Grans (Abs)   0.44  Comment: Mild elevation in immature granulocytes is non specific and   can be seen in a  variety of conditions including stress response,   acute inflammation, trauma and pregnancy. Correlation with other   laboratory and clinical findings is essential.               Immature Granulocytes   4.9             Lymph #   1.6             Lymph %   17.4             Magnesium   1.8             MCH   32.8             MCHC   31.0             MCV   106             Mono #   1.0             Mono %   10.6             MPV   10.9             nRBC   0             Phosphorus   3.9             Platelets   355             POCT Glucose 270         220       Potassium   4.6             RBC   2.90             RDW   11.9             Sodium   132             Vancomycin, Random   18.5             WBC   8.94                                03/09/22  1328   03/09/22  1144        Albumin 2.3         Anion Gap 11         Baso # 0.05         Basophil % 0.6         Blood Culture, Routine         BUN 22         Calcium 8.2         Chloride 94         CO2 25         Creatinine 3.1         Differential Method Automated         eGFR if  16.3         eGFR if non  14.2  Comment: Calculation used to obtain the estimated glomerular filtration  rate (eGFR) is the CKD-EPI equation.            Eos # 0.3         Eosinophil % 3.8         Glucose 189         Gran # (ANC) 5.6         Gran % 69.1         Hematocrit 28.2         Hemoglobin 8.8         Immature Grans (Abs) 0.17  Comment: Mild elevation in immature granulocytes is non specific and   can be seen in a variety of conditions including stress response,   acute inflammation, trauma and pregnancy. Correlation with other   laboratory and clinical findings is essential.           Immature Granulocytes 2.1         Lymph # 1.1         Lymph % 14.1         Magnesium 1.8         MCH 32.5         MCHC 31.2                  Mono # 0.8         Mono % 10.3         MPV 10.9         nRBC 0         Phosphorus 2.9         Platelets 299         POCT Glucose   155        Potassium 3.9         RBC 2.71         RDW 11.9         Sodium 130         Vancomycin, Random         WBC 8.08                  [P] - Preliminary Result               Significant Imaging:       I have reviewed all pertinent imaging results/findings within the past 24 hours.

## 2022-03-09 NOTE — PLAN OF CARE
Chart reviewed, no acute events - plans for TL removal today. Please obtain repeat cultures after line removed. Continue IV vancomycin.        Rosie Rehman MD  Infectious Disease Fellow  PGY-5    Pager [phone]

## 2022-03-09 NOTE — PLAN OF CARE
Plan of care discussed.night meds given and tolerated.pt informed npo after midnight.no complaints of sob or any pain.

## 2022-03-09 NOTE — ASSESSMENT & PLAN NOTE
Patient with Hypoxic Respiratory failure which is Acute on chronic.  she is on home oxygen at 2-3 LPM. Supplemental oxygen was provided and noted-  .   Signs/symptoms of respiratory failure include- respiratory distress. Contributing diagnoses includes - Interstitial lung disease Labs and images were reviewed. Patient Has recent ABG, which has been reviewed. Will treat underlying causes and adjust management of respiratory failure as follows:    - Duo nebs q6h  - Inhalation treatment q6h  - ICS and flutter valve  - Supplemental O2 PRN  - Bipap QHS PRN

## 2022-03-09 NOTE — ASSESSMENT & PLAN NOTE
ESKD 2/2 HTN & DM   Outpatient HD Information:  -Dialysis modality: Hemodialysis  -Outpatient HD unit: [facility]   -Nephrologist: Dr. Ugalde   -HD TX days: Monday/Wednesday/Friday, duration of treatment: 4 hours   -Last HD TX prior to hospital admission: 03/04/2022  -Dialysis access: dialysis catheter R IJ   -Residual urine: Yes   -EDW: ?    Plan/Recommendations:   -iHD today (03/09/2022) unable to remove volume 2/2 hypotensive, MAPs 60's, primary team notified    OK to remove HD line today after HD and pt should get a tunneled cath if possible by Friday in time for pt's next HD treatment, if not a trialysis catheter will also be OK.   -Electrolytes: acceptable, Na 131, K 4.9, Mg 2.2  -Mineral & Bone Disorder:   Phosphorus 4.3, PTH level: 207.4, cont phosphate binders sevelamer 800mg tid w/ meals   -ESKD anemia: hgb 9.9, goal hgb 10-11g/dL, iron studies reviewed, no indication for iron infusions, ESAs not indicated, transfuse if hgb <7  -Renal function panel on HD days   -Renal diet with protein supplements, if not NPO  -Strict I/O's and daily weights

## 2022-03-09 NOTE — ASSESSMENT & PLAN NOTE
Blood cultures drawn in ED 03/02 showing GPCs resembling staph. No clear source of infection at this time. No noted areas of skin breakdown, tunneled line does not appear grossly infected (replaced on 3/4), UA unremarkable, CXR at presentation more consistent with pulmonary edema than infectious process.    - Continue IV Vancomycin. De-escalate as feasible.  - ID consulted, appreciate recs  - Will attempt 48 hour line holiday after HD tomorrow  - Patient had presyncopal episode after HD yesterday due to being NPO  - General Surgery to remove tunneled catheter today. Will order repeat blood cultures whenever catheter is removed.  - F/U repeat cultures

## 2022-03-09 NOTE — PROGRESS NOTES
[facility] - [facility]iology Stepdown  Nephrology  Progress Note    Patient Name: Melinda Knight  MRN: [id number]  Admission Date: 3/3/2022  Hospital Length of Stay: 6 days  Attending Provider: Christin Randhawa MD   Primary Care Physician: Duke Cole MD  Principal Problem:NSTEMI (non-ST elevated myocardial infarction)    Subjective:     HPI: Melinda Knight is a 76 y.o. female with h/o HTN, HLD, DM, ESRD on HD (MWF), restrictive lung disease with chronic hypoxemic respiratory failure on 2-3L home oxygen who transferred to [facility] for evaluation for CABG, AVR. Patient presented with NSTEMI to outside hospital. LHC revealed proximal RCA 60% stenosed, mid LAD 70% stenosed, and mid Cx 70% stenosed. TTE revealed EF 48% with PORTILLO 0.63cm2. 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 Pt denies N/V/D, chest pain, edema, or swelling. Last dialysis on 3/4/22. K 4.2. Nephrology consulted for ESRD/HD management while inpatient.       Interval History:   No acute events overnight, no labs this morning   SBPs 110's-120's, pulse 60's-70's  Pt states feeling well w/o chest pain, shortness of breath, dizziness, lightheadedness, n/v/d or diarrhea  Had 150 ml of urine/24 hrs  Last iHD 3/7, UF -2.5L      Review of patient's allergies indicates:   Allergen Reactions    Sulfa (sulfonamide antibiotics) Nausea And Vomiting    Menthol contain prod      Current Facility-Administered Medications   Medication Frequency    0.9%  NaCl infusion Once    acetaminophen tablet 650 mg Q6H PRN    acetaminophen tablet 650 mg Q4H PRN    albuterol-ipratropium 2.5 mg-0.5 mg/3 mL nebulizer solution 3 mL Q6H WAKE    aspirin chewable tablet 81 mg Daily    atorvastatin tablet 80 mg Daily    carvediloL tablet 25 mg BID    citalopram tablet 20 mg Daily    clopidogreL tablet 75 mg Daily    dextrose 10% bolus 125 mL PRN    dextrose 10% bolus 250 mL PRN    glucagon (human recombinant) injection 1 mg PRN    glucose chewable tablet 16 g PRN    glucose chewable  tablet 24 g PRN    heparin (porcine) injection 1,000 Units PRN    hydrOXYzine HCL tablet 50 mg Q4H PRN    insulin aspart U-100 pen 0-5 Units QID (AC + HS) PRN    insulin aspart U-100 pen 5 Units TIDWM    insulin detemir U-100 pen 20 Units BID    labetalol 20 mg/4 mL (5 mg/mL) IV syring Q4H PRN    levothyroxine tablet 75 mcg Before breakfast    losartan tablet 25 mg Daily    melatonin tablet 6 mg Nightly PRN    ondansetron disintegrating tablet 4 mg Q8H PRN    ondansetron injection 4 mg Q8H PRN    pantoprazole EC tablet 40 mg Daily    sevelamer carbonate tablet 800 mg TID WM    sodium chloride 0.9% bolus 250 mL PRN    sodium chloride 0.9% flush 10 mL PRN    vancomycin - pharmacy to dose pharmacy to manage frequency    vitamin renal formula (B-complex-vitamin c-folic acid) 1 mg per capsule 1 capsule Daily       Objective:     Vital Signs (Most Recent):  Temp: 97.9 °F (36.6 °C) (03/09/22 0830)  Pulse: 69 (03/09/22 1030)  Resp: 18 (03/09/22 0830)  BP: (!) 92/47 (03/09/22 1030)  SpO2: 96 % (03/09/22 0830)  O2 Device (Oxygen Therapy): nasal cannula (03/09/22 0830)   Vital Signs (24h Range):  Temp:  [96.3 °F (35.7 °C)-99.1 °F (37.3 °C)] 97.9 °F (36.6 °C)  Pulse:  [63-89] 69  Resp:  [16-20] 18  SpO2:  [95 %-99 %] 96 %  BP: ()/(40-67) 92/47     Weight: 90 kg (198 lb 6.6 oz) (03/04/22 0600)  Body mass index is 37.49 kg/m².  Body surface area is 1.97 meters squared.    I/O last 3 completed shifts:  In: 702 [P.O.:702]  Out: 350 [Urine:350]    Physical Exam  Vitals and nursing note reviewed.   Constitutional:       General: She is not in acute distress.     Appearance: Normal appearance. She is not ill-appearing, toxic-appearing or diaphoretic.   Cardiovascular:      Rate and Rhythm: Normal rate and regular rhythm.      Pulses: Normal pulses.      Heart sounds: Murmur heard.      Comments: R IJ tunneled catheter  Pulmonary:      Effort: Pulmonary effort is normal. No respiratory distress.      Breath  sounds: No wheezing, rhonchi or rales.      Comments: Decreased breath sounds in posterior lung fields  Musculoskeletal:         General: No swelling, tenderness or deformity.      Right lower leg: Edema present.      Left lower leg: Edema present.      Comments: Trace LE edema purvi    Skin:     General: Skin is warm.      Capillary Refill: Capillary refill takes 2 to 3 seconds.      Coloration: Skin is not jaundiced or pale.      Findings: No bruising, erythema, lesion or rash.   Neurological:      Mental Status: She is alert and oriented to person, place, and time.   Psychiatric:         Mood and Affect: Mood normal.         Behavior: Behavior normal.         Thought Content: Thought content normal.         Judgment: Judgment normal.       Significant Labs:  Cardiac Markers: No results for input(s): CKMB, TROPONINT, MYOGLOBIN in the last 168 hours.  CBC:   Recent Labs   Lab 03/08/22  1636   WBC 9.59   RBC 3.05*   HGB 9.9*   HCT 31.2*      *   MCH 32.5*   MCHC 31.7*       CMP:   Recent Labs   Lab 03/02/22  2342 03/03/22  0745 03/08/22  1636   *   < > 185*   CALCIUM 10.2   < > 9.5   ALBUMIN 2.6*   < > 2.6*   PROT 7.1  --   --       < > 131*   K 4.0   < > 4.5   CO2 27   < > 26   CL 94*   < > 91*   BUN 64*   < > 43*   CREATININE 3.9*   < > 4.3*   ALKPHOS 87  --   --    ALT 17  --   --    AST 70*  --   --    BILITOT 0.5  --   --     < > = values in this interval not displayed.       Coagulation:   Recent Labs   Lab 03/03/22  0745 03/03/22  1612 03/07/22  1022   INR 1.0  --   --    APTT 27.1   < > 62.5*    < > = values in this interval not displayed.       PTH:   Recent Labs   Lab 03/03/22  0745   .4*       All labs within the past 24 hours have been reviewed.     Significant Imaging:  Labs: Reviewed    Assessment/Plan:     * NSTEMI (non-ST elevated myocardial infarction)   -s/p LHC stenosed proximal RCA 60%, mid LAD 70% stenosed on 3/3  - Management per primary     ESRD (end stage renal  disease) on dialysis  ESKD 2/2 HTN & DM   Outpatient HD Information:  -Dialysis modality: Hemodialysis  -Outpatient HD unit: [facility]   -Nephrologist: Dr. Uaglde   -HD TX days: Monday/Wednesday/Friday, duration of treatment: 4 hours   -Last HD TX prior to hospital admission: 03/04/2022  -Dialysis access: dialysis catheter R IJ   -Residual urine: Yes   -EDW: ?    Plan/Recommendations:   -iHD today (03/09/2022) unable to remove volume 2/2 hypotensive, MAPs 60's, primary team notified    OK to remove HD line today after HD and pt should get a tunneled cath if possible by Friday in time for pt's next HD treatment, if not a trialysis catheter will also be OK.   -Electrolytes: acceptable, Na 131, K 4.9, Mg 2.2  -Mineral & Bone Disorder:   Phosphorus 4.3, PTH level: 207.4, cont phosphate binders sevelamer 800mg tid w/ meals   -ESKD anemia: hgb 9.9, goal hgb 10-11g/dL, iron studies reviewed, no indication for iron infusions, ESAs not indicated, transfuse if hgb <7  -Renal function panel on HD days   -Renal diet with protein supplements, if not NPO  -Strict I/O's and daily weights     Anemia of chronic disease  See ESRD    Type 2 diabetes mellitus with diabetic polyneuropathy  Lab Results   Component Value Date    HGBA1C 5.5 03/03/2022     -Plan per primary team       Essential hypertension  Meds: carvedilol 25mg bid and losartan 25mg daily   Since SBPs have been in the low 110's-120's consider decreasing dose and holding meds prior to HD             Thank you for your consult. I will follow-up with patient. Please contact us if you have any additional questions.    Nadia Olivier MD  Nephrology  [facility] - [facility] Stepdown

## 2022-03-10 NOTE — H&P (VIEW-ONLY)
CC: NSTEMI with ESRD and MRSA bacteremia for Cuffed tunneled catheter insertion.      HPI: This lady is known to have ESRD was on HD with Rt. IJ cuffed tunneled HD catheter.  Her catheter was changed twice. The first time was for malfunction. She came to the hospital with NSTEMI with RCA 60% and LAD 70% with history of Restrictive lung disease, HTN, DM and hyperlipidemia. She had MRSA bacteremia with blood Cx on 3/2/2022. Repeated blood cultured were negative on 9/3/2022. Her Catheter was removed yesterday. We received a call today to place a cuffed tunneled catheter. I have explained the procedure to the patient. With the risks and will plan to have the catheter with anesthesia. She is taking Aspirin and Plavix and her PTT was 62 on 3/7/2022. We will need to repeat her PT and PTT with COVID test. Hold the Antiplatelet for  3 days to reduce the risk of bleeding. Specifically, that she mentioned she had bleeding on the first catheter insertion and with taking two antiplatelet her risk of bleeding will be high.     I have done a doppler ultrasound both IJ veins and the Rt. vein seems to be thrombosed. The Lt IJ looks patent and could be used as the possible site of the cuffed tunneled catheter.     We would suggest place a temporary line for the coming days and holding antiplatelets. If her clinical condition could not tolerate we could keep her for 5 days on only one agent and repeat her CBC, PT and PTT before the procedure day.     Because of her acute coronary event, she will need anesthesia for her procedure.     We would have appreciated if we could have been consulted before removing the Rt IJ cuffed tunneled catheter to plan a smoother exchange for her access.         Past Medical History:   Diagnosis Date    Acute on chronic diastolic congestive heart failure 4/14/2021    Carotid artery occlusion     Coronary artery disease     Hyperlipidemia     Hypertension     Skin cancer     cyst on the face , was  removed 20years +    Stroke     Thyroid disease     Type 2 diabetes mellitus        Past Surgical History:   Procedure Laterality Date    CAROTID ENDARTERECTOMY Left 03/24/2016    CHOLECYSTECTOMY      ESOPHAGOGASTRODUODENOSCOPY Left 9/11/2018    Procedure: EGD (ESOPHAGOGASTRODUODENOSCOPY);  Surgeon: Stevenson Mckee MD;  Location: [facility] ENDO;  Service: Endoscopy;  Laterality: Left;    GALLBLADDER SURGERY      1996    LEFT HEART CATHETERIZATION Left 3/3/2022    Procedure: CATHETERIZATION, HEART, LEFT;  Surgeon: Tex Alicea MD;  Location[facility] CATH LAB;  Service: Cardiology;  Laterality: Left;    RIGHT HEART CATHETERIZATION Right 3/26/2021    Procedure: INSERTION, CATHETER, RIGHT HEART;  Surgeon: Gennaro Sampson MD;  Location: [facility] CATH LAB;  Service: Cardiology;  Laterality: Right;    SKIN BIOPSY      TONSILLECTOMY      TUBAL LIGATION Bilateral 3/18/2016       Family History   Problem Relation Age of Onset    Hypertension Mother     Heart failure Mother     Diabetes Mother     Diabetes Sister     Diabetes Brother        Social History     Socioeconomic History    Marital status:    Tobacco Use    Smoking status: Never Smoker    Smokeless tobacco: Never Used   Substance and Sexual Activity    Alcohol use: Not Currently     Alcohol/week: 0.0 standard drinks     Comment: occasions    Drug use: No    Sexual activity: Not Currently     Social Determinants of Health     Financial Resource Strain: High Risk    Difficulty of Paying Living Expenses: Hard   Food Insecurity: Food Insecurity Present    Worried About Running Out of Food in the Last Year: Sometimes true    Ran Out of Food in the Last Year: Sometimes true   Transportation Needs: No Transportation Needs    Lack of Transportation (Medical): No    Lack of Transportation (Non-Medical): No   Physical Activity: Inactive    Days of Exercise per Week: 0 days    Minutes of Exercise per Session: 0 min   Stress: Stress Concern Present     Feeling of Stress : Very much   Social Connections: Unknown    Frequency of Communication with Friends and Family: Three times a week    Frequency of Social Gatherings with Friends and Family: Three times a week    Active Member of Clubs or Organizations: No    Attends Club or Organization Meetings: Never    Marital Status:    Housing Stability: Low Risk     Unable to Pay for Housing in the Last Year: No    Number of Places Lived in the Last Year: 1    Unstable Housing in the Last Year: No       Current Facility-Administered Medications   Medication Dose Route Frequency Provider Last Rate Last Admin    acetaminophen tablet 650 mg  650 mg Oral Q6H PRN Tex Alicea MD   650 mg at 03/06/22 2020    albuterol-ipratropium 2.5 mg-0.5 mg/3 mL nebulizer solution 3 mL  3 mL Nebulization Q6H WAKE Geovani Morton MD   3 mL at 03/10/22 1321    aspirin chewable tablet 81 mg  81 mg Oral Daily DEBORA Gonzalez MD   81 mg at 03/10/22 0837    atorvastatin tablet 80 mg  80 mg Oral Daily DEBORA Gonzalez MD   80 mg at 03/10/22 0837    citalopram tablet 20 mg  20 mg Oral Daily DEBORA Gonzalez MD   20 mg at 03/10/22 0837    clopidogreL tablet 75 mg  75 mg Oral Daily Geovani Morton MD   75 mg at 03/10/22 0900    dextrose 10% bolus 125 mL  12.5 g Intravenous PRN Tex Alicea MD        dextrose 10% bolus 250 mL  25 g Intravenous PRN Tex Alicea MD        glucagon (human recombinant) injection 1 mg  1 mg Intramuscular PRN Tex Alicea MD        glucose chewable tablet 16 g  16 g Oral PRN Tex Alicea MD        glucose chewable tablet 24 g  24 g Oral PRN Tex Alicea MD        heparin (porcine) injection 1,000 Units  1,000 Units Intra-Catheter PRN Nadia Olivier MD   1,000 Units at 03/09/22 1147    hydrOXYzine HCL tablet 50 mg  50 mg Oral Q4H PRN Tex Alicea MD   50 mg at 03/06/22 2020    insulin aspart U-100 pen 0-5 Units  0-5 Units Subcutaneous QID (AC + HS) PRN Tex Alicea MD    2 Units at 03/10/22 1641    insulin aspart U-100 pen 5 Units  5 Units Subcutaneous TIDWM Kary Chamberlain MD   5 Units at 03/10/22 1640    insulin detemir U-100 pen 20 Units  20 Units Subcutaneous BID Tex Alicea MD   20 Units at 03/10/22 0842    labetalol 20 mg/4 mL (5 mg/mL) IV syring  10 mg Intravenous Q4H PRN Kary Chamberlain MD        levothyroxine tablet 75 mcg  75 mcg Oral Before breakfast DEBORA Gonzalez MD   75 mcg at 03/10/22 0521    melatonin tablet 6 mg  6 mg Oral Nightly PRN DEBORA Gonzalez MD   6 mg at 03/09/22 2039    ondansetron disintegrating tablet 4 mg  4 mg Oral Q8H PRN Tex Alicea MD   4 mg at 03/07/22 1344    ondansetron injection 4 mg  4 mg Intravenous Q8H PRN Tex Alicea MD   4 mg at 03/09/22 0743    pantoprazole EC tablet 40 mg  40 mg Oral Daily DEBORA Gonzalez MD   40 mg at 03/10/22 0837    sevelamer carbonate tablet 800 mg  800 mg Oral TID  Geovani Morton MD   800 mg at 03/10/22 1745    sodium chloride 0.9% bolus 250 mL  250 mL Intravenous PRN Nadia Olivier MD        sodium chloride 0.9% flush 10 mL  10 mL Intravenous PRN Tex Alicea MD        vancomycin - pharmacy to dose   Intravenous pharmacy to manage frequency DEBORA Gonzalez MD        vitamin renal formula (B-complex-vitamin c-folic acid) 1 mg per capsule 1 capsule  1 capsule Oral Daily Tex Alicea MD   1 capsule at 03/10/22 0837       Review of patient's allergies indicates:   Allergen Reactions    Sulfa (sulfonamide antibiotics) Nausea And Vomiting    Menthol contain prod         Review of Systems   Constitutional: Negative.    HENT: Negative.    Eyes: Negative.    Respiratory: Positive for shortness of breath.    Cardiovascular: Positive for chest pain.   Gastrointestinal: Negative.    Musculoskeletal: Negative.    Skin: Negative.    Neurological: Negative.    Endo/Heme/Allergies: Negative.    Psychiatric/Behavioral: Negative.       Vitals:    03/10/22 1157 03/10/22 1321  03/10/22 1547 03/10/22 1641   BP:   (!) 122/52    BP Location:   Right leg    Patient Position:   Lying    Pulse: 87 89 94 94   Resp:  17 20    Temp:   98.1 °F (36.7 °C)    TempSrc:   Oral    SpO2:  96% 99%    Weight:       Height:           Physical Exam  Constitutional:       Appearance: Normal appearance.   HENT:      Head: Normocephalic.      Nose: Nose normal.   Eyes:      Pupils: Pupils are equal, round, and reactive to light.   Cardiovascular:      Rate and Rhythm: Normal rate.      Pulses: Normal pulses.      Heart sounds: Normal heart sounds.   Pulmonary:      Effort: Pulmonary effort is normal.      Breath sounds: Rales present.   Abdominal:      Palpations: Abdomen is soft.   Musculoskeletal:      Cervical back: Neck supple.   Skin:     General: Skin is warm and dry.   Neurological:      General: No focal deficit present.      Mental Status: She is alert.   Psychiatric:         Mood and Affect: Mood normal.         Behavior: Behavior normal.         Thought Content: Thought content normal.          Problem List Items Addressed This Visit        Unprioritized    Bacteremia    ESRD (end stage renal disease) on dialysis (Chronic)    * (Principal) NSTEMI (non-ST elevated myocardial infarction) - Primary      Other Visit Diagnoses     Bradycardia                 Labs: (preplanned procedure)  1. PT  2. PTT  3. COVID test.    Impression and plan:    1. NSTEMI. On medical treatment with dual antiplatelets.   2. MRSA Bacteremia. Will plan insertion of the cuffed tunneled cath after 3 days of holding the antiplatelets. Or on one agent for 5 days.   3. COPD. With restrictive lung disease.   4. HTN  5. DMII  6. Hyperlipidemia.  7. Aortic Valve disease.  8. ESRD on HD MWF.   9. Depression.  10. Urinary incontinence.  11. Inflammatory anemia.           ELVIRA [email or web address]. MD. ELIZABETH. FACP.  , [employer]  Nephrology Consultant. [facility].   [address],  [facility]. [address]th [place] [address].    email: [email or web address].  Tel: Office: [phone]

## 2022-03-10 NOTE — ASSESSMENT & PLAN NOTE
Chronic, normotensive on evaluation today.    - Coreg 25 mg BID  - Losartan 25 mg daily  - Labetalol 10 mg PRN for SBP >160    Will hold for now given hypotension with HD

## 2022-03-10 NOTE — ASSESSMENT & PLAN NOTE
76-year-old woman with CAD, HTN, DM2, ESRD on HD, restrictive lung disease, chronic hypoxemic respiratory failure on home oxygen (2-3 LNC) who was transferred from [facility] for CTS evaluation.found to have MRSA bacteremia source presumed to be HD TL vs skin tears from scratching around TL - repeat Bcx 3/4 NGT. TTE without evidence of infective endocarditis. TL removed 3/09- Bcx drawn after line was removed - patient doing well afebrile- wbc wnl - no joint or back pain.      Recommendations:    --Continue IV vancomycin with HD for a trough goal of 15-20 for MRSA bacteremia for total of 4 weeks from line removal ~ETD 04/06/2022    --Follow up repeat Bcx after line removal, call ID back if comes back positive.      Outpatient Antibiotic Therapy Plan:    Please send referral to [facility].    1) Infection: MRSA bacteremia     2) Discharge Antibiotics:    Intravenous antibiotics:   IV vancomycin post HD pharmacy to dose      3) Therapy Duration:  4 weeks    Estimated end date of IV antibiotics: 04/06/2022    4) Outpatient Weekly Labs:    Order the following labs to be drawn on Mondays:    CBC   CMP    Vancomycin trough. Target 15-20    If discharged on vancomycin IV, order the following additional labs to be drawn on Thursdays:   CMP    Vancomycin trough. Target 15-20    If vancomycin trough is not at target (15-20) prior to discharge, schedule vancomycin trough to be drawn before their fourth outpatient dose.    5) Fax Lab Results to Infectious Diseases Provider: Rosie    [facility] Fax Number: [phone]    6) Outpatient Infectious Diseases Follow-up     Follow-up appointment will be arranged by the ID clinic and will be found in the patient's appointments tab.     Prior to discharge, please ensure the patient's follow-up has been scheduled.     If there is still no follow-up scheduled prior to discharge, please send an EPIC message to Lindsey Nuñez in Infectious  Diseases.

## 2022-03-10 NOTE — CONSULTS
CC: NSTEMI with ESRD and MRSA bacteremia for Cuffed tunneled catheter insertion.      HPI: This lady is known to have ESRD was on HD with Rt. IJ cuffed tunneled HD catheter.  Her catheter was changed twice. The first time was for malfunction. She came to the hospital with NSTEMI with RCA 60% and LAD 70% with history of Restrictive lung disease, HTN, DM and hyperlipidemia. She had MRSA bacteremia with blood Cx on 3/2/2022. Repeated blood cultured were negative on 9/3/2022. Her Catheter was removed yesterday. We received a call today to place a cuffed tunneled catheter. I have explained the procedure to the patient. With the risks and will plan to have the catheter with anesthesia. She is taking Aspirin and Plavix and her PTT was 62 on 3/7/2022. We will need to repeat her PT and PTT with COVID test. Hold the Antiplatelet for  3 days to reduce the risk of bleeding. Specifically, that she mentioned she had bleeding on the first catheter insertion and with taking two antiplatelet her risk of bleeding will be high.     I have done a doppler ultrasound both IJ veins and the Rt. vein seems to be thrombosed. The Lt IJ looks patent and could be used as the possible site of the cuffed tunneled catheter.     We would suggest place a temporary line for the coming days and holding antiplatelets. If her clinical condition could not tolerate we could keep her for 5 days on only one agent and repeat her CBC, PT and PTT before the procedure day.     Because of her acute coronary event, she will need anesthesia for her procedure.     We would have appreciated if we could have been consulted before removing the Rt IJ cuffed tunneled catheter to plan a smoother exchange for her access.         Past Medical History:   Diagnosis Date    Acute on chronic diastolic congestive heart failure 4/14/2021    Carotid artery occlusion     Coronary artery disease     Hyperlipidemia     Hypertension     Skin cancer     cyst on the face , was  removed 20years +    Stroke     Thyroid disease     Type 2 diabetes mellitus        Past Surgical History:   Procedure Laterality Date    CAROTID ENDARTERECTOMY Left 03/24/2016    CHOLECYSTECTOMY      ESOPHAGOGASTRODUODENOSCOPY Left 9/11/2018    Procedure: EGD (ESOPHAGOGASTRODUODENOSCOPY);  Surgeon: Stevenson Mckee MD;  Location: [facility] ENDO;  Service: Endoscopy;  Laterality: Left;    GALLBLADDER SURGERY      1996    LEFT HEART CATHETERIZATION Left 3/3/2022    Procedure: CATHETERIZATION, HEART, LEFT;  Surgeon: Tex Alicea MD;  Location: [facility] CATH LAB;  Service: Cardiology;  Laterality: Left;    RIGHT HEART CATHETERIZATION Right 3/26/2021    Procedure: INSERTION, CATHETER, RIGHT HEART;  Surgeon: Gennaro Sampson MD;  Location: [facility] CATH LAB;  Service: Cardiology;  Laterality: Right;    SKIN BIOPSY      TONSILLECTOMY      TUBAL LIGATION Bilateral 3/18/2016       Family History   Problem Relation Age of Onset    Hypertension Mother     Heart failure Mother     Diabetes Mother     Diabetes Sister     Diabetes Brother        Social History     Socioeconomic History    Marital status:    Tobacco Use    Smoking status: Never Smoker    Smokeless tobacco: Never Used   Substance and Sexual Activity    Alcohol use: Not Currently     Alcohol/week: 0.0 standard drinks     Comment: occasions    Drug use: No    Sexual activity: Not Currently     Social Determinants of Health     Financial Resource Strain: High Risk    Difficulty of Paying Living Expenses: Hard   Food Insecurity: Food Insecurity Present    Worried About Running Out of Food in the Last Year: Sometimes true    Ran Out of Food in the Last Year: Sometimes true   Transportation Needs: No Transportation Needs    Lack of Transportation (Medical): No    Lack of Transportation (Non-Medical): No   Physical Activity: Inactive    Days of Exercise per Week: 0 days    Minutes of Exercise per Session: 0 min   Stress: Stress Concern Present     Feeling of Stress : Very much   Social Connections: Unknown    Frequency of Communication with Friends and Family: Three times a week    Frequency of Social Gatherings with Friends and Family: Three times a week    Active Member of Clubs or Organizations: No    Attends Club or Organization Meetings: Never    Marital Status:    Housing Stability: Low Risk     Unable to Pay for Housing in the Last Year: No    Number of Places Lived in the Last Year: 1    Unstable Housing in the Last Year: No       Current Facility-Administered Medications   Medication Dose Route Frequency Provider Last Rate Last Admin    acetaminophen tablet 650 mg  650 mg Oral Q6H PRN Tex Alicea MD   650 mg at 03/06/22 2020    albuterol-ipratropium 2.5 mg-0.5 mg/3 mL nebulizer solution 3 mL  3 mL Nebulization Q6H WAKE Geovani Morton MD   3 mL at 03/10/22 1321    aspirin chewable tablet 81 mg  81 mg Oral Daily DEBORA Gonzalez MD   81 mg at 03/10/22 0837    atorvastatin tablet 80 mg  80 mg Oral Daily DEBORA Gonzalez MD   80 mg at 03/10/22 0837    citalopram tablet 20 mg  20 mg Oral Daily DEBORA Gonzalez MD   20 mg at 03/10/22 0837    clopidogreL tablet 75 mg  75 mg Oral Daily Geovani Morton MD   75 mg at 03/10/22 0900    dextrose 10% bolus 125 mL  12.5 g Intravenous PRN Tex Alicea MD        dextrose 10% bolus 250 mL  25 g Intravenous PRN Tex Alicea MD        glucagon (human recombinant) injection 1 mg  1 mg Intramuscular PRN Tex Alicea MD        glucose chewable tablet 16 g  16 g Oral PRN Tex Alicea MD        glucose chewable tablet 24 g  24 g Oral PRN Tex Alicea MD        heparin (porcine) injection 1,000 Units  1,000 Units Intra-Catheter PRN Nadia Olivier MD   1,000 Units at 03/09/22 1147    hydrOXYzine HCL tablet 50 mg  50 mg Oral Q4H PRN Tex Alicea MD   50 mg at 03/06/22 2020    insulin aspart U-100 pen 0-5 Units  0-5 Units Subcutaneous QID (AC + HS) PRN Tex Alicea MD    2 Units at 03/10/22 1641    insulin aspart U-100 pen 5 Units  5 Units Subcutaneous TIDWM Kary Chamberlain MD   5 Units at 03/10/22 1640    insulin detemir U-100 pen 20 Units  20 Units Subcutaneous BID Tex Alicea MD   20 Units at 03/10/22 0842    labetalol 20 mg/4 mL (5 mg/mL) IV syring  10 mg Intravenous Q4H PRN Kary Chamberlain MD        levothyroxine tablet 75 mcg  75 mcg Oral Before breakfast DEBORA Gonzalez MD   75 mcg at 03/10/22 0521    melatonin tablet 6 mg  6 mg Oral Nightly PRN DEBORA Gonzalez MD   6 mg at 03/09/22 2039    ondansetron disintegrating tablet 4 mg  4 mg Oral Q8H PRN Tex Alicea MD   4 mg at 03/07/22 1344    ondansetron injection 4 mg  4 mg Intravenous Q8H PRN Tex Alicea MD   4 mg at 03/09/22 0743    pantoprazole EC tablet 40 mg  40 mg Oral Daily DEBORA Gonzalez MD   40 mg at 03/10/22 0837    sevelamer carbonate tablet 800 mg  800 mg Oral TID  Geovani Morton MD   800 mg at 03/10/22 1745    sodium chloride 0.9% bolus 250 mL  250 mL Intravenous PRN Nadia Olivier MD        sodium chloride 0.9% flush 10 mL  10 mL Intravenous PRN Tex Alicea MD        vancomycin - pharmacy to dose   Intravenous pharmacy to manage frequency DEBORA Gonzalez MD        vitamin renal formula (B-complex-vitamin c-folic acid) 1 mg per capsule 1 capsule  1 capsule Oral Daily Tex Alicea MD   1 capsule at 03/10/22 0837       Review of patient's allergies indicates:   Allergen Reactions    Sulfa (sulfonamide antibiotics) Nausea And Vomiting    Menthol contain prod         Review of Systems   Constitutional: Negative.    HENT: Negative.    Eyes: Negative.    Respiratory: Positive for shortness of breath.    Cardiovascular: Positive for chest pain.   Gastrointestinal: Negative.    Musculoskeletal: Negative.    Skin: Negative.    Neurological: Negative.    Endo/Heme/Allergies: Negative.    Psychiatric/Behavioral: Negative.       Vitals:    03/10/22 1157 03/10/22 1321  03/10/22 1547 03/10/22 1641   BP:   (!) 122/52    BP Location:   Right leg    Patient Position:   Lying    Pulse: 87 89 94 94   Resp:  17 20    Temp:   98.1 °F (36.7 °C)    TempSrc:   Oral    SpO2:  96% 99%    Weight:       Height:           Physical Exam  Constitutional:       Appearance: Normal appearance.   HENT:      Head: Normocephalic.      Nose: Nose normal.   Eyes:      Pupils: Pupils are equal, round, and reactive to light.   Cardiovascular:      Rate and Rhythm: Normal rate.      Pulses: Normal pulses.      Heart sounds: Normal heart sounds.   Pulmonary:      Effort: Pulmonary effort is normal.      Breath sounds: Rales present.   Abdominal:      Palpations: Abdomen is soft.   Musculoskeletal:      Cervical back: Neck supple.   Skin:     General: Skin is warm and dry.   Neurological:      General: No focal deficit present.      Mental Status: She is alert.   Psychiatric:         Mood and Affect: Mood normal.         Behavior: Behavior normal.         Thought Content: Thought content normal.          Problem List Items Addressed This Visit        Unprioritized    Bacteremia    ESRD (end stage renal disease) on dialysis (Chronic)    * (Principal) NSTEMI (non-ST elevated myocardial infarction) - Primary      Other Visit Diagnoses     Bradycardia                 Labs: (preplanned procedure)  1. PT  2. PTT  3. COVID test.    Impression and plan:    1. NSTEMI. On medical treatment with dual antiplatelets.   2. MRSA Bacteremia. Will plan insertion of the cuffed tunneled cath after 3 days of holding the antiplatelets. Or on one agent for 5 days.   3. COPD. With restrictive lung disease.   4. HTN  5. DMII  6. Hyperlipidemia.  7. Aortic Valve disease.  8. ESRD on HD MWF.   9. Depression.  10. Urinary incontinence.  11. Inflammatory anemia.           ELVIRA ZEPEDA.Jacob. MD. ELIZABETH. FACP.  , [employer] [facility] [employer]  Nephrology Consultant. [facility].   [address],  [facility]. [address]th [place] [address].    email: [email or web address].  Tel: Office: [phone]

## 2022-03-10 NOTE — PT/OT/SLP PROGRESS
Occupational Therapy      Patient Name:  Melinda Knight   MRN:  [id number]    Patient not seen today secondary to eating late lunch upon OT arrival, then working with PT Will follow-up 3/11.    3/10/2022

## 2022-03-10 NOTE — PROGRESS NOTES
[facility] - Cardiology Stepdown  Infectious Disease  Progress Note    Patient Name: Melinda Knight  MRN: [id number]  Admission Date: 3/3/2022  Length of Stay: 7 days  Attending Physician: Elia Barr MD  Primary Care Provider: Duke Cole MD    Isolation Status: Contact  Assessment/Plan:      Bacteremia  76-year-old woman with CAD, HTN, DM2, ESRD on HD, restrictive lung disease, chronic hypoxemic respiratory failure on home oxygen (2-3 LNC) who was transferred from [facility] for CTS evaluation.found to have MRSA bacteremia source presumed to be HD TL vs skin tears from scratching around TL - repeat Bcx 3/4 NGT. TTE without evidence of infective endocarditis. TL removed 3/09- Bcx drawn after line was removed - patient doing well afebrile- wbc wnl - no joint or back pain.      Recommendations:    --Continue IV vancomycin with HD for a trough goal of 15-20 for MRSA bacteremia for total of 4 weeks from line removal ~ETD 04/06/2022    --Follow up repeat Bcx after line removal, call ID back if comes back positive.      Outpatient Antibiotic Therapy Plan:    Please send referral to [facility].    1) Infection: MRSA bacteremia     2) Discharge Antibiotics:    Intravenous antibiotics:   IV vancomycin post HD pharmacy to dose      3) Therapy Duration:  4 weeks    Estimated end date of IV antibiotics: 04/06/2022    4) Outpatient Weekly Labs:    Order the following labs to be drawn on Mondays:    CBC   CMP    Vancomycin trough. Target 15-20    If discharged on vancomycin IV, order the following additional labs to be drawn on Thursdays:   CMP    Vancomycin trough. Target 15-20    If vancomycin trough is not at target (15-20) prior to discharge, schedule vancomycin trough to be drawn before their fourth outpatient dose.    5) Fax Lab Results to Infectious Diseases Provider: Rosie    [facility] Fax Number: [phone]    6) Outpatient Infectious Diseases  "Follow-up     Follow-up appointment will be arranged by the ID clinic and will be found in the patient's appointments tab.     Prior to discharge, please ensure the patient's follow-up has been scheduled.     If there is still no follow-up scheduled prior to discharge, please send an EPIC message to Lindsey Nuñez in Infectious Diseases.                    Thank you for your consult. I will sign off. Please contact us if you have any additional questions.    Rosie Rehman MD  Infectious Disease  [facility] - [facility]iology Stepdown    Subjective:     Principal Problem:NSTEMI (non-ST elevated myocardial infarction)    HPI: A 76-year-old woman with CAD, HTN, DM2, ESRD on HD, restrictive lung disease, chronic hypoxemic respiratory failure on home oxygen (2-3 LNC) who was transferred from [facility] for CTS evaluation. Mrs. Knight has presented to [facility] ED with 2 days of epigastric pain, nausea, vomiting and SOB. She was evaluated and subsequently transferred to [facility] after work up revealed elevated troponin plus BNP, and a CXR consistent with pulmonary edema. On admission to [facility] she was afebrile and with leukocytosis. Blood cultures were performed and positive for MRSA.     On transfer to [facility] she was afebrile and without leukocytosis. Blood cultures repeated on 3/4 are NGTD. She was evaluated by CTS and deemed a non surgical candidate.     Infectious Diseases consulted for "Pt with NSTEMI, ESRD with staph bacteremia, susceptibilities pending, on IV Vanc. Likely source permacath."          Interval History: TL removed by G.S    Review of Systems   Constitutional:  Negative for chills, fatigue, fever and unexpected weight change.   HENT:  Negative for congestion, postnasal drip and trouble swallowing.    Respiratory:  Negative for cough, chest tightness and shortness of breath.    Cardiovascular:  Negative for chest pain, palpitations and leg swelling.   Gastrointestinal:  Negative for " abdominal pain, blood in stool, constipation, diarrhea, nausea and vomiting.   Genitourinary:  Negative for difficulty urinating, dysuria and hematuria.   Musculoskeletal:  Negative for arthralgias and back pain.   Neurological:  Negative for dizziness and light-headedness.   Psychiatric/Behavioral:  Negative for confusion.    Objective:     Vital Signs (Most Recent):  Temp: 97.2 °F (36.2 °C) (03/10/22 0603)  Pulse: 81 (03/10/22 0734)  Resp: 17 (03/10/22 0734)  BP: (!) 142/63 (03/10/22 0603)  SpO2: 96 % (03/10/22 0734)   Vital Signs (24h Range):  Temp:  [97.1 °F (36.2 °C)-97.9 °F (36.6 °C)] 97.2 °F (36.2 °C)  Pulse:  [64-89] 81  Resp:  [16-20] 17  SpO2:  [94 %-100 %] 96 %  BP: ()/(40-66) 142/63     Weight: 90 kg (198 lb 6.6 oz)  Body mass index is 37.49 kg/m².    Estimated Creatinine Clearance: 12.9 mL/min (A) (based on SCr of 3.9 mg/dL (H)).    Physical Exam  Constitutional:       General: She is not in acute distress.     Appearance: Normal appearance. She is not ill-appearing.   Cardiovascular:      Rate and Rhythm: Normal rate and regular rhythm.      Pulses: Normal pulses.      Heart sounds: Normal heart sounds. No murmur heard.    No friction rub. No gallop.   Pulmonary:      Effort: Pulmonary effort is normal. No respiratory distress.   Abdominal:      General: Abdomen is flat. Bowel sounds are normal. There is no distension.      Palpations: Abdomen is soft.      Tenderness: There is no abdominal tenderness.   Skin:     General: Skin is warm and dry.      Comments: TL removed   Neurological:      Mental Status: She is alert.       Significant Labs: All pertinent labs within the past 24 hours have been reviewed.    Significant Imaging: I have reviewed all pertinent imaging results/findings within the past 24 hours.

## 2022-03-10 NOTE — PT/OT/SLP PROGRESS
"Physical Therapy Treatment    Patient Name:  Melinda Knight   MRN:  [id number]    Recommendations:     Discharge Recommendations:  home health PT   Discharge Equipment Recommendations: none   Barriers to discharge: None    Assessment:     Melinda Knight is a 76 y.o. female admitted with a medical diagnosis of NSTEMI (non-ST elevated myocardial infarction).  She presents with the following impairments/functional limitations:  weakness, impaired endurance, impaired self care skills, impaired functional mobilty. Pt declined wanting to get out of bed and walk with PT but was agreeable to sitting EOB and performing exercises. She was able to complete seated SAQ and marches x20 each leg and x3 sit to stands with RW. After 3 sit to stands, pt stated she was tired and would like to stop. Declined offer to get into recliner. Required PT assist to reposition in bed as pt was unable to fully scoot up in bed. Recommendation to discharge home with home health PT and has daughter at home to provide 24 hr caregiver assist.    Rehab Prognosis: Good; patient would benefit from acute skilled PT services to address these deficits and reach maximum level of function.    Recent Surgery: Procedure(s) (LRB):  CATHETERIZATION, HEART, LEFT (Left) 7 Days Post-Op    Plan:     During this hospitalization, patient to be seen 3 x/week to address the identified rehab impairments via gait training, therapeutic activities, therapeutic exercises and progress toward the following goals:    · Plan of Care Expires:  04/05/22    Subjective     Chief Complaint: "I don't want to walk, but I'll do exercises"  Patient/Family Comments/goals: to do exercises in bed  Pain/Comfort:  · Pain Rating 1: 0/10      Objective:     Communicated with nsg prior to session.  Patient found HOB elevated with PureWick, peripheral IV, telemetry upon PT entry to room.     General Precautions: Standard, fall   Orthopedic Precautions:N/A   Braces: N/A  Respiratory Status: Nasal " cannula, flow 2 L/min     Functional Mobility:  · Bed Mobility:     · Rolling Left:  stand by assistance  · Supine to Sit: stand by assistance  · Sit to Supine: CGA  · Balance: (fair-) static standing balance, with RW  · Transfers:     · Sit <> stand: CGA      AM-PAC 6 CLICK MOBILITY  Turning over in bed (including adjusting bedclothes, sheets and blankets)?: 2  Sitting down on and standing up from a chair with arms (e.g., wheelchair, bedside commode, etc.): 3  Moving from lying on back to sitting on the side of the bed?: 3  Moving to and from a bed to a chair (including a wheelchair)?: 2  Need to walk in hospital room?: 2  Climbing 3-5 steps with a railing?: 1  Basic Mobility Total Score: 13       Therapeutic Activities and Exercises:   Performed x20 reps of each exercise purvi sitting eob (SAQ, marches) and x3 sit to stands with use of UE and RW.     Patient left HOB elevated with call button in reach..    GOALS:   Multidisciplinary Problems     Physical Therapy Goals        Problem: Physical Therapy Goal    Goal Priority Disciplines Outcome Goal Variances Interventions   Physical Therapy Goal     PT, PT/OT Ongoing, Progressing     Description: Goals to be met by: 3/16     Patient will increase functional independence with mobility by performin. Supine to sit with Stand-by Assistance - met  2. Sit to supine with Stand-by Assistance - not met  3. Sit to stand transfer with Stand-by Assistance - not met  4. Gait  x 50 feet with Stand-by Assistance using LRAD. - not met  5. Lower extremity exercise program x15 reps per handout, with independence to improve strength and activity tolerance. - not met                     Time Tracking:     PT Received On:    PT Start Time: 1344     PT Stop Time: 1358  PT Total Time (min): 14 min     Billable Minutes: Therapeutic Exercise 14    Treatment Type: Treatment  PT/PTA: PT     PTA Visit Number: 0     03/10/2022

## 2022-03-10 NOTE — PLAN OF CARE
Plan of care discussed night meds given and tolerated.purwick changed.pt denies pain or discomfort.no complaints of shortness of breath.

## 2022-03-10 NOTE — PROGRESS NOTES
"[facility] - Cardiology [facility] Medicine  Progress Note    Patient Name: Melinda Knight  MRN: [id number]  Patient Class: IP- Inpatient   Admission Date: 3/3/2022  Length of Stay: 7 days  Attending Physician: Elia Barr MD  Primary Care Provider: Duke Cole MD        Subjective:     Principal Problem:NSTEMI (non-ST elevated myocardial infarction)        HPI:  Melinda Knight is a 76 y.o. F with history of HTN, HLD, CAD, DMII, hypothyroidism, ESRD on HD MWF, GERD, restrictive lung disease with chronic hypoxemic resp failure (on 2-3L O2) who transfers to [facility] for CTS evaluation for CABG +/- AVR. Initially presented to [facility] ED w/ 2 day history of "heartburn pain," N/V, and SOB. Workup notable for troponin 13 (peaked at 42 on 3/3), elevated BNP 2268 (baseline 400-600s), and radiographic evidence of pulmonary edema. EKG SR with RBBB. She was hypoxic and placed on BiPAP. Transferred to [facility] w/ NSTEMI. Cardiology and nephrology following. Volume removal with HD and weaned to baseline supplemental O2. BCx 3/2 resulted GPC resembling Staph and vanc initiated. Repeat BCx NGTD. LHC on 3/3 revealed proximal RCA 60% stenosed, mid LAD 70% stenosed, and mid Cx 70% stenosed. TTE revealed EF 48% with PORTILLO 0.63cm2, LFLG AS. Cardiology recommended CTS evaluation for CABG +/- AVR. Last dose of Plavix 3/3. She denies CP, SOB, N/V.      Overview/Hospital Course:  Admitted as transfer from [facility] ED with NSTEMI. Cardiology and nephrology consulted. Volume removed with HD and weaned to baseline supplemental O2. LHC performed showing triple vessel disease. Cardiology recommended CTS evaluation. Patient not a CABG candidate per CTS. IC consulted for possible PCI. Blood cultures from ED 03/02 showed GPCs; started IV Vancomycin. Permacath exchanged on 03/04. ID consulted for bacteremia. After long discussion with primary and cardiology, patient and family elected to not proceed with LHC/PCI and maintain DNR status. " Pursuing medical management. ID requested 48 hour line holiday to see if blood cultures remain clear. General surgery consulted for line removal. Patient recultured, given 48 hour line holiday. During dialysis 3/9/22 patient became hypotensive. Will hold BP meds for now and add as able.       Interval History: NAEO. AF VSS. HD line removed yesterday. Tolerated procedure well. Blood cxs NGTD. Plan for line placement prior to next HD session on Friday.    Review of Systems   Constitutional:  Negative for chills and fever.   HENT:  Negative for sore throat.    Respiratory:  Negative for cough and shortness of breath.    Cardiovascular:  Negative for chest pain and leg swelling.   Gastrointestinal:  Negative for abdominal pain, constipation, diarrhea, nausea and vomiting.   Genitourinary:  Negative for dysuria.   Musculoskeletal:  Negative for myalgias.   Skin:  Negative for rash.   Neurological:  Negative for dizziness and headaches.   Psychiatric/Behavioral:  Negative for confusion. The patient is nervous/anxious.    Objective:     Vital Signs (Most Recent):  Temp: 97.9 °F (36.6 °C) (03/09/22 1145)  Pulse: 78 (03/09/22 1429)  Resp: 20 (03/09/22 1429)  BP: (!) 103/56 (03/09/22 1145)  SpO2: (!) 94 % (03/09/22 1429)   Vital Signs (24h Range):  Temp:  [97.7 °F (36.5 °C)-99.1 °F (37.3 °C)] 97.9 °F (36.6 °C)  Pulse:  [64-89] 78  Resp:  [16-20] 20  SpO2:  [94 %-99 %] 94 %  BP: ()/(40-67) 103/56     Weight: 90 kg (198 lb 6.6 oz)  Body mass index is 37.49 kg/m².    Intake/Output Summary (Last 24 hours) at 3/9/2022 1515  Last data filed at 3/9/2022 0600  Gross per 24 hour   Intake --   Output 150 ml   Net -150 ml      Physical Exam  Constitutional:       Appearance: Normal appearance.   HENT:      Head: Normocephalic and atraumatic.      Mouth/Throat:      Mouth: Mucous membranes are moist.      Pharynx: Oropharynx is clear.   Eyes:      Extraocular Movements: Extraocular movements intact.      Pupils: Pupils are equal,  round, and reactive to light.   Neck:      Comments: No JVD  Cardiovascular:      Rate and Rhythm: Normal rate and regular rhythm.      Pulses: Normal pulses.      Heart sounds: Normal heart sounds.   Pulmonary:      Effort: Pulmonary effort is normal. No respiratory distress.      Breath sounds: Wheezing present. No rales.   Abdominal:      General: Abdomen is flat. Bowel sounds are normal. There is no distension.      Palpations: Abdomen is soft.      Tenderness: There is no abdominal tenderness. There is no guarding or rebound.   Musculoskeletal:         General: No swelling.      Cervical back: Neck supple.      Right lower leg: Edema (trace) present.      Left lower leg: Edema (trace) present.   Skin:     General: Skin is warm and dry.      Capillary Refill: Capillary refill takes less than 2 seconds.   Neurological:      General: No focal deficit present.      Mental Status: She is alert and oriented to person, place, and time. Mental status is at baseline.   Psychiatric:         Mood and Affect: Mood normal.         Behavior: Behavior normal.         Thought Content: Thought content normal.         Judgment: Judgment normal.       Significant Labs: All pertinent labs within the past 24 hours have been reviewed.  Recent Lab Results         03/10/22  0712   03/10/22  0400   03/09/22  1740   03/09/22  1739   03/09/22  1545        Albumin   2.4             Anion Gap   10             Baso #   0.11             Basophil %   1.2             Blood Culture, Routine     No Growth to date  [P]   No Growth to date  [P]         BUN   30             Calcium   9.2             Chloride   94             CO2   28             Creatinine   3.9             Differential Method   Automated             eGFR if    12.4             eGFR if non    10.7  Comment: Calculation used to obtain the estimated glomerular filtration  rate (eGFR) is the CKD-EPI equation.                Eos #   0.5              Eosinophil %   5.1             Glucose   248             Gran # (ANC)   5.4             Gran %   60.8             Hematocrit   30.6             Hemoglobin   9.5             Immature Grans (Abs)   0.44  Comment: Mild elevation in immature granulocytes is non specific and   can be seen in a variety of conditions including stress response,   acute inflammation, trauma and pregnancy. Correlation with other   laboratory and clinical findings is essential.               Immature Granulocytes   4.9             Lymph #   1.6             Lymph %   17.4             Magnesium   1.8             MCH   32.8             MCHC   31.0             MCV   106             Mono #   1.0             Mono %   10.6             MPV   10.9             nRBC   0             Phosphorus   3.9             Platelets   355             POCT Glucose 270         220       Potassium   4.6             RBC   2.90             RDW   11.9             Sodium   132             Vancomycin, Random   18.5             WBC   8.94                                03/09/22  1328   03/09/22  1144        Albumin 2.3         Anion Gap 11         Baso # 0.05         Basophil % 0.6         Blood Culture, Routine         BUN 22         Calcium 8.2         Chloride 94         CO2 25         Creatinine 3.1         Differential Method Automated         eGFR if  16.3         eGFR if non  14.2  Comment: Calculation used to obtain the estimated glomerular filtration  rate (eGFR) is the CKD-EPI equation.            Eos # 0.3         Eosinophil % 3.8         Glucose 189         Gran # (ANC) 5.6         Gran % 69.1         Hematocrit 28.2         Hemoglobin 8.8         Immature Grans (Abs) 0.17  Comment: Mild elevation in immature granulocytes is non specific and   can be seen in a variety of conditions including stress response,   acute inflammation, trauma and pregnancy. Correlation with other   laboratory and clinical findings is essential.            Immature Granulocytes 2.1         Lymph # 1.1         Lymph % 14.1         Magnesium 1.8         MCH 32.5         MCHC 31.2                  Mono # 0.8         Mono % 10.3         MPV 10.9         nRBC 0         Phosphorus 2.9         Platelets 299         POCT Glucose   155       Potassium 3.9         RBC 2.71         RDW 11.9         Sodium 130         Vancomycin, Random         WBC 8.08                  [P] - Preliminary Result               Significant Imaging:       I have reviewed all pertinent imaging results/findings within the past 24 hours.      Assessment/Plan:      * NSTEMI (non-ST elevated myocardial infarction)  HTN, HLD, CAD, acute on chronic resp failure with hypoxia, pHTN, AS, elevated troponin  - Echo 03/03 shows EF 48%, severe AS.  - LHC 03/03 showed LAD and LCx 70% stenosis and RCA 60% stenosis. Transferred [facility] for CTS evaluation; per CTS pt is poor surgical candidate. Recommended high risk PCI vs medical management  - Interventional cardiology consulted, after lengthy discussion with family, elected not to pursue invasive measures, treating medically  - Continue aspirin 81mg daily for life and clopidogrel 75mg PO daily for 1 year  - Continue atorvastatin 80mg PO daily  - Coreg and Losartan held due to hypotension with HD, will restart as needed  - Weaned to baseline supplemental O2.  - Duo nebs and inhalation treatment q6h   - d/c heparin gtt.  - Appreciate cardiology assistance.    Bacteremia  Blood cultures drawn in ED 03/02 showing GPCs resembling staph. No clear source of infection at this time. No noted areas of skin breakdown, tunneled line does not appear grossly infected (replaced on 3/4), UA unremarkable, CXR at presentation more consistent with pulmonary edema than infectious process.    - Continue IV Vancomycin. De-escalate as feasible.  - ID consulted, appreciate recs  - Will attempt 48 hour line holiday, recultured  - F/U repeat cultures    Advance care planning  DNR  status on admit; current [place] reflects her goals of care. Engaged the patient in a GOC discussion on 3/6. Patient has full capacity and stating that she would not want excessive life-saving measures including CPR or intubation.    - DNR order in place    Debility  - PT/OT recommending HH    Acute on chronic respiratory failure with hypoxemia  Patient with Hypoxic Respiratory failure which is Acute on chronic.  she is on home oxygen at 2-3 LPM. Supplemental oxygen was provided and noted-  .   Signs/symptoms of respiratory failure include- respiratory distress. Contributing diagnoses includes - Interstitial lung disease Labs and images were reviewed. Patient Has recent ABG, which has been reviewed. Will treat underlying causes and adjust management of respiratory failure as follows:    - Duo nebs q6h  - Inhalation treatment q6h  - ICS and flutter valve  - Supplemental O2 PRN  - Bipap QHS PRN    Elevated troponin  See NSTEMI      ESRD (end stage renal disease) on dialysis  ESRD on HD M,W,F outpatient. Access through right chest tunneled catheter  Oliguric at baseline, still makes some urine  Nephrology consulted for HD while inpatient  Dialysis 3/9/22. Unable to get a lot of fluid out due to hypotension. Holding BP meds.    - Renal function panel daily  - Monitor intake/output and daily standing weights.  - Replete electrolytes PRN, goal Mag/Phos/K 2/3/4   - Avoid nephrotoxic agents such as NSAIDs, gadolinium and IV radiocontrast.  - Renally dose meds to current GFR.  - Maintain MAP > 65.        Pulmonary hypertension  Likely 2/2 chronic ILD    See acute on chronic hypoxemic respiratory failure      Anemia of chronic disease  Baseline Hb 10, Hb 9.6 on admission  Antiplatelet/anticoagulation: Aspirin, Plavix  Likely 2/2 ESRD    - Monitor CBC daily  - Transfuse with goal Hb > 7        Aortic valve stenosis  See NSTEMI      Type 2 diabetes mellitus with diabetic polyneuropathy  Last A1c 5.5 (3/3/22). On detemir 30U subQ  BID at home. Some postprandial derangements.    - Continue insulin detemir 20U subQ BID  - Continue Aspart 5u TIDWM  - LDSSI  - Accuchecks achs  - Diabetic diet      Hypothyroidism (acquired)  TSH, T4 stable. Hyponatremia likely 2/2 renal failure.    - Continue levothyroxine 75mcg PO daily.    GERD (gastroesophageal reflux disease)  - Continue pantoprazole 40mg PO daily.    Mixed hyperlipidemia  Chronic, stable.    - Continue home Lipitor 80 mg daily    Essential hypertension  Chronic, normotensive on evaluation today.    - Coreg 25 mg BID  - Losartan 25 mg daily  - Labetalol 10 mg PRN for SBP >160    Will hold for now given hypotension with HD      VTE Risk Mitigation (From admission, onward)         Ordered     heparin (porcine) injection 1,000 Units  As needed (PRN)         03/08/22 1845     heparin (porcine) injection 5,000 Units  As needed (PRN)         03/03/22 0755     IP VTE HIGH RISK PATIENT  Once         03/03/22 0650     Place sequential compression device  Until discontinued         03/03/22 0650                Discharge Planning   UMAIR: 3/11/2022     Code Status: DNR   Is the patient medically ready for discharge?: No    Reason for patient still in hospital (select all that apply): Patient trending condition and Treatment  Discharge Plan A: Home Health          Geovani Morton MD  Department of Hospital Medicine   [facility] - Cardiology Stepdown

## 2022-03-10 NOTE — SUBJECTIVE & OBJECTIVE
Interval History: TL removed by AGUSTÍN    Review of Systems   Constitutional:  Negative for chills, fatigue, fever and unexpected weight change.   HENT:  Negative for congestion, postnasal drip and trouble swallowing.    Respiratory:  Negative for cough, chest tightness and shortness of breath.    Cardiovascular:  Negative for chest pain, palpitations and leg swelling.   Gastrointestinal:  Negative for abdominal pain, blood in stool, constipation, diarrhea, nausea and vomiting.   Genitourinary:  Negative for difficulty urinating, dysuria and hematuria.   Musculoskeletal:  Negative for arthralgias and back pain.   Neurological:  Negative for dizziness and light-headedness.   Psychiatric/Behavioral:  Negative for confusion.    Objective:     Vital Signs (Most Recent):  Temp: 97.2 °F (36.2 °C) (03/10/22 0603)  Pulse: 81 (03/10/22 0734)  Resp: 17 (03/10/22 0734)  BP: (!) 142/63 (03/10/22 0603)  SpO2: 96 % (03/10/22 0734)   Vital Signs (24h Range):  Temp:  [97.1 °F (36.2 °C)-97.9 °F (36.6 °C)] 97.2 °F (36.2 °C)  Pulse:  [64-89] 81  Resp:  [16-20] 17  SpO2:  [94 %-100 %] 96 %  BP: ()/(40-66) 142/63     Weight: 90 kg (198 lb 6.6 oz)  Body mass index is 37.49 kg/m².    Estimated Creatinine Clearance: 12.9 mL/min (A) (based on SCr of 3.9 mg/dL (H)).    Physical Exam  Constitutional:       General: She is not in acute distress.     Appearance: Normal appearance. She is not ill-appearing.   Cardiovascular:      Rate and Rhythm: Normal rate and regular rhythm.      Pulses: Normal pulses.      Heart sounds: Normal heart sounds. No murmur heard.    No friction rub. No gallop.   Pulmonary:      Effort: Pulmonary effort is normal. No respiratory distress.   Abdominal:      General: Abdomen is flat. Bowel sounds are normal. There is no distension.      Palpations: Abdomen is soft.      Tenderness: There is no abdominal tenderness.   Skin:     General: Skin is warm and dry.      Comments: TL removed   Neurological:      Mental  Status: She is alert.       Significant Labs: All pertinent labs within the past 24 hours have been reviewed.    Significant Imaging: I have reviewed all pertinent imaging results/findings within the past 24 hours.

## 2022-03-10 NOTE — ASSESSMENT & PLAN NOTE
Blood cultures drawn in ED 03/02 showing GPCs resembling staph. No clear source of infection at this time. No noted areas of skin breakdown, tunneled line does not appear grossly infected (replaced on 3/4), UA unremarkable, CXR at presentation more consistent with pulmonary edema than infectious process.    - Continue IV Vancomycin. De-escalate as feasible.  - ID consulted, appreciate recs  - Will attempt 48 hour line holiday, recultured  - F/U repeat cultures

## 2022-03-10 NOTE — CONSULTS
Pharmacokinetic Assessment Follow Up: IV Vancomycin    Vancomycin serum concentration assessment(s):    S/p HD  and permacath removal 3/9. Post-HD vancomycin concentration = 18.5 mcg/mL. Planned replacement of HD cath on and resumption of HD on 3/11.    Vancomycin Regimen Plan:  1. Hold vancomycin today. Will plan to re-dose post-HD on 3/11  2. Concentration in AM 3/12/22; goal 15-20 mcg/mL    Thank you for the consult, will continue to follow  Chapincito Jimenez PharmSofiaD., BCPS  [id number]       Patient brief summary:  Melinda Knight is a 76 y.o. female initiated on antimicrobial therapy with IV Vancomycin for treatment of MRSA bacteremia       Drug levels (last 3 results):  Recent Labs   Lab Result Units 03/08/22  1636 03/10/22  0400   Vancomycin, Random ug/mL 33.5 18.5       Drug Allergies:   Review of patient's allergies indicates:   Allergen Reactions    Sulfa (sulfonamide antibiotics) Nausea And Vomiting    Menthol contain prod        Actual Body Weight:   90 kg    Renal Function:   Estimated Creatinine Clearance: 12.9 mL/min (A) (based on SCr of 3.9 mg/dL (H)).,     Dialysis Method (if applicable):  HD MWF    CBC (last 72 hours):  Recent Labs   Lab Result Units 03/08/22 1636 03/09/22  1328 03/10/22  0400   WBC K/uL 9.59 8.08 8.94   Hemoglobin g/dL 9.9* 8.8* 9.5*   Hematocrit % 31.2* 28.2* 30.6*   Platelets K/uL 331 299 355   Gran % % 70.2 69.1 60.8   Lymph % % 13.3* 14.1* 17.4*   Mono % % 9.1 10.3 10.6   Eosinophil % % 4.3 3.8 5.1   Basophil % % 0.8 0.6 1.2   Differential Method  Automated Automated Automated       Metabolic Panel (last 72 hours):  Recent Labs   Lab Result Units 03/08/22  1636 03/09/22  1328 03/10/22  0400   Sodium mmol/L 131* 130* 132*   Potassium mmol/L 4.5 3.9 4.6   Chloride mmol/L 91* 94* 94*   CO2 mmol/L 26 25 28   Glucose mg/dL 185* 189* 248*   BUN mg/dL 43* 22 30*   Creatinine mg/dL 4.3* 3.1* 3.9*   Albumin g/dL 2.6* 2.3* 2.4*   Magnesium mg/dL 2.2 1.8 1.8   Phosphorus mg/dL 4.3 2.9 3.9        Vancomycin Administrations:  vancomycin given in the last 96 hours                   vancomycin 500 mg in dextrose 5 % 100 mL IVPB (ready to mix system) (mg) 500 mg New Bag 03/06/22 1108    vancomycin 2 g in dextrose 5 % 500 mL IVPB (mg) 2,000 mg New Bag 03/04/22 0841                Microbiologic Results:  Microbiology Results (last 7 days)     Procedure Component Value Units Date/Time    Blood culture [[id number]] Collected: 03/09/22 1740    Order Status: Completed Specimen: Blood from Peripheral, Left Hand Updated: 03/10/22 0315     Blood Culture, Routine No Growth to date    Blood culture [[id number]] Collected: 03/09/22 1739    Order Status: Completed Specimen: Blood from Peripheral, Right Arm Updated: 03/10/22 0315     Blood Culture, Routine No Growth to date    Blood culture [[id number]]     Order Status: Canceled Specimen: Blood     Blood culture [[id number]]     Order Status: Canceled Specimen: Blood     Blood culture [[id number]] Collected: 03/04/22 1039    Order Status: Completed Specimen: Blood Updated: 03/09/22 1612     Blood Culture, Routine No growth after 5 days.    Blood culture [[id number]] Collected: 03/04/22 1039    Order Status: Completed Specimen: Blood from Peripheral, Right Updated: 03/09/22 1612     Blood Culture, Routine No growth after 5 days.

## 2022-03-10 NOTE — CONSULTS
Interventional Nephrology is on call for permcaths until 03/18/22. Please consult Dr. Kimball with Nephrology.     Florence Harris MD  Radiology PGY III

## 2022-03-10 NOTE — ASSESSMENT & PLAN NOTE
HTN, HLD, CAD, acute on chronic resp failure with hypoxia, pHTN, AS, elevated troponin  - Echo 03/03 shows EF 48%, severe AS.  - LHC 03/03 showed LAD and LCx 70% stenosis and RCA 60% stenosis. Transferred [facility] for CTS evaluation; per CTS pt is poor surgical candidate. Recommended high risk PCI vs medical management  - Interventional cardiology consulted, after lengthy discussion with family, elected not to pursue invasive measures, treating medically  - Continue aspirin 81mg daily for life and clopidogrel 75mg PO daily for 1 year  - Continue atorvastatin 80mg PO daily  - Coreg and Losartan held due to hypotension with HD, will restart as needed  - Weaned to baseline supplemental O2.  - Duo nebs and inhalation treatment q6h   - d/c heparin gtt.  - Appreciate cardiology assistance.

## 2022-03-11 NOTE — PLAN OF CARE
PT AWAKE AT THIS TIME.NO COMPLAINTS OF PAIN OR DISCOMFORT.NO SHORTNESS OF BREATH.WILL CONT TO MONITOR

## 2022-03-11 NOTE — ASSESSMENT & PLAN NOTE
ESRD on HD M,W,F outpatient.  Oliguric at baseline, still makes some urine  Nephrology consulted for HD while inpatient  3/11: Right femoral trialysis placed by anesthesia at bedside    - HD per Nephrology  - Renal function panel daily  - Monitor intake/output and daily standing weights.  - Replete electrolytes PRN, goal Mag/Phos/K 2/3/4   - Avoid nephrotoxic agents such as NSAIDs, gadolinium and IV radiocontrast.  - Renally dose meds to current GFR.  - Maintain MAP > 65.

## 2022-03-11 NOTE — ASSESSMENT & PLAN NOTE
HTN, HLD, CAD, acute on chronic resp failure with hypoxia, pHTN, AS, elevated troponin  - Echo 03/03 shows EF 48%, severe AS.  - LHC 03/03 showed LAD and LCx 70% stenosis and RCA 60% stenosis. Transferred [facility] for CTS evaluation; per CTS pt is poor surgical candidate. Recommended high risk PCI vs medical management  - Interventional cardiology consulted, after lengthy discussion with family, elected not to pursue invasive measures, treating medically  - Continue aspirin 81mg daily for life and clopidogrel 75mg PO daily for 1 year (holding Plavix for tunneled catheter placement)  - Continue atorvastatin 80mg PO daily  - Coreg and Losartan held due to hypotension with HD, will restart as needed  - Weaned to baseline supplemental O2.  - Duo nebs and inhalation treatment q6h   - d/c heparin gtt.  - Appreciate cardiology assistance.

## 2022-03-11 NOTE — PLAN OF CARE
[facility] - Cardiology Stepdown      HOME HEALTH ORDERS  FACE TO FACE ENCOUNTER    Patient Name: Melinda Knight  YOB: 1945    PCP: Duke Cole MD   PCP Address: [address] / [place] [address]  PCP Phone Number: [phone]  PCP Fax: [phone]    Encounter Date: 3/18/22    Admit to Home Health    Diagnoses:  Active Hospital Problems    Diagnosis  POA    *NSTEMI (non-ST elevated myocardial infarction) [I21.4]  Yes    Bacteremia [R78.81]  No    Acute on chronic respiratory failure with hypoxemia [J96.21]  Yes    Debility [R53.81]  Yes    Advance care planning [Z71.89]  Not Applicable    Elevated troponin [R77.8]  Yes    ESRD (end stage renal disease) on dialysis [N18.6, Z99.2]  Not Applicable     Chronic    Pulmonary hypertension [I27.20]  Yes     Chronic    Anemia of chronic disease [D63.8]  Yes     Chronic    Aortic valve stenosis [I35.0]  Yes     Chronic    Hypothyroidism (acquired) [E03.9]  Yes     Chronic    Type 2 diabetes mellitus with diabetic polyneuropathy [E11.42]  Yes     Chronic    GERD (gastroesophageal reflux disease) [K21.9]  Yes     Chronic    Essential hypertension [I10]  Yes     Chronic    Mixed hyperlipidemia [E78.2]  Yes     Chronic      Resolved Hospital Problems   No resolved problems to display.       Follow Up Appointments:  Future Appointments   Date Time Provider Department Center   3/22/2022 10:00 AM Abe Almeida MD [facility]   3/24/2022  9:30 AM Avis Covington DO [facility]   4/7/2022 11:30 AM Manju Escobedo MD [facility]       Allergies:  Review of patient's allergies indicates:   Allergen Reactions    Sulfa (sulfonamide antibiotics) Nausea And Vomiting    Menthol contain prod        Medications: Review discharge medications with patient and family and provide education.    No current facility-administered medications for this encounter.     Current Outpatient Medications  "  Medication Sig Dispense Refill    atorvastatin (LIPITOR) 80 MG tablet Take 1 tablet (80 mg total) by mouth once daily. 90 tablet 3    citalopram (CELEXA) 20 MG tablet Take 1 tablet by mouth once daily 90 tablet 1    clopidogreL (PLAVIX) 75 mg tablet Take 1 tablet (75 mg total) by mouth once daily. 30 tablet 11    insulin detemir U-100 (LEVEMIR FLEXTOUCH) 100 unit/mL (3 mL) SubQ InPn pen Inject 30 Units into the skin 2 (two) times daily. 18 mL 11    levothyroxine (SYNTHROID) 75 MCG tablet Take 1 tablet (75 mcg total) by mouth before breakfast. 30 tablet 11    linaCLOtide (LINZESS) 72 mcg Cap capsule Take 1 capsule (72 mcg total) by mouth before breakfast. 90 capsule 1    pantoprazole (PROTONIX) 40 MG tablet Take 1 tablet by mouth once daily 90 tablet 0    apixaban (ELIQUIS) 5 mg (74 tabs) DsPk Start this prescription first. For the first 7 days take two 5 mg tablets twice daily.  After 7 days take one 5 mg tablet twice daily. 74 tablet 0    apixaban (ELIQUIS) 5 mg Tab Take 1 tablet (5 mg total) by mouth 2 (two) times daily. Start this prescription after finishing starter pack 60 tablet 1    aspirin 81 MG Chew Take 1 tablet (81 mg total) by mouth once daily. Stop taking aspirin on 4/15/22. 30 tablet 0    blood sugar diagnostic (BLOOD GLUCOSE TEST) Strp 1 strip by Misc.(Non-Drug; Combo Route) route 2 (two) times daily. Prodigy 200 strip 5    carvediloL (COREG) 3.125 MG tablet Take 1 tablet (3.125 mg total) by mouth 2 (two) times daily. Please discuss with primary care doctor prior to continuing other blood pressure medications 30 tablet 1    multivitamin (ONE DAILY MULTIVITAMIN) per tablet Take 1 tablet by mouth once daily.      nitroGLYCERIN (NITROSTAT) 0.3 MG SL tablet Place 1 tablet (0.3 mg total) under the tongue every 5 (five) minutes as needed for Chest pain. 30 tablet 2    pen needle, diabetic (NOVOTWIST) 32 gauge x 1/5" Ndle USE AS DIRECTED WITH  INSULIN   each 3    senna (SENOKOT) 8.6 " mg tablet Take 1 tablet by mouth once daily.      sevelamer carbonate (RENVELA) 800 mg Tab Take 1 tablet (800 mg total) by mouth 3 (three) times daily with meals. 90 tablet 11    vancomycin HCl (VANCOMYCIN 500 MG/100 ML NS, READY TO MIX, ) Give 500 mg IV three times weekly after HD on HD days. End date 4/6/22 900 mL 0    vitamin renal formula, B-complex-vitamin c-folic acid, (NEPHROCAP) 1 mg Cap Take 1 capsule by mouth once daily. 30 capsule 0     Discharge Medication List as of 3/16/2022  4:57 PM      START taking these medications    Details   nitroGLYCERIN (NITROSTAT) 0.3 MG SL tablet Place 1 tablet (0.3 mg total) under the tongue every 5 (five) minutes as needed for Chest pain., Starting Wed 3/9/2022, Until Thu 3/9/2023 at 2359, Normal      sevelamer carbonate (RENVELA) 800 mg Tab Take 1 tablet (800 mg total) by mouth 3 (three) times daily with meals., Starting Fri 3/11/2022, Until Sat 3/11/2023, Normal      vitamin renal formula, B-complex-vitamin c-folic acid, (NEPHROCAP) 1 mg Cap Take 1 capsule by mouth once daily., Starting Fri 3/11/2022, Until Sun 4/10/2022, Normal      apixaban (ELIQUIS) 5 mg Tab Take 1 tablet (5 mg total) by mouth 2 (two) times daily. Start this prescription after finishing starter pack, Starting Wed 3/16/2022, Normal         CONTINUE these medications which have CHANGED    Details   aspirin 81 MG Chew Take 1 tablet (81 mg total) by mouth once daily. Stop taking aspirin on 4/15/22., Starting Wed 3/16/2022, Normal      carvediloL (COREG) 3.125 MG tablet Take 1 tablet (3.125 mg total) by mouth 2 (two) times daily. Please discuss with primary care doctor prior to continuing other blood pressure medications, Starting Wed 3/16/2022, Normal      vancomycin HCl (VANCOMYCIN 500 MG/100 ML NS, READY TO MIX, ) Give 500 mg IV three times weekly after HD on HD days. End date 4/6/22, Print      apixaban (ELIQUIS) 5 mg (74 tabs) DsPk Start this prescription first. For the first 7 days take two 5 mg  "tablets twice daily.  After 7 days take one 5 mg tablet twice daily., Normal         CONTINUE these medications which have NOT CHANGED    Details   atorvastatin (LIPITOR) 80 MG tablet Take 1 tablet (80 mg total) by mouth once daily., Starting Fri 5/7/2021, Until Sat 5/7/2022, Normal      citalopram (CELEXA) 20 MG tablet Take 1 tablet by mouth once daily, Normal      clopidogreL (PLAVIX) 75 mg tablet Take 1 tablet (75 mg total) by mouth once daily., Starting Fri 3/12/2021, Until Sat 3/12/2022, Normal      insulin detemir U-100 (LEVEMIR FLEXTOUCH) 100 unit/mL (3 mL) SubQ InPn pen Inject 30 Units into the skin 2 (two) times daily., Starting Thu 5/6/2021, Until Fri 5/6/2022, Normal      levothyroxine (SYNTHROID) 75 MCG tablet Take 1 tablet (75 mcg total) by mouth before breakfast., Starting Fri 5/7/2021, Until Sat 5/7/2022, Normal      linaCLOtide (LINZESS) 72 mcg Cap capsule Take 1 capsule (72 mcg total) by mouth before breakfast., Starting Wed 5/19/2021, Normal      pantoprazole (PROTONIX) 40 MG tablet Take 1 tablet by mouth once daily, Normal      blood sugar diagnostic (BLOOD GLUCOSE TEST) Strp 1 strip by Misc.(Non-Drug; Combo Route) route 2 (two) times daily. Prodigy, Starting Mon 3/1/2021, Normal      multivitamin (ONE DAILY MULTIVITAMIN) per tablet Take 1 tablet by mouth once daily., Historical Med      pen needle, diabetic (NOVOTWIST) 32 gauge x 1/5" Ndle USE AS DIRECTED WITH  INSULIN  PEN, Normal      senna (SENOKOT) 8.6 mg tablet Take 1 tablet by mouth once daily., Historical Med         STOP taking these medications       melatonin (MELATIN) 3 mg tablet Comments:   Reason for Stopping:                 I have seen and examined this patient within the last 30 days. My clinical findings that support the need for the home health skilled services and home bound status are the following:no   Weakness/numbness causing balance and gait disturbance due to Coronary Heart Disease and Infection making it taxing to leave " home.     Diet:   cardiac diet and diabetic diet 2000 calorie    Referrals/ Consults  Physical Therapy to evaluate and treat. Evaluate for home safety and equipment needs; Establish/upgrade home exercise program. Perform / instruct on therapeutic exercises, gait training, transfer training, and Range of Motion.  Occupational Therapy to evaluate and treat. Evaluate home environment for safety and equipment needs. Perform/Instruct on transfers, ADL training, ROM, and therapeutic exercises.    Activities:   activity as tolerated    Nursing:   Agency to admit patient within 24 hours of hospital discharge unless specified on physician order or at patient request    SN to complete comprehensive assessment including routine vital signs. Instruct on disease process and s/s of complications to report to MD. Review/verify medication list sent home with the patient at time of discharge  and instruct patient/caregiver as needed. Frequency may be adjusted depending on start of care date.     Skilled nurse to perform up to 3 visits PRN for symptoms related to diagnosis    Notify MD if SBP > 160 or < 90; DBP > 90 or < 50; HR > 120 or < 50; Temp > 101; O2 < 88%    Ok to schedule additional visits based on staff availability and patient request on consecutive days within the home health episode.    When multiple disciplines ordered:    Start of Care occurs on Sunday - Wednesday schedule remaining discipline evaluations as ordered on separate consecutive days following the start of care.    Thursday SOC -schedule subsequent evaluations Friday and Monday the following week.     Friday - Saturday SOC - schedule subsequent discipline evaluations on consecutive days starting Monday of the following week.    Miscellaneous   Home Infusion Therapy:   SN to perform Infusion Therapy/Central Line Care.  Review Central Line Care & Central Line Flush with patient.    Scrub the Hub: Prior to accessing the line, always perform a 30 second alcohol  scrub  Each lumen of the central line is to be flushed at least daily with 10 mL Normal Saline and 3 mL Heparin flush (10 units/mL)  Skilled Nurse (SN) may draw blood from IV access  Blood Draw Procedure:   - Aspirate at least 5 mL of blood   - Discard   - Obtain specimen   - Change injection cap   - Flush with 20 mL Normal Saline followed by a                 3-5 mL Heparin flush (10 units/mL)  Central :   - Sterile dressing changes are done weekly and as needed.   - Use chlor-hexadine scrub to cleanse site, apply Biopatch to insertion site,       apply securement device dressing   - Injection caps are changed weekly and after EVERY lab draw.   - If sterile gauze is under dressing to control oozing,                 dressing change must be performed every 24 hours until gauze is not needed.    Outpatient Antibiotic Therapy Plan:     Please send referral to [facility].     1) Infection: MRSA bacteremia      2) Discharge Antibiotics:     Intravenous antibiotics:  · IV vancomycin post HD pharmacy to dose        3) Therapy Duration:  4 weeks     Estimated end date of IV antibiotics: 04/06/2022     4) Outpatient Weekly Labs:     Order the following labs to be drawn on Mondays:   · CBC  · CMP   · Vancomycin trough. Target 15-20     If discharged on vancomycin IV, order the following additional labs to be drawn on Thursdays:  · CMP   · Vancomycin trough. Target 15-20     If vancomycin trough is not at target (15-20) prior to discharge, schedule vancomycin trough to be drawn before their fourth outpatient dose.     5) Fax Lab Results to Infectious Diseases Provider: [facility] Fax Number: [phone]     6) Outpatient Infectious Diseases Follow-up     · Follow-up appointment will be arranged by the ID clinic and will be found in the patient's appointments tab.     · Prior to discharge, please ensure the patient's follow-up has been scheduled.    · If there is  still no follow-up scheduled prior to discharge, please send an EPIC message to Lindsey Nuñez in Infectious Diseases.       Diabetic Care:   SN to perform and educate Diabetic management with blood glucose monitoring: and Report CBG < 60 or > 350 to physician.  Home Oxygen:  Oxygen at 2-3 L/min nasal canula to be used:  As needed for SOB.    Wound Care Orders  no    I certify that this patient is confined to her home and needs intermittent skilled nursing care, physical therapy and occupational therapy.    Geovani Morton MD  Hospital Medicine [facility]

## 2022-03-11 NOTE — SUBJECTIVE & OBJECTIVE
Interval History: NAEO. AF VSS. No new complaints at this time. R femoral trialysis line placed at bedside by anesthesia. Plan for HD tonight.    Review of Systems   Constitutional:  Negative for chills and fever.   HENT:  Negative for sore throat.    Respiratory:  Negative for cough and shortness of breath.    Cardiovascular:  Negative for chest pain and leg swelling.   Gastrointestinal:  Negative for abdominal pain, constipation, diarrhea, nausea and vomiting.   Genitourinary:  Negative for dysuria.   Musculoskeletal:  Negative for myalgias.   Skin:  Negative for rash.   Neurological:  Negative for dizziness and headaches.   Psychiatric/Behavioral:  Negative for confusion. The patient is nervous/anxious.    Objective:     Vital Signs (Most Recent):  Temp: 98 °F (36.7 °C) (03/11/22 0745)  Pulse: 86 (03/11/22 1130)  Resp: 18 (03/11/22 0747)  BP: (!) 153/65 (03/11/22 0745)  SpO2: 96 % (03/11/22 0747)   Vital Signs (24h Range):  Temp:  [97.8 °F (36.6 °C)-99.3 °F (37.4 °C)] 98 °F (36.7 °C)  Pulse:  [86-96] 86  Resp:  [18-20] 18  SpO2:  [96 %-99 %] 96 %  BP: (122-193)/(52-80) 153/65     Weight: 90 kg (198 lb 6.6 oz)  Body mass index is 37.49 kg/m².    Intake/Output Summary (Last 24 hours) at 3/11/2022 1352  Last data filed at 3/11/2022 0100  Gross per 24 hour   Intake --   Output 200 ml   Net -200 ml      Physical Exam  Constitutional:       Appearance: Normal appearance.   HENT:      Head: Normocephalic and atraumatic.      Mouth/Throat:      Mouth: Mucous membranes are moist.      Pharynx: Oropharynx is clear.   Eyes:      Extraocular Movements: Extraocular movements intact.      Pupils: Pupils are equal, round, and reactive to light.   Neck:      Comments: No JVD  Cardiovascular:      Rate and Rhythm: Normal rate and regular rhythm.      Pulses: Normal pulses.      Heart sounds: Normal heart sounds.   Pulmonary:      Effort: Pulmonary effort is normal. No respiratory distress.      Breath sounds: Wheezing present. No  rales.   Abdominal:      General: Abdomen is flat. Bowel sounds are normal. There is no distension.      Palpations: Abdomen is soft.      Tenderness: There is no abdominal tenderness. There is no guarding or rebound.   Musculoskeletal:         General: No swelling.      Cervical back: Neck supple.      Right lower leg: Edema (trace) present.      Left lower leg: Edema (trace) present.   Skin:     General: Skin is warm and dry.      Capillary Refill: Capillary refill takes less than 2 seconds.   Neurological:      General: No focal deficit present.      Mental Status: She is alert and oriented to person, place, and time. Mental status is at baseline.   Psychiatric:         Mood and Affect: Mood normal.         Behavior: Behavior normal.         Thought Content: Thought content normal.         Judgment: Judgment normal.       Significant Labs: All pertinent labs within the past 24 hours have been reviewed.  Recent Lab Results         03/11/22  0952   03/11/22  0754   03/11/22  0408   03/10/22  2056   03/10/22  1541        Albumin     2.5           Anion Gap     13           aPTT     27.3  Comment: aPTT therapeutic range = 39-69 seconds           Baso # 0.12     0.13           Basophil % 1.2     1.3           BUN     44           Calcium     9.6           Chloride     98           CO2     23           Creatinine     4.5           Differential Method Automated     Automated           eGFR if      10.4           eGFR if non      9.0  Comment: Calculation used to obtain the estimated glomerular filtration  rate (eGFR) is the CKD-EPI equation.              Eos # 0.4     0.4           Eosinophil % 4.1     3.9           Glucose     219           Gran # (ANC) 6.7     6.5           Gran % 67.0     65.1           Hematocrit 33.0     33.0           Hemoglobin 10.3     10.2           Immature Grans (Abs) 0.32  Comment: Mild elevation in immature granulocytes is non specific and   can be seen in a  variety of conditions including stress response,   acute inflammation, trauma and pregnancy. Correlation with other   laboratory and clinical findings is essential.       0.38  Comment: Mild elevation in immature granulocytes is non specific and   can be seen in a variety of conditions including stress response,   acute inflammation, trauma and pregnancy. Correlation with other   laboratory and clinical findings is essential.             Immature Granulocytes 3.2     3.8           INR     1.1  Comment: Coumadin Therapy:  2.0 - 3.0 for INR for all indicators except mechanical heart valves  and antiphospholipid syndromes which should use 2.5 - 3.5.             Lymph # 1.5     1.8           Lymph % 15.1     17.6           Magnesium     1.9           MCH 33.4     32.5           MCHC 31.2     30.9                105           Mono # 0.9     0.8           Mono % 9.4     8.3           MPV 10.5     10.6           nRBC 0     0           Pathologist Review Review required               Phosphorus     3.7           Platelets 390     396           POCT Glucose   290     222   208       Potassium     4.9           Protime     11.3           RBC 3.08     3.14           RDW 11.9     12.0           Sodium     134           WBC 9.94     10.02                                  Significant Imaging:       I have reviewed all pertinent imaging results/findings within the past 24 hours.

## 2022-03-11 NOTE — ASSESSMENT & PLAN NOTE
Blood cultures drawn in ED 03/02 showing GPCs resembling staph. No clear source of infection at this time. No noted areas of skin breakdown, tunneled line does not appear grossly infected (replaced on 3/4), UA unremarkable, CXR at presentation more consistent with pulmonary edema than infectious process.    - Continue IV Vancomycin. De-escalate as feasible.  - ID consulted, appreciate recs  - 48 hour line holiday complete, repeat cultures NGTD  - R femoral Trialysis placed by anesthesia  - F/U repeat cultures

## 2022-03-11 NOTE — PT/OT/SLP PROGRESS
Physical Therapy   Progress Note    Patient Name:  Melinda Knight  MRN: [id number]    Admit Date: 3/3/2022  Admitting Diagnosis:  NSTEMI (non-ST elevated myocardial infarction)  Length of Stay: 8 days  Recent Surgery: Procedure(s) (LRB):  CATHETERIZATION, HEART, LEFT (Left) 8 Days Post-Op    Recommendations:     Discharge Recommendations: Home Health PT  Equipment recommendations: none  Barriers to discharge: None Identified     Assessment:     Melinda Knight is a 76 y.o. female admitted to [facility] on 3/3/2022 with medical diagnosis of NSTEMI (non-ST elevated myocardial infarction). Pt presents with weakness, impaired endurance, impaired functional mobilty, impaired self care skills, gait instability. Pt is progressing towards goals, but has not yet reached prior level of function. Pt is agreeable to perform exercises seated EOB and sit <> stands, but declines walking with PT. Max encouragement from PT to attempt ambulating with RW to promote functional mobility and independence, but pt declined. Melinda Knight would benefit from continued acute PT intervention to improve quality of life, focus on recovery of impairments, provide patient/caregiver education, reduce fall risk, and maximize (I) and safety with functional mobility. Once medically stable, recommending pt discharge to Home Health PT with caregiver assistance from daughter.    Rehab Prognosis: Good    Plan:     During this hospitalization, patient to be seen 3 x/week to address the identified rehab impairments via gait training, therapeutic activities, therapeutic exercises and progress towards stated goals.     Plan of Care Expires:  04/05/22  Plan of Care reviewed with: patient    This plan of care has been discussed with the patient/caregiver, who was included in its development and is in agreement with the identified goals and treatment plan.     Subjective     Communicated with RN prior to session.  Patient found HOB elevated upon PT entry to room,  "agreeable to therapy session. Pt alone during session.    Patient/Family Comments/goals: "I can't really get up and walk"    Pain/Comfort:  · Pain Rating 1: 0/10    Patients cultural, spiritual, Worship conflicts given the current situation: None identified     Objective:     Patient found with: PureWick, oxygen, telemetry    General Precautions: Standard, fall, contact   Orthopedic Precautions:N/A   Braces: N/A   Oxygen Device: nasal cannula 2L    Cognition:   Pt is Oriented X 3 during session.    Therapist provided skilled verbal and tactile cueing to facilitate the following functional mobility tasks. Listed tasks are focused on recovery of impairments and improving pt's independence and efficiency with bed mobility, transfers and ambulation as able.     Bed Mobility:  · Supine > Sit: Stand-by Assistance  · Sit > Supine: Stand-by Assistance    Transfers:   · Sit <> Stand Transfer: CGA from EOB with RW AD                   Gait:  · Distance: 2 side steps toward L  · Assistance level: Contact Guard Assistance  · Assistive Device: rolling walker  · Gait Assessment: unsteady gait     Outcome Measure: AM-PAC 6 CLICK MOBILITY  Total Score:13     Patient/Caregiver Education and Additional Therapeutic Activities/Exercises     Pt performed seated exercises EOB: SAQ x20, marches x20, sit <> stand with RW x5. Pt requires max encouragement to take 2 side steps toward L with RW.     Provided pt/caregiver education regarding:    PT POC and goals for therapy    Safety with mobility and fall risk    Safe management of AD as needed    Energy conservation techniques    Instruction on use of call button and importance of calling nursing staff for assistance with mobility    Importance of OOB activities, such as transferring from bed to recliner and walking to restroom with AD    Patient/caregiver able to verbalize understanding; will follow-up with pt/caregiver during current admit for additional questions/concerns within " scope of practice.       Patient left HOB elevated with call button in reach.    Goals:     Multidisciplinary Problems     Physical Therapy Goals        Problem: Physical Therapy Goal    Goal Priority Disciplines Outcome Goal Variances Interventions   Physical Therapy Goal     PT, PT/OT Ongoing, Progressing     Description: Goals to be met by: 3/16     Patient will increase functional independence with mobility by performin. Supine to sit with Stand-by Assistance - met  2. Sit to supine with Stand-by Assistance - met  3. Sit to stand transfer with Stand-by Assistance - not met  4. Gait  x 50 feet with Stand-by Assistance using LRAD. - not met  5. Lower extremity exercise program x15 reps per handout, with independence to improve strength and activity tolerance. - not met                     Time Tracking:       PT Received On: 22  PT Start Time: 0950     PT Stop Time: 1004  PT Total Time (min): 14 min     Billable Minutes: Therapeutic Exercise 2022

## 2022-03-11 NOTE — ANESTHESIA PROCEDURE NOTES
Central Line    Diagnosis: ESRD  Patient location during procedure: floor  Timeout: 3/11/2022 2:00 PM  Procedure end time: 3/11/2022 2:40 PM    Staffing  Authorizing Provider: Cj Skaggs MD  Performing Provider: Cj Skaggs MD    Anesthesiologist was present at the time of the procedure.  Preanesthetic Checklist  Completed: patient identified, IV checked, site marked, risks and benefits discussed, surgical consent, monitors and equipment checked, pre-op evaluation, timeout performed and anesthesia consent given  Indication   Indication: med administration, hemodialysis     Anesthesia   local infiltration    Central Line   Skin Prep: skin prepped with Betadine, skin prep agent completely dried prior to procedure  Sterile Barriers Followed: Yes    All five maximal barriers used- gloves, gown, cap, mask, and large sterile sheet    hand hygiene performed prior to central venous catheter insertion  Location: left femoral vein.   Catheter type: dialysis  Catheter Size: 12 Fr  Inserted Catheter Length: 24 cm  Ultrasound: vascular probe with ultrasound   Vessel Caliber: medium, patent  Needle advanced into vessel with real time Ultrasound guidance.  Guidewire confirmed in vessel.  sterile gel and probe cover used in ultrasound-guided central venous catheter insertion   Manometry: Venous cannualation confirmed by visual estimation of blood vessel pressure using manometry.  Insertion Attempts: 1   Securement:line sutured, chlorhexidine patch, sterile dressing applied and blood return through all ports    Post-Procedure    Adverse Events:none      Guidewire Guidewire removed intact.

## 2022-03-11 NOTE — PT/OT/SLP PROGRESS
"Occupational Therapy   Treatment       Patient Name:  Melinda Knight   MRN:  [id number]  Admit Date: 3/3/2022  Admitting Diagnosis:  NSTEMI (non-ST elevated myocardial infarction)   Length of Stay: 8 days  Recent Surgery: Procedure(s) (LRB):  CATHETERIZATION, HEART, LEFT (Left) 8 Days Post-Op    Recommendations:     Discharge Recommendations: home health OT  Discharge Equipment Recommendations:  none  Barriers to discharge:  None    Plan:     Patient to be seen 3 x/week to address the above listed problems via self-care/home management, therapeutic activities, therapeutic exercises  · Plan of Care Expires: 04/05/22  · Plan of Care Reviewed with: patient    Assessment:   Melinda Knight is a 76 y.o. female with a medical diagnosis of NSTEMI (non-ST elevated myocardial infarction).  She presents with the following performance deficits affecting function: weakness, impaired endurance, impaired self care skills, impaired functional mobilty.  Pt continues to benefit from a collaborative PT/OT/SLP program to improve quality of life and focus on recovery of impairments.     Pt politely reported that she had already worked with PT prior to session and had a dialysis appointment today, thus pt  was educated on the role of OT in acute care and benefits of OOB/EOB activities for improved functional improvement and ADL engagement. With increased positive reinforcement, pt able to complete Supine>sit with SBA before engaging in sit<>stand with SBA to ambulate for stand pivot t/f to chair. Pt then engaged in facewashing with a warm cloth at chair with SBA. Pt deferred other activity at this time.    Rehab Prognosis: Good; patient would benefit from acute skilled OT services to address these deficits and reach maximum level of function.        Subjective   Communicated with: RN prior to session.  Patient found HOB elevated with PureWick, peripheral IV, telemetry, oxygen upon OT entry to room.    Patient: "They're supposed to be " "coming by to get me to put that thing in my chest so I'm not sure if I can get up."    Pain/Comfort:  · Pain Rating 1: 0/10  · Pain Rating Post-Intervention 1: 0/10    Objective:   Patient found with: Garock, peripheral IV, telemetry, oxygen     General Precautions: Standard, Cardiac fall   Orthopedic Precautions:N/A   Braces: N/A   Respiratory Status:    Nasal cannula, flow 2 L/min  Vitals: BP (!) 153/65   Pulse 94   Temp 98 °F (36.7 °C)   Resp 18   Ht 5' 1" (1.549 m)   Wt 90 kg (198 lb 6.6 oz)   LMP  (LMP Unknown)   SpO2 96%   Breastfeeding No   BMI 37.49 kg/m²     Outcome Measures:  AMPA 6 Click ADL: 17    Cognition:   · Oriented X 4 and Alert  · Command following: follows multi-step commands  · Communication: clear/fluent    Occupational Performance:  Bed Mobility:    · Patient completed Rolling/Turning to Left with  stand by assistance  · Patient completed Supine to Sit with stand by assistance on L side of bed  · Scooting anteriorly to EOB to have both feet planted on floor: stand by assistance    Functional Mobility/Transfers:   Static Sitting EOB: SBA   Dynamic Sitting EOB: SBA to remove her own covers, to don gown over back, and to scoot feet on the floor in prep for sit to stand.   Patient completed Sit <> Stand Transfer with stand by assistance  with  rolling walker    Static Standing Balance: SBA   Dynamic Standing Balance: SBA for functional mobility to transfer to chair using step t/f.   Patient completed Bed <> Chair Transfer using Step Transfer technique with stand by assistance with rolling walker        Activities of Daily Living:  · Grooming: supervision for facewashing while seated at chair.  · Upper Body Dressing: minimum assistance to don gown over back like a gown prior to sit<>stand t/f.      AMPAC 6 Click ADL:  AMPAC Total Score: 17    Treatment & Education:  -OT POC, safety during ADLs and mobility   -Education on energy conservation and task modification to maximize " safety and independence  -Questions answered within OT scope of practice.    Patient left up in chair with all lines intact, call button in reach and RN notified    GOALS:   Multidisciplinary Problems     Occupational Therapy Goals        Problem: Occupational Therapy Goal    Goal Priority Disciplines Outcome Interventions   Occupational Therapy Goal     OT, PT/OT Ongoing, Progressing    Description: Goals set on 3/6 with expiration date 3/20:  Patient will increase functional independence with ADLs by performing:    Supine <> Sit with SBA.   Feeding while seated EOB/bedside chair with  Mod I.   Grooming while seated EOB with SBA.   UB Dressing with Min A.   LB Dressing with Min A.   Step transfer with  Mod I with DME as needed.  Pt will demonstrate understanding of education provided regarding energy conservation and task modification through teach-back method.   Patient and/or patient's family will verbalize understanding of POC and post d/c support needs, and personal risk factors for fall risk reduction.                        Time Tracking:     OT Date of Treatment: 03/11/22  OT Start Time: 1015  OT Stop Time: 1031  OT Total Time (min): 16 min  Additional staff present: N/A      Billable Minutes:Self Care/Home Management 16 min      3/11/2022

## 2022-03-11 NOTE — ASSESSMENT & PLAN NOTE
Patient with Hypoxic Respiratory failure which is Acute on chronic.  she is on home oxygen at 2-3 LPM. Supplemental oxygen was provided and noted- Oxygen Concentration (%):  [28] 28.   Signs/symptoms of respiratory failure include- respiratory distress. Contributing diagnoses includes - Interstitial lung disease Labs and images were reviewed. Patient Has recent ABG, which has been reviewed. Will treat underlying causes and adjust management of respiratory failure as follows:    - Duo nebs q6h  - Inhalation treatment q6h  - ICS and flutter valve  - Supplemental O2 PRN  - Bipap QHS PRN

## 2022-03-11 NOTE — PROGRESS NOTES
"[facility] - Cardiology [facility] Medicine  Progress Note    Patient Name: Melinda Knight  MRN: [id number]  Patient Class: IP- Inpatient   Admission Date: 3/3/2022  Length of Stay: 8 days  Attending Physician: Elia Barr MD  Primary Care Provider: Duke Cole MD        Subjective:     Principal Problem:NSTEMI (non-ST elevated myocardial infarction)        HPI:  Melinda Knight is a 76 y.o. F with history of HTN, HLD, CAD, DMII, hypothyroidism, ESRD on HD MWF, GERD, restrictive lung disease with chronic hypoxemic resp failure (on 2-3L O2) [place] [facility] [place] CABG +/- AVR. Initially presented to [facility] ED w/ 2 day history of "heartburn pain," N/V, and SOB. Workup notable for troponin 13 (peaked at 42 on 3/3), elevated BNP 2268 (baseline 400-600s), and radiographic evidence of pulmonary edema. EKG SR with RBBB. She was hypoxic and placed on BiPAP. [place] [facility] w/ [place]. Cardiology and nephrology following. Volume removal with HD and weaned to baseline supplemental O2. BCx 3/2 resulted GPC resembling Staph and vanc initiated. Repeat BCx NGTD. LHC on 3/3 revealed proximal RCA 60% stenosed, mid LAD 70% stenosed, and mid Cx 70% stenosed. TTE revealed EF 48% with PORTILLO 0.63cm2, LFLG AS. Cardiology recommended CTS evaluation for CABG +/- AVR. Last dose of Plavix 3/3. She denies CP, SOB, N/V.      Overview/Hospital Course:  Admitted as transfer from [facility] ED with NSTEMI. Cardiology and nephrology consulted. Volume removed with HD and weaned to baseline supplemental O2. LHC performed showing triple vessel disease. Cardiology recommended CTS evaluation. Patient not a CABG candidate per CTS. IC consulted for possible PCI. Blood cultures from ED 03/02 showed GPCs; started IV Vancomycin. Permacath exchanged on 03/04. ID consulted for bacteremia. After long discussion with primary and cardiology, patient and family elected to not proceed with LHC/PCI and maintain DNR status. " Pursuing medical management. During dialysis 3/9/22 patient became hypotensive. Held BP meds and added as able. ID requested 48 hour line holiday to see if blood cultures remain clear. General surgery consulted for line removal. Patient recultured, given 48 hour line holiday. Right femoral trialysis line placed by anesthesia at bedside. Nephro to continue dialysis.      Interval History: NAEO. AF VSS. No new complaints at this time. R femoral trialysis line placed at bedside by anesthesia. Plan for HD tonight.    Review of Systems   Constitutional:  Negative for chills and fever.   HENT:  Negative for sore throat.    Respiratory:  Negative for cough and shortness of breath.    Cardiovascular:  Negative for chest pain and leg swelling.   Gastrointestinal:  Negative for abdominal pain, constipation, diarrhea, nausea and vomiting.   Genitourinary:  Negative for dysuria.   Musculoskeletal:  Negative for myalgias.   Skin:  Negative for rash.   Neurological:  Negative for dizziness and headaches.   Psychiatric/Behavioral:  Negative for confusion. The patient is nervous/anxious.    Objective:     Vital Signs (Most Recent):  Temp: 98 °F (36.7 °C) (03/11/22 0745)  Pulse: 86 (03/11/22 1130)  Resp: 18 (03/11/22 0747)  BP: (!) 153/65 (03/11/22 0745)  SpO2: 96 % (03/11/22 0747)   Vital Signs (24h Range):  Temp:  [97.8 °F (36.6 °C)-99.3 °F (37.4 °C)] 98 °F (36.7 °C)  Pulse:  [86-96] 86  Resp:  [18-20] 18  SpO2:  [96 %-99 %] 96 %  BP: (122-193)/(52-80) 153/65     Weight: 90 kg (198 lb 6.6 oz)  Body mass index is 37.49 kg/m².    Intake/Output Summary (Last 24 hours) at 3/11/2022 1352  Last data filed at 3/11/2022 0100  Gross per 24 hour   Intake --   Output 200 ml   Net -200 ml      Physical Exam  Constitutional:       Appearance: Normal appearance.   HENT:      Head: Normocephalic and atraumatic.      Mouth/Throat:      Mouth: Mucous membranes are moist.      Pharynx: Oropharynx is clear.   Eyes:      Extraocular Movements:  Extraocular movements intact.      Pupils: Pupils are equal, round, and reactive to light.   Neck:      Comments: No JVD  Cardiovascular:      Rate and Rhythm: Normal rate and regular rhythm.      Pulses: Normal pulses.      Heart sounds: Normal heart sounds.   Pulmonary:      Effort: Pulmonary effort is normal. No respiratory distress.      Breath sounds: Wheezing present. No rales.   Abdominal:      General: Abdomen is flat. Bowel sounds are normal. There is no distension.      Palpations: Abdomen is soft.      Tenderness: There is no abdominal tenderness. There is no guarding or rebound.   Musculoskeletal:         General: No swelling.      Cervical back: Neck supple.      Right lower leg: Edema (trace) present.      Left lower leg: Edema (trace) present.   Skin:     General: Skin is warm and dry.      Capillary Refill: Capillary refill takes less than 2 seconds.   Neurological:      General: No focal deficit present.      Mental Status: She is alert and oriented to person, place, and time. Mental status is at baseline.   Psychiatric:         Mood and Affect: Mood normal.         Behavior: Behavior normal.         Thought Content: Thought content normal.         Judgment: Judgment normal.       Significant Labs: All pertinent labs within the past 24 hours have been reviewed.  Recent Lab Results         03/11/22  0952   03/11/22  0754   03/11/22  0408   03/10/22  2056   03/10/22  1541        Albumin     2.5           Anion Gap     13           aPTT     27.3  Comment: aPTT therapeutic range = 39-69 seconds           Baso # 0.12     0.13           Basophil % 1.2     1.3           BUN     44           Calcium     9.6           Chloride     98           CO2     23           Creatinine     4.5           Differential Method Automated     Automated           eGFR if      10.4           eGFR if non      9.0  Comment: Calculation used to obtain the estimated glomerular filtration  rate  (eGFR) is the CKD-EPI equation.              Eos # 0.4     0.4           Eosinophil % 4.1     3.9           Glucose     219           Gran # (ANC) 6.7     6.5           Gran % 67.0     65.1           Hematocrit 33.0     33.0           Hemoglobin 10.3     10.2           Immature Grans (Abs) 0.32  Comment: Mild elevation in immature granulocytes is non specific and   can be seen in a variety of conditions including stress response,   acute inflammation, trauma and pregnancy. Correlation with other   laboratory and clinical findings is essential.       0.38  Comment: Mild elevation in immature granulocytes is non specific and   can be seen in a variety of conditions including stress response,   acute inflammation, trauma and pregnancy. Correlation with other   laboratory and clinical findings is essential.             Immature Granulocytes 3.2     3.8           INR     1.1  Comment: Coumadin Therapy:  2.0 - 3.0 for INR for all indicators except mechanical heart valves  and antiphospholipid syndromes which should use 2.5 - 3.5.             Lymph # 1.5     1.8           Lymph % 15.1     17.6           Magnesium     1.9           MCH 33.4     32.5           MCHC 31.2     30.9                105           Mono # 0.9     0.8           Mono % 9.4     8.3           MPV 10.5     10.6           nRBC 0     0           Pathologist Review Review required               Phosphorus     3.7           Platelets 390     396           POCT Glucose   290     222   208       Potassium     4.9           Protime     11.3           RBC 3.08     3.14           RDW 11.9     12.0           Sodium     134           WBC 9.94     10.02                                  Significant Imaging:       I have reviewed all pertinent imaging results/findings within the past 24 hours.      Assessment/Plan:      * NSTEMI (non-ST elevated myocardial infarction)  HTN, HLD, CAD, acute on chronic resp failure with hypoxia, pHTN, AS, elevated troponin  -  Echo 03/03 shows EF 48%, severe AS.  - LHC 03/03 showed LAD and LCx 70% stenosis and RCA 60% stenosis. Transferred [facility] for CTS evaluation; per CTS pt is poor surgical candidate. Recommended high risk PCI vs medical management  - Interventional cardiology consulted, after lengthy discussion with family, elected not to pursue invasive measures, treating medically  - Continue aspirin 81mg daily for life and clopidogrel 75mg PO daily for 1 year (holding Plavix for tunneled catheter placement)  - Continue atorvastatin 80mg PO daily  - Coreg and Losartan held due to hypotension with HD, will restart as needed  - Weaned to baseline supplemental O2.  - Duo nebs and inhalation treatment q6h   - d/c heparin gtt.  - Appreciate cardiology assistance.    Bacteremia  Blood cultures drawn in ED 03/02 showing GPCs resembling staph. No clear source of infection at this time. No noted areas of skin breakdown, tunneled line does not appear grossly infected (replaced on 3/4), UA unremarkable, CXR at presentation more consistent with pulmonary edema than infectious process.    - Continue IV Vancomycin. De-escalate as feasible.  - ID consulted, appreciate recs  - 48 hour line holiday complete, repeat cultures NGTD  - R femoral Trialysis placed by anesthesia  - F/U repeat cultures    Advance care planning  DNR status on admit; current [place] reflects her goals of care. Engaged the patient in a GOC discussion on 3/6. Patient has full capacity and stating that she would not want excessive life-saving measures including CPR or intubation.    - DNR order in place    Debility  - PT/OT recommending HH    Acute on chronic respiratory failure with hypoxemia  Patient with Hypoxic Respiratory failure which is Acute on chronic.  she is on home oxygen at 2-3 LPM. Supplemental oxygen was provided and noted- Oxygen Concentration (%):  [28] 28.   Signs/symptoms of respiratory failure include- respiratory distress. Contributing diagnoses includes  - Interstitial lung disease Labs and images were reviewed. Patient Has recent ABG, which has been reviewed. Will treat underlying causes and adjust management of respiratory failure as follows:    - Duo nebs q6h  - Inhalation treatment q6h  - ICS and flutter valve  - Supplemental O2 PRN  - Bipap QHS PRN    Elevated troponin  See NSTEMI      ESRD (end stage renal disease) on dialysis  ESRD on HD M,W,F outpatient.  Oliguric at baseline, still makes some urine  Nephrology consulted for HD while inpatient  3/11: Right femoral trialysis placed by anesthesia at bedside    - HD per Nephrology  - Renal function panel daily  - Monitor intake/output and daily standing weights.  - Replete electrolytes PRN, goal Mag/Phos/K 2/3/4   - Avoid nephrotoxic agents such as NSAIDs, gadolinium and IV radiocontrast.  - Renally dose meds to current GFR.  - Maintain MAP > 65.        Pulmonary hypertension  Likely 2/2 chronic ILD    See acute on chronic hypoxemic respiratory failure      Anemia of chronic disease  Baseline Hb 10, Hb 9.6 on admission  Antiplatelet/anticoagulation: Aspirin, Plavix  Likely 2/2 ESRD    - Monitor CBC daily  - Transfuse with goal Hb > 7        Aortic valve stenosis  See NSTEMI      Type 2 diabetes mellitus with diabetic polyneuropathy  Last A1c 5.5 (3/3/22). On detemir 30U subQ BID at home. Some postprandial derangements.    - Continue insulin detemir 20U subQ BID  - Continue Aspart 5u TIDWM  - LDSSI  - Accuchecks achs  - Diabetic diet      Hypothyroidism (acquired)  TSH, T4 stable. Hyponatremia likely 2/2 renal failure.    - Continue levothyroxine 75mcg PO daily.    GERD (gastroesophageal reflux disease)  - Continue pantoprazole 40mg PO daily.    Mixed hyperlipidemia  Chronic, stable.    - Continue home Lipitor 80 mg daily    Essential hypertension  Chronic, normotensive on evaluation today.    - Coreg 25 mg BID  - Losartan 25 mg daily  - Labetalol 10 mg PRN for SBP >160    Will hold for now given hypotension  with HD      VTE Risk Mitigation (From admission, onward)         Ordered     heparin (porcine) injection 1,000 Units  As needed (PRN)         03/11/22 1349     heparin (porcine) injection 5,000 Units  As needed (PRN)         03/03/22 0755     IP VTE HIGH RISK PATIENT  Once         03/03/22 0650     Place sequential compression device  Until discontinued         03/03/22 0650                Discharge Planning   UMAIR: 3/16/2022     Code Status: DNR   Is the patient medically ready for discharge?: No    Reason for patient still in hospital (select all that apply): Treatment and Consult recommendations  Discharge Plan A: Home Health            Geovani Morton MD  Department of Hospital Medicine   [facility] - Cardiology Stepdown

## 2022-03-12 NOTE — PROGRESS NOTES
HD treatment complete. Duration of treatment 3 hours and 500cc removed. Low blood pressure during treatment. Access to left femoral has poor flow and very positional. Catheter flushed with NS and locked with NS. Capped and taped.

## 2022-03-12 NOTE — PROGRESS NOTES
"[facility] - Cardiology [facility] Medicine  Progress Note    Patient Name: Melinda Knight  MRN: [id number]  Patient Class: IP- Inpatient   Admission Date: 3/3/2022  Length of Stay: 9 days  Attending Physician: Elia Barr MD  Primary Care Provider: Duke Cole MD        Subjective:     Principal Problem:NSTEMI (non-ST elevated myocardial infarction)        HPI:  Melinda Knight is a 76 y.o. F with history of HTN, HLD, CAD, DMII, hypothyroidism, ESRD on HD MWF, GERD, restrictive lung disease with chronic hypoxemic resp failure (on 2-3L O2) who transfers to [facility] for CTS evaluation for CABG +/- AVR. Initially presented to [facility] ED w/ 2 day history of "heartburn pain," N/V, and SOB. Workup notable for troponin 13 (peaked at 42 on 3/3), elevated BNP 2268 (baseline 400-600s), and radiographic evidence of pulmonary edema. EKG SR with RBBB. She was hypoxic and placed on BiPAP. Transferred to [facility] w/ NSTEMI. Cardiology and nephrology following. Volume removal with HD and weaned to baseline supplemental O2. BCx 3/2 resulted GPC resembling Staph and vanc initiated. Repeat BCx NGTD. LHC on 3/3 revealed proximal RCA 60% stenosed, mid LAD 70% stenosed, and mid Cx 70% stenosed. TTE revealed EF 48% with PORTILLO 0.63cm2, LFLG AS. Cardiology recommended CTS evaluation for CABG +/- AVR. Last dose of Plavix 3/3. She denies CP, SOB, N/V.      Overview/Hospital Course:  Admitted as transfer from [facility] ED with NSTEMI. Cardiology and nephrology consulted. Volume removed with HD and weaned to baseline supplemental O2. LHC performed showing triple vessel disease. Cardiology recommended CTS evaluation. Patient not a CABG candidate per CTS. IC consulted for possible PCI. Blood cultures from ED 03/02 showed GPCs; started IV Vancomycin. Permacath exchanged on 03/04. ID consulted for bacteremia. After long discussion with primary and cardiology, patient and family elected to not proceed with LHC/PCI and maintain DNR status. " Pursuing medical management. During dialysis 3/9/22 patient became hypotensive. Held BP meds and added as able. ID requested 48 hour line holiday to see if blood cultures remain clear. General surgery consulted for line removal. Patient recultured, given 48 hour line holiday. Right femoral trialysis line placed by anesthesia at bedside. Nephro to continue dialysis. Plan for permacath placement on 3/15.      Interval History: NAEO. AF VSS. Patient feeling well, no issues with new L femoral line. HD today. Spoke with interventional nephrology, plan for permacath placement Tuesday 3/15.    Review of Systems   Constitutional:  Negative for chills and fever.   HENT:  Negative for sore throat.    Respiratory:  Negative for cough and shortness of breath.    Cardiovascular:  Negative for chest pain and leg swelling.   Gastrointestinal:  Negative for abdominal pain, constipation, diarrhea, nausea and vomiting.   Genitourinary:  Negative for dysuria.   Musculoskeletal:  Negative for myalgias.   Skin:  Negative for rash.   Neurological:  Negative for dizziness and headaches.   Psychiatric/Behavioral:  Negative for confusion. The patient is not nervous/anxious.    Objective:     Vital Signs (Most Recent):  Temp: 96.6 °F (35.9 °C) (03/12/22 0800)  Pulse: 89 (03/12/22 1045)  Resp: 18 (03/12/22 1045)  BP: (!) 117/56 (03/12/22 1045)  SpO2: (!) 94 % (03/12/22 0733)   Vital Signs (24h Range):  Temp:  [96.3 °F (35.7 °C)-98 °F (36.7 °C)] 96.6 °F (35.9 °C)  Pulse:  [] 89  Resp:  [17-18] 18  SpO2:  [94 %-99 %] 94 %  BP: ()/(45-69) 117/56     Weight: 90 kg (198 lb 6.6 oz)  Body mass index is 37.49 kg/m².    Intake/Output Summary (Last 24 hours) at 3/12/2022 1209  Last data filed at 3/12/2022 1135  Gross per 24 hour   Intake 540 ml   Output 1600 ml   Net -1060 ml      Physical Exam  Constitutional:       Appearance: Normal appearance.   HENT:      Head: Normocephalic and atraumatic.      Mouth/Throat:      Mouth: Mucous  membranes are moist.      Pharynx: Oropharynx is clear.   Eyes:      Extraocular Movements: Extraocular movements intact.      Pupils: Pupils are equal, round, and reactive to light.   Neck:      Comments: No JVD  Cardiovascular:      Rate and Rhythm: Normal rate and regular rhythm.      Pulses: Normal pulses.      Heart sounds: Normal heart sounds.   Pulmonary:      Effort: Pulmonary effort is normal. No respiratory distress.      Breath sounds: No wheezing or rales.   Abdominal:      General: Abdomen is flat. Bowel sounds are normal. There is no distension.      Palpations: Abdomen is soft.      Tenderness: There is no abdominal tenderness. There is no guarding or rebound.   Musculoskeletal:         General: No swelling.      Cervical back: Neck supple.      Right lower leg: Edema (trace) present.      Left lower leg: Edema (trace) present.   Skin:     General: Skin is warm and dry.      Capillary Refill: Capillary refill takes less than 2 seconds.   Neurological:      General: No focal deficit present.      Mental Status: She is alert and oriented to person, place, and time. Mental status is at baseline.   Psychiatric:         Mood and Affect: Mood normal.         Behavior: Behavior normal.         Thought Content: Thought content normal.         Judgment: Judgment normal.       Significant Labs: All pertinent labs within the past 24 hours have been reviewed.  Recent Lab Results         03/12/22  1148   03/12/22  0506   03/11/22  2342   03/11/22  1654        Albumin   2.6           Anion Gap   13           BUN   50           Calcium   9.7           Chloride   104           CO2   22           Creatinine   4.3           eGFR if    11.0           eGFR if non    9.5  Comment: Calculation used to obtain the estimated glomerular filtration  rate (eGFR) is the CKD-EPI equation.              Glucose   121           Magnesium   1.9           Phosphorus   3.9           POCT Glucose 175        139       Potassium   5.0           Sodium   139           Vancomycin, Random     14.1                 Significant Imaging:       I have reviewed all pertinent imaging results/findings within the past 24 hours.      Assessment/Plan:      * NSTEMI (non-ST elevated myocardial infarction)  HTN, HLD, CAD, acute on chronic resp failure with hypoxia, pHTN, AS, elevated troponin  - Echo 03/03 shows EF 48%, severe AS.  - LHC 03/03 showed LAD and LCx 70% stenosis and RCA 60% stenosis. Transferred [facility] for CTS evaluation; per CTS pt is poor surgical candidate. Recommended high risk PCI vs medical management  - Interventional cardiology consulted, after lengthy discussion with family, elected not to pursue invasive measures, treating medically  - Continue aspirin 81mg daily for life and clopidogrel 75mg PO daily for 1 year (holding DAPT for tunneled catheter placement)  - Continue atorvastatin 80mg PO daily  - Coreg and Losartan held due to hypotension with HD, will restart as needed  - Weaned to baseline supplemental O2.  - Duo nebs and inhalation treatment q6h   - d/c heparin gtt.  - Appreciate cardiology assistance.    Bacteremia  Blood cultures drawn in ED 03/02 showing GPCs resembling staph. No clear source of infection at this time. No noted areas of skin breakdown, tunneled line does not appear grossly infected (replaced on 3/4), UA unremarkable, CXR at presentation more consistent with pulmonary edema than infectious process.    - Continue IV Vancomycin. De-escalate as feasible.  - ID consulted, appreciate recs, plan for 4 weeks from line removal (end date 04/06/2022)  - 48 hour line holiday complete, repeat cultures NGTD  - L femoral Trialysis placed by anesthesia  - F/U repeat cultures    Advance care planning  DNR status on admit; current [place]POST reflects her goals of care. Engaged the patient in a GOC discussion on 3/6. Patient has full capacity and stating that she would not want excessive life-saving  measures including CPR or intubation.    - DNR order in place    Debility  - PT/OT recommending HH    Acute on chronic respiratory failure with hypoxemia  Patient with Hypoxic Respiratory failure which is Acute on chronic.  she is on home oxygen at 2-3 LPM. Supplemental oxygen was provided and noted- Oxygen Concentration (%):  [28] 28.   Signs/symptoms of respiratory failure include- respiratory distress. Contributing diagnoses includes - Interstitial lung disease Labs and images were reviewed. Patient Has recent ABG, which has been reviewed. Will treat underlying causes and adjust management of respiratory failure as follows:    - Duo nebs q6h  - Inhalation treatment q6h  - ICS and flutter valve  - Supplemental O2 PRN  - Bipap QHS PRN    Elevated troponin  See NSTEMI      ESRD (end stage renal disease) on dialysis  ESRD on HD M,W,F outpatient.  Oliguric at baseline, still makes some urine  Nephrology consulted for HD while inpatient  3/11: Left femoral trialysis placed by anesthesia at bedside    - HD per Nephrology  - Renal function panel daily  - Monitor intake/output and daily standing weights.  - Replete electrolytes PRN, goal Mag/Phos/K 2/3/4   - Avoid nephrotoxic agents such as NSAIDs, gadolinium and IV radiocontrast.  - Renally dose meds to current GFR.  - Maintain MAP > 65.        Pulmonary hypertension  Likely 2/2 chronic ILD    See acute on chronic hypoxemic respiratory failure      Anemia of chronic disease  Baseline Hb 10, Hb 9.6 on admission  Antiplatelet/anticoagulation: Aspirin, Plavix  Likely 2/2 ESRD    - Monitor CBC daily  - Transfuse with goal Hb > 7        Aortic valve stenosis  See NSTEMI      Type 2 diabetes mellitus with diabetic polyneuropathy  Last A1c 5.5 (3/3/22). On detemir 30U subQ BID at home. Some postprandial derangements.    - Continue insulin detemir 20U subQ BID  - Continue Aspart 5u TIDWM  - LDSSI  - Accuchecks achs  - Diabetic diet      Hypothyroidism (acquired)  TSH, T4 stable.  Hyponatremia likely 2/2 renal failure.    - Continue levothyroxine 75mcg PO daily.    GERD (gastroesophageal reflux disease)  - Continue pantoprazole 40mg PO daily.    Mixed hyperlipidemia  Chronic, stable.    - Continue home Lipitor 80 mg daily    Essential hypertension  Chronic, normotensive on evaluation today.    - Coreg 25 mg BID  - Losartan 25 mg daily  - Labetalol 10 mg PRN for SBP >160    Will hold for now given hypotension with HD      VTE Risk Mitigation (From admission, onward)         Ordered     heparin (porcine) injection 1,000 Units  As needed (PRN)         03/11/22 1349     heparin (porcine) injection 5,000 Units  As needed (PRN)         03/03/22 0755     IP VTE HIGH RISK PATIENT  Once         03/03/22 0650     Place sequential compression device  Until discontinued         03/03/22 0650                Discharge Planning   UMAIR: 3/16/2022     Code Status: DNR   Is the patient medically ready for discharge?: No    Reason for patient still in hospital (select all that apply): Patient trending condition and Consult recommendations  Discharge Plan A: Home Health          Geovani Morton MD  Department of Hospital Medicine   [facility] - Cardiology Stepdown

## 2022-03-12 NOTE — ASSESSMENT & PLAN NOTE
Blood cultures drawn in ED 03/02 showing GPCs resembling staph. No clear source of infection at this time. No noted areas of skin breakdown, tunneled line does not appear grossly infected (replaced on 3/4), UA unremarkable, CXR at presentation more consistent with pulmonary edema than infectious process.    - Continue IV Vancomycin. De-escalate as feasible.  - ID consulted, appreciate recs, plan for 4 weeks from line removal (end date 04/06/2022)  - 48 hour line holiday complete, repeat cultures NGTD  - L femoral Trialysis placed by anesthesia  - F/U repeat cultures

## 2022-03-12 NOTE — SUBJECTIVE & OBJECTIVE
Interval History: NAEO. AF VSS. Patient feeling well, no issues with new L femoral line. HD today. Spoke with interventional nephrology, plan for permacath placement Tuesday 3/15.    Review of Systems   Constitutional:  Negative for chills and fever.   HENT:  Negative for sore throat.    Respiratory:  Negative for cough and shortness of breath.    Cardiovascular:  Negative for chest pain and leg swelling.   Gastrointestinal:  Negative for abdominal pain, constipation, diarrhea, nausea and vomiting.   Genitourinary:  Negative for dysuria.   Musculoskeletal:  Negative for myalgias.   Skin:  Negative for rash.   Neurological:  Negative for dizziness and headaches.   Psychiatric/Behavioral:  Negative for confusion. The patient is not nervous/anxious.    Objective:     Vital Signs (Most Recent):  Temp: 96.6 °F (35.9 °C) (03/12/22 0800)  Pulse: 89 (03/12/22 1045)  Resp: 18 (03/12/22 1045)  BP: (!) 117/56 (03/12/22 1045)  SpO2: (!) 94 % (03/12/22 0733)   Vital Signs (24h Range):  Temp:  [96.3 °F (35.7 °C)-98 °F (36.7 °C)] 96.6 °F (35.9 °C)  Pulse:  [] 89  Resp:  [17-18] 18  SpO2:  [94 %-99 %] 94 %  BP: ()/(45-69) 117/56     Weight: 90 kg (198 lb 6.6 oz)  Body mass index is 37.49 kg/m².    Intake/Output Summary (Last 24 hours) at 3/12/2022 1209  Last data filed at 3/12/2022 1135  Gross per 24 hour   Intake 540 ml   Output 1600 ml   Net -1060 ml      Physical Exam  Constitutional:       Appearance: Normal appearance.   HENT:      Head: Normocephalic and atraumatic.      Mouth/Throat:      Mouth: Mucous membranes are moist.      Pharynx: Oropharynx is clear.   Eyes:      Extraocular Movements: Extraocular movements intact.      Pupils: Pupils are equal, round, and reactive to light.   Neck:      Comments: No JVD  Cardiovascular:      Rate and Rhythm: Normal rate and regular rhythm.      Pulses: Normal pulses.      Heart sounds: Normal heart sounds.   Pulmonary:      Effort: Pulmonary effort is normal. No  respiratory distress.      Breath sounds: No wheezing or rales.   Abdominal:      General: Abdomen is flat. Bowel sounds are normal. There is no distension.      Palpations: Abdomen is soft.      Tenderness: There is no abdominal tenderness. There is no guarding or rebound.   Musculoskeletal:         General: No swelling.      Cervical back: Neck supple.      Right lower leg: Edema (trace) present.      Left lower leg: Edema (trace) present.   Skin:     General: Skin is warm and dry.      Capillary Refill: Capillary refill takes less than 2 seconds.   Neurological:      General: No focal deficit present.      Mental Status: She is alert and oriented to person, place, and time. Mental status is at baseline.   Psychiatric:         Mood and Affect: Mood normal.         Behavior: Behavior normal.         Thought Content: Thought content normal.         Judgment: Judgment normal.       Significant Labs: All pertinent labs within the past 24 hours have been reviewed.  Recent Lab Results         03/12/22  1148   03/12/22  0506   03/11/22  2342   03/11/22  1654        Albumin   2.6           Anion Gap   13           BUN   50           Calcium   9.7           Chloride   104           CO2   22           Creatinine   4.3           eGFR if    11.0           eGFR if non    9.5  Comment: Calculation used to obtain the estimated glomerular filtration  rate (eGFR) is the CKD-EPI equation.              Glucose   121           Magnesium   1.9           Phosphorus   3.9           POCT Glucose 175       139       Potassium   5.0           Sodium   139           Vancomycin, Random     14.1                 Significant Imaging:       I have reviewed all pertinent imaging results/findings within the past 24 hours.

## 2022-03-12 NOTE — PLAN OF CARE
Received awake, alert, following commands, vss, no c/o pain. Went down for dialysis: tolerated well. Daughter called this shift for an update. Now back from dialysis resting comfortably. Will continue to monitor this shift.   Problem: Adult Inpatient Plan of Care  Goal: Plan of Care Review  Outcome: Ongoing, Progressing  Goal: Patient-Specific Goal (Individualized)  Outcome: Ongoing, Progressing  Goal: Absence of Hospital-Acquired Illness or Injury  Outcome: Ongoing, Progressing  Goal: Optimal Comfort and Wellbeing  Outcome: Ongoing, Progressing  Goal: Readiness for Transition of Care  Outcome: Ongoing, Progressing     Problem: Diabetes Comorbidity  Goal: Blood Glucose Level Within Targeted Range  Outcome: Ongoing, Progressing     Problem: Infection  Goal: Absence of Infection Signs and Symptoms  Outcome: Ongoing, Progressing     Problem: Skin Injury Risk Increased  Goal: Skin Health and Integrity  Outcome: Ongoing, Progressing     Problem: Device-Related Complication Risk (Hemodialysis)  Goal: Safe, Effective Therapy Delivery  Outcome: Ongoing, Progressing     Problem: Hemodynamic Instability (Hemodialysis)  Goal: Effective Tissue Perfusion  Outcome: Ongoing, Progressing     Problem: Infection (Hemodialysis)  Goal: Absence of Infection Signs and Symptoms  Outcome: Ongoing, Progressing     Problem: Fall Injury Risk  Goal: Absence of Fall and Fall-Related Injury  Outcome: Ongoing, Progressing

## 2022-03-12 NOTE — PROGRESS NOTES
HD treatment started. Access to left femoral poor flows. Rolled up towel and place in skin fold over access site and taped lines in upward direction. Telemetry in place and no complaints of discomfort at this time.

## 2022-03-12 NOTE — PROGRESS NOTES
Pharmacokinetic Assessment Follow Up: IV Vancomycin    Vancomycin serum concentration assessment(s):    · Patient Vancomycin level was drawn appropriately and can be used to guide therapy.  · Vancomycin level is subtherapeutic at 14.1 mcg/mL; goal 15-20 mcg/mL    Vancomycin Regimen Plan:    · Patient is currently on HD (MWF) - Missed dialysis on 3/11 and going to dialysis today  · Administer Vancomycin 500 mg x1 dose after HD   · Obtain random level with AM labs tomorrow  · Re-dose vancomycin based on levels <20 mcg/mL and renal function    Drug levels (last 3 results):  Recent Labs   Lab Result Units 03/10/22  0400 03/11/22  2342   Vancomycin, Random ug/mL 18.5 14.1       Pharmacy will continue to follow and monitor vancomycin.    Please contact pharmacy at extension [phone] for questions regarding this assessment.    Thank you for the consult,   Adeel Alicea       Patient brief summary:  Melinda Knight is a 76 y.o. female initiated on antimicrobial therapy with IV Vancomycin for treatment of bacteremia    Drug Allergies:   Review of patient's allergies indicates:   Allergen Reactions    Sulfa (sulfonamide antibiotics) Nausea And Vomiting    Menthol contain prod        Actual Body Weight:   90 kg    Renal Function:   Estimated Creatinine Clearance: 11.7 mL/min (A) (based on SCr of 4.3 mg/dL (H)).,     Dialysis Method (if applicable):  intermittent HD    CBC (last 72 hours):  Recent Labs   Lab Result Units 03/09/22  1328 03/10/22  0400 03/11/22  0408 03/11/22  0952   WBC K/uL 8.08 8.94 10.02 9.94   Hemoglobin g/dL 8.8* 9.5* 10.2* 10.3*   Hematocrit % 28.2* 30.6* 33.0* 33.0*   Platelets K/uL 299 355 396 390   Gran % % 69.1 60.8 65.1 67.0   Lymph % % 14.1* 17.4* 17.6* 15.1*   Mono % % 10.3 10.6 8.3 9.4   Eosinophil % % 3.8 5.1 3.9 4.1   Basophil % % 0.6 1.2 1.3 1.2   Differential Method  Automated Automated Automated Automated       Metabolic Panel (last 72 hours):  Recent Labs   Lab Result Units 03/09/22  1328  03/10/22  0400 03/11/22  0408 03/12/22  0506   Sodium mmol/L 130* 132* 134* 139   Potassium mmol/L 3.9 4.6 4.9 5.0   Chloride mmol/L 94* 94* 98 104   CO2 mmol/L 25 28 23 22*   Glucose mg/dL 189* 248* 219* 121*   BUN mg/dL 22 30* 44* 50*   Creatinine mg/dL 3.1* 3.9* 4.5* 4.3*   Albumin g/dL 2.3* 2.4* 2.5* 2.6*   Magnesium mg/dL 1.8 1.8 1.9 1.9   Phosphorus mg/dL 2.9 3.9 3.7 3.9       Vancomycin Administrations:  vancomycin given in the last 96 hours                   vancomycin 750 mg in dextrose 5 % 250 mL IVPB (ready to mix system) (mg) 750 mg New Bag 03/08/22 1355                Microbiologic Results:  Microbiology Results (last 7 days)     Procedure Component Value Units Date/Time    Blood culture [[id number]] Collected: 03/09/22 1740    Order Status: Completed Specimen: Blood from Peripheral, Left Hand Updated: 03/11/22 2212     Blood Culture, Routine No Growth to date      No Growth to date      No Growth to date    Blood culture [[id number]] Collected: 03/09/22 1739    Order Status: Completed Specimen: Blood from Peripheral, Right Arm Updated: 03/11/22 2212     Blood Culture, Routine No Growth to date      No Growth to date      No Growth to date    Blood culture [[id number]]     Order Status: Canceled Specimen: Blood     Blood culture [[id number]]     Order Status: Canceled Specimen: Blood     Blood culture [[id number]] Collected: 03/04/22 1039    Order Status: Completed Specimen: Blood Updated: 03/09/22 1612     Blood Culture, Routine No growth after 5 days.    Blood culture [[id number]] Collected: 03/04/22 1039    Order Status: Completed Specimen: Blood from Peripheral, Right Updated: 03/09/22 1612     Blood Culture, Routine No growth after 5 days.

## 2022-03-12 NOTE — ASSESSMENT & PLAN NOTE
ESRD on HD M,W,F outpatient.  Oliguric at baseline, still makes some urine  Nephrology consulted for HD while inpatient  3/11: Left femoral trialysis placed by anesthesia at bedside    - HD per Nephrology  - Renal function panel daily  - Monitor intake/output and daily standing weights.  - Replete electrolytes PRN, goal Mag/Phos/K 2/3/4   - Avoid nephrotoxic agents such as NSAIDs, gadolinium and IV radiocontrast.  - Renally dose meds to current GFR.  - Maintain MAP > 65.

## 2022-03-12 NOTE — PROCEDURES
[facility] NOTE     Patient currently on hemodialysis for removal of uremic toxins and volume.     Patient seen and evaluated on hemodialysis, tolerating treatment, see HD flowsheet for vitals and assessments.      Ultrafiltration goal is 2L     Labs have been reviewed and the dialysate bath has been adjusted.     Assessment/Plan:  Seen on HD this morning, tolerating well.  Temporary catheter was placed early this morning to L femoral.  Positional but able to run at 300 ml/min.      Currently on cardiac diet.  Monitor closely, currently electrolytes are stable but consider switching to renal if issues with hyperkalemia.    Continue phos binders with meals  hgb @ goal, continue.        HECTOR Tracy, FNP-BC  Nephrology  Pager:  [phone]

## 2022-03-12 NOTE — ASSESSMENT & PLAN NOTE
HTN, HLD, CAD, acute on chronic resp failure with hypoxia, pHTN, AS, elevated troponin  - Echo 03/03 shows EF 48%, severe AS.  - LHC 03/03 showed LAD and LCx 70% stenosis and RCA 60% stenosis. Transferred [facility] for CTS evaluation; per CTS pt is poor surgical candidate. Recommended high risk PCI vs medical management  - Interventional cardiology consulted, after lengthy discussion with family, elected not to pursue invasive measures, treating medically  - Continue aspirin 81mg daily for life and clopidogrel 75mg PO daily for 1 year (holding DAPT for tunneled catheter placement)  - Continue atorvastatin 80mg PO daily  - Coreg and Losartan held due to hypotension with HD, will restart as needed  - Weaned to baseline supplemental O2.  - Duo nebs and inhalation treatment q6h   - d/c heparin gtt.  - Appreciate cardiology assistance.

## 2022-03-13 NOTE — ASSESSMENT & PLAN NOTE
HTN, HLD, CAD, acute on chronic resp failure with hypoxia, pHTN, AS, elevated troponin  - Echo 03/03 shows EF 48%, severe AS.  - LHC 03/03 showed LAD and LCx 70% stenosis and RCA 60% stenosis. Transferred [facility] for CTS evaluation; per CTS pt is poor surgical candidate. Recommended high risk PCI vs medical management  - Interventional cardiology consulted, after lengthy discussion with family, elected not to pursue invasive measures, treating medically  - Continue aspirin 81mg daily for life and clopidogrel 75mg PO daily for 1 year (holding DAPT for tunneled catheter placement)  - Continue atorvastatin 80mg PO daily  - Coreg and Losartan held due to hypotension with HD, will restart as needed  - Weaned to baseline supplemental O2.  - Duo nebs and inhalation treatment q6h   - d/c heparin gtt.  - Appreciate cardiology assistance.

## 2022-03-13 NOTE — PLAN OF CARE
Pt maintained free from falls/ trauma/ injuries and new skin breakdown. Pt denied pain or discomfort. Denied SOB on 2 L O2. On contact isolation for MRSA. DNR. , insulin coverage given. Plan of care reviewed. Pt verbalized understanding. All questions and concerns addressed. Will continue to monitor.

## 2022-03-13 NOTE — SUBJECTIVE & OBJECTIVE
Interval History: NAEO. AF VSS. Patient in good spirits. Feeling well. Pending tunneled catheter placement with interventional nephrology likely Tuesday, holding DAPT.     Review of Systems   Constitutional:  Negative for chills and fever.   HENT:  Negative for sore throat.    Respiratory:  Negative for cough and shortness of breath.    Cardiovascular:  Negative for chest pain and leg swelling.   Gastrointestinal:  Negative for abdominal pain, constipation, diarrhea, nausea and vomiting.   Genitourinary:  Negative for dysuria.   Musculoskeletal:  Negative for myalgias.   Skin:  Negative for rash.   Neurological:  Negative for dizziness and headaches.   Psychiatric/Behavioral:  Negative for confusion. The patient is not nervous/anxious.    Objective:     Vital Signs (Most Recent):  Temp: 97.8 °F (36.6 °C) (03/13/22 1148)  Pulse: 88 (03/13/22 1148)  Resp: 17 (03/13/22 1148)  BP: (!) 152/63 (03/13/22 1148)  SpO2: 98 % (03/13/22 1148)   Vital Signs (24h Range):  Temp:  [97.7 °F (36.5 °C)-98.4 °F (36.9 °C)] 97.8 °F (36.6 °C)  Pulse:  [] 88  Resp:  [16-20] 17  SpO2:  [20 %-98 %] 98 %  BP: (129-160)/(59-63) 152/63     Weight: 90 kg (198 lb 6.6 oz)  Body mass index is 37.49 kg/m².    Intake/Output Summary (Last 24 hours) at 3/13/2022 1149  Last data filed at 3/12/2022 1643  Gross per 24 hour   Intake 240 ml   Output --   Net 240 ml      Physical Exam  Constitutional:       Appearance: Normal appearance.   HENT:      Head: Normocephalic and atraumatic.      Mouth/Throat:      Mouth: Mucous membranes are moist.      Pharynx: Oropharynx is clear.   Eyes:      Extraocular Movements: Extraocular movements intact.      Pupils: Pupils are equal, round, and reactive to light.   Neck:      Comments: No JVD  Cardiovascular:      Rate and Rhythm: Normal rate and regular rhythm.      Pulses: Normal pulses.      Heart sounds: Normal heart sounds.   Pulmonary:      Effort: Pulmonary effort is normal. No respiratory distress.       Breath sounds: No wheezing or rales.   Abdominal:      General: Abdomen is flat. Bowel sounds are normal. There is no distension.      Palpations: Abdomen is soft.      Tenderness: There is no abdominal tenderness. There is no guarding or rebound.   Musculoskeletal:         General: No swelling.      Cervical back: Neck supple.      Right lower leg: Edema (trace) present.      Left lower leg: Edema (trace) present.   Skin:     General: Skin is warm and dry.      Capillary Refill: Capillary refill takes less than 2 seconds.   Neurological:      General: No focal deficit present.      Mental Status: She is alert and oriented to person, place, and time. Mental status is at baseline.   Psychiatric:         Mood and Affect: Mood normal.         Behavior: Behavior normal.         Thought Content: Thought content normal.         Judgment: Judgment normal.       Significant Labs: All pertinent labs within the past 24 hours have been reviewed.  Recent Lab Results         03/13/22  1042   03/13/22  0454   03/12/22  1941   03/12/22  1547   03/12/22  1148        Albumin   2.5             Anion Gap   12             BUN   34             Calcium   8.8             Chloride   104             CO2   23             Creatinine   3.2             eGFR if    15.7             eGFR if non    13.6  Comment: Calculation used to obtain the estimated glomerular filtration  rate (eGFR) is the CKD-EPI equation.                Glucose   168             Magnesium   1.9             Phosphorus   2.8             POCT Glucose 115     206   239   175       Potassium   4.5             Sodium   139             Vancomycin, Random   15.4                                    Significant Imaging:       I have reviewed all pertinent imaging results/findings within the past 24 hours.

## 2022-03-13 NOTE — PLAN OF CARE
"[facility]  Cardiology Consult Service  Plan of Care      Melinda Knight  YOB: 1945  Medical Record Number:  [id number]  Attending Physician:  Elia Barr MD   Date of Admission: 3/3/2022       Hospital Day:  10  Current Principal Problem:  NSTEMI (non-ST elevated myocardial infarction)      Chart Review    76 year old woman with CAD, DMII, ESRD presented to an outside ED 10 days ago with "hearburn pain."  Evaluation revealed pulmonary edema and NSTEMI, with peak troponin 42, BNP 2268.  Angiography revealed stenotic disease in 3 vessels:  70% mLAD, 70 mLCx, 60% mRCA.  Culprit lesion was not identified, no intervention was performed.  Echo showed EF 48%, PORTILLO 0.63 cm2.      After 2 days, she was transferred to this hospital for evaluation by Interventional Cardiology and CTS.  Blood cultures drawn at admission produced MRSA.  4 days ago, her PermaCath was removed.  Subsequent cultures without growth.  PCI was not performed given bacteremia, co-morbid conditions, and DNR status.     Plan now is to replace PermaCath.   Interventional Nephrology is requesting discontinuation DAPT for 3 days prior to procedure.  Alternately continuing ASA alone for 5 days prior to procedure is an option.      Assessment    Various FRANCESCO trials show that the risk of death, myocardial infarction, or urgent revascularization are highest in the 30 days following and index NSTEMI.      Of the options, holding ASA for 5 days and placing a new catheter appears best.      Discontinuing antiplatelet completely creates an unacceptable risk of a recurrent event.  Deferring line insertion for another 10 to  20 days also presents and unacceptable risk of infection or other catheter-related complication.      Please call with questions or if you would like a formal consultation.      Ammon Caban MD  Physician, Cardiology  [facility]  "

## 2022-03-13 NOTE — PROGRESS NOTES
"[facility] Medicine  Progress Note    Patient Name: Melinda Knight  MRN: [id number]  Patient Class: IP- Inpatient   Admission Date: 3/3/2022  Length of Stay: 10 days  Attending Physician: Elia Barr MD  Primary Care Provider: Duke Cole MD        Subjective:     Principal Problem:NSTEMI (non-ST elevated myocardial infarction)        HPI:  Melinda Knight is a 76 y.o. F with history of HTN, HLD, CAD, DMII, hypothyroidism, ESRD on HD MWF, GERD, restrictive lung disease with chronic hypoxemic resp failure (on 2-3L O2) who transfers to [facility] for CTS evaluation for CABG +/- AVR. Initially presented to [facility] ED w/ 2 day history of "heartburn pain," N/V, and SOB. Workup notable for troponin 13 (peaked at 42 on 3/3), elevated BNP 2268 (baseline 400-600s), and radiographic evidence of pulmonary edema. EKG SR with RBBB. She was hypoxic and placed on BiPAP. Transferred to [facility] w/ NSTEMI. Cardiology and nephrology following. Volume removal with HD and weaned to baseline supplemental O2. BCx 3/2 resulted GPC resembling Staph and vanc initiated. Repeat BCx NGTD. LHC on 3/3 revealed proximal RCA 60% stenosed, mid LAD 70% stenosed, and mid Cx 70% stenosed. TTE revealed EF 48% with PORTILLO 0.63cm2, LFLG AS. Cardiology recommended CTS evaluation for CABG +/- AVR. Last dose of Plavix 3/3. She denies CP, SOB, N/V.      Overview/Hospital Course:  Admitted as transfer from [facility] ED with NSTEMI. Cardiology and nephrology consulted. Volume removed with HD and weaned to baseline supplemental O2. LHC performed showing triple vessel disease. Cardiology recommended CTS evaluation. Patient not a CABG candidate per CTS. IC consulted for possible PCI. Blood cultures from ED 03/02 showed GPCs; started IV Vancomycin. Permacath exchanged on 03/04. ID consulted for bacteremia. After long discussion with primary and cardiology, patient and family elected to not proceed with LHC/PCI and maintain DNR status. " Pursuing medical management. During dialysis 3/9/22 patient became hypotensive. Held BP meds and added as able. ID requested 48 hour line holiday to see if blood cultures remain clear. General surgery consulted for line removal. Patient recultured, given 48 hour line holiday. Right femoral trialysis line placed by anesthesia at bedside. Nephro to continue dialysis. Plan for permacath placement on 3/15.      Interval History: NAEO. AF VSS. Patient in good spirits. Feeling well. Pending tunneled catheter placement with interventional nephrology likely Tuesday, holding DAPT.     Review of Systems   Constitutional:  Negative for chills and fever.   HENT:  Negative for sore throat.    Respiratory:  Negative for cough and shortness of breath.    Cardiovascular:  Negative for chest pain and leg swelling.   Gastrointestinal:  Negative for abdominal pain, constipation, diarrhea, nausea and vomiting.   Genitourinary:  Negative for dysuria.   Musculoskeletal:  Negative for myalgias.   Skin:  Negative for rash.   Neurological:  Negative for dizziness and headaches.   Psychiatric/Behavioral:  Negative for confusion. The patient is not nervous/anxious.    Objective:     Vital Signs (Most Recent):  Temp: 97.8 °F (36.6 °C) (03/13/22 1148)  Pulse: 88 (03/13/22 1148)  Resp: 17 (03/13/22 1148)  BP: (!) 152/63 (03/13/22 1148)  SpO2: 98 % (03/13/22 1148)   Vital Signs (24h Range):  Temp:  [97.7 °F (36.5 °C)-98.4 °F (36.9 °C)] 97.8 °F (36.6 °C)  Pulse:  [] 88  Resp:  [16-20] 17  SpO2:  [20 %-98 %] 98 %  BP: (129-160)/(59-63) 152/63     Weight: 90 kg (198 lb 6.6 oz)  Body mass index is 37.49 kg/m².    Intake/Output Summary (Last 24 hours) at 3/13/2022 1149  Last data filed at 3/12/2022 1643  Gross per 24 hour   Intake 240 ml   Output --   Net 240 ml      Physical Exam  Constitutional:       Appearance: Normal appearance.   HENT:      Head: Normocephalic and atraumatic.      Mouth/Throat:      Mouth: Mucous membranes are moist.       Pharynx: Oropharynx is clear.   Eyes:      Extraocular Movements: Extraocular movements intact.      Pupils: Pupils are equal, round, and reactive to light.   Neck:      Comments: No JVD  Cardiovascular:      Rate and Rhythm: Normal rate and regular rhythm.      Pulses: Normal pulses.      Heart sounds: Normal heart sounds.   Pulmonary:      Effort: Pulmonary effort is normal. No respiratory distress.      Breath sounds: No wheezing or rales.   Abdominal:      General: Abdomen is flat. Bowel sounds are normal. There is no distension.      Palpations: Abdomen is soft.      Tenderness: There is no abdominal tenderness. There is no guarding or rebound.   Musculoskeletal:         General: No swelling.      Cervical back: Neck supple.      Right lower leg: Edema (trace) present.      Left lower leg: Edema (trace) present.   Skin:     General: Skin is warm and dry.      Capillary Refill: Capillary refill takes less than 2 seconds.   Neurological:      General: No focal deficit present.      Mental Status: She is alert and oriented to person, place, and time. Mental status is at baseline.   Psychiatric:         Mood and Affect: Mood normal.         Behavior: Behavior normal.         Thought Content: Thought content normal.         Judgment: Judgment normal.       Significant Labs: All pertinent labs within the past 24 hours have been reviewed.  Recent Lab Results         03/13/22  1042   03/13/22  0454   03/12/22  1941   03/12/22  1547   03/12/22  1148        Albumin   2.5             Anion Gap   12             BUN   34             Calcium   8.8             Chloride   104             CO2   23             Creatinine   3.2             eGFR if    15.7             eGFR if non    13.6  Comment: Calculation used to obtain the estimated glomerular filtration  rate (eGFR) is the CKD-EPI equation.                Glucose   168             Magnesium   1.9             Phosphorus   2.8             POCT  Glucose 115     206   239   175       Potassium   4.5             Sodium   139             Vancomycin, Random   15.4                                    Significant Imaging:       I have reviewed all pertinent imaging results/findings within the past 24 hours.      Assessment/Plan:      * NSTEMI (non-ST elevated myocardial infarction)  HTN, HLD, CAD, acute on chronic resp failure with hypoxia, pHTN, AS, elevated troponin  - Echo 03/03 shows EF 48%, severe AS.  - LHC 03/03 showed LAD and LCx 70% stenosis and RCA 60% stenosis. Transferred [facility] for CTS evaluation; per CTS pt is poor surgical candidate. Recommended high risk PCI vs medical management  - Interventional cardiology consulted, after lengthy discussion with family, elected not to pursue invasive measures, treating medically  - Continue aspirin 81mg daily for life and clopidogrel 75mg PO daily for 1 year (holding DAPT for tunneled catheter placement)  - Continue atorvastatin 80mg PO daily  - Coreg and Losartan held due to hypotension with HD, will restart as needed  - Weaned to baseline supplemental O2.  - Duo nebs and inhalation treatment q6h   - d/c heparin gtt.  - Appreciate cardiology assistance.    Bacteremia  Blood cultures drawn in ED 03/02 showing GPCs resembling staph. No clear source of infection at this time. No noted areas of skin breakdown, tunneled line does not appear grossly infected (replaced on 3/4), UA unremarkable, CXR at presentation more consistent with pulmonary edema than infectious process.    - Continue IV Vancomycin. De-escalate as feasible.  - ID consulted, appreciate recs, plan for 4 weeks from line removal (end date 04/06/2022)  - 48 hour line holiday complete, repeat cultures NGTD  - L femoral Trialysis placed by anesthesia  - F/U repeat cultures    Advance care planning  DNR status on admit; current LaPOST reflects her goals of care. Engaged the patient in a GOC discussion on 3/6. Patient has full capacity and stating  that she would not want excessive life-saving measures including CPR or intubation.    - DNR order in place    Debility  - PT/OT recommending HH    Acute on chronic respiratory failure with hypoxemia  Patient with Hypoxic Respiratory failure which is Acute on chronic.  she is on home oxygen at 2-3 LPM. Supplemental oxygen was provided and noted- Oxygen Concentration (%):  [28] 28.   Signs/symptoms of respiratory failure include- respiratory distress. Contributing diagnoses includes - Interstitial lung disease Labs and images were reviewed. Patient Has recent ABG, which has been reviewed. Will treat underlying causes and adjust management of respiratory failure as follows:    - Duo nebs q6h  - Inhalation treatment q6h  - ICS and flutter valve  - Supplemental O2 PRN  - Bipap QHS PRN    Elevated troponin  See NSTEMI      ESRD (end stage renal disease) on dialysis  ESRD on HD M,W,F outpatient.  Oliguric at baseline, still makes some urine  Nephrology consulted for HD while inpatient  3/11: Left femoral trialysis placed by anesthesia at bedside    - HD per Nephrology  - Renal function panel daily  - Monitor intake/output and daily standing weights.  - Replete electrolytes PRN, goal Mag/Phos/K 2/3/4   - Avoid nephrotoxic agents such as NSAIDs, gadolinium and IV radiocontrast.  - Renally dose meds to current GFR.  - Maintain MAP > 65.        Pulmonary hypertension  Likely 2/2 chronic ILD    See acute on chronic hypoxemic respiratory failure      Anemia of chronic disease  Baseline Hb 10, Hb 9.6 on admission  Antiplatelet/anticoagulation: Aspirin, Plavix  Likely 2/2 ESRD    - Monitor CBC daily  - Transfuse with goal Hb > 7        Aortic valve stenosis  See NSTEMI      Type 2 diabetes mellitus with diabetic polyneuropathy  Last A1c 5.5 (3/3/22). On detemir 30U subQ BID at home. Some postprandial derangements.    - Continue insulin detemir 20U subQ BID  - Continue Aspart 5u TIDWM  - LDSSI  - Accuchecks achs  - Diabetic  diet      Hypothyroidism (acquired)  TSH, T4 stable. Hyponatremia likely 2/2 renal failure.    - Continue levothyroxine 75mcg PO daily.    GERD (gastroesophageal reflux disease)  - Continue pantoprazole 40mg PO daily.    Mixed hyperlipidemia  Chronic, stable.    - Continue home Lipitor 80 mg daily    Essential hypertension  Chronic, normotensive on evaluation today.    - Coreg 25 mg BID  - Losartan 25 mg daily  - Labetalol 10 mg PRN for SBP >160    Will hold for now given hypotension with HD      VTE Risk Mitigation (From admission, onward)         Ordered     heparin (porcine) injection 1,000 Units  As needed (PRN)         03/11/22 1349     heparin (porcine) injection 5,000 Units  As needed (PRN)         03/03/22 0755     IP VTE HIGH RISK PATIENT  Once         03/03/22 0650     Place sequential compression device  Until discontinued         03/03/22 0650                Discharge Planning   UMAIR: 3/16/2022     Code Status: DNR   Is the patient medically ready for discharge?: No    Reason for patient still in hospital (select all that apply): Treatment and Consult recommendations  Discharge Plan A: Home Health                  Geovani Morton MD  Department of Hospital Medicine   [facility] - Cardiology Stepdown

## 2022-03-13 NOTE — PROGRESS NOTES
Pharmacokinetic Assessment Follow Up: IV Vancomycin    Vancomycin serum concentration assessment(s):    · Vancomycin level was drawn appropriately and can be used to guide therapy.  · Vancomycin was therapeutic at 15.4 mcg/mL; goal 15-20 mcg/mL    Vancomycin Regimen Plan:    · HOLD Vancomycin dose today - concern dosing with x1 dose of Vancomycin will cause patient to be supretherapeutic.  · Patient on iHD - last HD 3/12 AM  · Obtain lab with AM draws tomorrow morning - patient may be in dialysis tomorrow AM  · Re-dose Vancomycin based on dialysis plan/schedule and renal function  · Dialysis planned for MWF - missed Friday and was done Sat    Drug levels (last 3 results):  Recent Labs   Lab Result Units 03/11/22  2342 03/13/22  0454   Vancomycin, Random ug/mL 14.1 15.4       Pharmacy will continue to follow and monitor vancomycin.    Please contact pharmacy at extension [phone] for questions regarding this assessment.    Thank you for the consult,   Adeel Alicea       Patient brief summary:  Melinda Knight is a 76 y.o. female initiated on antimicrobial therapy with IV Vancomycin for treatment of bacteremia    Drug Allergies:   Review of patient's allergies indicates:   Allergen Reactions    Sulfa (sulfonamide antibiotics) Nausea And Vomiting    Menthol contain prod        Actual Body Weight:   90 kg    Renal Function:   Estimated Creatinine Clearance: 15.8 mL/min (A) (based on SCr of 3.2 mg/dL (H)).,     Dialysis Method (if applicable):  intermittent HD    CBC (last 72 hours):  Recent Labs   Lab Result Units 03/11/22  0408 03/11/22  0952   WBC K/uL 10.02 9.94   Hemoglobin g/dL 10.2* 10.3*   Hematocrit % 33.0* 33.0*   Platelets K/uL 396 390   Gran % % 65.1 67.0   Lymph % % 17.6* 15.1*   Mono % % 8.3 9.4   Eosinophil % % 3.9 4.1   Basophil % % 1.3 1.2   Differential Method  Automated Automated       Metabolic Panel (last 72 hours):  Recent Labs   Lab Result Units 03/11/22  0408 03/12/22  0506 03/13/22  0454    Sodium mmol/L 134* 139 139   Potassium mmol/L 4.9 5.0 4.5   Chloride mmol/L 98 104 104   CO2 mmol/L 23 22* 23   Glucose mg/dL 219* 121* 168*   BUN mg/dL 44* 50* 34*   Creatinine mg/dL 4.5* 4.3* 3.2*   Albumin g/dL 2.5* 2.6* 2.5*   Magnesium mg/dL 1.9 1.9 1.9   Phosphorus mg/dL 3.7 3.9 2.8       Vancomycin Administrations:  vancomycin given in the last 96 hours                   vancomycin 500 mg in dextrose 5 % 100 mL IVPB (ready to mix system) (mg) 500 mg New Bag 03/12/22 1821                Microbiologic Results:  Microbiology Results (last 7 days)     Procedure Component Value Units Date/Time    Blood culture [[id number]] Collected: 03/09/22 1740    Order Status: Completed Specimen: Blood from Peripheral, Left Hand Updated: 03/12/22 2212     Blood Culture, Routine No Growth to date      No Growth to date      No Growth to date      No Growth to date    Blood culture [[id number]] Collected: 03/09/22 1739    Order Status: Completed Specimen: Blood from Peripheral, Right Arm Updated: 03/12/22 2212     Blood Culture, Routine No Growth to date      No Growth to date      No Growth to date      No Growth to date    Blood culture [[id number]]     Order Status: Canceled Specimen: Blood     Blood culture [[id number]]     Order Status: Canceled Specimen: Blood     Blood culture [[id number]] Collected: 03/04/22 1039    Order Status: Completed Specimen: Blood Updated: 03/09/22 1612     Blood Culture, Routine No growth after 5 days.    Blood culture [[id number]] Collected: 03/04/22 1039    Order Status: Completed Specimen: Blood from Peripheral, Right Updated: 03/09/22 1612     Blood Culture, Routine No growth after 5 days.

## 2022-03-14 NOTE — PLAN OF CARE
"[facility] Card[facility] Stepdown  Discharge Reassessment    Primary Care Provider: Duke Cole MD    Expected Discharge Date: 3/16/2022    Reassessment (most recent)     Discharge Reassessment - 03/14/22 1314        Discharge Reassessment    Assessment Type Discharge Planning Reassessment     Did the patient's condition or plan change since previous assessment? No     Discharge Plan A Home Health     Discharge Plan B Home with family     Discharge Barriers Identified None     Why the patient remains in the hospital Requires continued medical care             Per treatment team pt "is getting her tunneled catheter placed tomorrow, so hopefully discharge after that."  Per HD  [facility] pt's outpatient dialysis clinic has pt's IV abx in stock and are prepared for pt to resume care on Wed 3/16.  So to fax prescription for IV abx when available (fx [phone]).  HH orders to be sent to [facility] when available.  Will continue to follow.    Monse Kemp, JB  [facility]  [phone]      "

## 2022-03-14 NOTE — PT/OT/SLP PROGRESS
Physical Therapy      Patient Name:  Melinda Knight   MRN:  [id number]    Patient not seen today secondary to: Pt off the floor for Dialysis. Will follow-up 3/15.    3/14/2022

## 2022-03-14 NOTE — PROGRESS NOTES
ESRD MWF Maintenance HD initiated to Left femoral catheter.  Lines reversed & reversed back.  Flushed again and tried to run but arterial bottoms out high pressures no matter lines reversed or not.  Unable to rinse blood back.      Unable to resume dialysis due to catheter not working.  Pt sent back to her room.

## 2022-03-14 NOTE — ASSESSMENT & PLAN NOTE
HTN, HLD, CAD, acute on chronic resp failure with hypoxia, pHTN, AS, elevated troponin  - Echo 03/03 shows EF 48%, severe AS.  - LHC 03/03 showed LAD and LCx 70% stenosis and RCA 60% stenosis. Transferred [facility] for CTS evaluation; per CTS pt is poor surgical candidate. Recommended high risk PCI vs medical management  - Interventional cardiology consulted, after lengthy discussion with family, elected not to pursue invasive measures, treating medically    Plan:  - Continue aspirin 81mg daily for life and clopidogrel 75mg PO daily for 1 year (holding DAPT for tunneled catheter placement)  - Continue atorvastatin 80mg PO daily  - Coreg and Losartan held due to hypotension with HD, will restart as needed  - Weaned to baseline supplemental O2.  - Duo nebs and inhalation treatment q6h   - d/c heparin gtt.  - Appreciate cardiology assistance.

## 2022-03-14 NOTE — PT/OT/SLP PROGRESS
Occupational Therapy      Patient Name:  Melinda Knight   MRN:  [id number]    Patient not seen today secondary to dialysis. Will follow-up tomorrow.    3/14/2022

## 2022-03-14 NOTE — ASSESSMENT & PLAN NOTE
Blood cultures drawn in ED 03/02 showing GPCs resembling staph. No clear source of infection at this time. No noted areas of skin breakdown, tunneled line does not appear grossly infected (replaced on 3/4), UA unremarkable, CXR at presentation more consistent with pulmonary edema than infectious process.    - Continue IV Vancomycin for MRSA  - ID consulted, appreciate recs, plan for 4 weeks from line removal (end date 04/06/2022)  - 48 hour line holiday complete, repeat cultures NGTD  - L femoral Trialysis placed by anesthesia  - F/U repeat cultures

## 2022-03-14 NOTE — SUBJECTIVE & OBJECTIVE
Interval History:   HD attempted today through temporary femoral line however catheter nor working.  NPO after MN for permacath placement tomorrow.  Had episode of hypoglycemia today.    Review of Systems   Constitutional:  Negative for chills, fever and malaise/fatigue.   HENT:  Negative for congestion, ear discharge, sinus pain and sore throat.    Eyes:  Negative for blurred vision.   Respiratory:  Negative for cough and shortness of breath.    Cardiovascular:  Negative for chest pain, palpitations, orthopnea, leg swelling and PND.   Gastrointestinal:  Negative for abdominal pain, constipation, diarrhea, heartburn, nausea and vomiting.   Genitourinary:  Negative for dysuria, flank pain and hematuria.   Musculoskeletal:  Negative for joint pain and myalgias.   Skin:  Negative for rash.   Neurological:  Negative for dizziness, weakness and headaches.   Psychiatric/Behavioral: Negative.         Objective:     Vital Signs (Most Recent):  Temp: 97.1 °F (36.2 °C) (03/14/22 1228)  Pulse: 96 (03/14/22 1228)  Resp: 16 (03/14/22 1020)  BP: (!) 134/58 (03/14/22 1228)  SpO2: 99 % (03/14/22 1228)  O2 Device (Oxygen Therapy): nasal cannula (03/14/22 1228)   Vital Signs (24h Range):  Temp:  [97.1 °F (36.2 °C)-98 °F (36.7 °C)] 97.1 °F (36.2 °C)  Pulse:  [87-99] 96  Resp:  [16-20] 16  SpO2:  [94 %-100 %] 99 %  BP: (129-169)/(57-70) 134/58     Weight: 90 kg (198 lb 6.6 oz) (03/04/22 0600)  Body mass index is 37.49 kg/m².  Body surface area is 1.97 meters squared.    I/O last 3 completed shifts:  In: 225 [P.O.:225]  Out: -     Physical Exam  Vitals and nursing note reviewed.   Constitutional:       General: She is not in acute distress.     Appearance: Normal appearance. She is not ill-appearing, toxic-appearing or diaphoretic.   Cardiovascular:      Rate and Rhythm: Normal rate and regular rhythm.      Pulses: Normal pulses.   Pulmonary:      Effort: Pulmonary effort is normal. No respiratory distress.      Breath sounds: No  wheezing, rhonchi or rales.      Comments: Decreased breath sounds in posterior lung fields  Musculoskeletal:         General: No swelling, tenderness or deformity.      Right lower leg: Edema present.      Left lower leg: Edema present.      Comments: Left femoral CVC line    Skin:     General: Skin is warm.      Capillary Refill: Capillary refill takes 2 to 3 seconds.      Coloration: Skin is not jaundiced or pale.      Findings: No bruising, erythema, lesion or rash.   Neurological:      Mental Status: She is alert and oriented to person, place, and time.   Psychiatric:         Mood and Affect: Mood normal.         Behavior: Behavior normal.         Thought Content: Thought content normal.         Judgment: Judgment normal.       Significant Labs:  CBC:   Recent Labs   Lab 03/14/22  0620   WBC 10.36   RBC 3.00*   HGB 9.6*   HCT 32.1*      *   MCH 32.0*   MCHC 29.9*       CMP:   Recent Labs   Lab 03/14/22  0620   GLU 47*   CALCIUM 9.9   ALBUMIN 2.7*      K 5.1   CO2 25      BUN 45*   CREATININE 3.6*       All labs within the past 24 hours have been reviewed.

## 2022-03-14 NOTE — PLAN OF CARE
Pt maintained free from falls/ trauma/ injuries and new skin breakdown. Pt denied pain or discomfort. Denied SOB on 2 L O2. On contact isolation for MRSA. DNR. , insulin coverage given. NPO post midnight. Plan of care reviewed. Pt verbalized understanding. All questions and concerns addressed. Will continue to monitor.

## 2022-03-14 NOTE — PROGRESS NOTES
"[facility] - Cardiology [facility] Medicine  Progress Note    Patient Name: Melinda Knight  MRN: [id number]  Patient Class: IP- Inpatient   Admission Date: 3/3/2022  Length of Stay: 11 days  Attending Physician: Elia Barr MD  Primary Care Provider: Duke Cole MD        Subjective:     Principal Problem:NSTEMI (non-ST elevated myocardial infarction)        HPI:  Melinda Knight is a 76 y.o. F with history of HTN, HLD, CAD, DMII, hypothyroidism, ESRD on HD MWF, GERD, restrictive lung disease with chronic hypoxemic resp failure (on 2-3L O2) [place] [facility] [place] for CABG +/- AVR. Initially presented to [facility]note 3] ED w/ 2 day history of "heartburn pain," N/V, and SOB. Workup notable for troponin 13 (peaked at 42 on 3/3), elevated BNP 2268 (baseline 400-600s), and radiographic evidence of pulmonary edema. EKG SR with RBBB. She was hypoxic and placed on BiPAP. Transferred to [facility] w/ NSTEMI. Cardiology and nephrology following. Volume removal with HD and weaned to baseline supplemental O2. BCx 3/2 resulted GPC resembling Staph and vanc initiated. Repeat BCx NGTD. LHC on 3/3 revealed proximal RCA 60% stenosed, mid LAD 70% stenosed, and mid Cx 70% stenosed. TTE revealed EF 48% with PORTILLO 0.63cm2, LFLG AS. Cardiology recommended CTS evaluation for CABG +/- AVR. Last dose of Plavix 3/3. She denies CP, SOB, N/V.      Overview/Hospital Course:  Admitted as transfer from [facility]note 3] ED with NSTEMI. Cardiology and nephrology consulted. Volume removed with HD and weaned to baseline supplemental O2. LHC performed showing triple vessel disease. Cardiology recommended CTS evaluation. Patient not a CABG candidate per CTS. IC consulted for possible PCI. Blood cultures from ED 03/02 showed GPCs; started IV Vancomycin. Permacath exchanged on 03/04. ID consulted for bacteremia. After long discussion with primary and cardiology, patient and family elected to not proceed with LHC/PCI and maintain DNR status. " Pursuing medical management. During dialysis 3/9/22 patient became hypotensive. Held BP meds and added as able. ID requested 48 hour line holiday to see if blood cultures remain clear. General surgery consulted for line removal. Patient recultured, given 48 hour line holiday. Right femoral trialysis line placed by anesthesia at bedside. Nephro to continue dialysis, but temp line noted to be non-functional on 3/14.  Had episode of hypoglycemia on 3/14 so prandial insulin was discontinued and long acting was decreased      Interval History:   HD attempted today through temporary femoral line however catheter nor working.  NPO after MN for permacath placement tomorrow.  Had episode of hypoglycemia today.    Review of Systems   Constitutional:  Negative for chills, fever and malaise/fatigue.   HENT:  Negative for congestion, ear discharge, sinus pain and sore throat.    Eyes:  Negative for blurred vision.   Respiratory:  Negative for cough and shortness of breath.    Cardiovascular:  Negative for chest pain, palpitations, orthopnea, leg swelling and PND.   Gastrointestinal:  Negative for abdominal pain, constipation, diarrhea, heartburn, nausea and vomiting.   Genitourinary:  Negative for dysuria, flank pain and hematuria.   Musculoskeletal:  Negative for joint pain and myalgias.   Skin:  Negative for rash.   Neurological:  Negative for dizziness, weakness and headaches.   Psychiatric/Behavioral: Negative.         Objective:     Vital Signs (Most Recent):  Temp: 97.1 °F (36.2 °C) (03/14/22 1228)  Pulse: 96 (03/14/22 1228)  Resp: 16 (03/14/22 1020)  BP: (!) 134/58 (03/14/22 1228)  SpO2: 99 % (03/14/22 1228)  O2 Device (Oxygen Therapy): nasal cannula (03/14/22 1228)   Vital Signs (24h Range):  Temp:  [97.1 °F (36.2 °C)-98 °F (36.7 °C)] 97.1 °F (36.2 °C)  Pulse:  [87-99] 96  Resp:  [16-20] 16  SpO2:  [94 %-100 %] 99 %  BP: (129-169)/(57-70) 134/58     Weight: 90 kg (198 lb 6.6 oz) (03/04/22 0600)  Body mass index is 37.49  kg/m².  Body surface area is 1.97 meters squared.    I/O last 3 completed shifts:  In: 225 [P.O.:225]  Out: -     Physical Exam  Vitals and nursing note reviewed.   Constitutional:       General: She is not in acute distress.     Appearance: Normal appearance. She is not ill-appearing, toxic-appearing or diaphoretic.   Cardiovascular:      Rate and Rhythm: Normal rate and regular rhythm.      Pulses: Normal pulses.   Pulmonary:      Effort: Pulmonary effort is normal. No respiratory distress.      Breath sounds: No wheezing, rhonchi or rales.      Comments: Decreased breath sounds in posterior lung fields  Musculoskeletal:         General: No swelling, tenderness or deformity.      Right lower leg: Edema present.      Left lower leg: Edema present.      Comments: Left femoral CVC line    Skin:     General: Skin is warm.      Capillary Refill: Capillary refill takes 2 to 3 seconds.      Coloration: Skin is not jaundiced or pale.      Findings: No bruising, erythema, lesion or rash.   Neurological:      Mental Status: She is alert and oriented to person, place, and time.   Psychiatric:         Mood and Affect: Mood normal.         Behavior: Behavior normal.         Thought Content: Thought content normal.         Judgment: Judgment normal.       Significant Labs:  CBC:   Recent Labs   Lab 03/14/22  0620   WBC 10.36   RBC 3.00*   HGB 9.6*   HCT 32.1*      *   MCH 32.0*   MCHC 29.9*       CMP:   Recent Labs   Lab 03/14/22  0620   GLU 47*   CALCIUM 9.9   ALBUMIN 2.7*      K 5.1   CO2 25      BUN 45*   CREATININE 3.6*       All labs within the past 24 hours have been reviewed.           Assessment/Plan:      * NSTEMI (non-ST elevated myocardial infarction)  HTN, HLD, CAD, acute on chronic resp failure with hypoxia, pHTN, AS, elevated troponin  - Echo 03/03 shows EF 48%, severe AS.  - LHC 03/03 showed LAD and LCx 70% stenosis and RCA 60% stenosis. Transferred [facility] for CTS evaluation; per  CTS pt is poor surgical candidate. Recommended high risk PCI vs medical management  - Interventional cardiology consulted, after lengthy discussion with family, elected not to pursue invasive measures, treating medically    Plan:  - Continue aspirin 81mg daily for life and clopidogrel 75mg PO daily for 1 year (holding DAPT for tunneled catheter placement)  - Continue atorvastatin 80mg PO daily  - Coreg and Losartan held due to hypotension with HD, will restart as needed  - Weaned to baseline supplemental O2.  - Duo nebs and inhalation treatment q6h   - d/c heparin gtt.  - Appreciate cardiology assistance.    Bacteremia  Blood cultures drawn in ED 03/02 showing GPCs resembling staph. No clear source of infection at this time. No noted areas of skin breakdown, tunneled line does not appear grossly infected (replaced on 3/4), UA unremarkable, CXR at presentation more consistent with pulmonary edema than infectious process.    - Continue IV Vancomycin for MRSA  - ID consulted, appreciate recs, plan for 4 weeks from line removal (end date 04/06/2022)  - 48 hour line holiday complete, repeat cultures NGTD  - L femoral Trialysis placed by anesthesia  - F/U repeat cultures    Advance care planning  DNR status on admit; current [place]POST reflects her goals of care. Engaged the patient in a GOC discussion on 3/6. Patient has full capacity and stating that she would not want excessive life-saving measures including CPR or intubation.    - DNR order in place    Debility  - PT/OT recommending HH    Acute on chronic respiratory failure with hypoxemia  Patient with Hypoxic Respiratory failure which is Acute on chronic.  she is on home oxygen at 2-3 LPM. Supplemental oxygen was provided and noted-  .   Signs/symptoms of respiratory failure include- respiratory distress. Contributing diagnoses includes - Interstitial lung disease Labs and images were reviewed. Patient Has recent ABG, which has been reviewed. Will treat underlying  causes and adjust management of respiratory failure as follows:    - Duo nebs q6h  - Inhalation treatment q6h  - ICS and flutter valve  - Supplemental O2 PRN  - Bipap QHS PRN    Elevated troponin  See NSTEMI      ESRD (end stage renal disease) on dialysis  ESRD on HD M,W,F outpatient.  Oliguric at baseline, still makes some urine  Nephrology consulted for HD while inpatient  3/11: Left femoral trialysis placed by anesthesia at bedside    - HD per Nephrology- Unable to dialyze today d/t non-functional temp line.  - NPO after MN for tunnelled catheter placement in AM  - Renal function panel daily  - Monitor intake/output and daily standing weights.  - Replete electrolytes PRN, goal Mag/Phos/K 2/3/4   - Avoid nephrotoxic agents such as NSAIDs, gadolinium and IV radiocontrast.  - Renally dose meds to current GFR.  - Maintain MAP > 65.        Pulmonary hypertension  Likely 2/2 chronic ILD    See acute on chronic hypoxemic respiratory failure      Anemia of chronic disease  Baseline Hb 10, Hb 9.6 on admission  Antiplatelet/anticoagulation: Aspirin, Plavix  Likely 2/2 ESRD    - Monitor CBC daily  - Transfuse with goal Hb > 7        Aortic valve stenosis  See NSTEMI      Type 2 diabetes mellitus with diabetic polyneuropathy  Last A1c 5.5 (3/3/22). On detemir 30U subQ BID at home. Some postprandial derangements.    Hypoglycemia today of 47    - Decrease detemir by 50% given NPO status and hypoglycemia- 10U SC BID  - Hold Aspart 5u TIDWM  - LDSSI  - Accuchecks achs  - Diabetic diet      Hypothyroidism (acquired)  TSH, T4 stable. Hyponatremia likely 2/2 renal failure.    - Continue levothyroxine 75mcg PO daily.    GERD (gastroesophageal reflux disease)  - Continue pantoprazole 40mg PO daily.    Mixed hyperlipidemia  Chronic, stable.    - Continue home Lipitor 80 mg daily    Essential hypertension  Chronic, normotensive on evaluation today.    - Coreg 25 mg BID  - Losartan 25 mg daily  - Labetalol 10 mg PRN for SBP >160    Will  hold for now given hypotension with HD      VTE Risk Mitigation (From admission, onward)         Ordered     heparin (porcine) injection 1,000 Units  As needed (PRN)         03/14/22 0759     heparin (porcine) injection 5,000 Units  As needed (PRN)         03/03/22 0755     IP VTE HIGH RISK PATIENT  Once         03/03/22 0650     Place sequential compression device  Until discontinued         03/03/22 0650                Discharge Planning   UMAIR: 3/16/2022     Code Status: DNR   Is the patient medically ready for discharge?: No    Reason for patient still in hospital (select all that apply): Patient trending condition  Discharge Plan A: Home Health                  Brendon Catherine MD  Department of Hospital Medicine   [facility] - Cardiology Stepdown

## 2022-03-14 NOTE — PROGRESS NOTES
[facility] - Cardiology Stepdown  Nephrology  Progress Note    Patient Name: Melinda Knight  MRN: [id number]  Admission Date: 3/3/2022  Hospital Length of Stay: 11 days  Attending Provider: Elia Barr MD   Primary Care Physician: Duke Cole MD  Principal Problem:NSTEMI (non-ST elevated myocardial infarction)    Subjective:     HPI: Melinda Knight is a 76 y.o. female with h/o HTN, HLD, DM, ESRD on HD (MWF), restrictive lung disease with chronic hypoxemic respiratory failure on 2-3L home oxygen who transferred to [facility] for evaluation for CABG, AVR. Patient presented with NSTEMI to outside hospital. LHC revealed proximal RCA 60% stenosed, mid LAD 70% stenosed, and mid Cx 70% stenosed. TTE revealed EF 48% with PORTILLO 0.63cm2. 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 Pt denies N/V/D, chest pain, edema, or swelling. Last dialysis on 3/4/22. K 4.2. Nephrology consulted for ESRD/HD management while inpatient.       Interval History:   HD attempted through temporary femoral line however catheter nor working.   Pt NPO for permacath placement today.     Review of patient's allergies indicates:   Allergen Reactions    Sulfa (sulfonamide antibiotics) Nausea And Vomiting    Menthol contain prod      Current Facility-Administered Medications   Medication Frequency    0.9%  NaCl infusion Once    acetaminophen tablet 650 mg Q6H PRN    albuterol-ipratropium 2.5 mg-0.5 mg/3 mL nebulizer solution 3 mL Q6H WAKE    aspirin chewable tablet 81 mg Daily    atorvastatin tablet 80 mg Daily    citalopram tablet 20 mg Daily    dextrose 10% bolus 125 mL PRN    dextrose 10% bolus 250 mL PRN    glucagon (human recombinant) injection 1 mg PRN    glucose chewable tablet 16 g PRN    glucose chewable tablet 24 g PRN    heparin (porcine) injection 1,000 Units PRN    hydrOXYzine HCL tablet 50 mg Q4H PRN    insulin aspart U-100 pen 1-10 Units QID (AC + HS) PRN    insulin detemir U-100 pen 10 Units BID    labetalol 20 mg/4 mL  (5 mg/mL) IV syring Q4H PRN    levothyroxine tablet 75 mcg Before breakfast    melatonin tablet 6 mg Nightly PRN    ondansetron disintegrating tablet 4 mg Q8H PRN    ondansetron injection 4 mg Q8H PRN    pantoprazole EC tablet 40 mg Daily    sevelamer carbonate tablet 800 mg TID WM    sodium chloride 0.9% bolus 250 mL PRN    sodium chloride 0.9% flush 10 mL PRN    vancomycin 500 mg in dextrose 5 % 100 mL IVPB (ready to mix system) Every Mon, Wed, Fri    vitamin renal formula (B-complex-vitamin c-folic acid) 1 mg per capsule 1 capsule Daily       Objective:     Vital Signs (Most Recent):  Temp: 97.4 °F (36.3 °C) (03/14/22 0340)  Pulse: 90 (03/14/22 1110)  Resp: 16 (03/14/22 1020)  BP: (!) 162/69 (03/14/22 1020)  SpO2: 97 % (03/14/22 0735)  O2 Device (Oxygen Therapy): nasal cannula (03/14/22 1020)   Vital Signs (24h Range):  Temp:  [97.4 °F (36.3 °C)-98 °F (36.7 °C)] 97.4 °F (36.3 °C)  Pulse:  [87-99] 90  Resp:  [16-20] 16  SpO2:  [94 %-100 %] 97 %  BP: (129-169)/(57-70) 162/69     Weight: 90 kg (198 lb 6.6 oz) (03/04/22 0600)  Body mass index is 37.49 kg/m².  Body surface area is 1.97 meters squared.    I/O last 3 completed shifts:  In: 225 [P.O.:225]  Out: -     Physical Exam  Vitals and nursing note reviewed.   Constitutional:       General: She is not in acute distress.     Appearance: Normal appearance. She is not ill-appearing, toxic-appearing or diaphoretic.   Cardiovascular:      Rate and Rhythm: Normal rate and regular rhythm.      Pulses: Normal pulses.   Pulmonary:      Effort: Pulmonary effort is normal. No respiratory distress.      Breath sounds: No wheezing, rhonchi or rales.      Comments: Decreased breath sounds in posterior lung fields  Musculoskeletal:         General: No swelling, tenderness or deformity.      Right lower leg: Edema present.      Left lower leg: Edema present.      Comments: Left femoral CVC line    Skin:     General: Skin is warm.      Capillary Refill: Capillary refill  takes 2 to 3 seconds.      Coloration: Skin is not jaundiced or pale.      Findings: No bruising, erythema, lesion or rash.   Neurological:      Mental Status: She is alert and oriented to person, place, and time.   Psychiatric:         Mood and Affect: Mood normal.         Behavior: Behavior normal.         Thought Content: Thought content normal.         Judgment: Judgment normal.       Significant Labs:  CBC:   Recent Labs   Lab 03/14/22  0620   WBC 10.36   RBC 3.00*   HGB 9.6*   HCT 32.1*      *   MCH 32.0*   MCHC 29.9*     CMP:   Recent Labs   Lab 03/14/22  0620   GLU 47*   CALCIUM 9.9   ALBUMIN 2.7*      K 5.1   CO2 25      BUN 45*   CREATININE 3.6*     All labs within the past 24 hours have been reviewed.       Assessment/Plan:     * NSTEMI (non-ST elevated myocardial infarction)  s/p LHC stenosed proximal RCA 60%, mid LAD 70% stenosed on 3/3  Management per primary     ESRD (end stage renal disease) on dialysis  Nephrology History  iHD Schedule: MWF   Unit/MD: Dr. Tram Cui   Access: L femoral temporary line   Residual Renal Function: Yes     Attempted to complete hemodialysis today however L femoral line not working; pt scheduled to undergo permacath placement today; will plan for HD after permacath placement  Continue to monitor intake and output, daily weights   Avoid nephrotoxic medication and renal dose medications to GFR    Anemia of Chronic Kidney Disease   Hgb goal in ESRD is Hgb of 10-11.   Iron repleted. No indication for FATOUMATA at this time.   Transfuse for Hg <7.     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

## 2022-03-14 NOTE — SUBJECTIVE & OBJECTIVE
Interval History:   HD attempted through temporary femoral line however catheter nor working.   Pt NPO for permacath placement today.     Review of patient's allergies indicates:   Allergen Reactions    Sulfa (sulfonamide antibiotics) Nausea And Vomiting    Menthol contain prod      Current Facility-Administered Medications   Medication Frequency    0.9%  NaCl infusion Once    acetaminophen tablet 650 mg Q6H PRN    albuterol-ipratropium 2.5 mg-0.5 mg/3 mL nebulizer solution 3 mL Q6H WAKE    aspirin chewable tablet 81 mg Daily    atorvastatin tablet 80 mg Daily    citalopram tablet 20 mg Daily    dextrose 10% bolus 125 mL PRN    dextrose 10% bolus 250 mL PRN    glucagon (human recombinant) injection 1 mg PRN    glucose chewable tablet 16 g PRN    glucose chewable tablet 24 g PRN    heparin (porcine) injection 1,000 Units PRN    hydrOXYzine HCL tablet 50 mg Q4H PRN    insulin aspart U-100 pen 1-10 Units QID (AC + HS) PRN    insulin detemir U-100 pen 10 Units BID    labetalol 20 mg/4 mL (5 mg/mL) IV syring Q4H PRN    levothyroxine tablet 75 mcg Before breakfast    melatonin tablet 6 mg Nightly PRN    ondansetron disintegrating tablet 4 mg Q8H PRN    ondansetron injection 4 mg Q8H PRN    pantoprazole EC tablet 40 mg Daily    sevelamer carbonate tablet 800 mg TID WM    sodium chloride 0.9% bolus 250 mL PRN    sodium chloride 0.9% flush 10 mL PRN    vancomycin 500 mg in dextrose 5 % 100 mL IVPB (ready to mix system) Every Mon, Wed, Fri    vitamin renal formula (B-complex-vitamin c-folic acid) 1 mg per capsule 1 capsule Daily       Objective:     Vital Signs (Most Recent):  Temp: 97.4 °F (36.3 °C) (03/14/22 0340)  Pulse: 90 (03/14/22 1110)  Resp: 16 (03/14/22 1020)  BP: (!) 162/69 (03/14/22 1020)  SpO2: 97 % (03/14/22 0735)  O2 Device (Oxygen Therapy): nasal cannula (03/14/22 1020)   Vital Signs (24h Range):  Temp:  [97.4 °F (36.3 °C)-98 °F (36.7 °C)] 97.4 °F (36.3 °C)  Pulse:  [87-99] 90  Resp:  [16-20] 16  SpO2:  [94  %-100 %] 97 %  BP: (129-169)/(57-70) 162/69     Weight: 90 kg (198 lb 6.6 oz) (03/04/22 0600)  Body mass index is 37.49 kg/m².  Body surface area is 1.97 meters squared.    I/O last 3 completed shifts:  In: 225 [P.O.:225]  Out: -     Physical Exam  Vitals and nursing note reviewed.   Constitutional:       General: She is not in acute distress.     Appearance: Normal appearance. She is not ill-appearing, toxic-appearing or diaphoretic.   Cardiovascular:      Rate and Rhythm: Normal rate and regular rhythm.      Pulses: Normal pulses.   Pulmonary:      Effort: Pulmonary effort is normal. No respiratory distress.      Breath sounds: No wheezing, rhonchi or rales.      Comments: Decreased breath sounds in posterior lung fields  Musculoskeletal:         General: No swelling, tenderness or deformity.      Right lower leg: Edema present.      Left lower leg: Edema present.      Comments: Left femoral CVC line    Skin:     General: Skin is warm.      Capillary Refill: Capillary refill takes 2 to 3 seconds.      Coloration: Skin is not jaundiced or pale.      Findings: No bruising, erythema, lesion or rash.   Neurological:      Mental Status: She is alert and oriented to person, place, and time.   Psychiatric:         Mood and Affect: Mood normal.         Behavior: Behavior normal.         Thought Content: Thought content normal.         Judgment: Judgment normal.       Significant Labs:  CBC:   Recent Labs   Lab 03/14/22  0620   WBC 10.36   RBC 3.00*   HGB 9.6*   HCT 32.1*      *   MCH 32.0*   MCHC 29.9*     CMP:   Recent Labs   Lab 03/14/22  0620   GLU 47*   CALCIUM 9.9   ALBUMIN 2.7*      K 5.1   CO2 25      BUN 45*   CREATININE 3.6*     All labs within the past 24 hours have been reviewed.

## 2022-03-14 NOTE — ASSESSMENT & PLAN NOTE
Last A1c 5.5 (3/3/22). On detemir 30U subQ BID at home. Some postprandial derangements.    Hypoglycemia today of 47    - Decrease detemir by 50% given NPO status and hypoglycemia- 10U SC BID  - Hold Aspart 5u TIDWM  - LDSSI  - Accuchecks achs  - Diabetic diet

## 2022-03-14 NOTE — ASSESSMENT & PLAN NOTE
ESRD on HD M,W,F outpatient.  Oliguric at baseline, still makes some urine  Nephrology consulted for HD while inpatient  3/11: Left femoral trialysis placed by anesthesia at bedside    - HD per Nephrology- Unable to dialyze today d/t non-functional temp line.  - NPO after MN for tunnelled catheter placement in AM  - Renal function panel daily  - Monitor intake/output and daily standing weights.  - Replete electrolytes PRN, goal Mag/Phos/K 2/3/4   - Avoid nephrotoxic agents such as NSAIDs, gadolinium and IV radiocontrast.  - Renally dose meds to current GFR.  - Maintain MAP > 65.

## 2022-03-14 NOTE — CARE UPDATE
SURY reached out to [facility] and spoke with Mark, who confirms that clinic has the Vanc needed for pt and pt's schedule (MWF 11:30AM). Mark is expecting recent notes. SURY faxed recent med records to clinic.     Catherine [address]  Nephrology SURY  Ext. [phone]

## 2022-03-14 NOTE — ASSESSMENT & PLAN NOTE
Nephrology History  iHD Schedule: MWF   Unit/MD: Dr. Tram Cui   Access: L femoral temporary line   Residual Renal Function: Yes     Attempted to complete hemodialysis today however L femoral line not working; pt scheduled to undergo permacath placement today; will plan for HD after permacath placement  Continue to monitor intake and output, daily weights   Avoid nephrotoxic medication and renal dose medications to GFR    Anemia of Chronic Kidney Disease   Hgb goal in ESRD is Hgb of 10-11.   Iron repleted. No indication for FATOUMATA at this time.   Transfuse for Hg <7.     Lab Results   Component Value Date    HGB 9.6 (L) 03/14/2022    HGB 10.3 (L) 03/11/2022    HGB 10.2 (L) 03/11/2022       Mineral Bone Disease in CKD   Renal diet if not NPO   Sevelamer 800mg PO TID with meals   Daily RFP    HTN (hypertension)  Goal for BP <140mmHg SBP and <90 mmHg DBP, maintain MAP> 65

## 2022-03-14 NOTE — CONSULTS
Pharmacokinetic Assessment Follow Up: IV Vancomycin    Vancomycin serum concentration assessment(s):    Vancomycin concentration drawn this AM pre-HD = 13 mcg/mL. Resuming home MWF HD schedule    Vancomycin Regimen Plan:  1. Vancomycin 500 mg IV MWF post-HD  2. Repeat concentration in AM 3/16/22  3. Goal trough = 15-20 mcg/mL    Thank you for the consult, will continue to follow  Melvin ReichD., BCPS  [id number]       Patient brief summary:  Melinda Knight is a 76 y.o. female initiated on antimicrobial therapy with IV Vancomycin for treatment of MRSA bacteremia       Drug levels (last 3 results):  Recent Labs   Lab Result Units 03/11/22  2342 03/13/22  0454 03/14/22  0620   Vancomycin, Random ug/mL 14.1 15.4 13.0       Drug Allergies:   Review of patient's allergies indicates:   Allergen Reactions    Sulfa (sulfonamide antibiotics) Nausea And Vomiting    Menthol contain prod        Actual Body Weight:   90 kg    Renal Function:   Estimated Creatinine Clearance: 14 mL/min (A) (based on SCr of 3.6 mg/dL (H)).,     Dialysis Method (if applicable):  HD MWF    CBC (last 72 hours):  Recent Labs   Lab Result Units 03/14/22  0620   WBC K/uL 10.36   Hemoglobin g/dL 9.6*   Hematocrit % 32.1*   Platelets K/uL 401   Gran % % 64.8   Lymph % % 19.0   Mono % % 9.3   Eosinophil % % 4.1   Basophil % % 1.4   Differential Method  Automated       Metabolic Panel (last 72 hours):  Recent Labs   Lab Result Units 03/12/22  0506 03/13/22  0454 03/14/22  0620   Sodium mmol/L 139 139 142   Potassium mmol/L 5.0 4.5 5.1   Chloride mmol/L 104 104 107   CO2 mmol/L 22* 23 25   Glucose mg/dL 121* 168* 47*   BUN mg/dL 50* 34* 45*   Creatinine mg/dL 4.3* 3.2* 3.6*   Albumin g/dL 2.6* 2.5* 2.7*   Magnesium mg/dL 1.9 1.9 1.9   Phosphorus mg/dL 3.9 2.8 3.7       Vancomycin Administrations:  vancomycin given in the last 96 hours                   vancomycin 500 mg in dextrose 5 % 100 mL IVPB (ready to mix system) (mg) 500 mg New Bag 03/06/22 1102     vancomycin 2 g in dextrose 5 % 500 mL IVPB (mg) 2,000 mg New Bag 03/04/22 0841                Microbiologic Results:  Microbiology Results (last 7 days)     Procedure Component Value Units Date/Time    Blood culture [[id number]] Collected: 03/09/22 1740    Order Status: Completed Specimen: Blood from Peripheral, Left Hand Updated: 03/13/22 2212     Blood Culture, Routine No Growth to date      No Growth to date      No Growth to date      No Growth to date      No Growth to date    Blood culture [[id number]] Collected: 03/09/22 1739    Order Status: Completed Specimen: Blood from Peripheral, Right Arm Updated: 03/13/22 2212     Blood Culture, Routine No Growth to date      No Growth to date      No Growth to date      No Growth to date      No Growth to date    Blood culture [[id number]]     Order Status: Canceled Specimen: Blood     Blood culture [[id number]]     Order Status: Canceled Specimen: Blood     Blood culture [[id number]] Collected: 03/04/22 1039    Order Status: Completed Specimen: Blood Updated: 03/09/22 1612     Blood Culture, Routine No growth after 5 days.    Blood culture [[id number]] Collected: 03/04/22 1039    Order Status: Completed Specimen: Blood from Peripheral, Right Updated: 03/09/22 1612     Blood Culture, Routine No growth after 5 days.

## 2022-03-15 NOTE — PLAN OF CARE
Cardiology Plan of Care    Brief HPI: Ms. Melinda Knight is a 76 y.o. female with HTN, HLD, CAD, DMII, hypothyroidism, ESRD on HD MWF, GERD, restrictive lung disease with chronic hypoxemic resp failure (on 2-3L O2) admitted for NSTEMI. Cardiology consulted for recommendations for triple therapy.     1) NSTEMI  EKG SR with RBBB. Transferred to [facility] w/ NSTEMI. Volume removal with HD and weaned to baseline supplemental O2. LHC on 3/3 revealed proximal RCA 60% stenosed, mid LAD 70% stenosed, and mid Cx 70% stenosed. TTE revealed EF 48% with PORTILLO 0.63cm2, LFLG AS. LHC 03/03 showed LAD and LCx 70% stenosis and RCA 60% stenosis. Transferred [facility] for CTS evaluation; per CTS pt is poor surgical candidate. Recommended high risk PCI vs medical management. Interventional cardiology consulted, after lengthy discussion with family, elected not to pursue invasive measures, treating medically. Cardiology consulted again for recommendations about triple therapy.     Plan:  - continue current medical management of NSTEMI  - Can start triple therapy with ASA, Plavix, and AC but would recommend discontinuing ASA in one month and continuing Plavix and AC.       We will sign off. Please contact us with any further questions.     Car Jimenez MD  Cardiology  [facility]

## 2022-03-15 NOTE — PLAN OF CARE
Problem: Adult Inpatient Plan of Care  Goal: Plan of Care Review  Outcome: Ongoing, Progressing  Goal: Patient-Specific Goal (Individualized)  Outcome: Ongoing, Progressing  Goal: Absence of Hospital-Acquired Illness or Injury  Outcome: Ongoing, Progressing  Goal: Optimal Comfort and Wellbeing  Outcome: Ongoing, Progressing  Goal: Readiness for Transition of Care  Outcome: Ongoing, Progressing     Problem: Diabetes Comorbidity  Goal: Blood Glucose Level Within Targeted Range  Outcome: Ongoing, Progressing     Problem: Infection  Goal: Absence of Infection Signs and Symptoms  Outcome: Ongoing, Progressing     Problem: Skin Injury Risk Increased  Goal: Skin Health and Integrity  Outcome: Ongoing, Progressing     Problem: Device-Related Complication Risk (Hemodialysis)  Goal: Safe, Effective Therapy Delivery  Outcome: Ongoing, Progressing     Problem: Hemodynamic Instability (Hemodialysis)  Goal: Effective Tissue Perfusion  Outcome: Ongoing, Progressing     Problem: Infection (Hemodialysis)  Goal: Absence of Infection Signs and Symptoms  Outcome: Ongoing, Progressing     Problem: Fall Injury Risk  Goal: Absence of Fall and Fall-Related Injury  Outcome: Ongoing, Progressing

## 2022-03-15 NOTE — ASSESSMENT & PLAN NOTE
Last A1c 5.5 (3/3/22). On detemir 30U subQ BID at home. Some postprandial derangements.    - Levemir 12u BID  - Aspart 5u TIDWM  - LDSSI  - Accuchecks achs  - Diabetic diet

## 2022-03-15 NOTE — INTERVAL H&P NOTE
The patient has been examined and the H&P has been reviewed:    I concur with the findings and no changes have occurred since H&P was written.    Procedure risks, benefits and alternative options discussed and understood by patient/family.    Rt. IJ cuffed tunneled HD catheter insertion.       Active Hospital Problems    Diagnosis  POA    *NSTEMI (non-ST elevated myocardial infarction) [I21.4]  Yes    Bacteremia [R78.81]  No    Acute on chronic respiratory failure with hypoxemia [J96.21]  Yes    Debility [R53.81]  Yes    Advance care planning [Z71.89]  Not Applicable    Elevated troponin [R77.8]  Yes    ESRD (end stage renal disease) on dialysis [N18.6, Z99.2]  Not Applicable     Chronic    Pulmonary hypertension [I27.20]  Yes     Chronic    Anemia of chronic disease [D63.8]  Yes     Chronic    Aortic valve stenosis [I35.0]  Yes     Chronic    Hypothyroidism (acquired) [E03.9]  Yes     Chronic    Type 2 diabetes mellitus with diabetic polyneuropathy [E11.42]  Yes     Chronic    GERD (gastroesophageal reflux disease) [K21.9]  Yes     Chronic    Essential hypertension [I10]  Yes     Chronic    Mixed hyperlipidemia [E78.2]  Yes     Chronic      Resolved Hospital Problems   No resolved problems to display.

## 2022-03-15 NOTE — ASSESSMENT & PLAN NOTE
ESRD on HD M,W,F outpatient.  Oliguric at baseline, still makes some urine  Nephrology consulted for HD while inpatient  3/11: Left femoral trialysis placed by anesthesia at bedside, non functioning  3/15: s/p RIJ tunneled catheter placement by interventional nephrology    - HD per Nephrology  - Renal function panel daily  - Monitor intake/output and daily standing weights.  - Replete electrolytes PRN, goal Mag/Phos/K 2/3/4   - Avoid nephrotoxic agents such as NSAIDs, gadolinium and IV radiocontrast.  - Renally dose meds to current GFR.  - Maintain MAP > 65.

## 2022-03-15 NOTE — ASSESSMENT & PLAN NOTE
Blood cultures drawn in ED 03/02 showing GPCs resembling staph. No clear source of infection at this time. No noted areas of skin breakdown, tunneled line does not appear grossly infected (replaced on 3/4), UA unremarkable, CXR at presentation more consistent with pulmonary edema than infectious process.    - Continue IV Vancomycin for MRSA  - ID consulted, appreciate recs, plan for 4 weeks from line removal (end date 04/06/2022)  - 48 hour line holiday complete, repeat cultures NGTD  - RIJ tunneled catheter placed by interventional nephrology  - F/U repeat cultures

## 2022-03-15 NOTE — OP NOTE
[facility] - Cath Lab  Operative Note    Date of Procedure: 3/15/2022     Procedure: Procedure(s) (LRB):  Insertion, Catheter, Central Venous, Hemodialysis (Right)     Melinda Knight  1945  [id number]    Pre-op Diagnosis: ESRD (end stage renal disease) on dialysis [N18.6, Z99.2]   Post-op Diagnosis: ESRD (end stage renal disease) on dialysis [N18.6, Z99.2]       LT Femoral Cuffed tunneled HD catheter under local anesthesia with sedation under anesthesia. With real time ultrasound and fluoroscopy.     The patient was placed supine.    The LT IJ was attempted but we could not get proper flow to through a guide wire.    The Lt femoral Trialysis was cleaned with Betadine and the skin was re cleaned with Chlorhexidine.    The skin was sterilized with Chlorhexidine.   Contrast was injected through both ports. The catheter was attached tot he wall and lateral with some oclussion.  The catheter was pulled back and contrast was injected again. The contrast has proper tract to the IVC.   Guide wire placed and left in area.   The trialysis was removed and the guide wire was left in place. Position confirmed with fluoroscopy.   2% Xylocaine with Epinephrine was given to anesthetize the skin and subcutane   The tunneled tract was anesthetized with 2% xylocaine with Epinephrine.    The catheter was tunneled under the skin.   The sheath is introduced in the Superior vena cava under fluoroscopy after multiple sequential dilatations are performed over the guide wire.   The catheter is inserted inside the sheath after removing the guidewire and the dilater.   The catheter position is secured with the fluoroscopy. The curve is checked and no angulation in the tract was secured.    We had to manupulate the position to confirm the proper place in IVC. Contrast was injected to confirm the location.  Both ports were checked.    Flushing and blood suction were checked from the catheter ports   1000IU/ml  heparin was inserted  according to the catheter dead space.   The wound was sutured.    No bleeding was assured after observation.   Bactroban ointment was placed over the wound.    Pressure dressing with rolled up 4 X4 over the tunnel was placed.    The procedure was smooth.         Complications: No   Condition: Good   FOLLOWUP: In patient.     The patient showed clotting over the guidewire. She will need active anticoagulation after 8 hours observation.     Her dialysis session should have anticoagulation.     The catheter will not last if no anticoagulation is given with the dialysis.       The procedure is done under real time fluoroscopy.   You can use the catheter for dialysis after the observation period.      Post cuffed Tunneled Cath. Orders    DIALYSIS CATHETERS ARE ONLY FOR DIALYSIS BY [facility], [facility] AND [facility] POLICIES     Keep patient in bed for 6 hours post procedure.   The patient can eat after one hour.    Vital every 15 min. For one hour.    Keep patient supine for 2 hours.    Feeding after 2 hours.    Avoid using Pillows below the upper back and keep the head extended,    Please keep the pressure Bandage over the dressing for 24 hours.    Keep the Venous and Arterial ports covered with gauze and Tegaderm to be opened only in Dialysis.    Cuffed tunneled cath. Is only for dialysis.    Please DO NOT ACCESS FOR MEDICATIONS, IV, OR BLOOD COLLECTION. Except in CPR or life-threatening situations.     Dialysis is preferable to be done tomorrow.    If there was any blood oozing please reinforce the dressing with pressure bandage and call the physician.     By the end of each dialysis treatment it needs to be flushed with 20 ml saline, in each port, and filled with 1000IU/ml Heparin according to the dead space in the Venous and arterial lumens.      Please clean the venous and arterial lumens before and after each dialysis with Chlorhexidine.     Follow Rub the hubs according to the CDC.     LOCK BOTH THE  ARTERIAL AND THE VENOUS LUMENS with secure hubs and cover  both the arterial and venous lumens with sterile gauze and Tegaderm.            ELVIRA [email or web address]. MD. ELIZABETH. OSIEL.  , [employer] [facility] [employer]  Nephrology Consultant. [facility].   [facility]. 5th [place] [address].    email: [email or web address] [email or web address].  Tel: Office: [phone]           no

## 2022-03-15 NOTE — SUBJECTIVE & OBJECTIVE
Interval History: NAEO. AF VSS. S/p tunneled catheter placement today. HD tomorrow. Ok to discharge afterwards.    Review of Systems   Constitutional:  Negative for chills and fever.   HENT:  Negative for sore throat.    Respiratory:  Negative for cough and shortness of breath.    Cardiovascular:  Negative for chest pain and leg swelling.   Gastrointestinal:  Negative for abdominal pain, constipation, diarrhea, nausea and vomiting.   Genitourinary:  Negative for dysuria.   Musculoskeletal:  Negative for myalgias.   Skin:  Negative for rash.   Neurological:  Negative for dizziness and headaches.   Psychiatric/Behavioral:  Negative for confusion. The patient is not nervous/anxious.    Objective:     Vital Signs (Most Recent):  Temp: 97.3 °F (36.3 °C) (03/15/22 1217)  Pulse: 95 (03/15/22 1217)  Resp: 20 (03/15/22 1217)  BP: (!) 194/85 (03/15/22 1217)  SpO2: (!) 93 % (03/15/22 1217)   Vital Signs (24h Range):  Temp:  [97.3 °F (36.3 °C)-99.2 °F (37.3 °C)] 97.3 °F (36.3 °C)  Pulse:  [] 95  Resp:  [10-20] 20  SpO2:  [93 %-97 %] 93 %  BP: (129-194)/(60-87) 194/85     Weight: 90 kg (198 lb 6.6 oz)  Body mass index is 37.49 kg/m².    Intake/Output Summary (Last 24 hours) at 3/15/2022 1236  Last data filed at 3/15/2022 0600  Gross per 24 hour   Intake 75 ml   Output 800 ml   Net -725 ml      Physical Exam  Vitals and nursing note reviewed.   Constitutional:       General: She is not in acute distress.     Appearance: Normal appearance. She is not ill-appearing, toxic-appearing or diaphoretic.   Cardiovascular:      Rate and Rhythm: Normal rate and regular rhythm.      Pulses: Normal pulses.   Pulmonary:      Effort: Pulmonary effort is normal. No respiratory distress.      Breath sounds: No wheezing, rhonchi or rales.   Musculoskeletal:         General: No swelling, tenderness or deformity.      Right lower leg: Edema present.      Left lower leg: Edema present.      Comments: RIJ tunneled catheter   Skin:     General:  Skin is warm.      Coloration: Skin is not jaundiced or pale.      Findings: No bruising, erythema, lesion or rash.   Neurological:      Mental Status: She is alert and oriented to person, place, and time.   Psychiatric:         Mood and Affect: Mood normal.         Behavior: Behavior normal.         Thought Content: Thought content normal.         Judgment: Judgment normal.       Significant Labs: All pertinent labs within the past 24 hours have been reviewed.  Recent Lab Results         03/15/22  0818   03/15/22  0455   03/14/22  2145   03/14/22  1654        Albumin   2.5           Anion Gap   8           BUN   51           Calcium   9.6           Chloride   102           CO2   28           Creatinine   3.6           eGFR if    13.6           eGFR if non    11.8  Comment: Calculation used to obtain the estimated glomerular filtration  rate (eGFR) is the CKD-EPI equation.              Glucose   213           Magnesium   1.9           Phosphorus   3.6           POCT Glucose 251     327   330       Potassium   5.3  Comment: *No Visible Hemolysis           Sodium   138                   Significant Imaging:       I have reviewed all pertinent imaging results/findings within the past 24 hours.

## 2022-03-15 NOTE — PLAN OF CARE
Received awake, alert, following commands, vss, no c/o pain. Was NPO for permacath placement: procedure rescheduled for tomorrow: will be NPO pass mdn for permacath placement tomorrow. Hypertensive: PRN dose of labetolol given.   Problem: Adult Inpatient Plan of Care  Goal: Plan of Care Review  Outcome: Ongoing, Progressing  Goal: Patient-Specific Goal (Individualized)  Outcome: Ongoing, Progressing  Goal: Absence of Hospital-Acquired Illness or Injury  Outcome: Ongoing, Progressing  Goal: Optimal Comfort and Wellbeing  Outcome: Ongoing, Progressing  Goal: Readiness for Transition of Care  Outcome: Ongoing, Progressing     Problem: Diabetes Comorbidity  Goal: Blood Glucose Level Within Targeted Range  Outcome: Ongoing, Progressing     Problem: Infection  Goal: Absence of Infection Signs and Symptoms  Outcome: Ongoing, Progressing     Problem: Skin Injury Risk Increased  Goal: Skin Health and Integrity  Outcome: Ongoing, Progressing     Problem: Device-Related Complication Risk (Hemodialysis)  Goal: Safe, Effective Therapy Delivery  Outcome: Ongoing, Progressing     Problem: Hemodynamic Instability (Hemodialysis)  Goal: Effective Tissue Perfusion  Outcome: Ongoing, Progressing     Problem: Infection (Hemodialysis)  Goal: Absence of Infection Signs and Symptoms  Outcome: Ongoing, Progressing     Problem: Fall Injury Risk  Goal: Absence of Fall and Fall-Related Injury  Outcome: Ongoing, Progressing

## 2022-03-15 NOTE — ASSESSMENT & PLAN NOTE
HTN, HLD, CAD, acute on chronic resp failure with hypoxia, pHTN, AS, elevated troponin  - Echo 03/03 shows EF 48%, severe AS.  - LHC 03/03 showed LAD and LCx 70% stenosis and RCA 60% stenosis. Transferred [facility] for CTS evaluation; per CTS pt is poor surgical candidate. Recommended high risk PCI vs medical management  - Interventional cardiology consulted, after lengthy discussion with family, elected not to pursue invasive measures, treating medically    Plan:  - Continue aspirin 81mg daily for life and clopidogrel 75mg PO daily for 1 year per Cardiology recs  - Clotting around guidewire during tunneled cath insertion, will need AC per interventional nephrology  - Will touch base with Cardiology about triple therapy  - Continue atorvastatin 80mg PO daily  - Coreg and Losartan held due to hypotension with HD, will restart as needed  - Weaned to baseline supplemental O2.  - Duo nebs and inhalation treatment q6h   - d/c heparin gtt.  - Appreciate cardiology assistance.

## 2022-03-15 NOTE — PROGRESS NOTES
"[facility] - Cardiology [facility] Medicine  Progress Note    Patient Name: Melinda Knight  MRN: [id number]  Patient Class: IP- Inpatient   Admission Date: 3/3/2022  Length of Stay: 12 days  Attending Physician: Eila Barr MD  Primary Care Provider: Duke Cole MD        Subjective:     Principal Problem:NSTEMI (non-ST elevated myocardial infarction)        HPI:  Melinda Knight is a 76 y.o. F with history of HTN, HLD, CAD, DMII, hypothyroidism, ESRD on HD MWF, GERD, restrictive lung disease with chronic hypoxemic resp failure (on 2-3L O2) who transfers to [facility] for CTS evaluation for CABG +/- AVR. Initially presented to [facility] ED w/ 2 day history of "heartburn pain," N/V, and SOB. Workup notable for troponin 13 (peaked at 42 on 3/3), elevated BNP 2268 (baseline 400-600s), and radiographic evidence of pulmonary edema. EKG SR with RBBB. She was hypoxic and placed on BiPAP. Transferred to [facility], operated by [facility] w/ NSTEMI. Cardiology and nephrology following. Volume removal with HD and weaned to baseline supplemental O2. BCx 3/2 resulted GPC resembling Staph and vanc initiated. Repeat BCx NGTD. LHC on 3/3 revealed proximal RCA 60% stenosed, mid LAD 70% stenosed, and mid Cx 70% stenosed. TTE revealed EF 48% with PORTILLO 0.63cm2, LFLG AS. Cardiology recommended CTS evaluation for CABG +/- AVR. Last dose of Plavix 3/3. She denies CP, SOB, N/V.      Overview/Hospital Course:  Admitted as transfer from [facility] ED with NSTEMI. Cardiology and nephrology consulted. Volume removed with HD and weaned to baseline supplemental O2. LHC performed showing triple vessel disease. Cardiology recommended CTS evaluation. Patient not a CABG candidate per CTS. IC consulted for possible PCI. Blood cultures from ED 03/02 showed GPCs; started IV Vancomycin. Permacath exchanged on 03/04. ID consulted for bacteremia. After long discussion with primary and cardiology, patient and family elected to not proceed with LHC/PCI and maintain DNR status. " Pursuing medical management. During dialysis 3/9/22 patient became hypotensive. Held BP meds and added as able. ID requested 48 hour line holiday to see if blood cultures remain clear. General surgery consulted for line removal. Patient recultured, given 48 hour line holiday. Right femoral trialysis line placed by anesthesia at bedside. Nephro to continue dialysis, but temp line noted to be non-functional on 3/14. S/p R tunneled catheter placement on 3/15. Clotting found over guidewire, interventional nephrology recommending AC 8 hours post-procedure and with HD sessions.      Interval History: NAEO. AF VSS. S/p tunneled catheter placement today. HD tomorrow. Ok to discharge afterwards.    Review of Systems   Constitutional:  Negative for chills and fever.   HENT:  Negative for sore throat.    Respiratory:  Negative for cough and shortness of breath.    Cardiovascular:  Negative for chest pain and leg swelling.   Gastrointestinal:  Negative for abdominal pain, constipation, diarrhea, nausea and vomiting.   Genitourinary:  Negative for dysuria.   Musculoskeletal:  Negative for myalgias.   Skin:  Negative for rash.   Neurological:  Negative for dizziness and headaches.   Psychiatric/Behavioral:  Negative for confusion. The patient is not nervous/anxious.    Objective:     Vital Signs (Most Recent):  Temp: 97.3 °F (36.3 °C) (03/15/22 1217)  Pulse: 95 (03/15/22 1217)  Resp: 20 (03/15/22 1217)  BP: (!) 194/85 (03/15/22 1217)  SpO2: (!) 93 % (03/15/22 1217)   Vital Signs (24h Range):  Temp:  [97.3 °F (36.3 °C)-99.2 °F (37.3 °C)] 97.3 °F (36.3 °C)  Pulse:  [] 95  Resp:  [10-20] 20  SpO2:  [93 %-97 %] 93 %  BP: (129-194)/(60-87) 194/85     Weight: 90 kg (198 lb 6.6 oz)  Body mass index is 37.49 kg/m².    Intake/Output Summary (Last 24 hours) at 3/15/2022 1236  Last data filed at 3/15/2022 0600  Gross per 24 hour   Intake 75 ml   Output 800 ml   Net -725 ml      Physical Exam  Vitals and nursing note reviewed.    Constitutional:       General: She is not in acute distress.     Appearance: Normal appearance. She is not ill-appearing, toxic-appearing or diaphoretic.   Cardiovascular:      Rate and Rhythm: Normal rate and regular rhythm.      Pulses: Normal pulses.   Pulmonary:      Effort: Pulmonary effort is normal. No respiratory distress.      Breath sounds: No wheezing, rhonchi or rales.   Musculoskeletal:         General: No swelling, tenderness or deformity.      Right lower leg: Edema present.      Left lower leg: Edema present.      Comments: L femoral tunneled catheter   Skin:     General: Skin is warm.      Coloration: Skin is not jaundiced or pale.      Findings: No bruising, erythema, lesion or rash.   Neurological:      Mental Status: She is alert and oriented to person, place, and time.   Psychiatric:         Mood and Affect: Mood normal.         Behavior: Behavior normal.         Thought Content: Thought content normal.         Judgment: Judgment normal.       Significant Labs: All pertinent labs within the past 24 hours have been reviewed.  Recent Lab Results         03/15/22  0818   03/15/22  0455   03/14/22  2145   03/14/22  1654        Albumin   2.5           Anion Gap   8           BUN   51           Calcium   9.6           Chloride   102           CO2   28           Creatinine   3.6           eGFR if    13.6           eGFR if non    11.8  Comment: Calculation used to obtain the estimated glomerular filtration  rate (eGFR) is the CKD-EPI equation.              Glucose   213           Magnesium   1.9           Phosphorus   3.6           POCT Glucose 251     327   330       Potassium   5.3  Comment: *No Visible Hemolysis           Sodium   138                   Significant Imaging:       I have reviewed all pertinent imaging results/findings within the past 24 hours.      Assessment/Plan:      * NSTEMI (non-ST elevated myocardial infarction)  HTN, HLD, CAD, acute on chronic  resp failure with hypoxia, pHTN, AS, elevated troponin  - Echo 03/03 shows EF 48%, severe AS.  - LHC 03/03 showed LAD and LCx 70% stenosis and RCA 60% stenosis. Transferred [facility] for CTS evaluation; per CTS pt is poor surgical candidate. Recommended high risk PCI vs medical management  - Interventional cardiology consulted, after lengthy discussion with family, elected not to pursue invasive measures, treating medically    Plan:  - Continue aspirin 81mg daily for life and clopidogrel 75mg PO daily for 1 year per Cardiology recs  - Clotting around guidewire during tunneled cath insertion, will need AC per interventional nephrology  - Will touch base with Cardiology about triple therapy  - Continue atorvastatin 80mg PO daily  - Coreg and Losartan held due to hypotension with HD, will restart as needed  - Weaned to baseline supplemental O2.  - Duo nebs and inhalation treatment q6h   - d/c heparin gtt.  - Appreciate cardiology assistance.    Bacteremia  Blood cultures drawn in ED 03/02 showing GPCs resembling staph. No clear source of infection at this time. No noted areas of skin breakdown, tunneled line does not appear grossly infected (replaced on 3/4), UA unremarkable, CXR at presentation more consistent with pulmonary edema than infectious process.    - Continue IV Vancomycin for MRSA  - ID consulted, appreciate recs, plan for 4 weeks from line removal (end date 04/06/2022)  - 48 hour line holiday complete, repeat cultures NGTD  - L femoral tunneled catheter placed by interventional nephrology  - F/U repeat cultures    Advance care planning  DNR status on admit; current [place] reflects her goals of care. Engaged the patient in a GOC discussion on 3/6. Patient has full capacity and stating that she would not want excessive life-saving measures including CPR or intubation.    - DNR order in place    Debility  - PT/OT recommending HH    Acute on chronic respiratory failure with hypoxemia  Patient with Hypoxic  Respiratory failure which is Acute on chronic.  she is on home oxygen at 2-3 LPM. Supplemental oxygen was provided and noted-  .   Signs/symptoms of respiratory failure include- respiratory distress. Contributing diagnoses includes - Interstitial lung disease Labs and images were reviewed. Patient Has recent ABG, which has been reviewed. Will treat underlying causes and adjust management of respiratory failure as follows:    - Duo nebs q6h  - Inhalation treatment q6h  - ICS and flutter valve  - Supplemental O2 PRN  - Bipap QHS PRN    Elevated troponin  See NSTEMI      ESRD (end stage renal disease) on dialysis  ESRD on HD M,W,F outpatient.  Oliguric at baseline, still makes some urine  Nephrology consulted for HD while inpatient  3/11: Left femoral trialysis placed by anesthesia at bedside, non functioning  3/15: s/p L femoral tunneled catheter placement by interventional nephrology    - HD per Nephrology  - Renal function panel daily  - Monitor intake/output and daily standing weights.  - Replete electrolytes PRN, goal Mag/Phos/K 2/3/4   - Avoid nephrotoxic agents such as NSAIDs, gadolinium and IV radiocontrast.  - Renally dose meds to current GFR.  - Maintain MAP > 65.        Pulmonary hypertension  Likely 2/2 chronic ILD    See acute on chronic hypoxemic respiratory failure      Anemia of chronic disease  Baseline Hb 10, Hb 9.6 on admission  Antiplatelet/anticoagulation: Aspirin, Plavix  Likely 2/2 ESRD    - Monitor CBC daily  - Transfuse with goal Hb > 7        Aortic valve stenosis  See NSTEMI      Type 2 diabetes mellitus with diabetic polyneuropathy  Last A1c 5.5 (3/3/22). On detemir 30U subQ BID at home. Some postprandial derangements.    - Levemir 12u BID  - Aspart 5u TIDWM  - LDSSI  - Accuchecks achs  - Diabetic diet      Hypothyroidism (acquired)  TSH, T4 stable. Hyponatremia likely 2/2 renal failure.    - Continue levothyroxine 75mcg PO daily.    GERD (gastroesophageal reflux disease)  - Continue  pantoprazole 40mg PO daily.    Mixed hyperlipidemia  Chronic, stable.    - Continue home Lipitor 80 mg daily    Essential hypertension  Chronic, normotensive on evaluation today.    - Coreg 25 mg BID  - Losartan 25 mg daily  - Labetalol 10 mg PRN for SBP >160    Will hold for now given hypotension with HD      VTE Risk Mitigation (From admission, onward)         Ordered     heparin (porcine) injection 1,000 Units  As needed (PRN)         03/15/22 0845     heparin (porcine) injection 5,000 Units  As needed (PRN)         03/03/22 0755     IP VTE HIGH RISK PATIENT  Once         03/03/22 0650     Place sequential compression device  Until discontinued         03/03/22 0650                Discharge Planning   UMAIR: 3/15/2022     Code Status: DNR   Is the patient medically ready for discharge?: No    Reason for patient still in hospital (select all that apply): Consult recommendations and Pending disposition  Discharge Plan A: Home Health          Geovani Morton MD  Department of Hospital Medicine   [facility] - Cardiology Stepdown

## 2022-03-15 NOTE — PT/OT/SLP PROGRESS
Occupational Therapy      Patient Name:  Melinda Knight   MRN:  [id number]    Patient not seen today secondary to pt off the floor for central line placement and OT unable to follow-up.  Will follow-up as scheduled per OT POC.    3/15/2022

## 2022-03-16 NOTE — ASSESSMENT & PLAN NOTE
Blood cultures drawn in ED 03/02 showing GPCs resembling staph. No clear source of infection at this time. No noted areas of skin breakdown, tunneled line does not appear grossly infected (replaced on 3/4), UA unremarkable, CXR at presentation more consistent with pulmonary edema than infectious process.    - Continue IV Vancomycin for MRSA  - Vanc trough on Fridays during HD  - ID consulted, appreciate recs, plan for 4 weeks from line removal (end date 04/06/2022)  - 48 hour line holiday complete, repeat cultures NGTD  - L femoral tunneled catheter placed by interventional nephrology  - F/U repeat cultures

## 2022-03-16 NOTE — PLAN OF CARE
03/16/22 1356   Post-Acute Status   Post-Acute Authorization Home Health   Home Health Status Set-up Complete/Auth obtained     Per PHN pt accepted by [facility].    Monse Kemp LMSW  [facility]  [phone]

## 2022-03-16 NOTE — CONSULTS
Pharmacokinetic Assessment Follow Up: IV Vancomycin    Vancomycin serum concentration assessment(s):    Vancomycin concentration = 14.6 mcg/mL. Drawn pre-HD today.    Vancomycin Regimen Plan:  1. Vancomycin 500 mg IV MWF post-HD  2. Repeat concentration in AM 3/18/22  3. Goal trough = 15-20 mcg/mL    Thank you for the consult, will continue to follow  Melvin ReichD., BCPS  [id number]       Patient brief summary:  Melinda Knight is a 76 y.o. female initiated on antimicrobial therapy with IV Vancomycin for treatment of MRSA bacteremia       Drug levels (last 3 results):  Recent Labs   Lab Result Units 03/14/22  0620 03/16/22  0448   Vancomycin, Random ug/mL 13.0 14.6       Drug Allergies:   Review of patient's allergies indicates:   Allergen Reactions    Sulfa (sulfonamide antibiotics) Nausea And Vomiting    Menthol contain prod        Actual Body Weight:   90 kg    Renal Function:   Estimated Creatinine Clearance: 14.8 mL/min (A) (based on SCr of 3.4 mg/dL (H)).,     Dialysis Method (if applicable):  HD MWF    CBC (last 72 hours):  Recent Labs   Lab Result Units 03/14/22  0620   WBC K/uL 10.36   Hemoglobin g/dL 9.6*   Hematocrit % 32.1*   Platelets K/uL 401   Gran % % 64.8   Lymph % % 19.0   Mono % % 9.3   Eosinophil % % 4.1   Basophil % % 1.4   Differential Method  Automated       Metabolic Panel (last 72 hours):  Recent Labs   Lab Result Units 03/14/22  0620 03/15/22  0455 03/16/22  0448   Sodium mmol/L 142 138 138   Potassium mmol/L 5.1 5.3* 5.2*   Chloride mmol/L 107 102 106   CO2 mmol/L 25 28 24   Glucose mg/dL 47* 213* 140*   BUN mg/dL 45* 51* 48*   Creatinine mg/dL 3.6* 3.6* 3.4*   Albumin g/dL 2.7* 2.5* 2.6*   Magnesium mg/dL 1.9 1.9 1.8   Phosphorus mg/dL 3.7 3.6 3.7       Vancomycin Administrations:  vancomycin given in the last 96 hours                   vancomycin 500 mg in dextrose 5 % 100 mL IVPB (ready to mix system) (mg) 500 mg New Bag 03/06/22 1108    vancomycin 2 g in dextrose 5 % 500 mL IVPB  (mg) 2,000 mg New Bag 03/04/22 0841                Microbiologic Results:  Microbiology Results (last 7 days)     Procedure Component Value Units Date/Time    Blood culture [[id number]] Collected: 03/09/22 1740    Order Status: Completed Specimen: Blood from Peripheral, Left Hand Updated: 03/14/22 2212     Blood Culture, Routine No growth after 5 days.    Blood culture [[id number]] Collected: 03/09/22 1739    Order Status: Completed Specimen: Blood from Peripheral, Right Arm Updated: 03/14/22 2212     Blood Culture, Routine No growth after 5 days.    Blood culture [[id number]]     Order Status: Canceled Specimen: Blood     Blood culture [[id number]]     Order Status: Canceled Specimen: Blood     Blood culture [[id number]] Collected: 03/04/22 1039    Order Status: Completed Specimen: Blood Updated: 03/09/22 1612     Blood Culture, Routine No growth after 5 days.    Blood culture [[id number]] Collected: 03/04/22 1039    Order Status: Completed Specimen: Blood from Peripheral, Right Updated: 03/09/22 1612     Blood Culture, Routine No growth after 5 days.

## 2022-03-16 NOTE — PROGRESS NOTES
HD txt complete.  1.5L UF removed.  Heparin locks in catheter lumens.  Tolerated well.      Sterile dressing added.     Pt transported back to room in her bed.

## 2022-03-16 NOTE — PROGRESS NOTES
ESRD HD MWF.  Maintenance HD initiated to left femoral perm catheter.  Tolerated well.        Pt arrived to [facility] in her bed.

## 2022-03-16 NOTE — PROCEDURES
[facility] NOTE     Patient currently on hemodialysis for removal of uremic toxins and volume.     Patient seen and evaluated on hemodialysis, tolerating treatment, see HD flowsheet for vitals and assessments.      Ultrafiltration goal is 2L     Labs have been reviewed and the dialysate bath has been adjusted.     Assessment/Plan:  Seen on HD this morning.  New L femoral TDC placed yesterday by interventional Nephrology.  Good blood flow rates with adequate pressures.  Will lock with heparin post HD.    Plans for discharge today, defer anemia management/BMM to her OP HD unit.      HECTOR Tracy, FNP-BC  Nephrology  Pager:  [phone]

## 2022-03-16 NOTE — PLAN OF CARE
"   03/16/22 0947   Post-Acute Status   Post-Acute Authorization Home Health   Home Health Status Referrals Sent  (PHN)     SW sent HH referral to N requesting assignment of provider:    Your fax has been successfully sent to [phone] at [phone].  ------------------------------------------------------------  From: [id number]  ------------------------------------------------------------  3/16/2022 9:41:10 AM Transmission Record   Sent to [phone] with remote ID "[email or web address]"   Result: (0/339;0/0) Success   Page record: 1 - 13   Elapsed time: 04:50 on channel 32      Monse Kemp LMSW  [facility]  [id number]    "

## 2022-03-16 NOTE — DISCHARGE SUMMARY
"[facility] - Cardiology [facility] Medicine  Discharge Summary      Patient Name: Melinda Knight  MRN: [id number]  Patient Class: IP- Inpatient  Admission Date: 3/3/2022  Hospital Length of Stay: 13 days  Discharge Date and Time:  03/16/2022 9:31 AM  Attending Physician: Elia Barr MD   Discharging Provider: Geovani Morton MD  Primary Care Provider: Duke Cole MD  Hospital Medicine Team: [facility] HOSP MED 2 Geovani Morton MD    HPI:   Melinda Knight is a 76 y.o. F with history of HTN, HLD, CAD, DMII, hypothyroidism, ESRD on HD MWF, GERD, restrictive lung disease with chronic hypoxemic resp failure (on 2-3L O2) who transfers to [facility] for CTS evaluation for CABG +/- AVR. Initially presented to [facility] ED w/ 2 day history of "heartburn pain," N/V, and SOB. Workup notable for troponin 13 (peaked at 42 on 3/3), elevated BNP 2268 (baseline 400-600s), and radiographic evidence of pulmonary edema. EKG SR with RBBB. She was hypoxic and placed on BiPAP. Transferred to [facility] w/ NSTEMI. Cardiology and nephrology following. Volume removal with HD and weaned to baseline supplemental O2. BCx 3/2 resulted GPC resembling Staph and vanc initiated. Repeat BCx NGTD. LHC on 3/3 revealed proximal RCA 60% stenosed, mid LAD 70% stenosed, and mid Cx 70% stenosed. TTE revealed EF 48% with PORTILLO 0.63cm2, LFLG AS. Cardiology recommended CTS evaluation for CABG +/- AVR. Last dose of Plavix 3/3. She denies CP, SOB, N/V.      Procedure(s) (LRB):  Insertion, Catheter, Central Venous, Hemodialysis (Right)      Hospital Course:   Admitted as transfer from [facility] ED with NSTEMI. Cardiology and nephrology consulted. Volume removed with HD and weaned to baseline supplemental O2. LHC performed showing triple vessel disease. Cardiology recommended CTS evaluation. Patient not a CABG candidate per CTS. IC consulted for possible PCI. Blood cultures from ED 03/02 showed GPCs; started IV Vancomycin. Permacath exchanged on 03/04. ID consulted for " bacteremia. After long discussion with primary and cardiology, patient and family elected to not proceed with LHC/PCI and maintain DNR status. Pursuing medical management. During dialysis 3/9/22 patient became hypotensive. Held BP meds and added as able. ID requested 48 hour line holiday to see if blood cultures remain clear. General surgery consulted for line removal. Patient recultured, given 48 hour line holiday. Right femoral trialysis line placed by anesthesia at bedside. Nephro to continue dialysis, but temp line noted to be non-functional on 3/14. S/p R tunneled catheter placement on 3/15. Clotting found over guidewire, interventional nephrology recommending definitive AC. Patient discharged on ASA/Plavix and Eliquis. Instructed to follow closely with cardiology and stop Aspirin in 1 month per cardiology recommendations. Discharged after HD in good condition.       Goals of Care Treatment Preferences:  Code Status: DNR    Health care agent: [facility] agent number: [phone]    Living Will: Yes  [place]POST: Yes       Vital Signs (Most Recent):  Temp: 97.9 °F (36.6 °C) (03/16/22 0600)  Pulse: 93 (03/16/22 0930)  Resp: 15 (03/16/22 0930)  BP: (!) 108/54 (03/16/22 0930)  SpO2: 97 % (03/16/22 0930)   Vital Signs (24h Range):  Temp:  [97.2 °F (36.2 °C)-97.9 °F (36.6 °C)] 97.9 °F (36.6 °C)  Pulse:  [] 93  Resp:  [13-26] 15  SpO2:  [92 %-99 %] 97 %  BP: (108-194)/(54-85) 108/54     Weight: 90 kg (198 lb 6.6 oz)  Body mass index is 37.49 kg/m².    Intake/Output Summary (Last 24 hours) at 3/16/2022 0948  Last data filed at 3/16/2022 0600  Gross per 24 hour   Intake 480 ml   Output 525 ml   Net -45 ml      Physical Exam  Vitals and nursing note reviewed.   Constitutional:       General: She is not in acute distress.     Appearance: Normal appearance. She is not ill-appearing, toxic-appearing or diaphoretic.   Eyes:      General: No scleral icterus.  Cardiovascular:      Rate and Rhythm: Normal rate and  "regular rhythm.      Pulses: Normal pulses.   Pulmonary:      Effort: Pulmonary effort is normal. No respiratory distress.      Breath sounds: No wheezing, rhonchi or rales.   Musculoskeletal:         General: No swelling, tenderness or deformity.      Right lower leg: Edema present.      Left lower leg: Edema present.      Comments: L femoral tunneled catheter in place and functional   Skin:     General: Skin is warm.      Coloration: Skin is not jaundiced or pale.      Findings: No bruising, erythema, lesion or rash.   Neurological:      Mental Status: She is alert and oriented to person, place, and time.   Psychiatric:         Mood and Affect: Mood normal.         Behavior: Behavior normal.         Thought Content: Thought content normal.         Judgment: Judgment normal.     Consults:   Consults (From admission, onward)        Status Ordering Provider     IP consult to Interventional Nephrology  Once        Provider:  (Not yet assigned)    Completed FERMIN BRIGGS     Inpatient consult to Interventional Radiology  Once        Provider:  Higinio España NP    Completed FERMIN BRIGGS     Inpatient consult to General Surgery  Once        Provider:  (Not yet assigned)    Completed FERMIN BRIGGS     Inpatient consult to Infectious Diseases  Once        Provider:  (Not yet assigned)    Completed FERMIN BRIGGS     Inpatient consult to Interventional Cardiology  Once        Provider:  (Not yet assigned)    Completed FERMIN BRIGGS     Inpatient consult to Nephrology  Once        Provider:  (Not yet assigned)    Completed MARGARITO ROSE     Inpatient consult to Cardiothoracic Surgery  Once        Provider:  (Not yet assigned)    Completed MARGARITO ROSE     Pharmacy to dose Vancomycin consult  Once        Provider:  (Not yet assigned)   "And" Linked Group Details    Acknowledged DEBORA GUY     Inpatient consult to Cardiology  Once        Provider:  Tex Alicea MD    Completed DEBORA GUY     Inpatient " consult to Nephrology-[place]  Once        Provider:  Higinio España NP    Completed DEBORA GUY          * NSTEMI (non-ST elevated myocardial infarction)  HTN, HLD, CAD, acute on chronic resp failure with hypoxia, pHTN, AS, elevated troponin  - Echo 03/03 shows EF 48%, severe AS.  - LHC 03/03 showed LAD and LCx 70% stenosis and RCA 60% stenosis. Transferred [facility] for CTS evaluation; per CTS pt is poor surgical candidate. Recommended high risk PCI vs medical management  - Interventional cardiology consulted, after lengthy discussion with family, elected not to pursue invasive measures, treating medically    Plan:  - Continue aspirin for 1 month and clopidogrel 75mg PO daily for 1 year per Cardiology recs  - Clotting around guidewire during tunneled cath insertion, will need AC per interventional nephrology  - Continue Eliquis indefinitely  - Continue atorvastatin 80mg PO daily  - Continue Coreg  - Weaned to baseline supplemental O2.  - Duo nebs and inhalation treatment q6h   - d/c heparin gtt.  - Appreciate cardiology assistance.    Bacteremia  Blood cultures drawn in ED 03/02 showing GPCs resembling staph. No clear source of infection at this time. No noted areas of skin breakdown, tunneled line does not appear grossly infected (replaced on 3/4), UA unremarkable, CXR at presentation more consistent with pulmonary edema than infectious process.    - Continue IV Vancomycin for MRSA  - ID consulted, appreciate recs, plan for 4 weeks from line removal (end date 04/06/2022)  - 48 hour line holiday complete, repeat cultures NGTD  - L femoral tunneled catheter placed by interventional nephrology  - F/U repeat cultures    Advance care planning  DNR status on admit; current [place]POST reflects her goals of care. Engaged the patient in a GOC discussion on 3/6. Patient has full capacity and stating that she would not want excessive life-saving measures including CPR or intubation.    - DNR order in  place    Debility  - PT/OT recommending HH    Acute on chronic respiratory failure with hypoxemia  Patient with Hypoxic Respiratory failure which is Acute on chronic.  she is on home oxygen at 2-3 LPM. Supplemental oxygen was provided and noted- Oxygen Concentration (%):  [28] 28.   Signs/symptoms of respiratory failure include- respiratory distress. Contributing diagnoses includes - Interstitial lung disease Labs and images were reviewed. Patient Has recent ABG, which has been reviewed. Will treat underlying causes and adjust management of respiratory failure as follows:    - Duo nebs q6h  - Inhalation treatment q6h  - ICS and flutter valve  - Supplemental O2 PRN  - Bipap QHS PRN    Elevated troponin  See NSTEMI      ESRD (end stage renal disease) on dialysis  ESRD on HD M,W,F outpatient.  Oliguric at baseline, still makes some urine  Nephrology consulted for HD while inpatient  3/11: Left femoral trialysis placed by anesthesia at bedside, non functioning  3/15: s/p L femoral tunneled catheter placement by interventional nephrology    - HD per Nephrology      Pulmonary hypertension  Likely 2/2 chronic ILD    See acute on chronic hypoxemic respiratory failure      Anemia of chronic disease  Baseline Hb 10, Hb 9.6 on admission  Antiplatelet/anticoagulation: Aspirin, Plavix, Eliquis  Likely 2/2 ESRD    - Stop ASA in 1 month          Aortic valve stenosis  See NSTEMI      Type 2 diabetes mellitus with diabetic polyneuropathy  Last A1c 5.5 (3/3/22). On detemir 30U subQ BID at home. Some postprandial derangements.    - Levemir 12u BID  - Aspart 8u TIDWM  - LDSSI  - Accuchecks achs  - Diabetic diet  - Resume previous home regimen at discharge  - F/U PCP      Hypothyroidism (acquired)  TSH, T4 stable. Hyponatremia likely 2/2 renal failure.    - Continue levothyroxine 75mcg PO daily.    GERD (gastroesophageal reflux disease)  - Continue pantoprazole 40mg PO daily.    Mixed hyperlipidemia  Chronic, stable.    - Continue home  Lipitor 80 mg daily    Essential hypertension  Chronic, normotensive on evaluation today. Previously on Coreg 25 mg BID, Losartan 25 mg daily. Held for hypotension with HD.     - Reduced Coreg to 3.125 mg BID  - F/U closely with PCP about resuming Losartan  - Monitor BP        Final Active Diagnoses:    Diagnosis Date Noted POA    PRINCIPAL PROBLEM:  NSTEMI (non-ST elevated myocardial infarction) [I21.4] 03/06/2021 Yes    Bacteremia [R78.81] 03/04/2022 No    Acute on chronic respiratory failure with hypoxemia [J96.21] 03/03/2022 Yes    Debility [R53.81] 03/03/2022 Yes    Advance care planning [Z71.89] 03/03/2022 Not Applicable    Elevated troponin [R77.8] 09/28/2021 Yes    ESRD (end stage renal disease) on dialysis [N18.6, Z99.2] 07/01/2021 Not Applicable     Chronic    Pulmonary hypertension [I27.20] 04/14/2021 Yes     Chronic    Anemia of chronic disease [D63.8] 04/05/2021 Yes     Chronic    Aortic valve stenosis [I35.0] 02/10/2021 Yes     Chronic    Hypothyroidism (acquired) [E03.9] 03/29/2016 Yes     Chronic    Type 2 diabetes mellitus with diabetic polyneuropathy [E11.42] 03/29/2016 Yes     Chronic    GERD (gastroesophageal reflux disease) [K21.9] 03/21/2016 Yes     Chronic    Essential hypertension [I10] 02/24/2016 Yes     Chronic    Mixed hyperlipidemia [E78.2] 02/24/2016 Yes     Chronic      Problems Resolved During this Admission:       Discharged Condition: good    [place] [facility] [place] [facility]    Follow Up:   Follow-up Information     Duke Cole MD Follow up in 1 week(s).    Specialty: Family Medicine  Why: Hospital follow up. Per cardiology, to be on triple therapy for 1 month and then drop Aspirin  Contact information:  [address]  [address]  [facility]  [place] [address] [phone]                       Patient Instructions:      Ambulatory referral/consult to Vascular Surgery   Standing Status: Future   Referral Priority: Routine Referral Type:  Consultation   Referral Reason: Specialty Services Required   Requested Specialty: Vascular Surgery     Ambulatory referral/consult to Cardiology   Standing Status: Future   Referral Priority: Routine Referral Type: Consultation   Referral Reason: Specialty Services Required   Requested Specialty: Cardiology   Number of Visits Requested: 1     Ambulatory referral/consult to Nephrology   Standing Status: Future   Referral Priority: Routine Referral Type: Consultation   Referral Reason: Specialty Services Required   Requested Specialty: Nephrology   Number of Visits Requested: 1       Significant Diagnostic Studies: Labs:   CMP   Recent Labs   Lab 03/15/22  0455 03/16/22  0448    138   K 5.3* 5.2*    106   CO2 28 24   * 140*   BUN 51* 48*   CREATININE 3.6* 3.4*   CALCIUM 9.6 9.8   ALBUMIN 2.5* 2.6*   ANIONGAP 8 8   ESTGFRAFRICA 13.6* 14.6*   EGFRNONAA 11.8* 12.7*   , CBC No results for input(s): WBC, HGB, HCT, PLT in the last 48 hours. and All labs within the past 24 hours have been reviewed  Microbiology:   Blood Culture   Lab Results   Component Value Date    LABBLOO No growth after 5 days. 03/09/2022    and Urine Culture    Lab Results   Component Value Date    LABURIN No growth 03/08/2021       Pending Diagnostic Studies:     None         Medications:  Reconciled Home Medications:      Medication List      START taking these medications    * apixaban 5 mg Tab  Commonly known as: ELIQUIS  Take 1 tablet (5 mg total) by mouth 2 (two) times daily. Start this prescription after finishing starter pack     * apixaban 5 mg (74 tabs) Dspk  Commonly known as: ELIQUIS  Start this prescription first. For the first 7 days take two 5 mg tablets twice daily.  After 7 days take one 5 mg tablet twice daily.     nitroGLYCERIN 0.3 MG SL tablet  Commonly known as: NITROSTAT  Place 1 tablet (0.3 mg total) under the tongue every 5 (five) minutes as needed for Chest pain.     sevelamer carbonate 800 mg Tab  Commonly  known as: RENVELA  Take 1 tablet (800 mg total) by mouth 3 (three) times daily with meals.     VANCOMYCIN 500 MG/100 ML NS (READY TO MIX)   Give 500 mg IV three times weekly after HD on HD days. End date 4/6/22     vitamin renal formula (B-complex-vitamin c-folic acid) 1 mg Cap  Commonly known as: NEPHROCAP  Take 1 capsule by mouth once daily.         * This list has 2 medication(s) that are the same as other medications prescribed for you. Read the directions carefully, and ask your doctor or other care provider to review them with you.            CHANGE how you take these medications    aspirin 81 MG Chew  Take 1 tablet (81 mg total) by mouth once daily. Stop taking aspirin on 4/15/22.  What changed: additional instructions  Notes to patient: Stop taking in 1 month     carvediloL 3.125 MG tablet  Commonly known as: COREG  Take 1 tablet (3.125 mg total) by mouth 2 (two) times daily. Please discuss with primary care doctor prior to continuing other blood pressure medications  What changed:   · medication strength  · how much to take  · additional instructions        CONTINUE taking these medications    atorvastatin 80 MG tablet  Commonly known as: LIPITOR  Take 1 tablet (80 mg total) by mouth once daily.     BLOOD GLUCOSE TEST Strp  Generic drug: blood sugar diagnostic  1 strip by Misc.(Non-Drug; Combo Route) route 2 (two) times daily. Prodigy     citalopram 20 MG tablet  Commonly known as: CeleXA  Take 1 tablet by mouth once daily     clopidogreL 75 mg tablet  Commonly known as: PLAVIX  Take 1 tablet (75 mg total) by mouth once daily.     insulin detemir U-100 100 unit/mL (3 mL) Inpn pen  Commonly known as: Levemir FLEXTOUCH  Inject 30 Units into the skin 2 (two) times daily.     levothyroxine 75 MCG tablet  Commonly known as: SYNTHROID  Take 1 tablet (75 mcg total) by mouth before breakfast.     linaCLOtide 72 mcg Cap capsule  Commonly known as: LINZESS  Take 1 capsule (72 mcg total) by mouth before breakfast.    "  NOVOTWIST 32 gauge x 1/5" Ndle  Generic drug: pen needle, diabetic  USE AS DIRECTED WITH  INSULIN  PEN     ONE DAILY MULTIVITAMIN per tablet  Generic drug: multivitamin  Take 1 tablet by mouth once daily.     pantoprazole 40 MG tablet  Commonly known as: PROTONIX  Take 1 tablet by mouth once daily     senna 8.6 mg tablet  Commonly known as: SENOKOT  Take 1 tablet by mouth once daily.            Indwelling Lines/Drains at time of discharge:   Lines/Drains/Airways     Central Venous Catheter Line  Duration                Hemodialysis Catheter 03/15/22 1128 left femoral <1 day                Time spent on the discharge of patient: 35 minutes         Geovani Morton MD  Department of Hospital Medicine  [facility] - Cardiology Stepdown  "

## 2022-03-16 NOTE — PLAN OF CARE
Patient has been scheduled for a [facility] hospital follow up appointment with Abe Almeida MD onTuesday March 22, 2022 10:00 AM  Arrive at check-in approximately 15 minutes before your scheduled appointment time. Bring all outside medical records and imaging, along with a list of your current medications and insurance card.  Please park in surface lot and take Medical Office Bldg. elevators. Check in at [address].    [facility] [address]   [address]   [facility] [id number] [phone]                Dayron Bernal    Case Management  Ext. [phone]

## 2022-03-16 NOTE — TELEPHONE ENCOUNTER
----- Message from Zenaida Banda sent at 3/16/2022  3:21 PM CDT -----  Contact: [phone]  Elicia with [facility] stated they received home health orders from the hospital ans wanted to know will you follow Home health  services  and sign Home Health orders.Please advise

## 2022-03-16 NOTE — ASSESSMENT & PLAN NOTE
Last A1c 5.5 (3/3/22). On detemir 30U subQ BID at home. Some postprandial derangements.    - Levemir 12u BID  - Aspart 8u TIDWM  - LDSSI  - Accuchecks achs  - Diabetic diet  - Resume previous home regimen at discharge  - F/U PCP

## 2022-03-16 NOTE — ASSESSMENT & PLAN NOTE
HTN, HLD, CAD, acute on chronic resp failure with hypoxia, pHTN, AS, elevated troponin  - Echo 03/03 shows EF 48%, severe AS.  - LHC 03/03 showed LAD and LCx 70% stenosis and RCA 60% stenosis. Transferred [facility] for CTS evaluation; per CTS pt is poor surgical candidate. Recommended high risk PCI vs medical management  - Interventional cardiology consulted, after lengthy discussion with family, elected not to pursue invasive measures, treating medically    Plan:  - Continue aspirin for 1 month and clopidogrel 75mg PO daily for 1 year per Cardiology recs  - Clotting around guidewire during tunneled cath insertion, will need AC per interventional nephrology  - Continue Eliquis indefinitely  - Continue atorvastatin 80mg PO daily  - Continue Coreg and Losartan  - Weaned to baseline supplemental O2.  - Duo nebs and inhalation treatment q6h   - d/c heparin gtt.  - Appreciate cardiology assistance.

## 2022-03-16 NOTE — ASSESSMENT & PLAN NOTE
ESRD on HD M,W,F outpatient.  Oliguric at baseline, still makes some urine  Nephrology consulted for HD while inpatient  3/11: Left femoral trialysis placed by anesthesia at bedside, non functioning  3/15: s/p L femoral tunneled catheter placement by interventional nephrology    - HD per Nephrology

## 2022-03-16 NOTE — NURSING
Patient AAOx4 with stable vital signs. Patient remained free of injury entire shift. Patient discharge information reviewed with patient and two daughters. Discharge paperwork included upcoming appointments, referrals, and medications. Medications are to be picked up from home pharmacy, address highlights in paperwork. All questions answered at this time. Patient and daughters verbalized understanding, IV's removed, telemetry box removed. Arely Negron RN.

## 2022-03-16 NOTE — PLAN OF CARE
[facility] - Cardiology Stepdown  Discharge Final Note    Primary Care Provider: Duke Cole MD    Expected Discharge Date: 3/16/2022    Final Discharge Note (most recent)     Final Note - 03/16/22 1617        Final Note    Assessment Type Final Discharge Note     Anticipated Discharge Disposition Home-Health Care [facility]     Hospital Resources/Appts/Education Provided Appointments scheduled and added to AVS;Dilaysis schedule provided;Post-Acute resouces added to AVS                 Important Message from Medicare  Important Message from Medicare regarding Discharge Appeal Rights: Given to patient/caregiver, Explained to patient/caregiver, Signed/date by patient/caregiver     Date IMM was signed: 03/16/22  Time IMM was signed: 8122    Contact Info     Duke Cole MD   Specialty: Family Medicine   Relationship: PCP - General    [address]  [address]  [facility]  [place] [address]   Phone: [phone]       Next Steps: Follow up in 1 week(s)    Instructions: Hospital follow up. Per cardiology, to be on triple therapy for 1 month and then drop Aspirin    [facility]   Specialty: Home Health Services    [address]  [id number]  [facility] [address]   Phone: [phone]       Next Steps: Follow up    Instructions: They will call you to schedule date and time.    [facility]    [address]  [place] [address]   Phone: [phone]       Next Steps: Follow up in 2 day(s)    Instructions: Resume Mon/Wed/Fri 11:30am        Monse Kemp LMSW  [facility]  [id number]

## 2022-03-16 NOTE — ASSESSMENT & PLAN NOTE
Baseline Hb 10, Hb 9.6 on admission  Antiplatelet/anticoagulation: Aspirin, Plavix, Eliquis  Likely 2/2 ESRD    - Stop ASA in 1 month

## 2022-03-16 NOTE — PT/OT/SLP PROGRESS
Occupational Therapy      Patient Name:  Melinda Knight   MRN:  [id number]    Patient not seen today secondary to pt in dialysis upon first attempt in am, pt rejected PT secondary to being too tired from dialysis and pending D/C. Will follow-up.    3/16/2022

## 2022-03-16 NOTE — DISCHARGE INSTRUCTIONS
Take Aspirin, Plavix, and Eliquis for 1 month. After 1 month, stop taking Aspirin. Follow closely with your Cardiologist and Nephrologist. Please take great care with new dialysis line. I have placed a referral for you to see vascular surgery for permanent line placement. Take Carvedilol 3.125 mg twice daily

## 2022-03-16 NOTE — ASSESSMENT & PLAN NOTE
Chronic, normotensive on evaluation today. Previously on Coreg 25 mg BID, Losartan 25 mg daily. Held for hypotension with HD.     - Reduced Coreg to 3.125 mg BID  - F/U closely with PCP about resuming Losartan  - Monitor BP

## 2022-03-16 NOTE — PT/OT/SLP PROGRESS
Physical Therapy      Patient Name:  Melinda Knight   MRN:  [id number]    Patient not seen today secondary to  (Pt CHAR at dialysis in AM. Pt declining PT in PM 2* awaiting d/c.) Encouragement provided; however, pt continued to decline. Will follow-up if pt does not d/c as planned.    3/16/2022

## 2022-03-16 NOTE — PLAN OF CARE
03/16/22 1431   Post-Acute Status   Post-Acute Authorization Dialysis;IV Infusion   Diaylsis Status Set-up Complete/Auth obtained   IV Infusion Status Set-up Complete/Auth obtained     SURY faxed vanc prescription and progress notes to [facility] ( [phone], fx [phone]):    Your fax has been successfully sent to [phone] at [phone].  ------------------------------------------------------------  From: [id number]  ------------------------------------------------------------  3/16/2022 4:07:06 PM Transmission Record   Sent to [phone] with remote ID "   Result: (0/339;0/0) Success   Page record: 1 - 7   Elapsed time: 03:42 on channel 43    SURY informed by Ting at [facility] ([phone]) that vanc has been authorized.  SURY informed Ting that the auth needs to be sent to the dialysis clinic who will be the ones providing and administering the vanc (per the note of dialysis social worker Catherine on 3/14/22).    Monse Kemp, JB  [facility]  [id number]

## 2022-03-17 NOTE — TELEPHONE ENCOUNTER
I spoke with the patient's daughter and got her scheduled next week with Dr. Colby. Verbalized understanding of date and time scheduled.

## 2022-03-17 NOTE — PROGRESS NOTES
C3 nurse attempted to contact patient. The following occurred:   C3 nurse attempted to contact Melinda Knight  for a TCC post hospital discharge follow up call. The patient is unable to conduct the call @ this time. The patient requested a callback.    The patient does not have a scheduled HOSFU appointment within 5-7 days post hospital discharge date 03/16/2022  Message sent to Physician staff to assist with HOSFU appointment scheduling.

## 2022-03-17 NOTE — PROGRESS NOTES
PATIENT REQUESTING TO SEE DR SIMON    Please forward this important TCC information to your provider in order to maximize the post discharge care delivery of this patient.    C3 nurse spoke with Melinda Knight for a TCC post hospital discharge follow up call. The patient has a scheduled HOSFU appointment with LO Almeida on 03/22/2022 @ 1000.

## 2022-03-19 NOTE — TELEPHONE ENCOUNTER
D/c'd Thurs. O2 in 80's this AM, O2 increased to 3 LPM, current O2 87%,. Daughter states pt was on 2 LPM in hospital, but normally uses 3 LPM at home continuously. (/50,  at 0930).  C/o nausea, heartburn-resolved, Lt shoulder soreness 4/10-constant. Vomited approximately 0800 x2. C/o feeling hot.   Pt has taken zofran approx 30 mins ago, but remains nauseous.    Current O2 88,   Symptoms of previous heart attack included: Nausea, vomiting, heartburn, then called 911. Pt then began experiencing DONALD.  Current /52,. Current O2 90.  Lt sided groin catheter in place, with recent bleeding complications.   Attempt to call PCP on call, to which there is not one on at this time. Pt referred to ED for further evaluation. Daughter verbalizes understanding.   Reason for Disposition   [1] Chest pain lasting <= 5 minutes AND [2] NO chest pain or cardiac symptoms now(Exception: coronary bypass surgery incision that lasts a few seconds)    Additional Information   Negative: SEVERE difficulty breathing (e.g., struggling for each breath, speaks in single words, bluish lips)   Negative: [1] Weakness (i.e., paralysis, loss of muscle strength) of the face, arm or leg on one side of the body AND [2] sudden onset AND [3] present now   Negative: [1] Numbness (i.e., loss of sensation) of the face, arm or leg on one side of the body AND [2] sudden onset AND [3] present now   Negative: [1] Loss of speech or garbled speech AND [2] sudden onset AND [3] present now   Negative: Difficult to awaken or acting confused (e.g., disoriented, slurred speech)   Negative: Shock suspected (e.g., cold/pale/clammy skin, too weak to stand, low BP, rapid pulse)   Negative: Passed out   Negative: Chest pain lasting > 5 minutes   Negative: [1] Received SHOCK from implantable cardiac defibrillator AND [2] persisting symptoms (i.e., palpitations, lightheadedness)   Negative: Sounds like a life-threatening emergency to  "the triager   Negative: Heart beating < 50 beats per minute OR > 130 beats per minute   Negative: MODERATE difficulty breathing (e.g., speaks in phrases, SOB even at rest, pulse 100-120)   Negative: Chest pain is made worse by taking a deep breath(Exception: coronary bypass surgery incisional pain that lasts a few seconds)   Negative: [1] Intermittent episodes of chest pain or "angina" AND [2] increasing in severity or frequency   Negative: Patient sounds very sick or weak to the triager   Negative: [1] Received SHOCK from implantable cardiac defibrillator AND [2] now feels well   Negative: [1] Caller has URGENT question AND [2] triager unable to answer question   Negative: [1] MODERATE weakness (i.e., interferes with normal activities) AND [2] new-onset or worse than when discharged from hospital   Negative: [1] MILD difficulty breathing (e.g., minimal/no SOB at rest, SOB with walking, pulse <100) AND [2]  worse since discharge from hospital   Negative: [1] Taking Coumadin (warfarin) AND [2] no INR testing performed within 7 days of discharge from hospital    Protocols used: HEART ATTACK POST-HOSPITALIZATION FOLLOW-UP CALL-A-      "

## 2022-03-20 PROBLEM — Z51.5 COMFORT MEASURES ONLY STATUS: Status: ACTIVE | Noted: 2022-01-01

## 2022-03-20 NOTE — ASSESSMENT & PLAN NOTE
76 year old female with ESRD, CAD, HF, CVA on eliquis with multivessel disease found on left heart cath previously and was deemed a poor surgical candidate for a CABG.     NSTEMI with rising troponin    Discussed with cardiology - started on heparin gtt and continued home asa and plavix    See cardiac arrest

## 2022-03-20 NOTE — SUBJECTIVE & OBJECTIVE
Past Medical History:   Diagnosis Date    Acute on chronic diastolic congestive heart failure 4/14/2021    Carotid artery occlusion     Coronary artery disease     Hyperlipidemia     Hypertension     Skin cancer     cyst on the face , was removed 20years +    Stroke     Thyroid disease     Type 2 diabetes mellitus        Past Surgical History:   Procedure Laterality Date    CAROTID ENDARTERECTOMY Left 03/24/2016    CHOLECYSTECTOMY      ESOPHAGOGASTRODUODENOSCOPY Left 9/11/2018    Procedure: EGD (ESOPHAGOGASTRODUODENOSCOPY);  Surgeon: Stevenson Mckee MD;  Location: [facility] ENDO;  Service: Endoscopy;  Laterality: Left;    GALLBLADDER SURGERY      1996    INSERTION OF TUNNELED CENTRAL VENOUS HEMODIALYSIS CATHETER Right 3/15/2022    Procedure: Insertion, Catheter, Central Venous, Hemodialysis;  Surgeon: Emperatriz Kimball MD;  Location: [facility] CATH LAB;  Service: Interventional Nephrology;  Laterality: Right;    LEFT HEART CATHETERIZATION Left 3/3/2022    Procedure: CATHETERIZATION, HEART, LEFT;  Surgeon: Tex Alicea MD;  Location: [facility] CATH LAB;  Service: Cardiology;  Laterality: Left;    RIGHT HEART CATHETERIZATION Right 3/26/2021    Procedure: INSERTION, CATHETER, RIGHT HEART;  Surgeon: Gennaro Sampson MD;  Location: [facility] CATH LAB;  Service: Cardiology;  Laterality: Right;    SKIN BIOPSY      TONSILLECTOMY      TUBAL LIGATION Bilateral 3/18/2016       Review of patient's allergies indicates:   Allergen Reactions    Sulfa (sulfonamide antibiotics) Nausea And Vomiting    Menthol contain prod        Current Facility-Administered Medications on File Prior to Encounter   Medication    [COMPLETED] diphenhydrAMINE injection 25 mg    [COMPLETED] droperidoL injection 1.25 mg    [COMPLETED] furosemide injection 40 mg    [COMPLETED] ondansetron injection 4 mg    [COMPLETED] ondansetron injection 4 mg    [DISCONTINUED] heparin 25,000 units in dextrose 5% (100 units/ml) IV bolus from bag - ADDITIONAL PRN BOLUS - 30 units/kg (max bolus  4000 units)    [DISCONTINUED] heparin 25,000 units in dextrose 5% (100 units/ml) IV bolus from bag - ADDITIONAL PRN BOLUS - 60 units/kg (max bolus 4000 units)    [DISCONTINUED] heparin 25,000 units in dextrose 5% (100 units/ml) IV bolus from bag INITIAL BOLUS (max bolus 4000 units)    [DISCONTINUED] heparin 25,000 units in dextrose 5% 250 mL (100 units/mL) infusion LOW INTENSITY nomogram - [facility]    [COMPLETED] sodium chloride 0.9% bolus 500 mL    [COMPLETED] vancomycin 1.5 g in  mL IVPB (ready to mix)     Current Outpatient Medications on File Prior to Encounter   Medication Sig    apixaban (ELIQUIS) 5 mg (74 tabs) DsPk Start this prescription first. For the first 7 days take two 5 mg tablets twice daily.  After 7 days take one 5 mg tablet twice daily.    apixaban (ELIQUIS) 5 mg Tab Take 1 tablet (5 mg total) by mouth 2 (two) times daily. Start this prescription after finishing starter pack (Patient not taking: Reported on 3/17/2022)    aspirin 81 MG Chew Take 1 tablet (81 mg total) by mouth once daily. Stop taking aspirin on 4/15/22.    atorvastatin (LIPITOR) 80 MG tablet Take 1 tablet (80 mg total) by mouth once daily.    blood sugar diagnostic (BLOOD GLUCOSE TEST) Strp 1 strip by Misc.(Non-Drug; Combo Route) route 2 (two) times daily. Prodigy    carvediloL (COREG) 3.125 MG tablet Take 1 tablet (3.125 mg total) by mouth 2 (two) times daily. Please discuss with primary care doctor prior to continuing other blood pressure medications    citalopram (CELEXA) 20 MG tablet Take 1 tablet by mouth once daily    clopidogreL (PLAVIX) 75 mg tablet Take 1 tablet (75 mg total) by mouth once daily.    insulin detemir U-100 (LEVEMIR FLEXTOUCH) 100 unit/mL (3 mL) SubQ InPn pen Inject 30 Units into the skin 2 (two) times daily.    levothyroxine (SYNTHROID) 75 MCG tablet Take 1 tablet (75 mcg total) by mouth before breakfast.    linaCLOtide (LINZESS) 72 mcg Cap capsule Take 1 capsule (72 mcg total) by mouth before breakfast.     "multivitamin (THERAGRAN) per tablet Take 1 tablet by mouth once daily.    nitroGLYCERIN (NITROSTAT) 0.3 MG SL tablet Place 1 tablet (0.3 mg total) under the tongue every 5 (five) minutes as needed for Chest pain.    pantoprazole (PROTONIX) 40 MG tablet Take 1 tablet by mouth once daily    pen needle, diabetic (NOVOTWIST) 32 gauge x 1/5" Ndle USE AS DIRECTED WITH  INSULIN  PEN    senna (SENOKOT) 8.6 mg tablet Take 1 tablet by mouth once daily.    sevelamer carbonate (RENVELA) 800 mg Tab Take 1 tablet (800 mg total) by mouth 3 (three) times daily with meals.    vancomycin HCl (VANCOMYCIN 500 MG/100 ML NS, READY TO MIX, ) Give 500 mg IV three times weekly after HD on HD days. End date 4/6/22    vitamin renal formula, B-complex-vitamin c-folic acid, (NEPHROCAP) 1 mg Cap Take 1 capsule by mouth once daily.     Family History       Problem Relation (Age of Onset)    Diabetes Mother, Sister, Brother    Heart failure Mother    Hypertension Mother          Tobacco Use    Smoking status: Never Smoker    Smokeless tobacco: Never Used   Substance and Sexual Activity    Alcohol use: Not Currently     Alcohol/week: 0.0 standard drinks     Comment: occasions    Drug use: No    Sexual activity: Not Currently     Review of Systems   Constitutional:  Negative for chills and fever.   HENT:  Negative for rhinorrhea and sore throat.    Eyes:  Negative for pain and visual disturbance.   Respiratory:  Negative for cough and shortness of breath.    Cardiovascular:  Negative for chest pain and palpitations.   Gastrointestinal:  Positive for nausea and vomiting. Negative for abdominal pain, constipation and diarrhea.   Endocrine: Negative for cold intolerance, heat intolerance and polyuria.   Genitourinary:  Negative for dysuria and flank pain.   Musculoskeletal:  Negative for back pain, gait problem and joint swelling.   Allergic/Immunologic: Negative for immunocompromised state.   Neurological:  Negative for light-headedness, numbness and " headaches.   Objective:     Vital Signs (Most Recent):  Pulse: (!) 36 (03/20/22 0320)  Resp: (!) 24 (03/20/22 0224)  BP: 126/71 (03/20/22 0224)  SpO2: 99 % (03/20/22 0224)   Vital Signs (24h Range):  Temp:  [98.7 °F (37.1 °C)] 98.7 °F (37.1 °C)  Pulse:  [] 36  Resp:  [20-24] 24  SpO2:  [92 %-99 %] 99 %  BP: (121-169)/(58-73) 126/71     Weight: 89.6 kg (197 lb 8.5 oz)  Body mass index is 37.32 kg/m².    Physical Exam  Constitutional:       General: She is not in acute distress.     Appearance: She is well-developed. She is obese. She is not ill-appearing or toxic-appearing.   HENT:      Head: Normocephalic and atraumatic.   Eyes:      General: No scleral icterus.        Right eye: No discharge.         Left eye: No discharge.      Pupils: Pupils are equal, round, and reactive to light.   Cardiovascular:      Rate and Rhythm: Normal rate and regular rhythm.      Heart sounds: Murmur heard.   Pulmonary:      Effort: Pulmonary effort is normal. No respiratory distress.      Breath sounds: Normal breath sounds. No wheezing.   Abdominal:      General: Bowel sounds are normal. There is no distension.      Palpations: Abdomen is soft. There is no mass.      Tenderness: There is no abdominal tenderness.   Musculoskeletal:         General: Swelling present. Normal range of motion.      Cervical back: Normal range of motion and neck supple.      Right lower leg: Edema present.      Left lower leg: Edema present.   Skin:     General: Skin is warm and dry.      Capillary Refill: Capillary refill takes less than 2 seconds.      Coloration: Skin is pale. Skin is not jaundiced.      Findings: No bruising.   Neurological:      Mental Status: She is alert and oriented to person, place, and time.         CRANIAL NERVES     CN III, IV, VI   Pupils are equal, round, and reactive to light.     Significant Labs: All pertinent labs within the past 24 hours have been reviewed.    Significant Imaging: I have reviewed all pertinent  imaging results/findings within the past 24 hours.

## 2022-03-20 NOTE — NURSING
ABDULLAHI, [facility] and  notified. Paper work from [facility] given to YVONNE Culver.

## 2022-03-20 NOTE — H&P
Gary Bauman - Cardiology [facility] Medicine  History & Physical    Patient Name: Melinda Knight  MRN: [id number]  Patient Class: IP- Inpatient  Admission Date: 3/20/2022  Attending Physician: Christin Randhawa MD   Primary Care Provider: Duke Cole MD         Patient information was obtained from patient, past medical records and ER records.     Subjective:     Principal Problem:Cardiac arrest    Chief Complaint: No chief complaint on file.       HPI: 76 year old female with ESRD, CAD with multivessel disease on ASA/plavix, chronic resp failure 2/2 restrictive lung disease/ILD/pHTN, HTN, HF, CVA on eliquis presents with 1 day of nausea and vomiting. She reports that she was in her normal state of health when she felt nauseous at 0400 on 3/18. She then presented to [facility] where she was found to have a rising troponin from 16 to 20. She denies any chest pain, shortness of breath, back pain, abdominal pain or altered sensorium.     She was previously admitted in March where she was evaluated for a CABG given her multivessel disease. She was ultimately deemed not a candidate and at that time did not want to pursue invasive treatment. She was also treated for MRSA bacteremia. A tunneled cath was placed 3/15.       Past Medical History:   Diagnosis Date    Acute on chronic diastolic congestive heart failure 4/14/2021    Carotid artery occlusion     Coronary artery disease     Hyperlipidemia     Hypertension     Skin cancer     cyst on the face , was removed 20years +    Stroke     Thyroid disease     Type 2 diabetes mellitus        Past Surgical History:   Procedure Laterality Date    CAROTID ENDARTERECTOMY Left 03/24/2016    CHOLECYSTECTOMY      ESOPHAGOGASTRODUODENOSCOPY Left 9/11/2018    Procedure: EGD (ESOPHAGOGASTRODUODENOSCOPY);  Surgeon: Stevenson Mckee MD;  Location: [facility];  Service: Endoscopy;  Laterality: Left;    GALLBLADDER SURGERY      1996    INSERTION OF TUNNELED  CENTRAL VENOUS HEMODIALYSIS CATHETER Right 3/15/2022    Procedure: Insertion, Catheter, Central Venous, Hemodialysis;  Surgeon: Emperatriz Kimball MD;  Location: [facility] CATH LAB;  Service: Interventional Nephrology;  Laterality: Right;    LEFT HEART CATHETERIZATION Left 3/3/2022    Procedure: CATHETERIZATION, HEART, LEFT;  Surgeon: Tex Alicea MD;  Location: [facility] CATH LAB;  Service: Cardiology;  Laterality: Left;    RIGHT HEART CATHETERIZATION Right 3/26/2021    Procedure: INSERTION, CATHETER, RIGHT HEART;  Surgeon: Gennaro Sampson MD;  Location: [facility] CATH LAB;  Service: Cardiology;  Laterality: Right;    SKIN BIOPSY      TONSILLECTOMY      TUBAL LIGATION Bilateral 3/18/2016       Review of patient's allergies indicates:   Allergen Reactions    Sulfa (sulfonamide antibiotics) Nausea And Vomiting    Menthol contain prod        Current Facility-Administered Medications on File Prior to Encounter   Medication    [COMPLETED] diphenhydrAMINE injection 25 mg    [COMPLETED] droperidoL injection 1.25 mg    [COMPLETED] furosemide injection 40 mg    [COMPLETED] ondansetron injection 4 mg    [COMPLETED] ondansetron injection 4 mg    [DISCONTINUED] heparin 25,000 units in dextrose 5% (100 units/ml) IV bolus from bag - ADDITIONAL PRN BOLUS - 30 units/kg (max bolus 4000 units)    [DISCONTINUED] heparin 25,000 units in dextrose 5% (100 units/ml) IV bolus from bag - ADDITIONAL PRN BOLUS - 60 units/kg (max bolus 4000 units)    [DISCONTINUED] heparin 25,000 units in dextrose 5% (100 units/ml) IV bolus from bag INITIAL BOLUS (max bolus 4000 units)    [DISCONTINUED] heparin 25,000 units in dextrose 5% 250 mL (100 units/mL) infusion LOW INTENSITY nomogram - [facility]    [COMPLETED] sodium chloride 0.9% bolus 500 mL    [COMPLETED] vancomycin 1.5 g in  mL IVPB (ready to mix)     Current Outpatient Medications on File Prior to Encounter   Medication Sig    apixaban (ELIQUIS) 5 mg (74 tabs) DsPk Start this prescription  "first. For the first 7 days take two 5 mg tablets twice daily.  After 7 days take one 5 mg tablet twice daily.    apixaban (ELIQUIS) 5 mg Tab Take 1 tablet (5 mg total) by mouth 2 (two) times daily. Start this prescription after finishing starter pack (Patient not taking: Reported on 3/17/2022)    aspirin 81 MG Chew Take 1 tablet (81 mg total) by mouth once daily. Stop taking aspirin on 4/15/22.    atorvastatin (LIPITOR) 80 MG tablet Take 1 tablet (80 mg total) by mouth once daily.    blood sugar diagnostic (BLOOD GLUCOSE TEST) Strp 1 strip by Misc.(Non-Drug; Combo Route) route 2 (two) times daily. Prodigy    carvediloL (COREG) 3.125 MG tablet Take 1 tablet (3.125 mg total) by mouth 2 (two) times daily. Please discuss with primary care doctor prior to continuing other blood pressure medications    citalopram (CELEXA) 20 MG tablet Take 1 tablet by mouth once daily    clopidogreL (PLAVIX) 75 mg tablet Take 1 tablet (75 mg total) by mouth once daily.    insulin detemir U-100 (LEVEMIR FLEXTOUCH) 100 unit/mL (3 mL) SubQ InPn pen Inject 30 Units into the skin 2 (two) times daily.    levothyroxine (SYNTHROID) 75 MCG tablet Take 1 tablet (75 mcg total) by mouth before breakfast.    linaCLOtide (LINZESS) 72 mcg Cap capsule Take 1 capsule (72 mcg total) by mouth before breakfast.    multivitamin (THERAGRAN) per tablet Take 1 tablet by mouth once daily.    nitroGLYCERIN (NITROSTAT) 0.3 MG SL tablet Place 1 tablet (0.3 mg total) under the tongue every 5 (five) minutes as needed for Chest pain.    pantoprazole (PROTONIX) 40 MG tablet Take 1 tablet by mouth once daily    pen needle, diabetic (NOVOTWIST) 32 gauge x 1/5" Ndle USE AS DIRECTED WITH  INSULIN  PEN    senna (SENOKOT) 8.6 mg tablet Take 1 tablet by mouth once daily.    sevelamer carbonate (RENVELA) 800 mg Tab Take 1 tablet (800 mg total) by mouth 3 (three) times daily with meals.    vancomycin HCl (VANCOMYCIN 500 MG/100 ML NS, READY TO MIX, ) Give 500 mg " IV three times weekly after HD on HD days. End date 4/6/22    vitamin renal formula, B-complex-vitamin c-folic acid, (NEPHROCAP) 1 mg Cap Take 1 capsule by mouth once daily.     Family History       Problem Relation (Age of Onset)    Diabetes Mother, Sister, Brother    Heart failure Mother    Hypertension Mother          Tobacco Use    Smoking status: Never Smoker    Smokeless tobacco: Never Used   Substance and Sexual Activity    Alcohol use: Not Currently     Alcohol/week: 0.0 standard drinks     Comment: occasions    Drug use: No    Sexual activity: Not Currently     Review of Systems   Constitutional:  Negative for chills and fever.   HENT:  Negative for rhinorrhea and sore throat.    Eyes:  Negative for pain and visual disturbance.   Respiratory:  Negative for cough and shortness of breath.    Cardiovascular:  Negative for chest pain and palpitations.   Gastrointestinal:  Positive for nausea and vomiting. Negative for abdominal pain, constipation and diarrhea.   Endocrine: Negative for cold intolerance, heat intolerance and polyuria.   Genitourinary:  Negative for dysuria and flank pain.   Musculoskeletal:  Negative for back pain, gait problem and joint swelling.   Allergic/Immunologic: Negative for immunocompromised state.   Neurological:  Negative for light-headedness, numbness and headaches.   Objective:     Vital Signs (Most Recent):  Pulse: (!) 36 (03/20/22 0320)  Resp: (!) 24 (03/20/22 0224)  BP: 126/71 (03/20/22 0224)  SpO2: 99 % (03/20/22 0224)   Vital Signs (24h Range):  Temp:  [98.7 °F (37.1 °C)] 98.7 °F (37.1 °C)  Pulse:  [] 36  Resp:  [20-24] 24  SpO2:  [92 %-99 %] 99 %  BP: (121-169)/(58-73) 126/71     Weight: 89.6 kg (197 lb 8.5 oz)  Body mass index is 37.32 kg/m².    Physical Exam  Constitutional:       General: She is not in acute distress.     Appearance: She is well-developed. She is obese. She is not ill-appearing or toxic-appearing.   HENT:      Head: Normocephalic and  atraumatic.   Eyes:      General: No scleral icterus.        Right eye: No discharge.         Left eye: No discharge.      Pupils: Pupils are equal, round, and reactive to light.   Cardiovascular:      Rate and Rhythm: Normal rate and regular rhythm.      Heart sounds: Murmur heard.   Pulmonary:      Effort: Pulmonary effort is normal. No respiratory distress.      Breath sounds: Normal breath sounds. No wheezing.   Abdominal:      General: Bowel sounds are normal. There is no distension.      Palpations: Abdomen is soft. There is no mass.      Tenderness: There is no abdominal tenderness.   Musculoskeletal:         General: Swelling present. Normal range of motion.      Cervical back: Normal range of motion and neck supple.      Right lower leg: Edema present.      Left lower leg: Edema present.   Skin:     General: Skin is warm and dry.      Capillary Refill: Capillary refill takes less than 2 seconds.      Coloration: Skin is pale. Skin is not jaundiced.      Findings: No bruising.   Neurological:      Mental Status: She is alert and oriented to person, place, and time.         CRANIAL NERVES     CN III, IV, VI   Pupils are equal, round, and reactive to light.     Significant Labs: All pertinent labs within the past 24 hours have been reviewed.    Significant Imaging: I have reviewed all pertinent imaging results/findings within the past 24 hours.    Assessment/Plan:     * Cardiac arrest  3/20 approx 0300 patient had a cardiac arrest. ACLS was started. It was noted that she had previously been a DNR with LaPOST documentation; family was called and decision was made to make comfort a goal. See code documentation. ROSC was achieved, however patient passed at 0352.      Family was updated at bedside. Questions answered.     Comfort measures only status        NSTEMI (non-ST elevated myocardial infarction)  76 year old female with ESRD, CAD, HF, CVA on eliquis with multivessel disease found on left heart cath  previously and was deemed a poor surgical candidate for a CABG.     NSTEMI with rising troponin    Discussed with cardiology - started on heparin gtt and continued home asa and plavix    See cardiac arrest      Type 2 diabetes mellitus with diabetic polyneuropathy  See cardiac arrest      Type 2 diabetes mellitus with diabetic retinopathy  See cardiac arrest      Mixed hyperlipidemia  See cardiac arrest      Essential hypertension  See Cardiac arrest        VTE Risk Mitigation (From admission, onward)         Ordered     IP VTE HIGH RISK PATIENT  Once         03/20/22 0117     Place sequential compression device  Until discontinued         03/20/22 0117                   Ry Bravo MD  Department of Hospital Medicine   [facility] - Cardiology Stepdown

## 2022-03-20 NOTE — ASSESSMENT & PLAN NOTE
3/20 approx 0300 patient had a cardiac arrest. ACLS was started. It was noted that she had previously been a DNR with LaPOST documentation; family was called and decision was made to make comfort a goal. See code documentation. ROSC was achieved, however patient passed at 0352.      Family was updated at bedside. Questions answered.

## 2022-03-20 NOTE — DISCHARGE SUMMARY
Gary Bauman - Cardiology [facility] Medicine  Discharge Summary      Patient Name: Melinda Knight  MRN: [id number]  Patient Class: IP- Inpatient  Admission Date: 3/20/2022  Hospital Length of Stay: 0 days  Discharge Date and Time:  03/20/2022 4:42 AM  Attending Physician: Christin Randhawa MD   Discharging Provider: Ry Bravo MD  Primary Care Provider: Duke Cole MD  Hospital Medicine Team[place] [facility] Ry Bravo MD    HPI:   76 year old female with ESRD, CAD with multivessel disease on ASA/plavix, chronic resp failure 2/2 restrictive lung disease/ILD/pHTN, HTN, HF, CVA on eliquis presents with 1 day of nausea and vomiting. She reports that she was in her normal state of health when she felt nauseous at 0400 on 3/18. She then presented to [facility] where she was found to have a rising troponin from 16 to 20. She denies any chest pain, shortness of breath, back pain, abdominal pain or altered sensorium.     She was previously admitted in March where she was evaluated for a CABG given her multivessel disease. She was ultimately deemed not a candidate and at that time did not want to pursue invasive treatment. She was also treated for MRSA bacteremia. A tunneled cath was placed 3/15.       * No surgery found *      Hospital Course:   No notes on file     Goals of Care Treatment Preferences:  Code Status: DNR    Health care agent: [facility] agent number: [phone]    Living Will: Yes  LaPOST: Yes           Consults:     * Cardiac arrest  3/20 approx 0300 patient had a cardiac arrest. ACLS was started. It was noted that she had previously been a DNR with LaPOST documentation; family was called and decision was made to make comfort a goal. See code documentation. ROSC was achieved, however patient passed at 0352.      Family was updated at bedside. Questions answered.       Final Active Diagnoses:    Diagnosis Date Noted POA    PRINCIPAL PROBLEM:  Cardiac arrest [I46.9]  Unknown     Comfort measures only status [Z51.5] 2022 Not Applicable    NSTEMI (non-ST elevated myocardial infarction) [I21.4] 2021 Yes    Type 2 diabetes mellitus with diabetic retinopathy [E11.319] 2016 Yes     Chronic    Type 2 diabetes mellitus with diabetic polyneuropathy [E11.42] 2016 Yes     Chronic    Mixed hyperlipidemia [E78.2] 2016 Yes     Chronic    Essential hypertension [I10] 2016 Yes     Chronic      Problems Resolved During this Admission:       Discharged Condition:     Disposition:     Follow Up:    Patient Instructions:   No discharge procedures on file.    Significant Diagnostic Studies:     Pending Diagnostic Studies:     Procedure Component Value Units Date/Time    US Soft Tissue, Groin Left [[id number]]     Order Status: Sent Lab Status: No result     X-Ray Abdomen AP 1 View [[id number]]     Order Status: Sent Lab Status: No result          Medications:  None    Indwelling Lines/Drains at time of discharge:   Lines/Drains/Airways     Central Venous Catheter Line  Duration                Hemodialysis Catheter 03/15/22 1128 left femoral 4 days                Time spent on the discharge of patient: 45 minutes         Ry Bravo MD  Department of Hospital Medicine  [facility] - Cardiology Stepdown

## 2022-03-20 NOTE — CODE/ RAPID DOCUMENTATION
"  CODE BLUE RAPID RESPONSE NURSE NOTE       Admit Date: 3/20/2022  LOS: 0  Code Status: DNR   Date of Consult: 2022  : 1945  Age: 76 y.o.  Weight:   Wt Readings from Last 1 Encounters:   22 89.6 kg (197 lb 8.5 oz)     Sex: female  Race: White   Bed: 328/328 A:   MRN: [id number]  Time Rapid Response Team page Received: 025  Time Rapid Response Team at Bedside: 025  Time Rapid Response Team left Bedside: 033  Was the patient discharged from an ICU this admission? No   Was the patient discharged from a PACU within last 24 hours? No   Did the patient receive conscious sedation/general anesthesia in last 24 hours? No  Was the patient in the ED within the past 24 hours? No  Was the patient on NIPPV within the past 24 hours? No   Did this progress into an ARC or CPA: Cardio Pulmonary Arrest  Attending Physician: Christin Randhawa MD  Primary Service: [facility] 2    SITUATION                   Notified by unit secretary via phone call.  Reason for alert: stated "I need to call a rapid on 328"  Called to evaluate the patient for Circulatory    BACKGROUND    Why is the patient in the hospital?: <principal problem not specified>   has a past medical history of Acute on chronic diastolic congestive heart failure, Carotid artery occlusion, Coronary artery disease, Hyperlipidemia, Hypertension, Skin cancer, Stroke, Thyroid disease, and Type 2 diabetes mellitus.    24 Hours Vitals Range:  Temp:  [98.7 °F (37.1 °C)]   Pulse:  []   Resp:  [20-24]   BP: (121-169)/(58-73)   SpO2:  [92 %-99 %]     Labs:  Recent Labs     22  1627 22  2321 22  0207   WBC 13.52* 13.14* 14.85*   HGB 8.4* 7.8* 8.2*   HCT 28.2* 26.6* 27.4*    230 223       Recent Labs     22  1714 22  1627    143   K 5.1 4.5    98   CO2 32* 34*   CREATININE 3.0* 3.1*    178*        No results for input(s): PH, PCO2, PO2, HCO3, POCSATURATED, BE in the last 72 hours. "     ASSESSMENT/INTERVENTIONS    What did you find: CPR in progress  Code blue called overhead at 0256, code team arrival at 0258    Time of CPR initiation: 0250  Initial rhythm: asystole  Time of first shock: n/a  Time of first Epinephrine dose: 0256  ETCO2 utilized to guide CPR: No  Patient on telemetry prior to arrest: yes  IV access present and patent prior to event: yes  Debrief completed after event: yes    0257 1 amp bicarb given  0258 2g Mag given   2 rounds high-quality CPR completed    Please see Code Blue Documentation for more details.    OUTCOME    ROSC obtained at 0259. , /89.   However, after MD conversation with patient's daughter over the phone, patient determined to be a DNR. Due to this status, patient was not intubated, and decision was made to make patient comfortable while waiting on family to arrive    Disposition: Remain in room 328.    CODE TEAM MEMBERS    : Dr. Bravo with   Dr Colbert with critical care also at bedside    Resident/WINSTON: Nella Sullivan NP    RRN: YVONNE Ty RN    Bedside RN: KAREN Carmen RN    Charge RN: Abiola HERNANDEZ RN    RRT: Pranay    Additional staff: Edgar BAILEY RN

## 2022-03-20 NOTE — NURSING
"Pt awake, alert. Stopped vanc, flushed IV and  Pt asked what did I just give her in her IV. Explained it was just a flush so I can start the next IV abx. Pt stated "I had a hot flash but it went away fast". I asked her if that happens often, before she could answer, her eyes rolled back, she  became rigid, and unresponsive. I told the xray tech in the door way to call a rapid as I lowered the bed. Nurses Murphy and Liz came in room. Nurse Murphy started compressions. Nurses Edgar Culver and rapid response nurses cam in and code blue iniated. See code record for details.   "

## 2022-03-20 NOTE — CODE DOCUMENTATION
"CODE SUMMARY  Critical Care Medicine    Admit Date: 3/20/2022  LOS: 0    CC: NTEMI/cardiac arrest    Code Status: DNR   Code Date: 2022    CODE Metrics:     Location of CODE: 328/328 A  Patient Age: 76 y.o.  MRN: [id number]  Was the patient discharged from an ICU this admission (include date)? no  Was the patient discharged from a PACU within last 24 hours? no  Did the patient receive conscious sedation/general anesthesia within last 24 hours? no  Was the patient in the ED within the past 24 hours? yes  Was the patient started on NIPPV within the past 24 hours? no  Attending Physician: Christin Randhawa MD  Primary Service: [facility] 2  Primary Service Notified?: yes    CODE Category (Acute Respiratory Compromise or Cardiopulmonary Arrest): cardiopulmonary arrest  Time of need for chest compressions or airway support: 02:50  Time CPR initiated: 02:50  Time CODE Page Received: 02:56   Time CODE team arrived: 02:57  First documented rhythm: unknown if PEA or asystole  Time to first epinephrine given: 02:56  Time to first defibrillation: n/a  Time to intubation: n/a  ROSC? (if yes provide time): 02:59  Post cardiac arrest hypothermia considered?: deferred  Disposition (transferred to ICU, , etc): transitioned to comfort measures    CODE Team Members:  [facility] Resident/Fellow: Jarod Colbert MD  Cardiology Fellow: n/a  Anesthesia Resident: Christopher Alexander MD  [facility] APC: Nella Sullivan NP  CODE : Sayra ANDREW & Nate NP  CODE Team Recorder: CSTRUDY RN  CNICU Nurse #1: Arelis John RN  CNICU Nurse #2: Gabriel Chavez RN    SUBJECTIVE:     Events preceding cardiopulmonary arrest: Patient transferred from [facility] with NSTEMI. Shortly after arrival to [facility], patient had an episode of "feeling flushed" and then went into pea arrest.     Objective:     Physical Exam:  Last Vitals:  Vitals:    22 0224   BP: 126/71   Pulse: (!) 119   Resp: (!) 24     GA: Comatose, unresponsive.  HEENT: No scleral icterus. "   Pulmonary: Respirations assisted with BVM  Cardiac: Pulseless. No chest deformities.   Abdominal: No visible abdominal lesions.   Neuro:  --GCS: E1 V1 M1  --Mental Status:  unresponsive    IV Access PTA::       Hemodialysis Catheter 03/15/22 1128 left femoral (Active)           Peripheral IV - Single Lumen 03/19/22 1628 20 G Right Antecubital (Active)   Site Assessment Clean;Dry;Intact 03/19/22 1628   Extremity Assessment Distal to IV Warm;Dry 03/19/22 1628   Line Status Blood return noted;Flushed;Saline locked 03/19/22 1628   Dressing Status Clean;Dry;Intact 03/19/22 1628            Peripheral IV - Single Lumen 03/19/22 2343 22 G Right Upper Arm (Active)   Dressing Intervention First dressing 03/19/22 2343       Labs:  ABG: No results for input(s): PH, PO2, PCO2, HCO3, POCSATURATED, BE in the last 24 hours.  BMP:  Recent Labs   Lab 03/19/22  1627      K 4.5   CL 98   CO2 34*   BUN 31*   CREATININE 3.1*   *     LFT:   Lab Results   Component Value Date     (H) 03/19/2022    ALT 19 03/19/2022    ALKPHOS 77 03/19/2022    BILITOT 0.4 03/19/2022    ALBUMIN 3.1 (L) 03/19/2022    PROT 6.1 03/19/2022     CBC:   Lab Results   Component Value Date    WBC 14.85 (H) 03/20/2022    HGB 8.2 (L) 03/20/2022    HCT 27.4 (L) 03/20/2022     (H) 03/20/2022     03/20/2022       CODE Timeline: Time/Event     See nursing documentation for specifics regarding times & dosages of medications. Patient rec'd x1 epi, x1 bicarb and x1 mag. On initial rhythm check patient was found to have ROSC with sinus tach as rhythm. It was then that the code team was informed that the patient is DNR/DNI. Team discussed with family that rosc had been achieved, and that decision needed to be made regarding proceeding with intubation vs proceeding with comfort care. Family elected to proceed with comfort care. Primary team assumed care of the patient and she remained on the floor.       Critical Care Time (uninterrupted) 20  zoë Sullivan NP  Critical Care Medicine

## 2022-03-20 NOTE — NURSING
Family at bedside. Dr. Bravo pronounced time of death as 0352.  called to be with family. Family sad/tearful and calm.

## 2022-03-20 NOTE — HPI
76 year old female with ESRD, CAD with multivessel disease on ASA/plavix, chronic resp failure 2/2 restrictive lung disease/ILD/pHTN, HTN, HF, CVA on eliquis presents with 1 day of nausea and vomiting. She reports that she was in her normal state of health when she felt nauseous at 0400 on 3/18. She then presented to [facility] where she was found to have a rising troponin from 16 to 20. She denies any chest pain, shortness of breath, back pain, abdominal pain or altered sensorium.     She was previously admitted in March where she was evaluated for a CABG given her multivessel disease. She was ultimately deemed not a candidate and at that time did not want to pursue invasive treatment. She was also treated for MRSA bacteremia. A tunneled cath was placed 3/15.

## 2022-03-20 NOTE — SIGNIFICANT EVENT
Code blue was called overhead and the critical care team arrived at the bedside at approximately 2:55am. At that time CPR was being performed with Dr. Bravo and the rapid response team at the bedside, and she had received 1 dose of epi and 1 amp of bicarb. There was a discrepancy in code status between previous [place] documentation (DNR) and current code status (Full). I called the patient's daughter Sherry Delgadillo to inform her that her mother had a PEA arrest and was undergoing CPR and she informed me that the patient did not want to be resuscitated or to receive any form of life support. The code was stopped and at that time the patient had achieved ROSC.    Upon further discussion with Sherry and Dr. Bravo regarding overall goals of care, we will shift to comfort measures only and address her pain and dyspnea with IV morphine. Sherry and the patient's other daughter are coming to the hospital now and will arrive in approximately 1 hour.         Jorge Daniel, DO  Critical Care Medicine, PGY-III

## 2022-03-20 NOTE — NURSING
Pt received from EMS. Awake, alert,calm/cooperative. C/o nausea and generalized weakness. Hep & Vanc infusing per order on arrival to right AC. Dialysis cath to left groin.   Denies pain. O2 @ 3L/NC. No sob noted. Generalized non pitting edema. Color is light gray/dusky, unable to get BP with machine, manual obtained 104/58 - verbalized concern/changes from last admission to Dr Bravo at bedside. Tele applied.

## 2022-03-20 NOTE — SIGNIFICANT EVENT
Critical Care Medicine                                                                                       Death Note      Called to bedside by patient's nurse. Nursing supervisor notified. Family at bedside.  has been called and is also at bedside.    Patient is not responding to verbal or tactile stimuli. Patient does not have a papillary or corneal reflex. Her pupils are fixed and dilated. No heart or breath sounds on auscultation. No respirations. No palpable pulses.     Time of death: 0352     Cause of Death: cardiac arrest    MD [email or web address]

## 2022-03-29 NOTE — PHYSICIAN QUERY
"  PT Name: Melinda Knight  MR #: [id number]     DOCUMENTATION CLARIFICATION      CDS/: Eric Cintron Jr, RN CCDS              Contact information:[email or web address]  This form is a permanent document in the medical record.     Query Date: March 29, 2022    By submitting this query, we are merely seeking further clarification of documentation.  Please utilize your independent clinical judgment when addressing the question(s) below.     The Medical Record contains the following:    Clinical Information Location in Medical Record   Code blue was called overhead and the critical care team arrived at the bedside at approximately 2:55am    ceFAZolin 2 gram in dextrose 5% 100 mL IVPB (premix)    Temp=99.8    Pulse=118-->119    TI=839//71    WBC=14.85    Events preceding cardiopulmonary arrest: Patient transferred from [facility] with NSTEMI. Shortly after arrival to [facility], patient had an episode of "feeling flushed" and then went into pea arrest.     Patient is not responding to verbal or tactile stimuli. Patient does not have a papillary or corneal reflex. Her pupils are fixed and dilated. No heart or breath sounds on auscultation. No respirations. No palpable pulses.      Time of death: 0352      Cause of Death: cardiac arrest    She was previously admitted in March where she was evaluated for a CABG given her multivessel disease. She was ultimately deemed not a candidate and at that time did not want to pursue invasive treatment. She was also treated for MRSA bacteremia. A tunneled cath was placed 3/15 3/20 Significant Event Note      3/20 MAR    3/20 VS Flow Sheet    3/20 VS Flow Sheet    3/20 VS Flow Sheet    3/20 Labs    3/20 Code Documentation         3/20 Significant Event Note                  3/20 Discharge Summary     Please document your best medical opinion regarding the etiology of the         Cardiac Arrest        ?       [ x  ] NSTEMI   [   ] Other etiology (please specify the other etiology): "   [   ] Clinically Undetermined             Please document in your progress notes daily for the duration of treatment, until resolved, and include in your discharge summary.

## 2022-03-29 NOTE — PHYSICIAN QUERY
"  PT Name: Melinda Knight  MR #: [id number]     DOCUMENTATION CLARIFICATION      CDS/: Eric Cintron Jr, RN CCDS              Contact information:[email or web address]  This form is a permanent document in the medical record.     Query Date: March 29, 2022    By submitting this query, we are merely seeking further clarification of documentation.  Please utilize your independent clinical judgment when addressing the question(s) below.     The Medical Record contains the following:    Clinical Information Location in Medical Record   Code blue was called overhead and the critical care team arrived at the bedside at approximately 2:55am    ceFAZolin 2 gram in dextrose 5% 100 mL IVPB (premix)    Temp=99.8    Pulse=118-->119    TO=799//71    WBC=14.85    Events preceding cardiopulmonary arrest: Patient transferred from [facility] with NSTEMI. Shortly after arrival to [facility], patient had an episode of "feeling flushed" and then went into pea arrest.     Patient is not responding to verbal or tactile stimuli. Patient does not have a papillary or corneal reflex. Her pupils are fixed and dilated. No heart or breath sounds on auscultation. No respirations. No palpable pulses.      Time of death: 0352      Cause of Death: cardiac arrest    She was previously admitted in March where she was evaluated for a CABG given her multivessel disease. She was ultimately deemed not a candidate and at that time did not want to pursue invasive treatment. She was also treated for MRSA bacteremia. A tunneled cath was placed 3/15 3/20 Significant Event Note      3/20 MAR    3/20 VS Flow Sheet    3/20 VS Flow Sheet    3/20 VS Flow Sheet    3/20 Labs    3/20 Code Documentation         3/20 Significant Event Note                  3/20 Discharge Summary     Please document your best medical opinion regarding the etiology of __ _____?       [   ]    [   ] Other etiology (please specify):___________________   [  ] Clinically Undetermined "       Present on admission (POA) status:   [   ] Yes (Y)                          [  ] Clinically Undetermined (W)  [   ] No (N)                            [   ] Documentation insufficient to determine if condition is POA (U)         Please document in your progress notes daily for the duration of treatment, until resolved, and include in your discharge summary.

## 2022-07-14 NOTE — TELEPHONE ENCOUNTER
Discharge Planning    SW informed Pathology needs to see the patient in order to inform of her cancer treatment needs. SW to follow up with  SNFs with updates as they need updates in order to inform SW of acceptance into their facility. ADDENDUM 2:57PM    MD informed SW  that this patient has full blown cancer and will be transferred to the 8th floor to receive chemo. [id number] [address] [place] [address] [facility], [address] [place] [address], [address] [place] [address],  [address], and [employer] updated. Updates have been sent on 7/14. SW to continue to follow.     [facility], [address] [place], pager: [phone] Schedule VV for tomorrow

## 2024-05-29 NOTE — CONSULTS
Problem: Respiratory  Goal: No signs of respiratory distress (eg. Use of accessory muscles. Peds grunting)  Outcome: Progressing      Pharmacokinetic Assessment Follow Up: IV Vancomycin    Vancomycin serum concentration assessment(s):    S/p HD 3/7. Labs not drawn this AM. Concentration likely < 20 mcg/mL.    Vancomycin Regimen Plan:  1. Vancomycin 750 mg IV x 1  2. Concentration in AM 3/9/22; goal 15-20 mcg/mL    Thank you for the consult, will continue to follow  Niko Reich.D., BCPS  [id number]       Patient brief summary:  Melinda Knight is a 76 y.o. female initiated on antimicrobial therapy with IV Vancomycin for treatment of MRSA bacteremia       Drug levels (last 3 results):  Recent Labs   Lab Result Units 03/06/22  0356 03/07/22  0335   Vancomycin, Random ug/mL 17.4 20.7       Drug Allergies:   Review of patient's allergies indicates:   Allergen Reactions    Sulfa (sulfonamide antibiotics) Nausea And Vomiting    Menthol contain prod        Actual Body Weight:   90 kg    Renal Function:   Estimated Creatinine Clearance: 9.3 mL/min (A) (based on SCr of 5.4 mg/dL (H)).,     Dialysis Method (if applicable):  HD MWF    CBC (last 72 hours):  Recent Labs   Lab Result Units 03/06/22  0356 03/07/22  0336   WBC K/uL 7.42 8.44   Hemoglobin g/dL 9.6* 10.0*   Hematocrit % 30.7* 33.3*   Platelets K/uL 242 256   Gran % % 60.4 59.8   Lymph % % 20.4 19.4   Mono % % 12.4 12.2   Eosinophil % % 5.1 6.4   Basophil % % 1.3 0.9   Differential Method  Automated Automated       Metabolic Panel (last 72 hours):  Recent Labs   Lab Result Units 03/06/22  0356 03/07/22  0335   Sodium mmol/L 128* 129*   Potassium mmol/L 4.6 4.9   Chloride mmol/L 93* 92*   CO2 mmol/L 25 22*   Glucose mg/dL 106 119*   BUN mg/dL 45* 58*   Creatinine mg/dL 4.9* 5.4*   Albumin g/dL 2.2* 2.3*   Magnesium mg/dL  --  1.7   Phosphorus mg/dL 4.9* 5.2*       Vancomycin Administrations:  vancomycin given in the last 96 hours                   vancomycin 500 mg in dextrose 5 % 100 mL IVPB (ready to mix system) (mg) 500 mg New Bag 03/06/22 1108    vancomycin 2 g in dextrose 5 % 500 mL IVPB (mg)  2,000 mg New Bag 03/04/22 0841                Microbiologic Results:  Microbiology Results (last 7 days)     Procedure Component Value Units Date/Time    Blood culture [[id number]] Collected: 03/04/22 1039    Order Status: Completed Specimen: Blood Updated: 03/07/22 1612     Blood Culture, Routine No Growth to date      No Growth to date      No Growth to date      No Growth to date    Blood culture [[id number]] Collected: 03/04/22 1039    Order Status: Completed Specimen: Blood from Peripheral, Right Updated: 03/07/22 1612     Blood Culture, Routine No Growth to date      No Growth to date      No Growth to date      No Growth to date

## (undated) DEVICE — HEMOSTAT VASC BAND REG 24CM

## (undated) DEVICE — BAG DRAINAGE W/SPIKE

## (undated) DEVICE — BAG SNAP KOVER BAND 36 X 28 IN

## (undated) DEVICE — SUT VICRYL + 2-0 36IN CP-1

## (undated) DEVICE — CATH ARI 4FR

## (undated) DEVICE — GLOVE BIOGEL SKINSENSE PI 7.5

## (undated) DEVICE — SEE MEDLINE ITEM 157187

## (undated) DEVICE — KIT MICROINTRO 4F .018X40X7CM

## (undated) DEVICE — CATH OPTITORQUE RADIAL 5FR

## (undated) DEVICE — GUIDEWIRE EMERALD 150CM PTFE

## (undated) DEVICE — SEE MEDLINE ITEM 156894

## (undated) DEVICE — GLOVE BIOGEL SKINSENSE PI 7.0

## (undated) DEVICE — KIT PROBE COVER WITH GEL

## (undated) DEVICE — KIT GLIDESHEATH SLEND 6FR 10CM

## (undated) DEVICE — PROTECTION STATION PLUS

## (undated) DEVICE — DRAPE OPTIMA MAJOR PEDIATRIC

## (undated) DEVICE — TRAY PICC CENTRAL LINE DRSNG

## (undated) DEVICE — MANIFOLD PERCEPTOR MP 3P RH ON

## (undated) DEVICE — OMNIPAQUE 350MG 150ML VIAL

## (undated) DEVICE — DRESSING TRANS 4X4 TEGADERM

## (undated) DEVICE — CATH SWAN GANZ 8FR HEP FREE

## (undated) DEVICE — CATH INFINITI JUDKINS JR4

## (undated) DEVICE — CATH AL1 4FR

## (undated) DEVICE — SYR CNTRL MALE LL 10ML

## (undated) DEVICE — TRAY CORONARY CUSTOM BAPTIST

## (undated) DEVICE — LINE 60IN PRESSURE MON.

## (undated) DEVICE — INTRODUCER RX PSI KIT 8.5 FR

## (undated) DEVICE — GUIDEWIRE ANGIO 1.5MM .035X180

## (undated) DEVICE — DRAPE RAD/FEM ANGIO 80X135IN

## (undated) DEVICE — OMNIPAQUE 300MG 50ML

## (undated) DEVICE — COVER BAND BAG 40 X 40

## (undated) DEVICE — STOPCOCK 3-WAY

## (undated) DEVICE — WIRE GUIDE SAFE-T-J .035 260CM